# Patient Record
Sex: FEMALE | Race: WHITE | NOT HISPANIC OR LATINO | Employment: OTHER | RURAL
[De-identification: names, ages, dates, MRNs, and addresses within clinical notes are randomized per-mention and may not be internally consistent; named-entity substitution may affect disease eponyms.]

---

## 2019-10-09 LAB — HIV 1 AB: NORMAL

## 2020-04-28 ENCOUNTER — HISTORICAL (OUTPATIENT)
Dept: ADMINISTRATIVE | Facility: HOSPITAL | Age: 65
End: 2020-04-28

## 2020-05-07 ENCOUNTER — HISTORICAL (OUTPATIENT)
Dept: ADMINISTRATIVE | Facility: HOSPITAL | Age: 65
End: 2020-05-07

## 2020-10-06 ENCOUNTER — HISTORICAL (OUTPATIENT)
Dept: ADMINISTRATIVE | Facility: HOSPITAL | Age: 65
End: 2020-10-06

## 2020-10-06 LAB
ALBUMIN SERPL BCP-MCNC: 4.7 G/DL (ref 3.5–5)
ALBUMIN/GLOB SERPL: 1.4 {RATIO}
ALP SERPL-CCNC: 122 U/L (ref 50–130)
ALT SERPL W P-5'-P-CCNC: 45 U/L (ref 13–56)
ANION GAP SERPL CALCULATED.3IONS-SCNC: 14 MMOL/L (ref 7–16)
AST SERPL W P-5'-P-CCNC: 30 U/L (ref 15–37)
BASOPHILS # BLD AUTO: 0.05 X10E3/UL (ref 0–0.2)
BASOPHILS NFR BLD AUTO: 1.1 % (ref 0–1)
BILIRUB SERPL-MCNC: 1 MG/DL (ref 0–1.2)
BUN SERPL-MCNC: 15 MG/DL (ref 7–18)
BUN/CREAT SERPL: 16
CALCIUM SERPL-MCNC: 9.5 MG/DL (ref 8.5–10.1)
CHLORIDE SERPL-SCNC: 102 MMOL/L (ref 98–107)
CHOLEST SERPL-MCNC: 202 MG/DL
CHOLEST/HDLC SERPL: 5.6 {RATIO}
CO2 SERPL-SCNC: 25 MMOL/L (ref 21–32)
CREAT SERPL-MCNC: 0.94 MG/DL (ref 0.5–1.02)
EOSINOPHIL # BLD AUTO: 0.08 X10E3/UL (ref 0–0.5)
EOSINOPHIL NFR BLD AUTO: 1.8 % (ref 1–4)
ERYTHROCYTE [DISTWIDTH] IN BLOOD BY AUTOMATED COUNT: 12.7 % (ref 11.5–14.5)
GLOBULIN SER-MCNC: 3.4 G/DL (ref 2–4)
GLUCOSE SERPL-MCNC: 74 MG/DL (ref 74–106)
HCT VFR BLD AUTO: 42.2 % (ref 38–47)
HDLC SERPL-MCNC: 36 MG/DL
HGB BLD-MCNC: 13.7 G/DL (ref 12–16)
IMM GRANULOCYTES # BLD AUTO: 0.01 X10E3/UL (ref 0–0.04)
IMM GRANULOCYTES NFR BLD: 0.2 % (ref 0–0.4)
LDLC SERPL CALC-MCNC: 137 MG/DL
LYMPHOCYTES # BLD AUTO: 0.84 X10E3/UL (ref 1–4.8)
LYMPHOCYTES NFR BLD AUTO: 18.6 % (ref 27–41)
MCH RBC QN AUTO: 28.8 PG (ref 27–31)
MCHC RBC AUTO-ENTMCNC: 32.5 G/DL (ref 32–36)
MCV RBC AUTO: 88.7 FL (ref 80–96)
MONOCYTES # BLD AUTO: 0.41 X10E3/UL (ref 0–0.8)
MONOCYTES NFR BLD AUTO: 9.1 % (ref 2–6)
MPC BLD CALC-MCNC: 10.9 FL (ref 9.4–12.4)
NEUTROPHILS # BLD AUTO: 3.12 X10E3/UL (ref 1.8–7.7)
NEUTROPHILS NFR BLD AUTO: 69.2 % (ref 53–65)
NRBC # BLD AUTO: 0 X10E3/UL (ref 0–0)
NRBC, AUTO (.00): 0 /100 (ref 0–0)
PLATELET # BLD AUTO: 276 X10E3/UL (ref 150–400)
POTASSIUM SERPL-SCNC: 4.4 MMOL/L (ref 3.5–5.1)
PROT SERPL-MCNC: 8.1 G/DL (ref 6.4–8.2)
RBC # BLD AUTO: 4.76 X10E6/UL (ref 4.2–5.4)
SODIUM SERPL-SCNC: 137 MMOL/L (ref 136–145)
TRIGL SERPL-MCNC: 144 MG/DL
TSH SERPL DL<=0.005 MIU/L-ACNC: 0.22 UIU/ML (ref 0.36–3.74)
WBC # BLD AUTO: 4.51 X10E3/UL (ref 4.5–11)

## 2021-02-22 ENCOUNTER — HISTORICAL (OUTPATIENT)
Dept: ADMINISTRATIVE | Facility: HOSPITAL | Age: 66
End: 2021-02-22

## 2021-02-24 LAB
REPORT: NORMAL

## 2021-03-18 ENCOUNTER — OFFICE VISIT (OUTPATIENT)
Dept: DERMATOLOGY | Facility: CLINIC | Age: 66
End: 2021-03-18
Payer: COMMERCIAL

## 2021-03-18 VITALS — WEIGHT: 212 LBS | BODY MASS INDEX: 36.19 KG/M2 | HEIGHT: 64 IN | RESPIRATION RATE: 18 BRPM

## 2021-03-18 DIAGNOSIS — L13.0 DERMATITIS HERPETIFORMIS: Primary | ICD-10-CM

## 2021-03-18 DIAGNOSIS — L30.9 DERMATITIS, UNSPECIFIED: ICD-10-CM

## 2021-03-18 PROCEDURE — 3008F BODY MASS INDEX DOCD: CPT | Mod: CPTII,,, | Performed by: DERMATOLOGY

## 2021-03-18 PROCEDURE — 3008F PR BODY MASS INDEX (BMI) DOCUMENTED: ICD-10-PCS | Mod: CPTII,,, | Performed by: DERMATOLOGY

## 2021-03-18 PROCEDURE — 99212 OFFICE O/P EST SF 10 MIN: CPT | Mod: 25,,, | Performed by: DERMATOLOGY

## 2021-03-18 PROCEDURE — 99212 PR OFFICE/OUTPT VISIT, EST, LEVL II, 10-19 MIN: ICD-10-PCS | Mod: 25,,, | Performed by: DERMATOLOGY

## 2021-03-18 PROCEDURE — 11104 PUNCH BX SKIN SINGLE LESION: CPT | Mod: ,,, | Performed by: DERMATOLOGY

## 2021-03-18 PROCEDURE — 11104 PR PUNCH BIOPSY, SKIN, SINGLE LESION: ICD-10-PCS | Mod: ,,, | Performed by: DERMATOLOGY

## 2021-03-18 RX ORDER — IVERMECTIN 3 MG/1
TABLET ORAL
COMMUNITY
Start: 2021-02-22 | End: 2021-04-22

## 2021-03-18 RX ORDER — METOPROLOL SUCCINATE 25 MG/1
25 TABLET, EXTENDED RELEASE ORAL DAILY
COMMUNITY
Start: 2021-01-12 | End: 2021-07-22 | Stop reason: SDUPTHER

## 2021-03-18 RX ORDER — SULFAMETHOXAZOLE AND TRIMETHOPRIM 800; 160 MG/1; MG/1
TABLET ORAL
COMMUNITY
Start: 2021-03-01 | End: 2021-04-22

## 2021-03-18 RX ORDER — KETOCONAZOLE 20 MG/G
1 CREAM TOPICAL 2 TIMES DAILY
COMMUNITY
Start: 2021-02-04 | End: 2021-04-22

## 2021-03-18 RX ORDER — TRIAMCINOLONE ACETONIDE 1 MG/G
CREAM TOPICAL
COMMUNITY
Start: 2021-02-22 | End: 2021-06-08 | Stop reason: SDUPTHER

## 2021-03-18 RX ORDER — CLINDAMYCIN HYDROCHLORIDE 150 MG/1
CAPSULE ORAL
COMMUNITY
Start: 2021-01-26 | End: 2021-03-18

## 2021-03-18 RX ORDER — SULFAMETHOXAZOLE AND TRIMETHOPRIM 800; 160 MG/1; MG/1
60 TABLET ORAL DAILY
COMMUNITY
Start: 2021-03-08 | End: 2021-04-06 | Stop reason: SDUPTHER

## 2021-03-18 RX ORDER — LEVOTHYROXINE SODIUM 125 UG/1
TABLET ORAL
COMMUNITY
Start: 2021-02-04 | End: 2021-04-07

## 2021-03-30 ENCOUNTER — OFFICE VISIT (OUTPATIENT)
Dept: DERMATOLOGY | Facility: CLINIC | Age: 66
End: 2021-03-30
Payer: COMMERCIAL

## 2021-03-30 VITALS — HEIGHT: 64 IN | BODY MASS INDEX: 36.19 KG/M2 | WEIGHT: 212 LBS | RESPIRATION RATE: 18 BRPM

## 2021-03-30 DIAGNOSIS — Z48.02 ENCOUNTER FOR REMOVAL OF SUTURES: ICD-10-CM

## 2021-03-30 DIAGNOSIS — L30.9 DERMATITIS, UNSPECIFIED: Primary | ICD-10-CM

## 2021-03-30 PROCEDURE — 99213 PR OFFICE/OUTPT VISIT, EST, LEVL III, 20-29 MIN: ICD-10-PCS | Mod: ,,, | Performed by: DERMATOLOGY

## 2021-03-30 PROCEDURE — 3008F PR BODY MASS INDEX (BMI) DOCUMENTED: ICD-10-PCS | Mod: CPTII,,, | Performed by: DERMATOLOGY

## 2021-03-30 PROCEDURE — 3008F BODY MASS INDEX DOCD: CPT | Mod: CPTII,,, | Performed by: DERMATOLOGY

## 2021-03-30 PROCEDURE — 99213 OFFICE O/P EST LOW 20 MIN: CPT | Mod: ,,, | Performed by: DERMATOLOGY

## 2021-04-07 RX ORDER — SULFAMETHOXAZOLE AND TRIMETHOPRIM 800; 160 MG/1; MG/1
60 TABLET ORAL DAILY
Qty: 90 TABLET | Refills: 1 | Status: SHIPPED | OUTPATIENT
Start: 2021-04-07 | End: 2021-07-22 | Stop reason: SDUPTHER

## 2021-04-22 ENCOUNTER — OFFICE VISIT (OUTPATIENT)
Dept: DERMATOLOGY | Facility: CLINIC | Age: 66
End: 2021-04-22
Payer: COMMERCIAL

## 2021-04-22 VITALS — RESPIRATION RATE: 18 BRPM | WEIGHT: 212 LBS | HEIGHT: 64 IN | BODY MASS INDEX: 36.19 KG/M2

## 2021-04-22 DIAGNOSIS — L13.0 DERMATITIS HERPETIFORMIS: Primary | ICD-10-CM

## 2021-04-22 DIAGNOSIS — Z79.899 HIGH RISK MEDICATION USE: ICD-10-CM

## 2021-04-22 PROCEDURE — 99214 PR OFFICE/OUTPT VISIT, EST, LEVL IV, 30-39 MIN: ICD-10-PCS | Mod: ,,, | Performed by: DERMATOLOGY

## 2021-04-22 PROCEDURE — 3008F PR BODY MASS INDEX (BMI) DOCUMENTED: ICD-10-PCS | Mod: CPTII,,, | Performed by: DERMATOLOGY

## 2021-04-22 PROCEDURE — 3008F BODY MASS INDEX DOCD: CPT | Mod: CPTII,,, | Performed by: DERMATOLOGY

## 2021-04-22 PROCEDURE — 99214 OFFICE O/P EST MOD 30 MIN: CPT | Mod: ,,, | Performed by: DERMATOLOGY

## 2021-05-06 DIAGNOSIS — Z79.899 HIGH RISK MEDICATION USE: Primary | ICD-10-CM

## 2021-05-06 RX ORDER — DAPSONE 25 MG/1
50 TABLET ORAL 2 TIMES DAILY
Qty: 60 TABLET | Refills: 0 | Status: SHIPPED | OUTPATIENT
Start: 2021-05-06 | End: 2021-06-08

## 2021-05-06 RX ORDER — DAPSONE 25 MG/1
50 TABLET ORAL DAILY
Qty: 30 TABLET | Refills: 0 | OUTPATIENT
Start: 2021-05-06

## 2021-05-07 ENCOUNTER — TELEPHONE (OUTPATIENT)
Dept: DERMATOLOGY | Facility: CLINIC | Age: 66
End: 2021-05-07

## 2021-05-07 DIAGNOSIS — L13.0 DERMATITIS HERPETIFORMIS: Primary | ICD-10-CM

## 2021-05-13 ENCOUNTER — TELEPHONE (OUTPATIENT)
Dept: DERMATOLOGY | Facility: CLINIC | Age: 66
End: 2021-05-13

## 2021-05-13 DIAGNOSIS — Z79.899 HIGH RISK MEDICATION USE: ICD-10-CM

## 2021-05-13 DIAGNOSIS — L13.0 DERMATITIS HERPETIFORMIS: Primary | ICD-10-CM

## 2021-06-08 ENCOUNTER — OFFICE VISIT (OUTPATIENT)
Dept: DERMATOLOGY | Facility: CLINIC | Age: 66
End: 2021-06-08
Payer: MEDICARE

## 2021-06-08 VITALS — WEIGHT: 212 LBS | BODY MASS INDEX: 36.19 KG/M2 | HEIGHT: 64 IN | RESPIRATION RATE: 16 BRPM

## 2021-06-08 DIAGNOSIS — L13.0 DERMATITIS HERPETIFORMIS: Primary | ICD-10-CM

## 2021-06-08 DIAGNOSIS — Z79.899 HIGH RISK MEDICATION USE: ICD-10-CM

## 2021-06-08 DIAGNOSIS — L08.9 LOCAL SKIN INFECTION: ICD-10-CM

## 2021-06-08 PROCEDURE — 99214 OFFICE O/P EST MOD 30 MIN: CPT | Mod: ,,, | Performed by: DERMATOLOGY

## 2021-06-08 PROCEDURE — 87077 CULTURE AEROBIC IDENTIFY: CPT | Mod: ,,, | Performed by: CLINICAL MEDICAL LABORATORY

## 2021-06-08 PROCEDURE — 87077 CULTURE, WOUND: ICD-10-PCS | Mod: ,,, | Performed by: CLINICAL MEDICAL LABORATORY

## 2021-06-08 PROCEDURE — 87186 SC STD MICRODIL/AGAR DIL: CPT | Mod: ,,, | Performed by: CLINICAL MEDICAL LABORATORY

## 2021-06-08 PROCEDURE — 87070 CULTURE OTHR SPECIMN AEROBIC: CPT | Mod: ,,, | Performed by: CLINICAL MEDICAL LABORATORY

## 2021-06-08 PROCEDURE — 99214 PR OFFICE/OUTPT VISIT, EST, LEVL IV, 30-39 MIN: ICD-10-PCS | Mod: ,,, | Performed by: DERMATOLOGY

## 2021-06-08 PROCEDURE — 87070 CULTURE, WOUND: ICD-10-PCS | Mod: ,,, | Performed by: CLINICAL MEDICAL LABORATORY

## 2021-06-08 PROCEDURE — 87186 CULTURE, WOUND: ICD-10-PCS | Mod: ,,, | Performed by: CLINICAL MEDICAL LABORATORY

## 2021-06-08 PROCEDURE — 3008F PR BODY MASS INDEX (BMI) DOCUMENTED: ICD-10-PCS | Mod: CPTII,,, | Performed by: DERMATOLOGY

## 2021-06-08 PROCEDURE — 3008F BODY MASS INDEX DOCD: CPT | Mod: CPTII,,, | Performed by: DERMATOLOGY

## 2021-06-08 RX ORDER — DOXYCYCLINE 100 MG/1
100 CAPSULE ORAL 2 TIMES DAILY
Qty: 20 CAPSULE | Refills: 0 | OUTPATIENT
Start: 2021-06-08 | End: 2021-07-14

## 2021-06-08 RX ORDER — FLUCONAZOLE 150 MG/1
TABLET ORAL
Qty: 2 TABLET | Refills: 0 | OUTPATIENT
Start: 2021-06-08 | End: 2021-07-14

## 2021-06-08 RX ORDER — TRIAMCINOLONE ACETONIDE 1 MG/G
CREAM TOPICAL
Qty: 454 G | Refills: 1 | Status: ON HOLD | OUTPATIENT
Start: 2021-06-08 | End: 2022-02-07 | Stop reason: ALTCHOICE

## 2021-06-10 ENCOUNTER — OFFICE VISIT (OUTPATIENT)
Dept: GASTROENTEROLOGY | Facility: CLINIC | Age: 66
End: 2021-06-10
Payer: COMMERCIAL

## 2021-06-10 VITALS
SYSTOLIC BLOOD PRESSURE: 155 MMHG | HEART RATE: 64 BPM | WEIGHT: 212 LBS | BODY MASS INDEX: 36.19 KG/M2 | OXYGEN SATURATION: 100 % | DIASTOLIC BLOOD PRESSURE: 47 MMHG | RESPIRATION RATE: 16 BRPM | HEIGHT: 64 IN

## 2021-06-10 DIAGNOSIS — K90.0 CELIAC DISEASE: Primary | ICD-10-CM

## 2021-06-10 PROCEDURE — 99204 PR OFFICE/OUTPT VISIT, NEW, LEVL IV, 45-59 MIN: ICD-10-PCS | Mod: ,,, | Performed by: STUDENT IN AN ORGANIZED HEALTH CARE EDUCATION/TRAINING PROGRAM

## 2021-06-10 PROCEDURE — 99204 OFFICE O/P NEW MOD 45 MIN: CPT | Mod: ,,, | Performed by: STUDENT IN AN ORGANIZED HEALTH CARE EDUCATION/TRAINING PROGRAM

## 2021-06-10 PROCEDURE — 3008F BODY MASS INDEX DOCD: CPT | Mod: CPTII,,, | Performed by: STUDENT IN AN ORGANIZED HEALTH CARE EDUCATION/TRAINING PROGRAM

## 2021-06-10 PROCEDURE — 3008F PR BODY MASS INDEX (BMI) DOCUMENTED: ICD-10-PCS | Mod: CPTII,,, | Performed by: STUDENT IN AN ORGANIZED HEALTH CARE EDUCATION/TRAINING PROGRAM

## 2021-06-12 LAB
MICROORGANISM SPEC CULT: ABNORMAL
MICROORGANISM SPEC CULT: ABNORMAL

## 2021-06-14 RX ORDER — CIPROFLOXACIN 500 MG/1
500 TABLET ORAL EVERY 12 HOURS
Qty: 20 TABLET | Refills: 0 | OUTPATIENT
Start: 2021-06-14 | End: 2021-07-14

## 2021-07-14 ENCOUNTER — HOSPITAL ENCOUNTER (EMERGENCY)
Facility: HOSPITAL | Age: 66
Discharge: HOME | End: 2021-07-14
Payer: COMMERCIAL

## 2021-07-14 VITALS
DIASTOLIC BLOOD PRESSURE: 63 MMHG | HEIGHT: 64 IN | OXYGEN SATURATION: 96 % | BODY MASS INDEX: 35.77 KG/M2 | SYSTOLIC BLOOD PRESSURE: 168 MMHG | RESPIRATION RATE: 18 BRPM | WEIGHT: 209.5 LBS | TEMPERATURE: 99 F | HEART RATE: 54 BPM
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
## 2021-07-14 DIAGNOSIS — S61.451A DOG BITE, HAND, RIGHT, INITIAL ENCOUNTER: Primary | ICD-10-CM

## 2021-07-14 PROCEDURE — 99283 EMERGENCY DEPT VISIT LOW MDM: CPT | Mod: ,,, | Performed by: NURSE PRACTITIONER

## 2021-07-14 PROCEDURE — 99283 EMERGENCY DEPT VISIT LOW MDM: CPT | Performed by: NURSE PRACTITIONER

## 2021-07-14 PROCEDURE — 99283 PR EMERGENCY DEPT VISIT,LEVEL III: ICD-10-PCS | Mod: ,,, | Performed by: NURSE PRACTITIONER

## 2021-07-14 RX ORDER — SULFAMETHOXAZOLE AND TRIMETHOPRIM 800; 160 MG/1; MG/1
1 TABLET ORAL 2 TIMES DAILY
Qty: 20 TABLET | Refills: 0 | Status: SHIPPED | OUTPATIENT
Start: 2021-07-14 | End: 2021-07-21

## 2021-07-21 ENCOUNTER — OFFICE VISIT (OUTPATIENT)
Dept: DERMATOLOGY | Facility: CLINIC | Age: 66
End: 2021-07-21
Payer: COMMERCIAL

## 2021-07-21 VITALS — RESPIRATION RATE: 16 BRPM | HEIGHT: 64 IN | BODY MASS INDEX: 35.68 KG/M2 | WEIGHT: 209 LBS

## 2021-07-21 DIAGNOSIS — Z79.899 HIGH RISK MEDICATION USE: ICD-10-CM

## 2021-07-21 DIAGNOSIS — L13.0 DERMATITIS HERPETIFORMIS: Primary | ICD-10-CM

## 2021-07-21 DIAGNOSIS — L30.9 DERMATITIS, UNSPECIFIED: ICD-10-CM

## 2021-07-21 PROCEDURE — 99214 PR OFFICE/OUTPT VISIT, EST, LEVL IV, 30-39 MIN: ICD-10-PCS | Mod: ,,, | Performed by: DERMATOLOGY

## 2021-07-21 PROCEDURE — 99214 OFFICE O/P EST MOD 30 MIN: CPT | Mod: ,,, | Performed by: DERMATOLOGY

## 2021-07-21 PROCEDURE — 3008F PR BODY MASS INDEX (BMI) DOCUMENTED: ICD-10-PCS | Mod: CPTII,,, | Performed by: DERMATOLOGY

## 2021-07-21 PROCEDURE — 3008F BODY MASS INDEX DOCD: CPT | Mod: CPTII,,, | Performed by: DERMATOLOGY

## 2021-07-21 RX ORDER — CLOBETASOL PROPIONATE 0.5 MG/G
OINTMENT TOPICAL
Qty: 60 G | Refills: 2 | Status: ON HOLD | OUTPATIENT
Start: 2021-07-21 | End: 2022-02-07 | Stop reason: ALTCHOICE

## 2021-07-21 RX ORDER — DAPSONE 25 MG/1
50 TABLET ORAL 2 TIMES DAILY
Qty: 120 TABLET | Refills: 1 | Status: ON HOLD | OUTPATIENT
Start: 2021-07-21 | End: 2022-02-07 | Stop reason: ALTCHOICE

## 2021-07-22 ENCOUNTER — OFFICE VISIT (OUTPATIENT)
Dept: FAMILY MEDICINE | Facility: CLINIC | Age: 66
End: 2021-07-22
Payer: COMMERCIAL

## 2021-07-22 VITALS
TEMPERATURE: 98 F | DIASTOLIC BLOOD PRESSURE: 78 MMHG | SYSTOLIC BLOOD PRESSURE: 144 MMHG | WEIGHT: 209 LBS | RESPIRATION RATE: 18 BRPM | HEIGHT: 64 IN | BODY MASS INDEX: 35.68 KG/M2

## 2021-07-22 DIAGNOSIS — E03.9 HYPOTHYROIDISM, UNSPECIFIED TYPE: ICD-10-CM

## 2021-07-22 DIAGNOSIS — E66.9 OBESITY, UNSPECIFIED CLASSIFICATION, UNSPECIFIED OBESITY TYPE, UNSPECIFIED WHETHER SERIOUS COMORBIDITY PRESENT: Primary | ICD-10-CM

## 2021-07-22 DIAGNOSIS — J30.9 ALLERGIC RHINITIS, UNSPECIFIED SEASONALITY, UNSPECIFIED TRIGGER: ICD-10-CM

## 2021-07-22 DIAGNOSIS — I10 HYPERTENSION, UNSPECIFIED TYPE: ICD-10-CM

## 2021-07-22 PROCEDURE — 1125F PR PAIN SEVERITY QUANTIFIED, PAIN PRESENT: ICD-10-PCS | Mod: CPTII,,, | Performed by: FAMILY MEDICINE

## 2021-07-22 PROCEDURE — 1125F AMNT PAIN NOTED PAIN PRSNT: CPT | Mod: CPTII,,, | Performed by: FAMILY MEDICINE

## 2021-07-22 PROCEDURE — 99214 PR OFFICE/OUTPT VISIT, EST, LEVL IV, 30-39 MIN: ICD-10-PCS | Mod: ,,, | Performed by: FAMILY MEDICINE

## 2021-07-22 PROCEDURE — 80050 COMPREHENSIVE METABOLIC PANEL: ICD-10-PCS | Mod: ,,, | Performed by: CLINICAL MEDICAL LABORATORY

## 2021-07-22 PROCEDURE — 80050 GENERAL HEALTH PANEL: CPT | Mod: ,,, | Performed by: CLINICAL MEDICAL LABORATORY

## 2021-07-22 PROCEDURE — 3008F PR BODY MASS INDEX (BMI) DOCUMENTED: ICD-10-PCS | Mod: CPTII,,, | Performed by: FAMILY MEDICINE

## 2021-07-22 PROCEDURE — 84481 FREE ASSAY (FT-3): CPT | Mod: ,,, | Performed by: CLINICAL MEDICAL LABORATORY

## 2021-07-22 PROCEDURE — 84481 T3, FREE: ICD-10-PCS | Mod: ,,, | Performed by: CLINICAL MEDICAL LABORATORY

## 2021-07-22 PROCEDURE — 99214 OFFICE O/P EST MOD 30 MIN: CPT | Mod: ,,, | Performed by: FAMILY MEDICINE

## 2021-07-22 PROCEDURE — 84439 T4, FREE: ICD-10-PCS | Mod: ,,, | Performed by: CLINICAL MEDICAL LABORATORY

## 2021-07-22 PROCEDURE — 84439 ASSAY OF FREE THYROXINE: CPT | Mod: ,,, | Performed by: CLINICAL MEDICAL LABORATORY

## 2021-07-22 PROCEDURE — 3008F BODY MASS INDEX DOCD: CPT | Mod: CPTII,,, | Performed by: FAMILY MEDICINE

## 2021-07-22 PROCEDURE — 80061 LIPID PANEL: ICD-10-PCS | Mod: ,,, | Performed by: CLINICAL MEDICAL LABORATORY

## 2021-07-22 PROCEDURE — 80061 LIPID PANEL: CPT | Mod: ,,, | Performed by: CLINICAL MEDICAL LABORATORY

## 2021-07-22 RX ORDER — METOPROLOL SUCCINATE 25 MG/1
25 TABLET, EXTENDED RELEASE ORAL DAILY
Qty: 90 TABLET | Refills: 1 | Status: SHIPPED | OUTPATIENT
Start: 2021-07-22 | End: 2021-11-09 | Stop reason: SDUPTHER

## 2021-07-22 RX ORDER — SULFAMETHOXAZOLE AND TRIMETHOPRIM 800; 160 MG/1; MG/1
60 TABLET ORAL DAILY
Qty: 90 TABLET | Refills: 1 | Status: SHIPPED | OUTPATIENT
Start: 2021-07-22 | End: 2021-11-09 | Stop reason: SDUPTHER

## 2021-07-22 RX ORDER — MONTELUKAST SODIUM 10 MG/1
10 TABLET ORAL NIGHTLY
Qty: 30 TABLET | Refills: 0 | Status: SHIPPED | OUTPATIENT
Start: 2021-07-22 | End: 2021-08-21

## 2021-07-23 LAB
ALBUMIN SERPL BCP-MCNC: 4.7 G/DL (ref 3.5–5)
ALBUMIN/GLOB SERPL: 1.4 {RATIO}
ALP SERPL-CCNC: 101 U/L (ref 50–130)
ALT SERPL W P-5'-P-CCNC: 40 U/L (ref 13–56)
ANION GAP SERPL CALCULATED.3IONS-SCNC: 14 MMOL/L (ref 7–16)
AST SERPL W P-5'-P-CCNC: 26 U/L (ref 15–37)
BASOPHILS # BLD AUTO: 0.11 K/UL (ref 0–0.2)
BASOPHILS NFR BLD AUTO: 1.7 % (ref 0–1)
BILIRUB SERPL-MCNC: 0.5 MG/DL (ref 0–1.2)
BUN SERPL-MCNC: 22 MG/DL (ref 7–18)
BUN/CREAT SERPL: 16 (ref 6–20)
CALCIUM SERPL-MCNC: 9.4 MG/DL (ref 8.5–10.1)
CHLORIDE SERPL-SCNC: 100 MMOL/L (ref 98–107)
CHOLEST SERPL-MCNC: 247 MG/DL (ref 0–200)
CHOLEST/HDLC SERPL: 6 {RATIO}
CO2 SERPL-SCNC: 24 MMOL/L (ref 21–32)
CREAT SERPL-MCNC: 1.4 MG/DL (ref 0.55–1.02)
DIFFERENTIAL METHOD BLD: ABNORMAL
EOSINOPHIL # BLD AUTO: 0.32 K/UL (ref 0–0.5)
EOSINOPHIL NFR BLD AUTO: 4.9 % (ref 1–4)
ERYTHROCYTE [DISTWIDTH] IN BLOOD BY AUTOMATED COUNT: 13 % (ref 11.5–14.5)
GLOBULIN SER-MCNC: 3.4 G/DL (ref 2–4)
GLUCOSE SERPL-MCNC: 90 MG/DL (ref 74–106)
HCT VFR BLD AUTO: 38.3 % (ref 38–47)
HDLC SERPL-MCNC: 41 MG/DL (ref 40–60)
HGB BLD-MCNC: 12.5 G/DL (ref 12–16)
IMM GRANULOCYTES # BLD AUTO: 0.04 K/UL (ref 0–0.04)
IMM GRANULOCYTES NFR BLD: 0.6 % (ref 0–0.4)
LDLC SERPL CALC-MCNC: 159 MG/DL
LDLC/HDLC SERPL: 3.9 {RATIO}
LYMPHOCYTES # BLD AUTO: 1.04 K/UL (ref 1–4.8)
LYMPHOCYTES NFR BLD AUTO: 16 % (ref 27–41)
MCH RBC QN AUTO: 28.7 PG (ref 27–31)
MCHC RBC AUTO-ENTMCNC: 32.6 G/DL (ref 32–36)
MCV RBC AUTO: 88 FL (ref 80–96)
MONOCYTES # BLD AUTO: 0.51 K/UL (ref 0–0.8)
MONOCYTES NFR BLD AUTO: 7.8 % (ref 2–6)
MPC BLD CALC-MCNC: 10.8 FL (ref 9.4–12.4)
NEUTROPHILS # BLD AUTO: 4.49 K/UL (ref 1.8–7.7)
NEUTROPHILS NFR BLD AUTO: 69 % (ref 53–65)
NONHDLC SERPL-MCNC: 206 MG/DL
NRBC # BLD AUTO: 0 X10E3/UL
NRBC, AUTO (.00): 0 %
PLATELET # BLD AUTO: 294 K/UL (ref 150–400)
POTASSIUM SERPL-SCNC: 4.7 MMOL/L (ref 3.5–5.1)
PROT SERPL-MCNC: 8.1 G/DL (ref 6.4–8.2)
RBC # BLD AUTO: 4.35 M/UL (ref 4.2–5.4)
SODIUM SERPL-SCNC: 133 MMOL/L (ref 136–145)
T3FREE SERPL-MCNC: 2.83 PG/ML (ref 2.18–3.98)
T4 FREE SERPL-MCNC: 0.52 NG/DL (ref 0.76–1.46)
TRIGL SERPL-MCNC: 237 MG/DL (ref 35–150)
TSH SERPL DL<=0.005 MIU/L-ACNC: 6.13 UIU/ML (ref 0.36–3.74)
VLDLC SERPL-MCNC: 47 MG/DL
WBC # BLD AUTO: 6.51 K/UL (ref 4.5–11)

## 2021-07-26 ENCOUNTER — TELEPHONE (OUTPATIENT)
Dept: FAMILY MEDICINE | Facility: CLINIC | Age: 66
End: 2021-07-26

## 2021-07-29 ENCOUNTER — OFFICE VISIT (OUTPATIENT)
Dept: FAMILY MEDICINE | Facility: CLINIC | Age: 66
End: 2021-07-29
Payer: COMMERCIAL

## 2021-07-29 VITALS
DIASTOLIC BLOOD PRESSURE: 89 MMHG | HEIGHT: 64 IN | BODY MASS INDEX: 35.68 KG/M2 | HEART RATE: 65 BPM | RESPIRATION RATE: 18 BRPM | TEMPERATURE: 99 F | SYSTOLIC BLOOD PRESSURE: 153 MMHG | OXYGEN SATURATION: 97 % | WEIGHT: 209 LBS
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
## 2021-07-29 PROCEDURE — 99213 PR OFFICE/OUTPT VISIT, EST, LEVL III, 20-29 MIN: ICD-10-PCS | Mod: ,,, | Performed by: NURSE PRACTITIONER

## 2021-07-29 PROCEDURE — 3077F PR MOST RECENT SYSTOLIC BLOOD PRESSURE >= 140 MM HG: ICD-10-PCS | Mod: CPTII,,, | Performed by: NURSE PRACTITIONER

## 2021-07-29 PROCEDURE — 3079F DIAST BP 80-89 MM HG: CPT | Mod: CPTII,,, | Performed by: NURSE PRACTITIONER

## 2021-07-29 PROCEDURE — 3008F PR BODY MASS INDEX (BMI) DOCUMENTED: ICD-10-PCS | Mod: CPTII,,, | Performed by: NURSE PRACTITIONER

## 2021-07-29 PROCEDURE — 3077F SYST BP >= 140 MM HG: CPT | Mod: CPTII,,, | Performed by: NURSE PRACTITIONER

## 2021-07-29 PROCEDURE — 3008F BODY MASS INDEX DOCD: CPT | Mod: CPTII,,, | Performed by: NURSE PRACTITIONER

## 2021-07-29 PROCEDURE — 99213 OFFICE O/P EST LOW 20 MIN: CPT | Mod: ,,, | Performed by: NURSE PRACTITIONER

## 2021-07-29 PROCEDURE — 3079F PR MOST RECENT DIASTOLIC BLOOD PRESSURE 80-89 MM HG: ICD-10-PCS | Mod: CPTII,,, | Performed by: NURSE PRACTITIONER

## 2021-07-29 RX ORDER — DOXYCYCLINE HYCLATE 100 MG
100 TABLET ORAL EVERY 12 HOURS
Qty: 14 TABLET | Refills: 0 | Status: SHIPPED | OUTPATIENT
Start: 2021-07-29 | End: 2021-08-05

## 2021-07-30 ENCOUNTER — TELEPHONE (OUTPATIENT)
Dept: FAMILY MEDICINE | Facility: CLINIC | Age: 66
End: 2021-07-30

## 2021-08-01 ENCOUNTER — OFFICE VISIT (OUTPATIENT)
Dept: FAMILY MEDICINE | Facility: CLINIC | Age: 66
End: 2021-08-01
Payer: COMMERCIAL

## 2021-08-01 VITALS
DIASTOLIC BLOOD PRESSURE: 60 MMHG | OXYGEN SATURATION: 93 % | WEIGHT: 209 LBS | RESPIRATION RATE: 20 BRPM | TEMPERATURE: 100 F | SYSTOLIC BLOOD PRESSURE: 101 MMHG | BODY MASS INDEX: 35.68 KG/M2 | HEART RATE: 91 BPM | HEIGHT: 64 IN

## 2021-08-01 DIAGNOSIS — J02.9 SORE THROAT: ICD-10-CM

## 2021-08-01 DIAGNOSIS — R50.9 FEVER, UNSPECIFIED FEVER CAUSE: ICD-10-CM
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
## 2021-08-01 LAB
BASOPHILS # BLD AUTO: 0.07 K/UL (ref 0–0.2)
BASOPHILS NFR BLD AUTO: 1.1 % (ref 0–1)
CTP QC/QA: YES
CTP QC/QA: YES
DIFFERENTIAL METHOD BLD: ABNORMAL
EOSINOPHIL # BLD AUTO: 0.16 K/UL (ref 0–0.5)
EOSINOPHIL NFR BLD AUTO: 2.4 % (ref 1–4)
ERYTHROCYTE [DISTWIDTH] IN BLOOD BY AUTOMATED COUNT: 12.8 % (ref 11.5–14.5)
FLUAV AG NPH QL: NEGATIVE
FLUBV AG NPH QL: NEGATIVE
HCT VFR BLD AUTO: 36 % (ref 38–47)
HGB BLD-MCNC: 11.8 G/DL (ref 12–16)
IMM GRANULOCYTES # BLD AUTO: 0.02 K/UL (ref 0–0.04)
IMM GRANULOCYTES NFR BLD: 0.3 % (ref 0–0.4)
LYMPHOCYTES # BLD AUTO: 1.07 K/UL (ref 1–4.8)
LYMPHOCYTES NFR BLD AUTO: 16.4 % (ref 27–41)
MCH RBC QN AUTO: 28.7 PG (ref 27–31)
MCHC RBC AUTO-ENTMCNC: 32.8 G/DL (ref 32–36)
MCV RBC AUTO: 87.6 FL (ref 80–96)
MONOCYTES # BLD AUTO: 0.58 K/UL (ref 0–0.8)
MONOCYTES NFR BLD AUTO: 8.9 % (ref 2–6)
MPC BLD CALC-MCNC: 10.8 FL (ref 9.4–12.4)
NEUTROPHILS # BLD AUTO: 4.64 K/UL (ref 1.8–7.7)
NEUTROPHILS NFR BLD AUTO: 70.9 % (ref 53–65)
NRBC # BLD AUTO: 0 X10E3/UL
NRBC, AUTO (.00): 0 %
PLATELET # BLD AUTO: 282 K/UL (ref 150–400)
RBC # BLD AUTO: 4.11 M/UL (ref 4.2–5.4)
S PYO RRNA THROAT QL PROBE: NEGATIVE
SARS-COV-2 AG RESP QL IA.RAPID: NEGATIVE
WBC # BLD AUTO: 6.54 K/UL (ref 4.5–11)

## 2021-08-01 PROCEDURE — 87880 STREP A ASSAY W/OPTIC: CPT | Mod: QW,,, | Performed by: NURSE PRACTITIONER

## 2021-08-01 PROCEDURE — 87880 POCT RAPID STREP A: ICD-10-PCS | Mod: QW,,, | Performed by: NURSE PRACTITIONER

## 2021-08-01 PROCEDURE — 99214 PR OFFICE/OUTPT VISIT, EST, LEVL IV, 30-39 MIN: ICD-10-PCS | Mod: CG,,, | Performed by: NURSE PRACTITIONER

## 2021-08-01 PROCEDURE — 3078F PR MOST RECENT DIASTOLIC BLOOD PRESSURE < 80 MM HG: ICD-10-PCS | Mod: CPTII,,, | Performed by: NURSE PRACTITIONER

## 2021-08-01 PROCEDURE — 1125F PR PAIN SEVERITY QUANTIFIED, PAIN PRESENT: ICD-10-PCS | Mod: CPTII,,, | Performed by: NURSE PRACTITIONER

## 2021-08-01 PROCEDURE — 3074F PR MOST RECENT SYSTOLIC BLOOD PRESSURE < 130 MM HG: ICD-10-PCS | Mod: CPTII,,, | Performed by: NURSE PRACTITIONER

## 2021-08-01 PROCEDURE — 99051 MED SERV EVE/WKEND/HOLIDAY: CPT | Mod: ,,, | Performed by: NURSE PRACTITIONER

## 2021-08-01 PROCEDURE — 87428 SARSCOV & INF VIR A&B AG IA: CPT | Mod: QW,,, | Performed by: NURSE PRACTITIONER

## 2021-08-01 PROCEDURE — 3008F PR BODY MASS INDEX (BMI) DOCUMENTED: ICD-10-PCS | Mod: CPTII,,, | Performed by: NURSE PRACTITIONER

## 2021-08-01 PROCEDURE — 1160F RVW MEDS BY RX/DR IN RCRD: CPT | Mod: CPTII,,, | Performed by: NURSE PRACTITIONER

## 2021-08-01 PROCEDURE — 3008F BODY MASS INDEX DOCD: CPT | Mod: CPTII,,, | Performed by: NURSE PRACTITIONER

## 2021-08-01 PROCEDURE — 99051 PR MEDICAL SERVICES, EVE/WKEND/HOLIDAY: ICD-10-PCS | Mod: ,,, | Performed by: NURSE PRACTITIONER

## 2021-08-01 PROCEDURE — 85025 COMPLETE CBC W/AUTO DIFF WBC: CPT | Mod: ,,, | Performed by: CLINICAL MEDICAL LABORATORY

## 2021-08-01 PROCEDURE — 87428 POCT SARS-COV2 (COVID) WITH FLU ANTIGEN: ICD-10-PCS | Mod: QW,,, | Performed by: NURSE PRACTITIONER

## 2021-08-01 PROCEDURE — 85025 CBC WITH DIFFERENTIAL: ICD-10-PCS | Mod: ,,, | Performed by: CLINICAL MEDICAL LABORATORY

## 2021-08-01 PROCEDURE — 1125F AMNT PAIN NOTED PAIN PRSNT: CPT | Mod: CPTII,,, | Performed by: NURSE PRACTITIONER

## 2021-08-01 PROCEDURE — 3078F DIAST BP <80 MM HG: CPT | Mod: CPTII,,, | Performed by: NURSE PRACTITIONER

## 2021-08-01 PROCEDURE — 99214 OFFICE O/P EST MOD 30 MIN: CPT | Mod: CG,,, | Performed by: NURSE PRACTITIONER

## 2021-08-01 PROCEDURE — 1160F PR REVIEW ALL MEDS BY PRESCRIBER/CLIN PHARMACIST DOCUMENTED: ICD-10-PCS | Mod: CPTII,,, | Performed by: NURSE PRACTITIONER

## 2021-08-01 PROCEDURE — 3074F SYST BP LT 130 MM HG: CPT | Mod: CPTII,,, | Performed by: NURSE PRACTITIONER

## 2021-08-01 PROCEDURE — 1159F PR MEDICATION LIST DOCUMENTED IN MEDICAL RECORD: ICD-10-PCS | Mod: CPTII,,, | Performed by: NURSE PRACTITIONER

## 2021-08-01 PROCEDURE — 1159F MED LIST DOCD IN RCRD: CPT | Mod: CPTII,,, | Performed by: NURSE PRACTITIONER

## 2021-08-01 RX ORDER — MUPIROCIN 20 MG/G
OINTMENT TOPICAL 3 TIMES DAILY
Qty: 30 G | Refills: 0 | Status: ON HOLD | OUTPATIENT
Start: 2021-08-01 | End: 2022-02-07 | Stop reason: ALTCHOICE

## 2021-08-04 ENCOUNTER — TELEPHONE (OUTPATIENT)
Dept: FAMILY MEDICINE | Facility: CLINIC | Age: 66
End: 2021-08-04

## 2021-09-17 ENCOUNTER — TELEPHONE (OUTPATIENT)
Dept: FAMILY MEDICINE | Facility: CLINIC | Age: 66
End: 2021-09-17

## 2021-09-21 ENCOUNTER — TELEPHONE (OUTPATIENT)
Dept: DERMATOLOGY | Facility: CLINIC | Age: 66
End: 2021-09-21

## 2021-09-21 DIAGNOSIS — Z79.899 HIGH RISK MEDICATION USE: Primary | ICD-10-CM

## 2021-10-21 ENCOUNTER — TELEPHONE (OUTPATIENT)
Dept: GASTROENTEROLOGY | Facility: CLINIC | Age: 66
End: 2021-10-21

## 2021-11-09 ENCOUNTER — OFFICE VISIT (OUTPATIENT)
Dept: FAMILY MEDICINE | Facility: CLINIC | Age: 66
End: 2021-11-09
Payer: COMMERCIAL

## 2021-11-09 VITALS
RESPIRATION RATE: 18 BRPM | TEMPERATURE: 99 F | BODY MASS INDEX: 35.87 KG/M2 | DIASTOLIC BLOOD PRESSURE: 72 MMHG | HEART RATE: 64 BPM | HEIGHT: 64 IN | SYSTOLIC BLOOD PRESSURE: 100 MMHG | OXYGEN SATURATION: 98 %

## 2021-11-09 DIAGNOSIS — M25.50 ARTHRALGIA, UNSPECIFIED JOINT: Primary | ICD-10-CM

## 2021-11-09 DIAGNOSIS — I10 HYPERTENSION, UNSPECIFIED TYPE: ICD-10-CM

## 2021-11-09 DIAGNOSIS — E03.9 HYPOTHYROIDISM, UNSPECIFIED TYPE: ICD-10-CM

## 2021-11-09 PROCEDURE — 1160F RVW MEDS BY RX/DR IN RCRD: CPT | Mod: CPTII,,, | Performed by: FAMILY MEDICINE

## 2021-11-09 PROCEDURE — 1159F PR MEDICATION LIST DOCUMENTED IN MEDICAL RECORD: ICD-10-PCS | Mod: CPTII,,, | Performed by: FAMILY MEDICINE

## 2021-11-09 PROCEDURE — 3074F SYST BP LT 130 MM HG: CPT | Mod: CPTII,,, | Performed by: FAMILY MEDICINE

## 2021-11-09 PROCEDURE — 3074F PR MOST RECENT SYSTOLIC BLOOD PRESSURE < 130 MM HG: ICD-10-PCS | Mod: CPTII,,, | Performed by: FAMILY MEDICINE

## 2021-11-09 PROCEDURE — 3078F PR MOST RECENT DIASTOLIC BLOOD PRESSURE < 80 MM HG: ICD-10-PCS | Mod: CPTII,,, | Performed by: FAMILY MEDICINE

## 2021-11-09 PROCEDURE — 3008F PR BODY MASS INDEX (BMI) DOCUMENTED: ICD-10-PCS | Mod: CPTII,,, | Performed by: FAMILY MEDICINE

## 2021-11-09 PROCEDURE — 3008F BODY MASS INDEX DOCD: CPT | Mod: CPTII,,, | Performed by: FAMILY MEDICINE

## 2021-11-09 PROCEDURE — 99213 PR OFFICE/OUTPT VISIT, EST, LEVL III, 20-29 MIN: ICD-10-PCS | Mod: ,,, | Performed by: FAMILY MEDICINE

## 2021-11-09 PROCEDURE — 99213 OFFICE O/P EST LOW 20 MIN: CPT | Mod: ,,, | Performed by: FAMILY MEDICINE

## 2021-11-09 PROCEDURE — 1160F PR REVIEW ALL MEDS BY PRESCRIBER/CLIN PHARMACIST DOCUMENTED: ICD-10-PCS | Mod: CPTII,,, | Performed by: FAMILY MEDICINE

## 2021-11-09 PROCEDURE — 3078F DIAST BP <80 MM HG: CPT | Mod: CPTII,,, | Performed by: FAMILY MEDICINE

## 2021-11-09 PROCEDURE — 1159F MED LIST DOCD IN RCRD: CPT | Mod: CPTII,,, | Performed by: FAMILY MEDICINE

## 2021-11-09 RX ORDER — SULFAMETHOXAZOLE AND TRIMETHOPRIM 800; 160 MG/1; MG/1
60 TABLET ORAL DAILY
Qty: 90 TABLET | Refills: 1 | Status: ON HOLD | OUTPATIENT
Start: 2021-11-09 | End: 2022-02-13 | Stop reason: HOSPADM

## 2021-11-09 RX ORDER — METOPROLOL SUCCINATE 25 MG/1
25 TABLET, EXTENDED RELEASE ORAL DAILY
Qty: 90 TABLET | Refills: 1 | Status: SHIPPED | OUTPATIENT
Start: 2021-11-09 | End: 2022-07-28 | Stop reason: SDUPTHER

## 2021-11-17 ENCOUNTER — CLINICAL SUPPORT (OUTPATIENT)
Dept: REHABILITATION | Facility: HOSPITAL | Age: 66
End: 2021-11-17
Attending: FAMILY MEDICINE
Payer: MEDICARE

## 2021-11-17 DIAGNOSIS — M25.60 DECREASED RANGE OF MOTION: ICD-10-CM

## 2021-11-17 DIAGNOSIS — R52 PAIN: ICD-10-CM

## 2021-11-17 DIAGNOSIS — R53.1 DECREASED STRENGTH: ICD-10-CM

## 2021-11-17 DIAGNOSIS — M25.50 ARTHRALGIA, UNSPECIFIED JOINT: Primary | ICD-10-CM

## 2021-11-17 PROCEDURE — 97161 PT EVAL LOW COMPLEX 20 MIN: CPT

## 2021-11-18 ENCOUNTER — OFFICE VISIT (OUTPATIENT)
Dept: OTOLARYNGOLOGY | Facility: CLINIC | Age: 66
End: 2021-11-18
Payer: COMMERCIAL

## 2021-11-18 VITALS — WEIGHT: 209 LBS | BODY MASS INDEX: 35.68 KG/M2 | HEIGHT: 64 IN

## 2021-11-18 DIAGNOSIS — H90.3 SENSORINEURAL HEARING LOSS (SNHL) OF BOTH EARS: ICD-10-CM

## 2021-11-18 DIAGNOSIS — H93.19 TINNITUS, UNSPECIFIED LATERALITY: Primary | ICD-10-CM

## 2021-11-18 PROCEDURE — 1160F PR REVIEW ALL MEDS BY PRESCRIBER/CLIN PHARMACIST DOCUMENTED: ICD-10-PCS | Mod: CPTII,,, | Performed by: OTOLARYNGOLOGY

## 2021-11-18 PROCEDURE — 1159F MED LIST DOCD IN RCRD: CPT | Mod: CPTII,,, | Performed by: OTOLARYNGOLOGY

## 2021-11-18 PROCEDURE — 99214 PR OFFICE/OUTPT VISIT, EST, LEVL IV, 30-39 MIN: ICD-10-PCS | Mod: S$PBB,,, | Performed by: OTOLARYNGOLOGY

## 2021-11-18 PROCEDURE — 1159F PR MEDICATION LIST DOCUMENTED IN MEDICAL RECORD: ICD-10-PCS | Mod: CPTII,,, | Performed by: OTOLARYNGOLOGY

## 2021-11-18 PROCEDURE — 1160F RVW MEDS BY RX/DR IN RCRD: CPT | Mod: CPTII,,, | Performed by: OTOLARYNGOLOGY

## 2021-11-18 PROCEDURE — 99214 OFFICE O/P EST MOD 30 MIN: CPT | Mod: S$PBB,,, | Performed by: OTOLARYNGOLOGY

## 2021-11-18 PROCEDURE — 3008F PR BODY MASS INDEX (BMI) DOCUMENTED: ICD-10-PCS | Mod: CPTII,,, | Performed by: OTOLARYNGOLOGY

## 2021-11-18 PROCEDURE — 3008F BODY MASS INDEX DOCD: CPT | Mod: CPTII,,, | Performed by: OTOLARYNGOLOGY

## 2021-11-18 PROCEDURE — 99213 OFFICE O/P EST LOW 20 MIN: CPT | Mod: PBBFAC | Performed by: OTOLARYNGOLOGY

## 2021-12-08 ENCOUNTER — CLINICAL SUPPORT (OUTPATIENT)
Dept: REHABILITATION | Facility: HOSPITAL | Age: 66
End: 2021-12-08
Payer: MEDICARE

## 2021-12-08 DIAGNOSIS — M25.60 DECREASED RANGE OF MOTION: ICD-10-CM

## 2021-12-08 DIAGNOSIS — R53.1 DECREASED STRENGTH: Primary | ICD-10-CM

## 2021-12-08 DIAGNOSIS — R52 PAIN: ICD-10-CM

## 2021-12-08 PROCEDURE — 97110 THERAPEUTIC EXERCISES: CPT

## 2021-12-09 ENCOUNTER — OFFICE VISIT (OUTPATIENT)
Dept: GASTROENTEROLOGY | Facility: CLINIC | Age: 66
End: 2021-12-09
Payer: COMMERCIAL

## 2021-12-09 VITALS
DIASTOLIC BLOOD PRESSURE: 67 MMHG | OXYGEN SATURATION: 96 % | HEART RATE: 62 BPM | SYSTOLIC BLOOD PRESSURE: 105 MMHG | RESPIRATION RATE: 14 BRPM | BODY MASS INDEX: 35.68 KG/M2 | HEIGHT: 64 IN | WEIGHT: 209 LBS

## 2021-12-09 DIAGNOSIS — D50.9 IRON DEFICIENCY ANEMIA, UNSPECIFIED IRON DEFICIENCY ANEMIA TYPE: ICD-10-CM

## 2021-12-09 DIAGNOSIS — K90.0 CELIAC DISEASE: Primary | ICD-10-CM

## 2021-12-09 DIAGNOSIS — K90.41 GLUTEN INTOLERANCE: ICD-10-CM

## 2021-12-09 PROCEDURE — 99214 PR OFFICE/OUTPT VISIT, EST, LEVL IV, 30-39 MIN: ICD-10-PCS | Mod: ,,, | Performed by: NURSE PRACTITIONER

## 2021-12-09 PROCEDURE — 99214 OFFICE O/P EST MOD 30 MIN: CPT | Mod: ,,, | Performed by: NURSE PRACTITIONER

## 2021-12-14 ENCOUNTER — CLINICAL SUPPORT (OUTPATIENT)
Dept: REHABILITATION | Facility: HOSPITAL | Age: 66
End: 2021-12-14
Payer: MEDICARE

## 2021-12-14 DIAGNOSIS — R53.1 DECREASED STRENGTH: Primary | ICD-10-CM

## 2021-12-14 DIAGNOSIS — M25.60 DECREASED RANGE OF MOTION: ICD-10-CM

## 2021-12-14 DIAGNOSIS — R52 PAIN: ICD-10-CM

## 2021-12-14 PROCEDURE — 97110 THERAPEUTIC EXERCISES: CPT

## 2021-12-21 ENCOUNTER — CLINICAL SUPPORT (OUTPATIENT)
Dept: REHABILITATION | Facility: HOSPITAL | Age: 66
End: 2021-12-21
Payer: COMMERCIAL

## 2021-12-21 DIAGNOSIS — R53.1 DECREASED STRENGTH: ICD-10-CM

## 2021-12-21 DIAGNOSIS — M25.60 DECREASED RANGE OF MOTION: Primary | ICD-10-CM

## 2021-12-21 DIAGNOSIS — R52 PAIN: ICD-10-CM

## 2021-12-21 PROCEDURE — 97110 THERAPEUTIC EXERCISES: CPT | Mod: CQ

## 2021-12-27 DIAGNOSIS — Z11.59 SCREENING FOR VIRAL DISEASE: Primary | ICD-10-CM

## 2021-12-29 ENCOUNTER — HOSPITAL ENCOUNTER (OUTPATIENT)
Dept: GASTROENTEROLOGY | Facility: HOSPITAL | Age: 66
Discharge: HOME | End: 2021-12-29
Attending: NURSE PRACTITIONER
Payer: COMMERCIAL

## 2021-12-29 ENCOUNTER — ANESTHESIA (OUTPATIENT)
Dept: GASTROENTEROLOGY | Facility: HOSPITAL | Age: 66
End: 2021-12-29
Payer: COMMERCIAL

## 2021-12-29 ENCOUNTER — ANESTHESIA EVENT (OUTPATIENT)
Dept: GASTROENTEROLOGY | Facility: HOSPITAL | Age: 66
End: 2021-12-29
Payer: MEDICARE

## 2021-12-29 VITALS
TEMPERATURE: 98 F | RESPIRATION RATE: 14 BRPM | WEIGHT: 209 LBS | OXYGEN SATURATION: 100 % | BODY MASS INDEX: 35.68 KG/M2 | SYSTOLIC BLOOD PRESSURE: 146 MMHG | DIASTOLIC BLOOD PRESSURE: 74 MMHG | HEIGHT: 64 IN | HEART RATE: 63 BPM

## 2021-12-29 DIAGNOSIS — D50.9 IRON DEFICIENCY ANEMIA, UNSPECIFIED IRON DEFICIENCY ANEMIA TYPE: ICD-10-CM

## 2021-12-29 PROCEDURE — 63600175 PHARM REV CODE 636 W HCPCS: Performed by: NURSE ANESTHETIST, CERTIFIED REGISTERED

## 2021-12-29 PROCEDURE — 45380 COLONOSCOPY AND BIOPSY: CPT | Mod: ,,, | Performed by: STUDENT IN AN ORGANIZED HEALTH CARE EDUCATION/TRAINING PROGRAM

## 2021-12-29 PROCEDURE — 88305 TISSUE EXAM BY PATHOLOGIST: CPT | Mod: 26,,, | Performed by: PATHOLOGY

## 2021-12-29 PROCEDURE — 00813 ANES UPR LWR GI NDSC PX: CPT

## 2021-12-29 PROCEDURE — 00731 ANES UPR GI NDSC PX NOS: CPT

## 2021-12-29 PROCEDURE — 45380 PR COLONOSCOPY,BIOPSY: ICD-10-PCS | Mod: ,,, | Performed by: STUDENT IN AN ORGANIZED HEALTH CARE EDUCATION/TRAINING PROGRAM

## 2021-12-29 PROCEDURE — 45380 COLONOSCOPY AND BIOPSY: CPT

## 2021-12-29 PROCEDURE — 43239 EGD BIOPSY SINGLE/MULTIPLE: CPT | Mod: 51,,, | Performed by: STUDENT IN AN ORGANIZED HEALTH CARE EDUCATION/TRAINING PROGRAM

## 2021-12-29 PROCEDURE — 43239 EGD BIOPSY SINGLE/MULTIPLE: CPT

## 2021-12-29 PROCEDURE — 88305 SURGICAL PATHOLOGY: ICD-10-PCS | Mod: 26,XU,, | Performed by: PATHOLOGY

## 2021-12-29 PROCEDURE — 25000003 PHARM REV CODE 250: Performed by: NURSE ANESTHETIST, CERTIFIED REGISTERED

## 2021-12-29 PROCEDURE — 25000003 PHARM REV CODE 250: Performed by: STUDENT IN AN ORGANIZED HEALTH CARE EDUCATION/TRAINING PROGRAM

## 2021-12-29 PROCEDURE — 37000009 HC ANESTHESIA EA ADD 15 MINS

## 2021-12-29 PROCEDURE — C1889 IMPLANT/INSERT DEVICE, NOC: HCPCS

## 2021-12-29 PROCEDURE — 43239 PR EGD, FLEX, W/BIOPSY, SGL/MULTI: ICD-10-PCS | Mod: 51,,, | Performed by: STUDENT IN AN ORGANIZED HEALTH CARE EDUCATION/TRAINING PROGRAM

## 2021-12-29 PROCEDURE — D9220A PRA ANESTHESIA: Mod: ,,, | Performed by: NURSE ANESTHETIST, CERTIFIED REGISTERED

## 2021-12-29 PROCEDURE — 88305 TISSUE EXAM BY PATHOLOGIST: CPT | Mod: SUR,59 | Performed by: STUDENT IN AN ORGANIZED HEALTH CARE EDUCATION/TRAINING PROGRAM

## 2021-12-29 PROCEDURE — D9220A PRA ANESTHESIA: ICD-10-PCS | Mod: ,,, | Performed by: NURSE ANESTHETIST, CERTIFIED REGISTERED

## 2021-12-29 PROCEDURE — 27201423 OPTIME MED/SURG SUP & DEVICES STERILE SUPPLY

## 2021-12-29 PROCEDURE — 37000008 HC ANESTHESIA 1ST 15 MINUTES

## 2021-12-29 RX ORDER — GLYCOPYRROLATE 0.2 MG/ML
INJECTION INTRAMUSCULAR; INTRAVENOUS
Status: DISCONTINUED | OUTPATIENT
Start: 2021-12-29 | End: 2021-12-29

## 2021-12-29 RX ORDER — LIDOCAINE HYDROCHLORIDE 20 MG/ML
INJECTION, SOLUTION EPIDURAL; INFILTRATION; INTRACAUDAL; PERINEURAL
Status: DISCONTINUED | OUTPATIENT
Start: 2021-12-29 | End: 2021-12-29

## 2021-12-29 RX ORDER — SODIUM CHLORIDE 9 MG/ML
INJECTION, SOLUTION INTRAVENOUS CONTINUOUS
Status: DISCONTINUED | OUTPATIENT
Start: 2021-12-29 | End: 2021-12-30 | Stop reason: HOSPADM

## 2021-12-29 RX ORDER — SODIUM CHLORIDE 0.9 % (FLUSH) 0.9 %
10 SYRINGE (ML) INJECTION
Status: DISCONTINUED | OUTPATIENT
Start: 2021-12-29 | End: 2021-12-30 | Stop reason: HOSPADM

## 2021-12-29 RX ORDER — PROPOFOL 10 MG/ML
VIAL (ML) INTRAVENOUS
Status: DISCONTINUED | OUTPATIENT
Start: 2021-12-29 | End: 2021-12-29

## 2021-12-29 RX ADMIN — PROPOFOL 50 MG: 10 INJECTION, EMULSION INTRAVENOUS at 10:12

## 2021-12-29 RX ADMIN — PROPOFOL 100 MG: 10 INJECTION, EMULSION INTRAVENOUS at 10:12

## 2021-12-29 RX ADMIN — LIDOCAINE HYDROCHLORIDE 50 MG: 20 INJECTION, SOLUTION INTRAVENOUS at 10:12

## 2021-12-29 RX ADMIN — SODIUM CHLORIDE: 9 INJECTION, SOLUTION INTRAVENOUS at 10:12

## 2021-12-29 RX ADMIN — GLYCOPYRROLATE 0.2 MG: 0.2 INJECTION INTRAMUSCULAR; INTRAVENOUS at 11:12

## 2021-12-30 LAB
ESTROGEN SERPL-MCNC: NORMAL PG/ML
LAB AP GROSS DESCRIPTION: NORMAL
LAB AP LABORATORY NOTES: NORMAL
T3RU NFR SERPL: NORMAL %

## 2022-01-04 ENCOUNTER — CLINICAL SUPPORT (OUTPATIENT)
Dept: REHABILITATION | Facility: HOSPITAL | Age: 67
End: 2022-01-04
Attending: FAMILY MEDICINE
Payer: MEDICARE

## 2022-01-04 DIAGNOSIS — R53.1 DECREASED STRENGTH: Primary | ICD-10-CM

## 2022-01-04 DIAGNOSIS — R52 PAIN: ICD-10-CM

## 2022-01-04 DIAGNOSIS — M25.60 DECREASED RANGE OF MOTION: ICD-10-CM

## 2022-01-04 PROCEDURE — 97110 THERAPEUTIC EXERCISES: CPT | Mod: CQ

## 2022-01-04 NOTE — PROGRESS NOTES
Physical Therapy Treatment Note     Name: Ascencion Rodríguez  Clinic Number: [id number]    Therapy Diagnosis:   Encounter Diagnoses   Name Primary?    Decreased strength Yes    Decreased range of motion     Pain      Physician: Prem Vincent MD    Visit Date: 1/4/2022    Physician Orders: PT Eval and Treat   Medical Diagnosis from Referral: Arthralgia  Evaluation Date: 11/17/2021  Authorization Period Expiration: 11/9/22  Plan of Care Expiration: 12/19/21  Visit # / Visits authorized: 5    Time In: 1037  Time Out: 1125  Total Billable Time: 48    minutes    Precautions: Standard, MRSA and Fall      Subjective     Pt reports: still can't hold onto anything with hands, thinks left wrist was broken back in August and still hurts    She was compliant with home exercise program.    Pain: 6/10      Objective     ASCENCION received therapeutic exercises to develop strength, endurance, ROM, flexibility, posture and core stabilization   Nustep x 6 min  Calf stretch 2 min  Cybex leg press bilateral 6pl  x 30  Cybex leg unilatearl 4pl x 20  Cybex quad 2pl x 10, 1 1/2 pl x 15  Cybex HS curl 3pl x 20  Hip abduction #2 x 20 David    Home Exercises Provided and Patient Education Provided     Education provided: issued HEP for LE      Assessment   . Pt with good performance. C/o pain with hip abduction on stance leg B,  Min signs of fatigue.    ASCENCION is progressing well towards her goals.   Pt prognosis is Good.     Pt will continue to benefit from skilled outpatient physical therapy to address the deficits listed in the problem list box on initial evaluation, provide pt/family education and to maximize pt's level of independence in the home and community environment.     Pt's spiritual, cultural and educational needs considered and pt agreeable to plan of care and goals.     Anticipated barriers to physical therapy: none     Goals:    Short Term Goals: 4 weeks   1. Patient will be able to ambulate to the mailbox with no fatigue in the  legs  2. Patient be able to complete all activities of daily living with 6/10 pain    Long Term Goals: 8 weeks   1. Patient will be able to sleep ~ 4 hours without waking from leg pain  2. Patient will report no leg buckling x 1 month  3. Patient will have 4+/5 manual muscle test for the lower extremities   4. Patient will complete all activities of daily living with 4/10 pain      Plan     To continue to progress toward goals    Madie Ramirez, PTA  1/4/2022

## 2022-01-11 ENCOUNTER — CLINICAL SUPPORT (OUTPATIENT)
Dept: REHABILITATION | Facility: HOSPITAL | Age: 67
End: 2022-01-11
Attending: FAMILY MEDICINE
Payer: MEDICARE

## 2022-01-11 DIAGNOSIS — M25.60 DECREASED RANGE OF MOTION: ICD-10-CM

## 2022-01-11 DIAGNOSIS — R52 PAIN: ICD-10-CM

## 2022-01-11 DIAGNOSIS — R53.1 DECREASED STRENGTH: Primary | ICD-10-CM

## 2022-01-11 PROCEDURE — 97110 THERAPEUTIC EXERCISES: CPT | Mod: CQ

## 2022-01-11 NOTE — PROGRESS NOTES
Physical Therapy Treatment Note     Name: Ascencion Rodríguez  Clinic Number: [id number]    Therapy Diagnosis:   Encounter Diagnoses   Name Primary?    Decreased strength Yes    Decreased range of motion     Pain      Physician: Prem Vincent MD    Visit Date: 1/11/2022    Physician Orders: PT Eval and Treat   Medical Diagnosis from Referral: Arthralgia  Evaluation Date: 11/17/2021  Authorization Period Expiration: 11/9/22  Plan of Care Expiration: 12/19/21  Visit # / Visits authorized: 6    Time In: 1022  Time Out: 1120  Total Billable Time:   58  minutes    Precautions: Standard, MRSA and Fall      Subjective     Pt reports: less pain in ankles    She was compliant with home exercise program somewhat    Pain: 6/10      Objective     ASCENCION received therapeutic exercises to develop strength, endurance, ROM, flexibility, posture and core stabilization   Nustep x 6 min  Calf stretch 2 min  Foam pad calf raises x 30  Rail squats  X 20  Cybex leg press bilateral 6pl  x 30  Cybex leg unilatearl 4pl x 20 NOT THIS VISIT   Cybex quad  1 1/2 pl x 20  Cybex HS curl 4pl x 30  Hip abduction #2 x 20 David    Home Exercises Provided and Patient Education Provided     Education provided: issued HEP for LE      Assessment   . Pt with good performance. C/o pain with hip abduction on stance leg B,  Min signs of fatigue.    ASCENCION is progressing well towards her goals.   Pt prognosis is Good.     Pt will continue to benefit from skilled outpatient physical therapy to address the deficits listed in the problem list box on initial evaluation, provide pt/family education and to maximize pt's level of independence in the home and community environment.     Pt's spiritual, cultural and educational needs considered and pt agreeable to plan of care and goals.     Anticipated barriers to physical therapy: none     Goals:    Short Term Goals: 4 weeks   1. Patient will be able to ambulate to the mailbox with no fatigue in the legs  2. Patient be  able to complete all activities of daily living with 6/10 pain    Long Term Goals: 8 weeks   1. Patient will be able to sleep ~ 4 hours without waking from leg pain  2. Patient will report no leg buckling x 1 month  3. Patient will have 4+/5 manual muscle test for the lower extremities   4. Patient will complete all activities of daily living with 4/10 pain      Plan     To continue to progress toward goals    Madie Ramirez, PTA  1/11/2022

## 2022-01-24 ENCOUNTER — CLINICAL SUPPORT (OUTPATIENT)
Dept: REHABILITATION | Facility: HOSPITAL | Age: 67
End: 2022-01-24
Attending: FAMILY MEDICINE
Payer: MEDICARE

## 2022-01-24 DIAGNOSIS — R52 PAIN: ICD-10-CM

## 2022-01-24 DIAGNOSIS — M25.60 DECREASED RANGE OF MOTION: ICD-10-CM

## 2022-01-24 DIAGNOSIS — R53.1 DECREASED STRENGTH: Primary | ICD-10-CM

## 2022-01-24 PROCEDURE — 97110 THERAPEUTIC EXERCISES: CPT

## 2022-01-24 NOTE — PROGRESS NOTES
Physical Therapy Updated Plan of Care     Name: Beverly Rodríguez  Clinic Number: [id number]    Therapy Diagnosis:   Encounter Diagnoses   Name Primary?    Decreased strength Yes    Decreased range of motion     Pain      Physician: Prem Vincent MD    Visit Date: 1/24/2022    Physician Orders: PT Eval and Treat   Medical Diagnosis from Referral: Arthralgia  Evaluation Date: 11/17/2021  Authorization Period Expiration: 11/9/22  Plan of Care Expiration: 2/26/22  Visit # / Visits authorized: 7    Time In: 1025am  Time Out: 1115am     Total Billable Time:   50 minutes    Precautions: Standard, MRSA and Fall      Subjective     Pt reports:   Had very high blood pressure last week and had to miss two sessions.     She was compliant with home exercise program somewhat    Pain: 8/10      Objective     Beverly received therapeutic exercises to develop strength, endurance, ROM, flexibility, posture and core stabilization   Nustep x 6 min  Calf stretch 2 min  Cybex leg press bilateral 6pl  x 20  Rail squats  X 20  Cybex leg unilatearl 5pl x 20 left leg/ 6pl right leg x 10  Cybex quad  1 1/2 pl x 20  Cybex HS curl 4pl x 30  Hip abduction #2 x 20 David  Cybex back extension 2pl x 20    Home Exercises Provided and Patient Education Provided     Education provided: issued HEP for LE      Assessment   Patient continues to have a high pain level. Has several health concerns that seem to be limiting the progression to full capability of reaching goals set in therapy. Patient is progressing with weights and repetitions and able to tolerate the exercises but will fatigue quickly. Continues to have a high pain level and at this point the goals will have to be adjusted to fit the progression of the patient. Patient will continue to benefit from skilled physical therapy at this time.     Beverly is progressing well towards her goals.   Pt prognosis is Good.     Pt will continue to benefit from skilled outpatient physical therapy to address  the deficits listed in the problem list box on initial evaluation, provide pt/family education and to maximize pt's level of independence in the home and community environment.     Pt's spiritual, cultural and educational needs considered and pt agreeable to plan of care and goals.     Anticipated barriers to physical therapy: none     Goals:  All Goals Not Met    Short Term Goals: 4 weeks   1. Patient will be able to ambulate to the mailbox with no fatigue in the legs (new goal: Patient will be able to ambulate to mailbox with mild fatigue in the legs and no buckling of the legs)  2. Patient be able to complete all activities of daily living with 6/10 pain    Long Term Goals: 8 weeks   1. Patient will be able to sleep ~ 4 hours without waking from leg pain  2. Patient will report no leg buckling x 1 month (new goal: patient will report no buckling x 2 weeks)  3. Patient will have 4+/5 manual muscle test for the lower extremities   4. Patient will complete all activities of daily living with 4/10 pain    Reasons for Recertification of Therapy: to continue to progress toward goals    Plan     Updated Certification Period: 1/24/2022 to 2/26/22  Recommended Treatment Plan: 2 times per week for 8 weeks: Cervical/Lumbar Traction, Electrical Stimulation IFC/Premod, Iontophoresis (with Dex), Manual Therapy, Moist Heat/ Ice, Neuromuscular Re-ed, Patient Education, Therapeutic Exercise and Ultrasound  Other Recommendations: none    HOWARD DAVE, PT  1/24/2022      I CERTIFY THE NEED FOR THESE SERVICES FURNISHED UNDER THIS PLAN OF TREATMENT AND WHILE UNDER MY CARE.    Physician's comments:      Physician's Signature: ___________________________________________________

## 2022-01-26 ENCOUNTER — HOSPITAL ENCOUNTER (EMERGENCY)
Facility: HOSPITAL | Age: 67
Discharge: HOME | End: 2022-01-26
Payer: COMMERCIAL

## 2022-01-26 VITALS
BODY MASS INDEX: 40.04 KG/M2 | RESPIRATION RATE: 16 BRPM | SYSTOLIC BLOOD PRESSURE: 155 MMHG | WEIGHT: 226 LBS | TEMPERATURE: 99 F | DIASTOLIC BLOOD PRESSURE: 99 MMHG | OXYGEN SATURATION: 96 % | HEART RATE: 69 BPM | HEIGHT: 63 IN

## 2022-01-26 DIAGNOSIS — U07.1 COVID-19: Primary | ICD-10-CM

## 2022-01-26 LAB
FLUAV AG UPPER RESP QL IA.RAPID: NEGATIVE
FLUBV AG UPPER RESP QL IA.RAPID: NEGATIVE
SARS-COV+SARS-COV-2 AG RESP QL IA.RAPID: POSITIVE

## 2022-01-26 PROCEDURE — 87428 SARSCOV & INF VIR A&B AG IA: CPT | Performed by: NURSE PRACTITIONER

## 2022-01-26 PROCEDURE — 99283 EMERGENCY DEPT VISIT LOW MDM: CPT

## 2022-01-26 PROCEDURE — 99282 EMERGENCY DEPT VISIT SF MDM: CPT | Mod: ,,, | Performed by: NURSE PRACTITIONER

## 2022-01-26 PROCEDURE — 99282 PR EMERGENCY DEPT VISIT,LEVEL II: ICD-10-PCS | Mod: ,,, | Performed by: NURSE PRACTITIONER

## 2022-01-26 NOTE — DISCHARGE INSTRUCTIONS
Take vitamin C 2000mg three times per day, vitamin D 5000 units daily, zinc 220mg two times per day, thiamine 100mg two times per day, pepcid 20mg two times per day, zyrtec 10 mg daily. Quarantine for 5 days from the onset of your symptoms, and if your symptoms are resolving without fever for 24 hours, follow that by 5 days of wearing a mask when around others to minimize the risk of infecting people you encounter. Return to the emergency department for any increase in symptoms or for any other new or worrisome symptoms.

## 2022-01-26 NOTE — ED PROVIDER NOTES
Encounter Date: 1/26/2022       History     Chief Complaint   Patient presents with    Fever    Cough     66 year old female presents to the emergency department to be evaluated for chills and cough since yesterday morning.     The history is provided by the patient.   Fever  Primary symptoms of the febrile illness include fever, cough and myalgias. Primary symptoms do not include fatigue, headaches, wheezing, shortness of breath, abdominal pain, nausea, vomiting, diarrhea, dysuria, altered mental status or rash.   Cough  Associated symptoms include myalgias. Pertinent negatives include no headaches, no shortness of breath and no wheezing.     Review of patient's allergies indicates:   Allergen Reactions    Aricept [donepezil]     Aspirin     Codeine     Crestor [rosuvastatin]     Flagyl [metronidazole]     Hydrocodone     Imitrex [sumatriptan]     Influenza virus vaccines     Opioids - morphine analogues     Penicillins     Tramadol     Clindamycin Nausea Only     Past Medical History:   Diagnosis Date    Abnormal LFTs     Diabetes mellitus     Hypertension     Hypothyroidism      Past Surgical History:   Procedure Laterality Date    CHOLECYSTECTOMY      TONSILLECTOMY       Family History   Problem Relation Age of Onset    Heart disease Mother     Kidney disease Mother     Heart disease Father     Cancer Paternal Aunt      Social History     Tobacco Use    Smoking status: Never Smoker    Smokeless tobacco: Never Used   Substance Use Topics    Alcohol use: Never    Drug use: Never     Review of Systems   Constitutional: Positive for fever. Negative for fatigue.   Respiratory: Positive for cough. Negative for shortness of breath and wheezing.    Gastrointestinal: Negative for abdominal pain, diarrhea, nausea and vomiting.   Genitourinary: Negative for dysuria.   Musculoskeletal: Positive for myalgias.   Skin: Negative for rash.   Neurological: Negative for headaches.   All other systems  reviewed and are negative.      Physical Exam     Initial Vitals [01/26/22 1101]   BP Pulse Resp Temp SpO2   (!) 155/99 69 16 99.3 °F (37.4 °C) 96 %      MAP       --         Physical Exam    Vitals reviewed.  Constitutional: She appears well-developed and well-nourished.   Neck: Neck supple.   Cardiovascular: Normal rate and regular rhythm.   Pulmonary/Chest: Breath sounds normal.   Abdominal: Abdomen is soft. Bowel sounds are normal. She exhibits no distension and no mass. There is no abdominal tenderness. There is no rebound and no guarding.   Musculoskeletal:         General: Normal range of motion.      Cervical back: Neck supple.     Neurological: She is alert and oriented to person, place, and time. She has normal strength. GCS score is 15. GCS eye subscore is 4. GCS verbal subscore is 5. GCS motor subscore is 6.   Skin: Skin is warm and dry. Capillary refill takes less than 2 seconds.   Psychiatric: She has a normal mood and affect.         Medical Screening Exam   See Full Note    ED Course   Procedures  Labs Reviewed   SARS-COV2 (COVID) W/ FLU ANTIGEN - Abnormal; Notable for the following components:       Result Value    COVID-19 Ag Positive (*)     All other components within normal limits    Narrative:     Positive SARS-CoV antigen results indicate the presence of viral antigens; correlation with patient history and presence of clinical signs & symptoms consistent with COVID-19 are necessary to determine infection status.          Imaging Results    None          Medications - No data to display                    Clinical Impression:   Final diagnoses:  [U07.1] COVID-19 (Primary)          ED Disposition Condition    Discharge Stable        ED Prescriptions     None        Follow-up Information    None          Lorrie Mary, ALICIA  01/26/22 0783

## 2022-02-06 ENCOUNTER — HOSPITAL ENCOUNTER (EMERGENCY)
Facility: HOSPITAL | Age: 67
Discharge: ANOTHER HOSPITAL | End: 2022-02-06
Payer: COMMERCIAL

## 2022-02-06 ENCOUNTER — HOSPITAL ENCOUNTER (INPATIENT)
Facility: HOSPITAL | Age: 67
LOS: 8 days | Discharge: HOME HEALTH | DRG: 177 | End: 2022-02-14
Attending: EMERGENCY MEDICINE | Admitting: EMERGENCY MEDICINE
Payer: MEDICARE

## 2022-02-06 VITALS
WEIGHT: 226 LBS | SYSTOLIC BLOOD PRESSURE: 110 MMHG | DIASTOLIC BLOOD PRESSURE: 63 MMHG | HEART RATE: 64 BPM | TEMPERATURE: 98 F | OXYGEN SATURATION: 93 % | BODY MASS INDEX: 40.03 KG/M2 | RESPIRATION RATE: 18 BRPM

## 2022-02-06 DIAGNOSIS — J12.82 PNEUMONIA DUE TO COVID-19 VIRUS: Primary | ICD-10-CM

## 2022-02-06 DIAGNOSIS — N39.0 ACUTE URINARY TRACT INFECTION: ICD-10-CM

## 2022-02-06 DIAGNOSIS — U07.1 COVID: ICD-10-CM

## 2022-02-06 DIAGNOSIS — R53.1 GENERALIZED WEAKNESS: ICD-10-CM

## 2022-02-06 DIAGNOSIS — U07.1 PNEUMONIA DUE TO COVID-19 VIRUS: Primary | ICD-10-CM

## 2022-02-06 DIAGNOSIS — E87.6 HYPOKALEMIA: ICD-10-CM

## 2022-02-06 DIAGNOSIS — I10 HYPERTENSION, UNSPECIFIED TYPE: ICD-10-CM

## 2022-02-06 LAB
ALBUMIN SERPL BCP-MCNC: 3.5 G/DL (ref 3.5–5)
ALBUMIN/GLOB SERPL: 0.8 {RATIO}
ALP SERPL-CCNC: 92 U/L (ref 50–130)
ALT SERPL W P-5'-P-CCNC: 30 U/L (ref 13–56)
AMYLASE SERPL-CCNC: 48 U/L (ref 25–115)
ANION GAP SERPL CALCULATED.3IONS-SCNC: 16 MMOL/L (ref 7–16)
ANISOCYTOSIS BLD QL SMEAR: ABNORMAL
AST SERPL W P-5'-P-CCNC: 34 U/L (ref 15–37)
BACTERIA #/AREA URNS HPF: ABNORMAL /HPF
BASOPHILS # BLD AUTO: 0.01 K/UL (ref 0–0.2)
BASOPHILS NFR BLD AUTO: 0.2 % (ref 0–1)
BILIRUB SERPL-MCNC: 0.6 MG/DL (ref 0–1.2)
BILIRUB UR QL STRIP: ABNORMAL
BUN SERPL-MCNC: 28 MG/DL (ref 7–18)
BUN/CREAT SERPL: 17 (ref 6–20)
CALCIUM SERPL-MCNC: 8.9 MG/DL (ref 8.5–10.1)
CHLORIDE SERPL-SCNC: 100 MMOL/L (ref 98–107)
CLARITY UR: ABNORMAL
CO2 SERPL-SCNC: 25 MMOL/L (ref 21–32)
COLOR UR: ABNORMAL
CREAT SERPL-MCNC: 1.66 MG/DL (ref 0.55–1.02)
DIFFERENTIAL METHOD BLD: ABNORMAL
EOSINOPHIL # BLD AUTO: 0.03 K/UL (ref 0–0.5)
EOSINOPHIL NFR BLD AUTO: 0.6 % (ref 1–4)
EOSINOPHIL NFR BLD MANUAL: 1 % (ref 1–4)
ERYTHROCYTE [DISTWIDTH] IN BLOOD BY AUTOMATED COUNT: 13 % (ref 11.5–14.5)
GLOBULIN SER-MCNC: 4.4 G/DL (ref 2–4)
GLUCOSE SERPL-MCNC: 100 MG/DL (ref 74–106)
GLUCOSE UR STRIP-MCNC: NEGATIVE MG/DL
HCT VFR BLD AUTO: 33.7 % (ref 38–47)
HGB BLD-MCNC: 11.4 G/DL (ref 12–16)
IMM GRANULOCYTES # BLD AUTO: 0.01 K/UL (ref 0–0.04)
IMM GRANULOCYTES NFR BLD: 0.2 % (ref 0–0.4)
KETONES UR STRIP-SCNC: ABNORMAL MG/DL
LEUKOCYTE ESTERASE UR QL STRIP: ABNORMAL
LIPASE SERPL-CCNC: 184 U/L (ref 73–393)
LYMPHOCYTES # BLD AUTO: 0.66 K/UL (ref 1–4.8)
LYMPHOCYTES NFR BLD AUTO: 13.5 % (ref 27–41)
LYMPHOCYTES NFR BLD MANUAL: 9 % (ref 27–41)
MCH RBC QN AUTO: 28.7 PG (ref 27–31)
MCHC RBC AUTO-ENTMCNC: 33.8 G/DL (ref 32–36)
MCV RBC AUTO: 84.9 FL (ref 80–96)
MONOCYTES # BLD AUTO: 0.35 K/UL (ref 0–0.8)
MONOCYTES NFR BLD AUTO: 7.2 % (ref 2–6)
MONOCYTES NFR BLD MANUAL: 3 % (ref 2–6)
MPC BLD CALC-MCNC: 9.6 FL (ref 9.4–12.4)
NEUTROPHILS # BLD AUTO: 3.82 K/UL (ref 1.8–7.7)
NEUTROPHILS NFR BLD AUTO: 78.3 % (ref 53–65)
NEUTS SEG NFR BLD MANUAL: 87 % (ref 50–62)
NITRITE UR QL STRIP: POSITIVE
NRBC # BLD AUTO: 0 X10E3/UL
NRBC, AUTO (.00): 0 %
PH UR STRIP: 5.5 PH UNITS
PLATELET # BLD AUTO: 275 K/UL (ref 150–400)
PLATELET MORPHOLOGY: ABNORMAL
POTASSIUM SERPL-SCNC: 3 MMOL/L (ref 3.5–5.1)
PROT SERPL-MCNC: 7.9 G/DL (ref 6.4–8.2)
PROT UR QL STRIP: 100
RBC # BLD AUTO: 3.97 M/UL (ref 4.2–5.4)
RBC # UR STRIP: ABNORMAL /UL
RBC #/AREA URNS HPF: ABNORMAL /HPF
SODIUM SERPL-SCNC: 138 MMOL/L (ref 136–145)
SP GR UR STRIP: 1.02
SQUAMOUS #/AREA URNS LPF: ABNORMAL /LPF
UROBILINOGEN UR STRIP-ACNC: 0.2 MG/DL
WBC # BLD AUTO: 4.88 K/UL (ref 4.5–11)
WBC #/AREA URNS HPF: ABNORMAL /HPF

## 2022-02-06 PROCEDURE — 25000003 PHARM REV CODE 250: Performed by: NURSE PRACTITIONER

## 2022-02-06 PROCEDURE — 99284 PR EMERGENCY DEPT VISIT,LEVEL IV: ICD-10-PCS | Mod: ,,, | Performed by: NURSE PRACTITIONER

## 2022-02-06 PROCEDURE — 80053 COMPREHEN METABOLIC PANEL: CPT | Performed by: NURSE PRACTITIONER

## 2022-02-06 PROCEDURE — 36415 COLL VENOUS BLD VENIPUNCTURE: CPT | Performed by: NURSE PRACTITIONER

## 2022-02-06 PROCEDURE — 87040 BLOOD CULTURE FOR BACTERIA: CPT | Performed by: NURSE PRACTITIONER

## 2022-02-06 PROCEDURE — 82150 ASSAY OF AMYLASE: CPT | Performed by: NURSE PRACTITIONER

## 2022-02-06 PROCEDURE — 87077 CULTURE AEROBIC IDENTIFY: CPT | Performed by: NURSE PRACTITIONER

## 2022-02-06 PROCEDURE — 87186 SC STD MICRODIL/AGAR DIL: CPT | Performed by: NURSE PRACTITIONER

## 2022-02-06 PROCEDURE — 83690 ASSAY OF LIPASE: CPT | Performed by: NURSE PRACTITIONER

## 2022-02-06 PROCEDURE — 96365 THER/PROPH/DIAG IV INF INIT: CPT

## 2022-02-06 PROCEDURE — 99284 EMERGENCY DEPT VISIT MOD MDM: CPT | Mod: ,,, | Performed by: NURSE PRACTITIONER

## 2022-02-06 PROCEDURE — 96361 HYDRATE IV INFUSION ADD-ON: CPT

## 2022-02-06 PROCEDURE — 99285 EMERGENCY DEPT VISIT HI MDM: CPT | Mod: 25

## 2022-02-06 PROCEDURE — 85025 COMPLETE CBC W/AUTO DIFF WBC: CPT | Performed by: NURSE PRACTITIONER

## 2022-02-06 PROCEDURE — 96375 TX/PRO/DX INJ NEW DRUG ADDON: CPT

## 2022-02-06 PROCEDURE — 81001 URINALYSIS AUTO W/SCOPE: CPT | Performed by: NURSE PRACTITIONER

## 2022-02-06 PROCEDURE — 25000242 PHARM REV CODE 250 ALT 637 W/ HCPCS: Performed by: NURSE PRACTITIONER

## 2022-02-06 PROCEDURE — 96368 THER/DIAG CONCURRENT INF: CPT

## 2022-02-06 PROCEDURE — 11000001 HC ACUTE MED/SURG PRIVATE ROOM

## 2022-02-06 PROCEDURE — 63600175 PHARM REV CODE 636 W HCPCS: Performed by: NURSE PRACTITIONER

## 2022-02-06 RX ORDER — ALBUTEROL SULFATE 90 UG/1
2 AEROSOL, METERED RESPIRATORY (INHALATION) ONCE
Status: COMPLETED | OUTPATIENT
Start: 2022-02-06 | End: 2022-02-06

## 2022-02-06 RX ORDER — POTASSIUM CHLORIDE 750 MG/1
30 TABLET, EXTENDED RELEASE ORAL ONCE
Status: DISCONTINUED | OUTPATIENT
Start: 2022-02-06 | End: 2022-02-07 | Stop reason: HOSPADM

## 2022-02-06 RX ORDER — CIPROFLOXACIN 2 MG/ML
400 INJECTION, SOLUTION INTRAVENOUS
Status: DISCONTINUED | OUTPATIENT
Start: 2022-02-06 | End: 2022-02-06

## 2022-02-06 RX ORDER — POTASSIUM CHLORIDE 7.45 MG/ML
10 INJECTION INTRAVENOUS
Status: COMPLETED | OUTPATIENT
Start: 2022-02-06 | End: 2022-02-06

## 2022-02-06 RX ORDER — DEXAMETHASONE SODIUM PHOSPHATE 4 MG/ML
4 INJECTION, SOLUTION INTRA-ARTICULAR; INTRALESIONAL; INTRAMUSCULAR; INTRAVENOUS; SOFT TISSUE
Status: COMPLETED | OUTPATIENT
Start: 2022-02-06 | End: 2022-02-06

## 2022-02-06 RX ORDER — LEVOFLOXACIN 5 MG/ML
500 INJECTION, SOLUTION INTRAVENOUS
Status: DISCONTINUED | OUTPATIENT
Start: 2022-02-06 | End: 2022-02-07 | Stop reason: HOSPADM

## 2022-02-06 RX ORDER — ONDANSETRON 2 MG/ML
4 INJECTION INTRAMUSCULAR; INTRAVENOUS
Status: COMPLETED | OUTPATIENT
Start: 2022-02-06 | End: 2022-02-06

## 2022-02-06 RX ADMIN — LEVOFLOXACIN 500 MG: 5 INJECTION, SOLUTION INTRAVENOUS at 09:02

## 2022-02-06 RX ADMIN — ALBUTEROL SULFATE 2 PUFF: 90 AEROSOL, METERED RESPIRATORY (INHALATION) at 09:02

## 2022-02-06 RX ADMIN — SODIUM CHLORIDE 1000 ML: 9 INJECTION, SOLUTION INTRAVENOUS at 06:02

## 2022-02-06 RX ADMIN — ONDANSETRON 4 MG: 2 INJECTION INTRAMUSCULAR; INTRAVENOUS at 07:02

## 2022-02-06 RX ADMIN — POTASSIUM CHLORIDE 10 MEQ: 7.46 INJECTION, SOLUTION INTRAVENOUS at 09:02

## 2022-02-06 RX ADMIN — DEXAMETHASONE SODIUM PHOSPHATE 4 MG: 4 INJECTION, SOLUTION INTRAMUSCULAR; INTRAVENOUS at 09:02

## 2022-02-07 PROBLEM — M25.60 DECREASED RANGE OF MOTION: Status: RESOLVED | Noted: 2021-11-17 | Resolved: 2022-02-07

## 2022-02-07 PROBLEM — R53.1 DECREASED STRENGTH: Status: RESOLVED | Noted: 2021-11-17 | Resolved: 2022-02-07

## 2022-02-07 PROBLEM — R53.1 GENERALIZED WEAKNESS: Status: ACTIVE | Noted: 2022-02-07

## 2022-02-07 PROBLEM — R52 PAIN: Status: RESOLVED | Noted: 2021-11-17 | Resolved: 2022-02-07
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
## 2022-02-07 PROBLEM — E03.9 HYPOTHYROIDISM: Status: ACTIVE | Noted: 2022-02-07

## 2022-02-07 PROBLEM — I10 HYPERTENSION: Status: ACTIVE | Noted: 2022-02-07

## 2022-02-07 PROBLEM — R11.2 NAUSEA VOMITING AND DIARRHEA: Status: ACTIVE | Noted: 2022-02-07

## 2022-02-07 PROBLEM — E87.6 HYPOKALEMIA: Status: ACTIVE | Noted: 2022-02-07

## 2022-02-07 PROBLEM — J12.82 PNEUMONIA DUE TO COVID-19 VIRUS: Status: ACTIVE | Noted: 2022-02-07

## 2022-02-07 PROBLEM — E86.0 DEHYDRATION: Status: ACTIVE | Noted: 2022-02-07

## 2022-02-07 PROBLEM — N39.0 ACUTE URINARY TRACT INFECTION: Status: ACTIVE | Noted: 2022-02-07

## 2022-02-07 PROBLEM — U07.1 PNEUMONIA DUE TO COVID-19 VIRUS: Status: ACTIVE | Noted: 2022-02-07

## 2022-02-07 PROBLEM — R19.7 NAUSEA VOMITING AND DIARRHEA: Status: ACTIVE | Noted: 2022-02-07

## 2022-02-07 LAB
ALBUMIN SERPL BCP-MCNC: 3.3 G/DL (ref 3.5–5)
ALBUMIN/GLOB SERPL: 0.8 {RATIO}
ALP SERPL-CCNC: 89 U/L (ref 50–130)
ALT SERPL W P-5'-P-CCNC: 36 U/L (ref 13–56)
ANION GAP SERPL CALCULATED.3IONS-SCNC: 14 MMOL/L (ref 7–16)
AST SERPL W P-5'-P-CCNC: 35 U/L (ref 15–37)
BASOPHILS # BLD AUTO: 0.01 K/UL (ref 0–0.2)
BASOPHILS NFR BLD AUTO: 0.3 % (ref 0–1)
BILIRUB SERPL-MCNC: 0.5 MG/DL (ref 0–1.2)
BUN SERPL-MCNC: 24 MG/DL (ref 7–18)
BUN/CREAT SERPL: 16 (ref 6–20)
CALCIUM SERPL-MCNC: 8.4 MG/DL (ref 8.5–10.1)
CHLORIDE SERPL-SCNC: 104 MMOL/L (ref 98–107)
CO2 SERPL-SCNC: 27 MMOL/L (ref 21–32)
CREAT SERPL-MCNC: 1.52 MG/DL (ref 0.55–1.02)
D DIMER PPP FEU-MCNC: 0.54 ΜG/ML (ref 0–0.47)
DIFFERENTIAL METHOD BLD: ABNORMAL
EOSINOPHIL # BLD AUTO: 0 K/UL (ref 0–0.5)
EOSINOPHIL NFR BLD AUTO: 0 % (ref 1–4)
ERYTHROCYTE [DISTWIDTH] IN BLOOD BY AUTOMATED COUNT: 13 % (ref 11.5–14.5)
GLOBULIN SER-MCNC: 4.1 G/DL (ref 2–4)
GLUCOSE SERPL-MCNC: 146 MG/DL (ref 74–106)
HCT VFR BLD AUTO: 31.5 % (ref 38–47)
HGB BLD-MCNC: 10.4 G/DL (ref 12–16)
LYMPHOCYTES # BLD AUTO: 0.38 K/UL (ref 1–4.8)
LYMPHOCYTES NFR BLD AUTO: 12.1 % (ref 27–41)
LYMPHOCYTES NFR BLD MANUAL: 10 % (ref 27–41)
MAGNESIUM SERPL-MCNC: 2.1 MG/DL (ref 1.7–2.3)
MCH RBC QN AUTO: 28.6 PG (ref 27–31)
MCHC RBC AUTO-ENTMCNC: 33 G/DL (ref 32–36)
MCV RBC AUTO: 86.5 FL (ref 80–96)
MONOCYTES # BLD AUTO: 0.2 K/UL (ref 0–0.8)
MONOCYTES NFR BLD AUTO: 6.4 % (ref 2–6)
MONOCYTES NFR BLD MANUAL: 2 % (ref 2–6)
MPC BLD CALC-MCNC: 9.9 FL (ref 9.4–12.4)
NEUTROPHILS # BLD AUTO: 2.54 K/UL (ref 1.8–7.7)
NEUTROPHILS NFR BLD AUTO: 81.2 % (ref 53–65)
NEUTS BAND NFR BLD MANUAL: 5 % (ref 1–5)
NEUTS SEG NFR BLD MANUAL: 83 % (ref 50–62)
NRBC BLD MANUAL-RTO: ABNORMAL %
PLATELET # BLD AUTO: 243 K/UL (ref 150–400)
PLATELET MORPHOLOGY: NORMAL
POTASSIUM SERPL-SCNC: 3.2 MMOL/L (ref 3.5–5.1)
PROT SERPL-MCNC: 7.4 G/DL (ref 6.4–8.2)
RBC # BLD AUTO: 3.64 M/UL (ref 4.2–5.4)
RBC MORPH BLD: NORMAL
SODIUM SERPL-SCNC: 142 MMOL/L (ref 136–145)
WBC # BLD AUTO: 3.13 K/UL (ref 4.5–11)

## 2022-02-07 PROCEDURE — 94640 AIRWAY INHALATION TREATMENT: CPT

## 2022-02-07 PROCEDURE — 63600175 PHARM REV CODE 636 W HCPCS: Performed by: NURSE PRACTITIONER

## 2022-02-07 PROCEDURE — 84443 ASSAY THYROID STIM HORMONE: CPT | Performed by: NURSE PRACTITIONER

## 2022-02-07 PROCEDURE — 25000242 PHARM REV CODE 250 ALT 637 W/ HCPCS: Performed by: NURSE PRACTITIONER

## 2022-02-07 PROCEDURE — 27000221 HC OXYGEN, UP TO 24 HOURS

## 2022-02-07 PROCEDURE — 80053 COMPREHEN METABOLIC PANEL: CPT | Performed by: NURSE PRACTITIONER

## 2022-02-07 PROCEDURE — 99223 PR INITIAL HOSPITAL CARE,LEVL III: ICD-10-PCS | Mod: ,,, | Performed by: EMERGENCY MEDICINE

## 2022-02-07 PROCEDURE — 25000003 PHARM REV CODE 250: Performed by: NURSE PRACTITIONER

## 2022-02-07 PROCEDURE — 83735 ASSAY OF MAGNESIUM: CPT | Performed by: NURSE PRACTITIONER

## 2022-02-07 PROCEDURE — 85378 FIBRIN DEGRADE SEMIQUANT: CPT | Performed by: NURSE PRACTITIONER

## 2022-02-07 PROCEDURE — 85025 COMPLETE CBC W/AUTO DIFF WBC: CPT | Performed by: NURSE PRACTITIONER

## 2022-02-07 PROCEDURE — 99223 1ST HOSP IP/OBS HIGH 75: CPT | Mod: ,,, | Performed by: EMERGENCY MEDICINE

## 2022-02-07 PROCEDURE — 11000001 HC ACUTE MED/SURG PRIVATE ROOM

## 2022-02-07 PROCEDURE — 99900035 HC TECH TIME PER 15 MIN (STAT)

## 2022-02-07 PROCEDURE — 36415 COLL VENOUS BLD VENIPUNCTURE: CPT | Performed by: NURSE PRACTITIONER

## 2022-02-07 PROCEDURE — 25000003 PHARM REV CODE 250: Performed by: EMERGENCY MEDICINE

## 2022-02-07 PROCEDURE — 27100098 HC SPACER

## 2022-02-07 PROCEDURE — 94761 N-INVAS EAR/PLS OXIMETRY MLT: CPT

## 2022-02-07 RX ORDER — METOPROLOL SUCCINATE 25 MG/1
25 TABLET, EXTENDED RELEASE ORAL DAILY
Status: DISCONTINUED | OUTPATIENT
Start: 2022-02-07 | End: 2022-02-14 | Stop reason: HOSPADM

## 2022-02-07 RX ORDER — ONDANSETRON 2 MG/ML
4 INJECTION INTRAMUSCULAR; INTRAVENOUS EVERY 8 HOURS PRN
Status: DISCONTINUED | OUTPATIENT
Start: 2022-02-07 | End: 2022-02-14 | Stop reason: HOSPADM

## 2022-02-07 RX ORDER — ENOXAPARIN SODIUM 100 MG/ML
40 INJECTION SUBCUTANEOUS EVERY 24 HOURS
Status: DISCONTINUED | OUTPATIENT
Start: 2022-02-07 | End: 2022-02-14 | Stop reason: HOSPADM

## 2022-02-07 RX ORDER — THYROID 15 MG/1
15 TABLET ORAL
Status: ON HOLD | COMMUNITY
End: 2022-02-13 | Stop reason: HOSPADM

## 2022-02-07 RX ORDER — POLYETHYLENE GLYCOL 3350 17 G/17G
17 POWDER, FOR SOLUTION ORAL 2 TIMES DAILY PRN
Status: DISCONTINUED | OUTPATIENT
Start: 2022-02-07 | End: 2022-02-14 | Stop reason: HOSPADM

## 2022-02-07 RX ORDER — THYROID 15 MG/1
60 TABLET ORAL DAILY
Status: DISCONTINUED | OUTPATIENT
Start: 2022-02-07 | End: 2022-02-09 | Stop reason: SDUPTHER

## 2022-02-07 RX ORDER — THYROID 15 MG/1
15 TABLET ORAL
Status: DISCONTINUED | OUTPATIENT
Start: 2022-02-07 | End: 2022-02-09

## 2022-02-07 RX ORDER — CHOLECALCIFEROL (VITAMIN D3) 25 MCG
1000 TABLET ORAL DAILY
Status: DISCONTINUED | OUTPATIENT
Start: 2022-02-07 | End: 2022-02-14 | Stop reason: HOSPADM

## 2022-02-07 RX ORDER — DEXAMETHASONE SODIUM PHOSPHATE 4 MG/ML
6 INJECTION, SOLUTION INTRA-ARTICULAR; INTRALESIONAL; INTRAMUSCULAR; INTRAVENOUS; SOFT TISSUE EVERY 24 HOURS
Status: DISCONTINUED | OUTPATIENT
Start: 2022-02-07 | End: 2022-02-11

## 2022-02-07 RX ORDER — AMOXICILLIN 250 MG
1 CAPSULE ORAL 2 TIMES DAILY
Status: DISCONTINUED | OUTPATIENT
Start: 2022-02-07 | End: 2022-02-09

## 2022-02-07 RX ORDER — ASCORBIC ACID 500 MG
1000 TABLET ORAL DAILY
Status: DISCONTINUED | OUTPATIENT
Start: 2022-02-07 | End: 2022-02-14 | Stop reason: HOSPADM

## 2022-02-07 RX ORDER — ACETAMINOPHEN 325 MG/1
650 TABLET ORAL EVERY 6 HOURS PRN
Status: DISCONTINUED | OUTPATIENT
Start: 2022-02-07 | End: 2022-02-14 | Stop reason: HOSPADM

## 2022-02-07 RX ORDER — SODIUM CHLORIDE 0.9 % (FLUSH) 0.9 %
10 SYRINGE (ML) INJECTION
Status: DISCONTINUED | OUTPATIENT
Start: 2022-02-07 | End: 2022-02-14 | Stop reason: HOSPADM

## 2022-02-07 RX ORDER — LEVOFLOXACIN 5 MG/ML
500 INJECTION, SOLUTION INTRAVENOUS
Status: DISCONTINUED | OUTPATIENT
Start: 2022-02-07 | End: 2022-02-07

## 2022-02-07 RX ORDER — ALBUTEROL SULFATE 90 UG/1
2 AEROSOL, METERED RESPIRATORY (INHALATION) EVERY 6 HOURS
Status: DISCONTINUED | OUTPATIENT
Start: 2022-02-07 | End: 2022-02-14 | Stop reason: HOSPADM

## 2022-02-07 RX ORDER — POTASSIUM CHLORIDE 20 MEQ/1
40 TABLET, EXTENDED RELEASE ORAL ONCE
Status: DISCONTINUED | OUTPATIENT
Start: 2022-02-07 | End: 2022-02-07

## 2022-02-07 RX ORDER — TALC
6 POWDER (GRAM) TOPICAL NIGHTLY PRN
Status: DISCONTINUED | OUTPATIENT
Start: 2022-02-07 | End: 2022-02-14 | Stop reason: HOSPADM

## 2022-02-07 RX ORDER — FAMOTIDINE 20 MG/1
20 TABLET, FILM COATED ORAL DAILY
Status: DISCONTINUED | OUTPATIENT
Start: 2022-02-07 | End: 2022-02-14 | Stop reason: HOSPADM

## 2022-02-07 RX ADMIN — FAMOTIDINE 20 MG: 20 TABLET ORAL at 09:02

## 2022-02-07 RX ADMIN — OXYCODONE HYDROCHLORIDE AND ACETAMINOPHEN 1000 MG: 500 TABLET ORAL at 09:02

## 2022-02-07 RX ADMIN — Medication 1000 UNITS: at 09:02

## 2022-02-07 RX ADMIN — AZITHROMYCIN MONOHYDRATE 500 MG: 500 INJECTION, POWDER, LYOPHILIZED, FOR SOLUTION INTRAVENOUS at 12:02

## 2022-02-07 RX ADMIN — POLYETHYLENE GLYCOL 3350 17 G: 17 POWDER, FOR SOLUTION ORAL at 01:02

## 2022-02-07 RX ADMIN — SENNOSIDES AND DOCUSATE SODIUM 1 TABLET: 50; 8.6 TABLET ORAL at 08:02

## 2022-02-07 RX ADMIN — SENNOSIDES AND DOCUSATE SODIUM 1 TABLET: 50; 8.6 TABLET ORAL at 09:02

## 2022-02-07 RX ADMIN — ALBUTEROL SULFATE 2 PUFF: 90 AEROSOL, METERED RESPIRATORY (INHALATION) at 12:02

## 2022-02-07 RX ADMIN — POTASSIUM BICARBONATE 40 MEQ: 391 TABLET, EFFERVESCENT ORAL at 08:02

## 2022-02-07 RX ADMIN — DEXAMETHASONE SODIUM PHOSPHATE 6 MG: 4 INJECTION, SOLUTION INTRAMUSCULAR; INTRAVENOUS at 09:02

## 2022-02-07 RX ADMIN — ALBUTEROL SULFATE 2 PUFF: 90 AEROSOL, METERED RESPIRATORY (INHALATION) at 07:02

## 2022-02-07 RX ADMIN — CEFTRIAXONE 1 G: 1 INJECTION, POWDER, FOR SOLUTION INTRAMUSCULAR; INTRAVENOUS at 10:02

## 2022-02-07 RX ADMIN — ALBUTEROL SULFATE 2 PUFF: 90 AEROSOL, METERED RESPIRATORY (INHALATION) at 05:02

## 2022-02-07 RX ADMIN — METOPROLOL SUCCINATE 25 MG: 25 TABLET, EXTENDED RELEASE ORAL at 09:02

## 2022-02-07 RX ADMIN — ENOXAPARIN SODIUM 40 MG: 40 INJECTION SUBCUTANEOUS at 04:02

## 2022-02-07 NOTE — ASSESSMENT & PLAN NOTE
Ns 1 Liter bolus in ED  NS at 100 Ml/h  BUN 28/creatinine 1.66 in ED  Bun 24/ creatinine 1.52 this am

## 2022-02-07 NOTE — HPI
Ms. Rodríguez is a 66 year old  female who is admitted to [facility] acute care services due to COVID 19 pneumonia and acute urinary tract infection. She tested positive for COVID 19 infection on 1/26/22 after presenting to the ED with c/o fever and chills. She was discharged home at that time. Yesterday, she returned to the ED at [facility] per EMS EMS with complaint of nausea, vomiting, diarrhea, generalized weakness, non-productive cough, and shortness of breath cough.  Diagnostic evaluation in the ED at [facility] revealed a significant viral pneumonia on chest x-ray, a urinary tract infection and dehydration on labs. Her WBC was 4880. Her Bun was 28 with creatinine 1.66. Her potassium was 3.0. She was given 1 L of NS, Levaquine, decadron, and Kcl. Her O2 sats while in the ED ranged 93-95% on RA. She was started on O2 at 2L/min per NC. Dr. Madrigal accepted her here for admission. Admit orders and home medication reconciliation were completed under his direction.    Upon arrival here, Ms. Rodríguez is alert and oriented x3. She has no obvious dyspnea. She denies chest pain. She states that she has gotten so weak and tired with nausea, vomiting, and diarrhea that started shortly after diagnosis with COVID 19 but has continued. She states that she has been unable to eat anything solid but can tolerate liquids po. She denies significant shortness of breath. She notes that she is on home O2 at night due to pulmonary disease but not sure of her diagnosis. She is receiving O2 at 2L/min per NC. A d-dimer was 0.54 here. She was started on Lovenox 40 mg daily. Levaquin and decadron are continued along with NS at 100 ml/h.    Full Code  Not UTD with COVID 19 of flu vaccines due to allergic to flu vaccine  High risk VTE-TEDs, lovenox, early amb

## 2022-02-07 NOTE — NURSING
Patient stated that she cannot swallow the potassium and lactobacillus pills. I offered to crush the pills and put them in applesauce or jello but she refused. She said that Dr Doll stretched her esophegus years ago and she has not had it done in awhile.Lori Bartlett NP is aware.

## 2022-02-07 NOTE — ASSESSMENT & PLAN NOTE
Levaquin  Decadron  Albuterol MDI  Incentive spirometry  Vit C, Vit D3 (Zinc not started due to nausea)  Famotidine  O2 at 2L/min per NC to titrate  BC x2 pending

## 2022-02-07 NOTE — ED PROVIDER NOTES
Encounter Date: 2/6/2022       History     Chief Complaint   Patient presents with    Vomiting    Nausea     Patient presents to ER via EMS with complaint of nausea and vomiting.  She states she was diagnosed with COVID on 01/26/21.  She states she has not felt well since that time.  She complains of non-productive cough and body aches.  She states she gets short of breath with her coughing.  She denies fever but complains of body aches and chills.  She also complains of headache and weakness.  She contributes the weakness to not being able to the nausea and vomiting and not being able to eat or drink.  She denies chest pain.  She also complains of diarrhea.      The history is provided by the patient and the EMS personnel. No  was used.     Review of patient's allergies indicates:   Allergen Reactions    Aricept [donepezil]     Aspirin     Codeine     Crestor [rosuvastatin]     Flagyl [metronidazole]     Hydrocodone     Imitrex [sumatriptan]     Influenza virus vaccines     Opioids - morphine analogues     Penicillins     Tramadol     Clindamycin Nausea Only     Past Medical History:   Diagnosis Date    Abnormal LFTs     Diabetes mellitus     Hypertension     Hypothyroidism      Past Surgical History:   Procedure Laterality Date    CHOLECYSTECTOMY      TONSILLECTOMY       Family History   Problem Relation Age of Onset    Heart disease Mother     Kidney disease Mother     Heart disease Father     Cancer Paternal Aunt      Social History     Tobacco Use    Smoking status: Never Smoker    Smokeless tobacco: Never Used   Substance Use Topics    Alcohol use: Never    Drug use: Never     Review of Systems   Constitutional: Positive for appetite change, chills and fatigue.   HENT: Positive for sore throat.    Respiratory: Positive for cough and shortness of breath.    Gastrointestinal: Positive for diarrhea, nausea and vomiting.   Musculoskeletal: Positive for myalgias.    Neurological: Positive for weakness and headaches.   All other systems reviewed and are negative.      Physical Exam     Initial Vitals [02/06/22 1823]   BP Pulse Resp Temp SpO2   104/72 70 18 97.6 °F (36.4 °C) 95 %      MAP       --         Physical Exam    Nursing note and vitals reviewed.  Constitutional: She appears well-developed and well-nourished. She appears distressed.   HENT:   Head: Normocephalic.   Right Ear: External ear normal.   Left Ear: External ear normal.   Nose: Nose normal.   Mouth/Throat: Oropharyngeal exudate present.   Eyes: Conjunctivae and EOM are normal. Pupils are equal, round, and reactive to light.   Neck: Neck supple.   Normal range of motion.  Cardiovascular: Normal rate, regular rhythm, normal heart sounds and intact distal pulses.   Pulmonary/Chest: She has rhonchi.   Abdominal: Abdomen is soft. Bowel sounds are normal. There is abdominal tenderness (epigastric).   Musculoskeletal:         General: Normal range of motion.      Cervical back: Normal range of motion and neck supple.     Neurological: She is alert and oriented to person, place, and time. She has normal strength. GCS score is 15. GCS eye subscore is 4. GCS verbal subscore is 5. GCS motor subscore is 6.   Skin: Skin is warm and dry. Capillary refill takes less than 2 seconds.   Psychiatric: She has a normal mood and affect. Her behavior is normal. Judgment and thought content normal.         Medical Screening Exam   See Full Note    ED Course   Procedures  Labs Reviewed   COMPREHENSIVE METABOLIC PANEL - Abnormal; Notable for the following components:       Result Value    Potassium 3.0 (*)     BUN 28 (*)     Creatinine 1.66 (*)     Globulin 4.4 (*)     eGFR 33 (*)     All other components within normal limits   URINALYSIS, REFLEX TO URINE CULTURE - Abnormal; Notable for the following components:    Clarity, UA Cloudy (*)     Leukocytes, UA Moderate (*)     Nitrites, UA Positive (*)     Protein,   (*)      Bilirubin, UA Small (*)     Blood, UA Large (*)     All other components within normal limits   CBC WITH DIFFERENTIAL - Abnormal; Notable for the following components:    RBC 3.97 (*)     Hemoglobin 11.4 (*)     Hematocrit 33.7 (*)     Neutrophils % 78.3 (*)     Lymphocytes % 13.5 (*)     Monocytes % 7.2 (*)     Eosinophils % 0.6 (*)     Lymphocytes, Absolute 0.66 (*)     All other components within normal limits   MANUAL DIFFERENTIAL - Abnormal; Notable for the following components:    Segmented Neutrophils, Man % 87 (*)     Lymphocytes, Man % 9 (*)     Platelet Morphology Few Large Platelets (*)     All other components within normal limits   URINALYSIS, MICROSCOPIC - Abnormal; Notable for the following components:    WBC, UA 15-25 (*)     RBC, UA 10-15 (*)     Bacteria, UA Many (*)     Squamous Epithelial Cells, UA Few (*)     All other components within normal limits   AMYLASE - Normal   LIPASE - Normal   CULTURE, BLOOD   CULTURE, BLOOD   CULTURE, URINE   CBC W/ AUTO DIFFERENTIAL    Narrative:     The following orders were created for panel order CBC auto differential.  Procedure                               Abnormality         Status                     ---------                               -----------         ------                     CBC with Differential[[id number]]        Abnormal            Final result               Manual Differential[[id number]]          Abnormal            Final result                 Please view results for these tests on the individual orders.          Imaging Results          X-Ray Chest AP Portable (Final result)  Result time 02/06/22 19:10:54    Final result by Jake Martinez MD (02/06/22 19:10:54)                 Impression:      There is evidence of bilateral pneumonia.  Consider viral etiologies    No acute process otherwise      Electronically signed by: Jake Martinez  Date:    02/06/2022  Time:    19:10             Narrative:    EXAMINATION:  XR CHEST AP  PORTABLE    CLINICAL HISTORY:  Covid positive, cough;.    TECHNIQUE:  AP portable erect chest    COMPARISON:  February 27, 2020    FINDINGS:  There is borderline cardiomegaly.  There is no mediastinal mass.    There is patchy and hazy and ground-glass opacity throughout both lungs compatible with pneumonia.  Shallow breath.  There is no gross pleural effusion.    Osseous structures are similar                                 Medications   potassium chloride 10 mEq in 100 mL IVPB (10 mEq Intravenous New Bag 2/6/22 2124)   levoFLOXacin 500 mg/100 mL IVPB 500 mg (500 mg Intravenous New Bag 2/6/22 2119)   potassium chloride CR tablet 30 mEq (has no administration in time range)   sodium chloride 0.9% bolus 1,000 mL (1,000 mLs Intravenous New Bag 2/6/22 1854)   ondansetron injection 4 mg (4 mg Intravenous Given 2/6/22 1925)   dexamethasone injection 4 mg (4 mg Intravenous Given 2/6/22 2113)   albuterol inhaler 2 puff (2 puffs Inhalation Given 2/6/22 2116)     Medical Decision Making:   Clinical Tests:   Lab Tests: Ordered  Radiological Study: Ordered  Other:   I have discussed this case with another health care provider.       <> Summary of the Discussion: Discussed history and results with Dr Madrigal through [facility] .  Dr Madrigal will evaluate patient in ER.     2130 Dr Madrigal accepted patient for transfer.  Patient will transfer to [facility].                    Clinical Impression:   Final diagnoses:  [U07.1, J12.82] Pneumonia due to COVID-19 virus (Primary)  [N39.0] Acute urinary tract infection          ED Disposition Condition    Admit               ALICIA Sampson  02/06/22 2131

## 2022-02-07 NOTE — H&P
[facility] - Medical Surgical Unit  [facility]  History & Physical    Patient Name: Beverly Rodríguez  MRN: [id number]  Patient Class: IP- Inpatient  Admission Date: 2/6/2022  Attending Physician: Sharron Andujar MD   Primary Care Provider: Prem Vincent MD         Patient information was obtained from patient and ER records.     Subjective:     Principal Problem:Pneumonia due to COVID-19 virus    Chief Complaint:   Chief Complaint   Patient presents with    Fatigue        HPI: Ms. Rodríguez is a 66 year old  female who is admitted to [facility] acute care services due to COVID 19 pneumonia and acute urinary tract infection. She tested positive for COVID 19 infection on 1/26/22 after presenting to the ED with c/o fever and chills. She was discharged home at that time. Yesterday, she returned to the ED at [facility] per EMS EMS with complaint of nausea, vomiting, diarrhea, generalized weakness, non-productive cough, and shortness of breath cough.  Diagnostic evaluation in the ED at [facility] revealed a significant viral pneumonia on chest x-ray, a urinary tract infection and dehydration on labs. Her WBC was 4880. Her Bun was 28 with creatinine 1.66. Her potassium was 3.0. She was given 1 L of NS, Levaquine, decadron, and Kcl. Her O2 sats while in the ED ranged 93-95% on RA. She was started on O2 at 2L/min per NC. Dr. Madrigal accepted her here for admission. Admit orders and home medication reconciliation were completed under his direction.    Upon arrival here, Ms. Rodríguez is alert and oriented x3. She has no obvious dyspnea. She denies chest pain. She states that she has gotten so weak and tired with nausea, vomiting, and diarrhea that started shortly after diagnosis with COVID 19 but has continued. She states that she has been unable to eat anything solid but can tolerate liquids po. She denies significant shortness of breath. She notes that she is on home O2 at night due to pulmonary disease but not sure of her  diagnosis. She is receiving O2 at 2L/min per NC. A d-dimer was 0.54 here. She was started on Lovenox 40 mg daily. Levaquin and decadron are continued along with NS at 100 ml/h.      Past Medical History:   Diagnosis Date    Abnormal LFTs     Diabetes mellitus     Hypertension     Hypothyroidism        Past Surgical History:   Procedure Laterality Date    APPENDECTOMY      CHOLECYSTECTOMY      HYSTERECTOMY      TONSILLECTOMY         Review of patient's allergies indicates:   Allergen Reactions    Aricept [donepezil]     Aspirin     Codeine     Crestor [rosuvastatin]     Flagyl [metronidazole]     Hydrocodone     Imitrex [sumatriptan]     Influenza virus vaccines     Opioids - morphine analogues     Penicillins     Tramadol     Clindamycin Nausea Only       Current Facility-Administered Medications on File Prior to Encounter   Medication    [COMPLETED] albuterol inhaler 2 puff    [COMPLETED] dexamethasone injection 4 mg    [COMPLETED] ondansetron injection 4 mg    [COMPLETED] potassium chloride 10 mEq in 100 mL IVPB    [COMPLETED] sodium chloride 0.9% bolus 1,000 mL    [DISCONTINUED] ciprofloxacin (CIPRO)400mg/200ml D5W IVPB 400 mg    [DISCONTINUED] levoFLOXacin 500 mg/100 mL IVPB 500 mg    [DISCONTINUED] potassium chloride CR tablet 30 mEq     Current Outpatient Medications on File Prior to Encounter   Medication Sig    ARMOUR THYROID 60 mg Tab Take 1 tablet (60 mg total) by mouth once daily.    metoprolol succinate (TOPROL-XL) 25 MG 24 hr tablet Take 1 tablet (25 mg total) by mouth once daily.    thyroid, pork, (ARMOUR THYROID) 15 mg Tab Take 15 mg by mouth before breakfast.    [DISCONTINUED] clobetasol 0.05% (TEMOVATE) 0.05 % Oint Apply to rash on hands BID tapering with improvement    [DISCONTINUED] dapsone 25 MG Tab Take 2 tablets (50 mg total) by mouth 2 (two) times daily.    [DISCONTINUED] mupirocin (BACTROBAN) 2 % ointment Apply topically 3 (three) times daily.     [DISCONTINUED] triamcinolone acetonide 0.1% (KENALOG) 0.1 % cream APPLY TO RASH on body TWICE A DAY AND taper with improvement     Family History     Problem Relation (Age of Onset)    Cancer Paternal Aunt    Heart disease Mother, Father    Kidney disease Mother        Tobacco Use    Smoking status: Never Smoker    Smokeless tobacco: Never Used   Substance and Sexual Activity    Alcohol use: Never    Drug use: Never    Sexual activity: Not Currently     Review of Systems   Constitutional: Positive for activity change, appetite change, chills and fatigue. Negative for fever.   HENT: Negative.    Respiratory: Positive for cough and shortness of breath (mild  , more so with exertion).    Cardiovascular: Negative for chest pain, palpitations and leg swelling.   Gastrointestinal: Positive for diarrhea, nausea and vomiting. Negative for abdominal pain.   Genitourinary: Positive for decreased urine volume. Negative for difficulty urinating and dysuria.   Musculoskeletal: Positive for myalgias.   Skin: Negative.    Neurological: Positive for weakness (generalized).   Psychiatric/Behavioral: Negative.      Objective:     Vital Signs (Most Recent):  Temp: 98.3 °F (36.8 °C) (02/07/22 0400)  Pulse: 63 (02/07/22 0546)  Resp: 18 (02/07/22 0546)  BP: 123/76 (02/07/22 0400)  SpO2: 95 % (02/07/22 0546) Vital Signs (24h Range):  Temp:  [97.6 °F (36.4 °C)-98.3 °F (36.8 °C)] 98.3 °F (36.8 °C)  Pulse:  [60-70] 63  Resp:  [18-20] 18  SpO2:  [93 %-97 %] 95 %  BP: (104-123)/(63-76) 123/76     Weight: 95.9 kg (211 lb 6.4 oz)  Body mass index is 36.29 kg/m².    Physical Exam  Vitals and nursing note reviewed.   Constitutional:       General: She is not in acute distress.     Appearance: Normal appearance. She is obese. She is ill-appearing. She is not toxic-appearing.   HENT:      Head: Normocephalic.      Right Ear: Tympanic membrane normal.      Left Ear: Tympanic membrane normal.      Nose: Nose normal.      Mouth/Throat:      Mouth:  Mucous membranes are dry.      Pharynx: Oropharynx is clear.   Eyes:      Extraocular Movements: Extraocular movements intact.      Conjunctiva/sclera: Conjunctivae normal.      Pupils: Pupils are equal, round, and reactive to light.   Cardiovascular:      Rate and Rhythm: Normal rate and regular rhythm.      Pulses: Normal pulses.      Heart sounds: Normal heart sounds.   Pulmonary:      Effort: Pulmonary effort is normal.      Breath sounds: Normal breath sounds.   Abdominal:      General: Bowel sounds are normal. There is no distension.      Palpations: Abdomen is soft.      Tenderness: There is no abdominal tenderness. There is no guarding.   Musculoskeletal:         General: Normal range of motion.      Cervical back: Normal range of motion and neck supple.   Skin:     General: Skin is warm and dry.      Capillary Refill: Capillary refill takes less than 2 seconds.   Neurological:      General: No focal deficit present.      Mental Status: She is alert and oriented to person, place, and time.      Cranial Nerves: No cranial nerve deficit.   Psychiatric:         Mood and Affect: Mood normal.         Behavior: Behavior normal.         Thought Content: Thought content normal.         Judgment: Judgment normal.           CRANIAL NERVES     CN III, IV, VI   Pupils are equal, round, and reactive to light.       Significant Labs:   All pertinent labs within the past 24 hours have been reviewed.  CBC:   Recent Labs   Lab 02/06/22 1850 02/07/22  0420   WBC 4.88 3.13*   HGB 11.4* 10.4*   HCT 33.7* 31.5*    243     CMP:   Recent Labs   Lab 02/06/22 1850 02/07/22  0420    142   K 3.0* 3.2*    104   CO2 25 27    146*   BUN 28* 24*   CREATININE 1.66* 1.52*   CALCIUM 8.9 8.4*   PROT 7.9 7.4   ALBUMIN 3.5 3.3*   BILITOT 0.6 0.5   ALKPHOS 92 89   AST 34 35   ALT 30 36   ANIONGAP 16 14   EGFRNONAA 33* 37*     Urine Studies:   Recent Labs   Lab 02/06/22 1948   COLORU Straw   APPEARANCEUA Cloudy*    PHUR 5.5   SPECGRAV 1.025   PROTEINUA 100 *   GLUCUA Negative   KETONESU Trace   BILIRUBINUA Small*   OCCULTUA Large*   NITRITE Positive*   UROBILINOGEN 0.2   LEUKOCYTESUR Moderate*   RBCUA 10-15*   WBCUA 15-25*   BACTERIA Many*       Significant Imaging: I have reviewed all pertinent imaging results/findings within the past 24 hours.    Assessment/Plan:     * Pneumonia due to COVID-19 virus  Levaquin  Decadron  Albuterol MDI  Incentive spirometry  Vit C, Vit D3 (Zinc not started due to nausea)  Famotidine  O2 at 2L/min per NC to titrate  BC x2 pending        Dehydration  Ns 1 Liter bolus in ED  NS at 100 Ml/h  BUN 28/creatinine 1.66 in ED  Bun 24/ creatinine 1.52 this am    Acute urinary tract infection  Urine culture and blood culture pending  Levaquin  NS at 100 ml/h      Hypokalemia  Check magnesium  Potassium 3.2 this am  Monitor daily      Hypertension  Continue Metoprolol  /66      Hypothyroidism  TSH pending  Continue home med ([place] thyroid 75 mg daily)      Generalized weakness  Routine treatment for her illnesses  PT/OT if tolerated      Nausea vomiting and diarrhea  Ns at 100 ml/h  Ondansetron prn          VTE Risk Mitigation (From admission, onward)         Ordered     enoxaparin injection 40 mg  Daily         02/07/22 0111     IP VTE HIGH RISK PATIENT  Once         02/07/22 0111     Place sequential compression device  Until discontinued         02/07/22 0111                   Latesha Paulino NP  Department of Hospital Medicine   [facility] - Medical Surgical Unit

## 2022-02-07 NOTE — PROGRESS NOTES
Patient is a 66-year-old female with a history of type 2 diabetes, CKD III, hypertension, and hypothyroidism who presented emergency room with nausea vomiting and shortness of breath with most dry cough the past few days.  Patient stated that she tested positive for COVID on 1/26/22 and has not felt well since primarily due to fatigue. On today's evaluation, patient was found to have bilateral pulmonary infiltrates suggestive of a viral pneumonia, and urinary tract infection.  Patient's oxygen requirement is only 2 L per nasal cannula to maintain SpO2 of greater than 94% and patient's respiratory status is stable.  Feel that patient could be treated at an outlying critical care access hospital.

## 2022-02-07 NOTE — PLAN OF CARE
PLAN OF CARE REVIEWED WITH PATIENT  Problem: Adult Inpatient Plan of Care  Goal: Plan of Care Review  Outcome: Ongoing, Progressing  Goal: Patient-Specific Goal (Individualized)  Outcome: Ongoing, Progressing  Goal: Absence of Hospital-Acquired Illness or Injury  Outcome: Ongoing, Progressing  Intervention: Identify and Manage Fall Risk  Flowsheets (Taken 2/7/2022 0431)  Safety Promotion/Fall Prevention:   lighting adjusted   nonskid shoes/socks when out of bed   side rails raised x 2   Supervised toileting - stay within arms reach   instructed to call staff for mobility   toileting scheduled   supervised activity  Intervention: Prevent Skin Injury  Flowsheets (Taken 2/7/2022 0431)  Body Position:   position changed independently   position maintained  Skin Protection: tubing/devices free from skin contact  Intervention: Prevent and Manage VTE (Venous Thromboembolism) Risk  Flowsheets (Taken 2/7/2022 0431)  VTE Prevention/Management:   fluids promoted   dorsiflexion/plantar flexion performed   ROM (active) performed  Intervention: Prevent Infection  Flowsheets (Taken 2/7/2022 0431)  Infection Prevention:   equipment surfaces disinfected   hand hygiene promoted   personal protective equipment utilized   rest/sleep promoted   single patient room provided  Goal: Optimal Comfort and Wellbeing  Outcome: Ongoing, Progressing  Intervention: Monitor Pain and Promote Comfort  Flowsheets (Taken 2/7/2022 0431)  Pain Management Interventions:   position adjusted   pillow support provided   quiet environment facilitated   pain management plan reviewed with patient/caregiver  Intervention: Provide Person-Centered Care  Flowsheets (Taken 2/7/2022 0431)  Trust Relationship/Rapport:   care explained   choices provided   emotional support provided   empathic listening provided   questions answered   thoughts/feelings acknowledged   reassurance provided   questions encouraged     Problem: Bariatric Environmental Safety  Goal: Safety  Maintained with Care  Outcome: Ongoing, Progressing

## 2022-02-07 NOTE — HOSPITAL COURSE
2/7/22: Day 2    2/8/22: Hospital day 3. COVID 19 positive day 13. Ms. Rodríguez has been afebrile since admission. Droplet and airborne isolation are discontinued to day. Her O2 sats are ranging 93-96% on O2 at 2L/min per NC. She does wear O2 at night at home. Labs show improved BUN and creatinine to 18 and 1.09. Her glucose is 112 this am. She is tolerating po intake without vomiting. She says that her diarrhea has improved as well. Her diet will be increased to full liquid today. She is receiving decadron (day 4), azithromycin, (day 2) and rocephin (day 2). Since she is tolerating po intake, her azithromycin is changed to po today. She is also receiving Lovenox 40 mg SC daily, Vit C, Vit D, and she is using albuterol MDI and incentive spirometer under the direction of RT. She has not been receiving her thyroid med due to availability for 2 days. Her TSH is 0.61. She will restart her routine home dose tonight.    02/09/2022 HOSPITAL DAY 4  COVID POSITIVE DAY 14  PT'S VTE PPX IS LOVENOX/TEDS/EARLY AMBULATION    PT CONTINUES COVID TREATMENT WITH ALBUTEROL,O2 TITRATION, ROCEPHIN/Z MAX, DECADRON AND PEPCID AS WELL AS VIT C,D, AND ZINC.PT NOTED TO HAVE INCREASED COUGHING TODAY AND WILL CONTINUE ISOLATION FOR 20 DAYS . PT O2 SAT IS 94-96% ON 2 L NC. PT HAS JIANG WITH INCREASED FATIGUE WITH EXERCISE. PT/OT EVALUATED PT TODAY WITH PT/OT RECOMMENDED FOR 1-2 WEEKS; PT AMBULATED 40 FEET WITH O2 WITH MILD DYSPNEA WITH SUPERVISION. PT'S LABS REVEAL K 3.4, H/H 10/32 STABLE WITH UR CULTURE E COLI >100K SENSITIVE TO ROCEPHIN AND ENTEROCOCCUS >15K .     02/10/2022 HOSPITAL DAY 5  COVID POSITIVE DAY 15  PT CONTINUES COVID 19 TREATMENT WITH ALBUTEROL, O2 TITRATION, ROCEPHIN/Z MAX, DECADRON AND PEPCID; VIT C,D AND ZN. PT REMAINS IN COVID ISOLATION. PT'S O2 SAT IS 93-98% ON 2L NC O2; PT HAS INCREASED DYSPNEA ON EXERTION. PT PARTICIPATED WITH PT/OT TODAY WITH BED MOBILITY AND TRANSFER EXERCISES AND AMBULATED 80 FEET WITH ONE EPISODE OF LOSS  OF BALANCE. PT'S LABS ARE STABLE EXCEPT K 2.9 WITH POTASSIUM SUPPLEMENTATION ORDERED. PT WILL CONTINUE DAILY LABS.    02/11/2022 HOSPITAL DAY 6  COVID POSITIVE DAY 16  PT CONTINUES COVID TREATMENT WITH ALBUTEROL, O2 TITRATION, ROCEPHIN/Z MAX, PEPCID, VIT C,D AND ZINC. PT HAS COMPLETED DECADRON. PT CONTINUES ISOLATION. PT HAS O2 SAT 95-98% ON 2 LNC O2.LABS ARE STABLE.PT PARTICIPATED WITH PT/OT TODAY WITH BED MOBILITY AND TRANSFER EXERCISES. PT HAS HYPOKALEMIA WITH IMPROVEMENT K 3.4 TODAY.PT STATES DYSPNEA AND COUGH HAVE IMPROVED.    2/12/22:  Hospital day 7  COVID positive day 17  COVID treatment continues.  O2 sat 98% on 2 L. labs and vital signs stable.  Potassium is 3.2 today.  Being repleted by mouth.  Participated with PT/OT throughout the week.  Patient is scheduled to receive her last dose of Rocephin in the morning and will be discharged after.  She has requested a repeat COVID test.  We discussed the possibility that it was still returned a positive result and is not of much value.  However, a repeat COVID test was ordered for tomorrow morning per patient request.  The patient continues to have an intermittent cough, though improved.  She is without complaints and ready to return home.  Discussed with Dr. Gomes.  No change in plan.        2/13/22. Hospital day 8. COVID 19 positive day 18. Ms. Rodríguez was planned for discharge home today. However, she has no transportation home. Medicaid transport was scheduled to arrive at 1300, but when transport did not arrive, nursing staff contacted the dispatch and was told that no  is available now. Discharge was cancelled. She has completed treatment with Rocephin, azithromycin, and Decadron. She continues to receive Lovenox, albuterol MDI, Vitamin C, and Vitamin D3. She is afebrile. Her BP is 147/87. He O2 sats range 95-98% on O2 at 2L/min per NC. She has home O2 that she wears at night. A repeat COVID 19 test today is negative. Will continue current  treatment.    02/14/2022 HOSPITAL DAY 9  COVID POS DAY 19  PT HAS COMPLETED COVID TREATMENT WITH ROCEPHIN/ZITHROMAX AND DECADRON WITH MARKED IMPROVEMENT AND PT IS STABLE FOR DISCHARGE HOME WITH HOME HEALTH NURSING CARE. PT HAD SAT 93-98% ON 2 L NC.  PT DID NOT QUALIFY FOR REMDESIVIR DUE PRESENTATION GREATER THAN 10 DAYS.  PT HAD E COLI UTI >100,000 SENSITIVE TO ROCEPHIN WHICH PT COMPLETED FOR 7 DAYS AND >15,000 ENTEROCOCCUS. PT TESTED NEG FOR COVID ON REPEAT 2/13/2022. PT PARTICIPATED WITH PT/OT AMBULATING 40 FEET WITH O2 AND MILD JIANG  WITH SUPERVISION. PT WILL CONTINUE VIT C,D AND ZN AS WELL AS PEPCID. PT WILL CONTINUE TEDS AND IS ALLERGIC TO ASA. PT ENCOURAGED TO INCREASED HOME AMBULATION.

## 2022-02-07 NOTE — SUBJECTIVE & OBJECTIVE
Past Medical History:   Diagnosis Date    Abnormal LFTs     Diabetes mellitus     Hypertension     Hypothyroidism        Past Surgical History:   Procedure Laterality Date    APPENDECTOMY      CHOLECYSTECTOMY      HYSTERECTOMY      TONSILLECTOMY         Review of patient's allergies indicates:   Allergen Reactions    Aricept [donepezil]     Aspirin     Codeine     Crestor [rosuvastatin]     Flagyl [metronidazole]     Hydrocodone     Imitrex [sumatriptan]     Influenza virus vaccines     Opioids - morphine analogues     Penicillins     Tramadol     Clindamycin Nausea Only       Current Facility-Administered Medications on File Prior to Encounter   Medication    [COMPLETED] albuterol inhaler 2 puff    [COMPLETED] dexamethasone injection 4 mg    [COMPLETED] ondansetron injection 4 mg    [COMPLETED] potassium chloride 10 mEq in 100 mL IVPB    [COMPLETED] sodium chloride 0.9% bolus 1,000 mL    [DISCONTINUED] ciprofloxacin (CIPRO)400mg/200ml D5W IVPB 400 mg    [DISCONTINUED] levoFLOXacin 500 mg/100 mL IVPB 500 mg    [DISCONTINUED] potassium chloride CR tablet 30 mEq     Current Outpatient Medications on File Prior to Encounter   Medication Sig    ARMOUR THYROID 60 mg Tab Take 1 tablet (60 mg total) by mouth once daily.    metoprolol succinate (TOPROL-XL) 25 MG 24 hr tablet Take 1 tablet (25 mg total) by mouth once daily.    thyroid, pork, (ARMOUR THYROID) 15 mg Tab Take 15 mg by mouth before breakfast.    [DISCONTINUED] clobetasol 0.05% (TEMOVATE) 0.05 % Oint Apply to rash on hands BID tapering with improvement    [DISCONTINUED] dapsone 25 MG Tab Take 2 tablets (50 mg total) by mouth 2 (two) times daily.    [DISCONTINUED] mupirocin (BACTROBAN) 2 % ointment Apply topically 3 (three) times daily.    [DISCONTINUED] triamcinolone acetonide 0.1% (KENALOG) 0.1 % cream APPLY TO RASH on body TWICE A DAY AND taper with improvement     Family History     Problem Relation (Age of Onset)    Cancer  Paternal Aunt    Heart disease Mother, Father    Kidney disease Mother        Tobacco Use    Smoking status: Never Smoker    Smokeless tobacco: Never Used   Substance and Sexual Activity    Alcohol use: Never    Drug use: Never    Sexual activity: Not Currently     Review of Systems   Constitutional: Positive for activity change, appetite change, chills and fatigue. Negative for fever.   HENT: Negative.    Respiratory: Positive for cough and shortness of breath (mild  , more so with exertion).    Cardiovascular: Negative for chest pain, palpitations and leg swelling.   Gastrointestinal: Positive for diarrhea, nausea and vomiting. Negative for abdominal pain.   Genitourinary: Positive for decreased urine volume. Negative for difficulty urinating and dysuria.   Musculoskeletal: Positive for myalgias.   Skin: Negative.    Neurological: Positive for weakness (generalized).   Psychiatric/Behavioral: Negative.      Objective:     Vital Signs (Most Recent):  Temp: 98.3 °F (36.8 °C) (02/07/22 0400)  Pulse: 63 (02/07/22 0546)  Resp: 18 (02/07/22 0546)  BP: 123/76 (02/07/22 0400)  SpO2: 95 % (02/07/22 0546) Vital Signs (24h Range):  Temp:  [97.6 °F (36.4 °C)-98.3 °F (36.8 °C)] 98.3 °F (36.8 °C)  Pulse:  [60-70] 63  Resp:  [18-20] 18  SpO2:  [93 %-97 %] 95 %  BP: (104-123)/(63-76) 123/76     Weight: 95.9 kg (211 lb 6.4 oz)  Body mass index is 36.29 kg/m².    Physical Exam  Vitals and nursing note reviewed.   Constitutional:       General: She is not in acute distress.     Appearance: Normal appearance. She is obese. She is ill-appearing. She is not toxic-appearing.   HENT:      Head: Normocephalic.      Right Ear: Tympanic membrane normal.      Left Ear: Tympanic membrane normal.      Nose: Nose normal.      Mouth/Throat:      Mouth: Mucous membranes are dry.      Pharynx: Oropharynx is clear.   Eyes:      Extraocular Movements: Extraocular movements intact.      Conjunctiva/sclera: Conjunctivae normal.      Pupils:  Pupils are equal, round, and reactive to light.   Cardiovascular:      Rate and Rhythm: Normal rate and regular rhythm.      Pulses: Normal pulses.      Heart sounds: Normal heart sounds.   Pulmonary:      Effort: Pulmonary effort is normal.      Breath sounds: Normal breath sounds.   Abdominal:      General: Bowel sounds are normal. There is no distension.      Palpations: Abdomen is soft.      Tenderness: There is no abdominal tenderness. There is no guarding.   Musculoskeletal:         General: Normal range of motion.      Cervical back: Normal range of motion and neck supple.   Skin:     General: Skin is warm and dry.      Capillary Refill: Capillary refill takes less than 2 seconds.   Neurological:      General: No focal deficit present.      Mental Status: She is alert and oriented to person, place, and time.      Cranial Nerves: No cranial nerve deficit.   Psychiatric:         Mood and Affect: Mood normal.         Behavior: Behavior normal.         Thought Content: Thought content normal.         Judgment: Judgment normal.           CRANIAL NERVES     CN III, IV, VI   Pupils are equal, round, and reactive to light.       Significant Labs:   All pertinent labs within the past 24 hours have been reviewed.  CBC:   Recent Labs   Lab 02/06/22 1850 02/07/22  0420   WBC 4.88 3.13*   HGB 11.4* 10.4*   HCT 33.7* 31.5*    243     CMP:   Recent Labs   Lab 02/06/22 1850 02/07/22  0420    142   K 3.0* 3.2*    104   CO2 25 27    146*   BUN 28* 24*   CREATININE 1.66* 1.52*   CALCIUM 8.9 8.4*   PROT 7.9 7.4   ALBUMIN 3.5 3.3*   BILITOT 0.6 0.5   ALKPHOS 92 89   AST 34 35   ALT 30 36   ANIONGAP 16 14   EGFRNONAA 33* 37*     Urine Studies:   Recent Labs   Lab 02/06/22 1948   COLORU Straw   APPEARANCEUA Cloudy*   PHUR 5.5   SPECGRAV 1.025   PROTEINUA 100 *   GLUCUA Negative   KETONESU Trace   BILIRUBINUA Small*   OCCULTUA Large*   NITRITE Positive*   UROBILINOGEN 0.2   LEUKOCYTESUR Moderate*   RBCUA  10-15*   WBCUA 15-25*   BACTERIA Many*       Significant Imaging: I have reviewed all pertinent imaging results/findings within the past 24 hours.

## 2022-02-08 LAB
ALBUMIN SERPL BCP-MCNC: 3.2 G/DL (ref 3.5–5)
ALBUMIN/GLOB SERPL: 0.8 {RATIO}
ALP SERPL-CCNC: 79 U/L (ref 50–130)
ALT SERPL W P-5'-P-CCNC: 33 U/L (ref 13–56)
ANION GAP SERPL CALCULATED.3IONS-SCNC: 13 MMOL/L (ref 7–16)
AST SERPL W P-5'-P-CCNC: 29 U/L (ref 15–37)
BASOPHILS # BLD AUTO: 0.01 K/UL (ref 0–0.2)
BASOPHILS NFR BLD AUTO: 0.2 % (ref 0–1)
BILIRUB SERPL-MCNC: 0.5 MG/DL (ref 0–1.2)
BUN SERPL-MCNC: 18 MG/DL (ref 7–18)
BUN/CREAT SERPL: 17 (ref 6–20)
CALCIUM SERPL-MCNC: 8.6 MG/DL (ref 8.5–10.1)
CHLORIDE SERPL-SCNC: 106 MMOL/L (ref 98–107)
CO2 SERPL-SCNC: 29 MMOL/L (ref 21–32)
CREAT SERPL-MCNC: 1.09 MG/DL (ref 0.55–1.02)
DIFFERENTIAL METHOD BLD: ABNORMAL
EOSINOPHIL # BLD AUTO: 0.01 K/UL (ref 0–0.5)
EOSINOPHIL NFR BLD AUTO: 0.2 % (ref 1–4)
ERYTHROCYTE [DISTWIDTH] IN BLOOD BY AUTOMATED COUNT: 12.9 % (ref 11.5–14.5)
GLOBULIN SER-MCNC: 3.8 G/DL (ref 2–4)
GLUCOSE SERPL-MCNC: 112 MG/DL (ref 74–106)
HCT VFR BLD AUTO: 30.9 % (ref 38–47)
HGB BLD-MCNC: 10 G/DL (ref 12–16)
LYMPHOCYTES # BLD AUTO: 0.7 K/UL (ref 1–4.8)
LYMPHOCYTES NFR BLD AUTO: 13.6 % (ref 27–41)
LYMPHOCYTES NFR BLD MANUAL: 10 % (ref 27–41)
MCH RBC QN AUTO: 28.2 PG (ref 27–31)
MCHC RBC AUTO-ENTMCNC: 32.4 G/DL (ref 32–36)
MCV RBC AUTO: 87.3 FL (ref 80–96)
MONOCYTES # BLD AUTO: 0.56 K/UL (ref 0–0.8)
MONOCYTES NFR BLD AUTO: 10.9 % (ref 2–6)
MONOCYTES NFR BLD MANUAL: 7 % (ref 2–6)
MPC BLD CALC-MCNC: 10.1 FL (ref 9.4–12.4)
NEUTROPHILS # BLD AUTO: 3.85 K/UL (ref 1.8–7.7)
NEUTROPHILS NFR BLD AUTO: 75.1 % (ref 53–65)
NEUTS BAND NFR BLD MANUAL: 3 % (ref 1–5)
NEUTS SEG NFR BLD MANUAL: 80 % (ref 50–62)
NRBC BLD MANUAL-RTO: ABNORMAL %
PLATELET # BLD AUTO: 258 K/UL (ref 150–400)
PLATELET MORPHOLOGY: NORMAL
POTASSIUM SERPL-SCNC: 3.3 MMOL/L (ref 3.5–5.1)
PROT SERPL-MCNC: 7 G/DL (ref 6.4–8.2)
RBC # BLD AUTO: 3.54 M/UL (ref 4.2–5.4)
RBC MORPH BLD: NORMAL
SODIUM SERPL-SCNC: 145 MMOL/L (ref 136–145)
TSH SERPL DL<=0.005 MIU/L-ACNC: 0.6 UIU/ML (ref 0.36–3.74)
WBC # BLD AUTO: 5.13 K/UL (ref 4.5–11)

## 2022-02-08 PROCEDURE — 94640 AIRWAY INHALATION TREATMENT: CPT

## 2022-02-08 PROCEDURE — 99233 SBSQ HOSP IP/OBS HIGH 50: CPT | Mod: ,,, | Performed by: EMERGENCY MEDICINE

## 2022-02-08 PROCEDURE — 25000003 PHARM REV CODE 250: Performed by: EMERGENCY MEDICINE

## 2022-02-08 PROCEDURE — 63600175 PHARM REV CODE 636 W HCPCS: Performed by: NURSE PRACTITIONER

## 2022-02-08 PROCEDURE — 99233 PR SUBSEQUENT HOSPITAL CARE,LEVL III: ICD-10-PCS | Mod: ,,, | Performed by: EMERGENCY MEDICINE

## 2022-02-08 PROCEDURE — 25000003 PHARM REV CODE 250: Performed by: NURSE PRACTITIONER

## 2022-02-08 PROCEDURE — 80053 COMPREHEN METABOLIC PANEL: CPT | Performed by: NURSE PRACTITIONER

## 2022-02-08 PROCEDURE — 36415 COLL VENOUS BLD VENIPUNCTURE: CPT | Performed by: NURSE PRACTITIONER

## 2022-02-08 PROCEDURE — 85025 COMPLETE CBC W/AUTO DIFF WBC: CPT | Performed by: NURSE PRACTITIONER

## 2022-02-08 PROCEDURE — 27000221 HC OXYGEN, UP TO 24 HOURS

## 2022-02-08 PROCEDURE — 11000001 HC ACUTE MED/SURG PRIVATE ROOM

## 2022-02-08 PROCEDURE — 27000944

## 2022-02-08 PROCEDURE — 94761 N-INVAS EAR/PLS OXIMETRY MLT: CPT

## 2022-02-08 RX ORDER — AZITHROMYCIN 250 MG/1
500 TABLET, FILM COATED ORAL DAILY
Status: COMPLETED | OUTPATIENT
Start: 2022-02-09 | End: 2022-02-13

## 2022-02-08 RX ADMIN — FAMOTIDINE 20 MG: 20 TABLET ORAL at 08:02

## 2022-02-08 RX ADMIN — ENOXAPARIN SODIUM 40 MG: 40 INJECTION SUBCUTANEOUS at 05:02

## 2022-02-08 RX ADMIN — ALBUTEROL SULFATE 2 PUFF: 90 AEROSOL, METERED RESPIRATORY (INHALATION) at 05:02

## 2022-02-08 RX ADMIN — ALBUTEROL SULFATE 2 PUFF: 90 AEROSOL, METERED RESPIRATORY (INHALATION) at 12:02

## 2022-02-08 RX ADMIN — LACTOBACILLUS TAB 4 TABLET: TAB at 08:02

## 2022-02-08 RX ADMIN — DEXAMETHASONE SODIUM PHOSPHATE 6 MG: 4 INJECTION, SOLUTION INTRAMUSCULAR; INTRAVENOUS at 08:02

## 2022-02-08 RX ADMIN — SENNOSIDES AND DOCUSATE SODIUM 1 TABLET: 50; 8.6 TABLET ORAL at 08:02

## 2022-02-08 RX ADMIN — CEFTRIAXONE 1 G: 1 INJECTION, POWDER, FOR SOLUTION INTRAMUSCULAR; INTRAVENOUS at 10:02

## 2022-02-08 RX ADMIN — AZITHROMYCIN MONOHYDRATE 500 MG: 500 INJECTION, POWDER, LYOPHILIZED, FOR SOLUTION INTRAVENOUS at 10:02

## 2022-02-08 RX ADMIN — LACTOBACILLUS TAB 4 TABLET: TAB at 05:02

## 2022-02-08 RX ADMIN — Medication 1000 UNITS: at 08:02

## 2022-02-08 RX ADMIN — LACTOBACILLUS TAB 4 TABLET: TAB at 12:02

## 2022-02-08 RX ADMIN — OXYCODONE HYDROCHLORIDE AND ACETAMINOPHEN 1000 MG: 500 TABLET ORAL at 08:02

## 2022-02-08 NOTE — ASSESSMENT & PLAN NOTE
Ns 1 Liter bolus in ED  NS at 100 Ml/h  BUN 28/creatinine 1.66 in ED  Bun 24/ creatinine 1.52 this am  2/8/22 1.09 and 18

## 2022-02-08 NOTE — ASSESSMENT & PLAN NOTE
Urine culture preliminary show E. Coli > 100,000 and Enterococcus species 15,000  Levaquin d/c'd  Azithromycin/Rocephin started 2/7 for 7 days  NS at 100 ml/h-completed

## 2022-02-08 NOTE — SUBJECTIVE & OBJECTIVE
Interval History: Upon evaluation today, Ms. Rodríguez is alert and oriented. Her color is good, and she has no obvious dyspnea. She is pleasant and very talkative. She states that she is feeling better. She denies n/v and is tolerating po intake. Her case was discussed with Dr. Vazquez, and he is participating in all aspects of her care.    Review of Systems   Constitutional: Positive for activity change, appetite change (improving) and fatigue. Negative for chills and fever.   HENT: Negative.    Respiratory: Positive for cough and shortness of breath (mild  , more so with exertion).    Cardiovascular: Negative for chest pain, palpitations and leg swelling.   Gastrointestinal: Positive for diarrhea (mild). Negative for abdominal pain, nausea and vomiting.   Genitourinary: Negative for decreased urine volume, difficulty urinating and dysuria.   Musculoskeletal: Negative for myalgias.   Skin: Negative.    Neurological: Positive for weakness (generalized).   Psychiatric/Behavioral: Negative.      Objective:     Vital Signs (Most Recent):  Temp: 98 °F (36.7 °C) (02/08/22 1100)  Pulse: (!) 59 (02/08/22 1214)  Resp: 18 (02/08/22 1214)  BP: 125/60 (02/08/22 1100)  SpO2: 95 % (02/08/22 1100) Vital Signs (24h Range):  Temp:  [97.8 °F (36.6 °C)-98.9 °F (37.2 °C)] 98 °F (36.7 °C)  Pulse:  [47-60] 59  Resp:  [18-20] 18  SpO2:  [93 %-96 %] 95 %  BP: (115-145)/(57-73) 125/60     Weight: 95.9 kg (211 lb 6.4 oz)  Body mass index is 36.29 kg/m².    Intake/Output Summary (Last 24 hours) at 2/8/2022 1531  Last data filed at 2/8/2022 1400  Gross per 24 hour   Intake 720 ml   Output --   Net 720 ml      Physical Exam  Vitals and nursing note reviewed.   Constitutional:       General: She is not in acute distress.     Appearance: Normal appearance. She is obese. She is ill-appearing (improving). She is not toxic-appearing.   HENT:      Head: Normocephalic.      Right Ear: Tympanic membrane normal.      Left Ear: Tympanic membrane normal.       Nose: Nose normal.      Mouth/Throat:      Mouth: Mucous membranes are dry.      Pharynx: Oropharynx is clear.   Eyes:      Extraocular Movements: Extraocular movements intact.      Conjunctiva/sclera: Conjunctivae normal.      Pupils: Pupils are equal, round, and reactive to light.   Cardiovascular:      Rate and Rhythm: Normal rate and regular rhythm.      Pulses: Normal pulses.      Heart sounds: Normal heart sounds.   Pulmonary:      Effort: Pulmonary effort is normal.      Breath sounds: Normal breath sounds.      Comments: Coarse breath sounds bilat.  Abdominal:      General: Bowel sounds are normal. There is no distension.      Palpations: Abdomen is soft.      Tenderness: There is no abdominal tenderness. There is no guarding.   Musculoskeletal:         General: Normal range of motion.      Cervical back: Normal range of motion and neck supple.   Skin:     General: Skin is warm and dry.      Capillary Refill: Capillary refill takes less than 2 seconds.   Neurological:      General: No focal deficit present.      Mental Status: She is alert and oriented to person, place, and time.      Cranial Nerves: No cranial nerve deficit.   Psychiatric:         Mood and Affect: Mood normal.         Behavior: Behavior normal.         Thought Content: Thought content normal.         Judgment: Judgment normal.         Significant Labs:   All pertinent labs within the past 24 hours have been reviewed.  CBC:   Recent Labs   Lab 02/06/22  1850 02/07/22  0420 02/08/22  0435   WBC 4.88 3.13* 5.13   HGB 11.4* 10.4* 10.0*   HCT 33.7* 31.5* 30.9*    243 258     CMP:   Recent Labs   Lab 02/06/22  1850 02/07/22  0420 02/08/22  0435    142 145   K 3.0* 3.2* 3.3*    104 106   CO2 25 27 29    146* 112*   BUN 28* 24* 18   CREATININE 1.66* 1.52* 1.09*   CALCIUM 8.9 8.4* 8.6   PROT 7.9 7.4 7.0   ALBUMIN 3.5 3.3* 3.2*   BILITOT 0.6 0.5 0.5   ALKPHOS 92 89 79   AST 34 35 29   ALT 30 36 33   ANIONGAP 16 14 13    EGFRNONAA 33* 37* 54*       Significant Imaging: I have reviewed all pertinent imaging results/findings within the past 24 hours.

## 2022-02-08 NOTE — PROGRESS NOTES
[facility] Medical Surgical Unit  [facility] Medicine  Progress Note    Patient Name: Beverly Rodríguez  MRN: [id number]  Patient Class: IP- Inpatient   Admission Date: 2/6/2022  Length of Stay: 2 days  Attending Physician: Sharron Andujar MD  Primary Care Provider: Prem Vincent MD        Subjective:     Principal Problem:Pneumonia due to COVID-19 virus        HPI:  Ms. Rodríguez is a 66 year old  female who is admitted to [facility] acute care services due to COVID 19 pneumonia and acute urinary tract infection. She tested positive for COVID 19 infection on 1/26/22 after presenting to the ED with c/o fever and chills. She was discharged home at that time. Yesterday, she returned to the ED at [facility] per EMS EMS with complaint of nausea, vomiting, diarrhea, generalized weakness, non-productive cough, and shortness of breath cough.  Diagnostic evaluation in the ED at [facility] revealed a significant viral pneumonia on chest x-ray, a urinary tract infection and dehydration on labs. Her WBC was 4880. Her Bun was 28 with creatinine 1.66. Her potassium was 3.0. She was given 1 L of NS, Levaquine, decadron, and Kcl. Her O2 sats while in the ED ranged 93-95% on RA. She was started on O2 at 2L/min per NC. Dr. Madrigal accepted her here for admission. Admit orders and home medication reconciliation were completed under his direction.    Upon arrival here, Ms. Rodríguez is alert and oriented x3. She has no obvious dyspnea. She denies chest pain. She states that she has gotten so weak and tired with nausea, vomiting, and diarrhea that started shortly after diagnosis with COVID 19 but has continued. She states that she has been unable to eat anything solid but can tolerate liquids po. She denies significant shortness of breath. She notes that she is on home O2 at night due to pulmonary disease but not sure of her diagnosis. She is receiving O2 at 2L/min per NC. A d-dimer was 0.54 here. She was started on Lovenox 40 mg daily. Levaquin  and decadron are continued along with NS at 100 ml/h.    Full Code  Not UTD with COVID 19 of flu vaccines due to allergic to flu vaccine  High risk VTE-TEDs, lovenox, early amb      Overview/Hospital Course:  2/7/22: Day 2    2/8/22: Hospital day 3. COVID 19 positive day 13. Ms. Rodríguez has been afebrile since admission. Droplet and airborne isolation are discontinued to day. Her O2 sats are ranging 93-96% on O2 at 2L/min per NC. She does wear O2 at night at home. Labs show improved BUN and creatinine to 18 and 1.09. Her glucose is 112 this am. She is tolerating po intake without vomiting. She says that her diarrhea has improved as well. Her diet will be increased to full liquid today. She is receiving decadron (day 4), azithromycin, (day 2) and rocephin (day 2). Since she is tolerating po intake, her azithromycin is changed to po today. She is also receiving Lovenox 40 mg SC daily, Vit C, Vit D, and she is using albuterol MDI and incentive spirometer under the direction of RT. She has not been receiving her thyroid med due to availability for 2 days. Her TSH is 0.61. She will restart her routine home dose tonight.      Interval History: Upon evaluation today, Ms. Rodríguez is alert and oriented. Her color is good, and she has no obvious dyspnea. She is pleasant and very talkative. She states that she is feeling better. She denies n/v and is tolerating po intake. Her case was discussed with Dr. Vazquez, and he is participating in all aspects of her care.    Review of Systems   Constitutional: Positive for activity change, appetite change (improving) and fatigue. Negative for chills and fever.   HENT: Negative.    Respiratory: Positive for cough and shortness of breath (mild  , more so with exertion).    Cardiovascular: Negative for chest pain, palpitations and leg swelling.   Gastrointestinal: Positive for diarrhea (mild). Negative for abdominal pain, nausea and vomiting.   Genitourinary: Negative for decreased urine  volume, difficulty urinating and dysuria.   Musculoskeletal: Negative for myalgias.   Skin: Negative.    Neurological: Positive for weakness (generalized).   Psychiatric/Behavioral: Negative.      Objective:     Vital Signs (Most Recent):  Temp: 98 °F (36.7 °C) (02/08/22 1100)  Pulse: (!) 59 (02/08/22 1214)  Resp: 18 (02/08/22 1214)  BP: 125/60 (02/08/22 1100)  SpO2: 95 % (02/08/22 1100) Vital Signs (24h Range):  Temp:  [97.8 °F (36.6 °C)-98.9 °F (37.2 °C)] 98 °F (36.7 °C)  Pulse:  [47-60] 59  Resp:  [18-20] 18  SpO2:  [93 %-96 %] 95 %  BP: (115-145)/(57-73) 125/60     Weight: 95.9 kg (211 lb 6.4 oz)  Body mass index is 36.29 kg/m².    Intake/Output Summary (Last 24 hours) at 2/8/2022 1531  Last data filed at 2/8/2022 1400  Gross per 24 hour   Intake 720 ml   Output --   Net 720 ml      Physical Exam  Vitals and nursing note reviewed.   Constitutional:       General: She is not in acute distress.     Appearance: Normal appearance. She is obese. She is ill-appearing (improving). She is not toxic-appearing.   HENT:      Head: Normocephalic.      Right Ear: Tympanic membrane normal.      Left Ear: Tympanic membrane normal.      Nose: Nose normal.      Mouth/Throat:      Mouth: Mucous membranes are dry.      Pharynx: Oropharynx is clear.   Eyes:      Extraocular Movements: Extraocular movements intact.      Conjunctiva/sclera: Conjunctivae normal.      Pupils: Pupils are equal, round, and reactive to light.   Cardiovascular:      Rate and Rhythm: Normal rate and regular rhythm.      Pulses: Normal pulses.      Heart sounds: Normal heart sounds.   Pulmonary:      Effort: Pulmonary effort is normal.      Breath sounds: Normal breath sounds.      Comments: Coarse breath sounds bilat.  Abdominal:      General: Bowel sounds are normal. There is no distension.      Palpations: Abdomen is soft.      Tenderness: There is no abdominal tenderness. There is no guarding.   Musculoskeletal:         General: Normal range of motion.       Cervical back: Normal range of motion and neck supple.   Skin:     General: Skin is warm and dry.      Capillary Refill: Capillary refill takes less than 2 seconds.   Neurological:      General: No focal deficit present.      Mental Status: She is alert and oriented to person, place, and time.      Cranial Nerves: No cranial nerve deficit.   Psychiatric:         Mood and Affect: Mood normal.         Behavior: Behavior normal.         Thought Content: Thought content normal.         Judgment: Judgment normal.         Significant Labs:   All pertinent labs within the past 24 hours have been reviewed.  CBC:   Recent Labs   Lab 02/06/22 1850 02/07/22  0420 02/08/22  0435   WBC 4.88 3.13* 5.13   HGB 11.4* 10.4* 10.0*   HCT 33.7* 31.5* 30.9*    243 258     CMP:   Recent Labs   Lab 02/06/22 1850 02/07/22 0420 02/08/22  0435    142 145   K 3.0* 3.2* 3.3*    104 106   CO2 25 27 29    146* 112*   BUN 28* 24* 18   CREATININE 1.66* 1.52* 1.09*   CALCIUM 8.9 8.4* 8.6   PROT 7.9 7.4 7.0   ALBUMIN 3.5 3.3* 3.2*   BILITOT 0.6 0.5 0.5   ALKPHOS 92 89 79   AST 34 35 29   ALT 30 36 33   ANIONGAP 16 14 13   EGFRNONAA 33* 37* 54*       Significant Imaging: I have reviewed all pertinent imaging results/findings within the past 24 hours.      Assessment/Plan:      * Pneumonia due to COVID-19 virus  Levaquin d/c'd   Azithromycin/Rocephin started on 2/7 (dose 2 of both today)  Decadron (dose 4 today)  Albuterol MDI  Incentive spirometry  Vit C, Vit D3 (Zinc not started due to nausea)  Famotidine  O2 at 2L/min per NC to titrate  BC x2 show no growth after 24 hours        Dehydration  Ns 1 Liter bolus in ED  NS at 100 Ml/h  BUN 28/creatinine 1.66 in ED  Bun 24/ creatinine 1.52 this am  2/8/22 1.09 and 18    Acute urinary tract infection  Urine culture preliminary show E. Coli > 100,000 and Enterococcus species 15,000  Levaquin d/c'd  Azithromycin/Rocephin started 2/7 for 7 days  NS at 100  ml/h-completed      Hypokalemia  Magnesium 2.1  Potassium 3.3 this am  (3.0 to 3.2 to 3.3)  Monitor daily      Hypertension  Continue Metoprolol  /57      Hypothyroidism  TSH 0.6  Continue home med ([place] thyroid 75 mg daily)      Generalized weakness  Routine treatment for her illnesses  PT/OT if tolerated      Nausea vomiting and diarrhea  Ns at 100 ml/h-completed  Ondansetron prn  Vishal clear liq diet. Increased to full liquid diet today (2/8)        VTE Risk Mitigation (From admission, onward)         Ordered     enoxaparin injection 40 mg  Daily         02/07/22 0111     IP VTE HIGH RISK PATIENT  Once         02/07/22 0111     Place sequential compression device  Until discontinued         02/07/22 0111                Discharge Planning   SANDY:      Code Status: Full Code   Is the patient medically ready for discharge?:     Reason for patient still in hospital (select all that apply): Treatment                     Latesha Paulino NP  Department of Hospital Medicine   [facility] [place] - Medical Surgical Unit

## 2022-02-08 NOTE — ASSESSMENT & PLAN NOTE
Levaquin d/c'd   Azithromycin/Rocephin started on 2/7 (dose 2 of both today)  Decadron (dose 4 today)  Albuterol MDI  Incentive spirometry  Vit C, Vit D3 (Zinc not started due to nausea)  Famotidine  O2 at 2L/min per NC to titrate  BC x2 show no growth after 24 hours

## 2022-02-08 NOTE — ASSESSMENT & PLAN NOTE
Ns at 100 ml/h-completed  Ondansetron prn  Vishal clear liq diet. Increased to full liquid diet today (2/8)

## 2022-02-08 NOTE — PLAN OF CARE
Problem: Adult Inpatient Plan of Care  Goal: Plan of Care Review  Outcome: Ongoing, Progressing  Goal: Patient-Specific Goal (Individualized)  Outcome: Ongoing, Progressing  Goal: Absence of Hospital-Acquired Illness or Injury  Outcome: Ongoing, Progressing  Intervention: Identify and Manage Fall Risk  Flowsheets (Taken 2/8/2022 0638)  Safety Promotion/Fall Prevention:   bed alarm set   assistive device/personal item within reach   Fall Risk reviewed with patient/family   Fall Risk signage in place   nonskid shoes/socks when out of bed   side rails raised x 2  Intervention: Prevent Skin Injury  Flowsheets (Taken 2/8/2022 0638)  Body Position: position changed independently  Skin Protection: tubing/devices free from skin contact  Intervention: Prevent and Manage VTE (Venous Thromboembolism) Risk  Flowsheets (Taken 2/8/2022 0638)  Activity Management: Rolling - L1  VTE Prevention/Management: remove, assess skin, and reapply compression stockings  Range of Motion: active ROM (range of motion) encouraged  Intervention: Prevent Infection  Flowsheets (Taken 2/8/2022 0638)  Infection Prevention: hand hygiene promoted  Goal: Optimal Comfort and Wellbeing  Outcome: Ongoing, Progressing  Goal: Readiness for Transition of Care  Outcome: Ongoing, Progressing     Problem: Bariatric Environmental Safety  Goal: Safety Maintained with Care  Outcome: Ongoing, Progressing

## 2022-02-09 PROBLEM — Z79.899 DVT PROPHYLAXIS: Status: ACTIVE | Noted: 2022-02-09

## 2022-02-09 LAB
ALBUMIN SERPL BCP-MCNC: 3.3 G/DL (ref 3.5–5)
ALBUMIN/GLOB SERPL: 0.9 {RATIO}
ALP SERPL-CCNC: 81 U/L (ref 50–130)
ALT SERPL W P-5'-P-CCNC: 38 U/L (ref 13–56)
ANION GAP SERPL CALCULATED.3IONS-SCNC: 9 MMOL/L (ref 7–16)
AST SERPL W P-5'-P-CCNC: 21 U/L (ref 15–37)
BASOPHILS # BLD AUTO: 0.01 K/UL (ref 0–0.2)
BASOPHILS NFR BLD AUTO: 0.1 % (ref 0–1)
BILIRUB SERPL-MCNC: 0.5 MG/DL (ref 0–1.2)
BUN SERPL-MCNC: 15 MG/DL (ref 7–18)
BUN/CREAT SERPL: 14 (ref 6–20)
CALCIUM SERPL-MCNC: 9.1 MG/DL (ref 8.5–10.1)
CHLORIDE SERPL-SCNC: 106 MMOL/L (ref 98–107)
CO2 SERPL-SCNC: 32 MMOL/L (ref 21–32)
CREAT SERPL-MCNC: 1.11 MG/DL (ref 0.55–1.02)
DIFFERENTIAL METHOD BLD: ABNORMAL
EOSINOPHIL # BLD AUTO: 0.07 K/UL (ref 0–0.5)
EOSINOPHIL NFR BLD AUTO: 1 % (ref 1–4)
EOSINOPHIL NFR BLD MANUAL: 1 % (ref 1–4)
ERYTHROCYTE [DISTWIDTH] IN BLOOD BY AUTOMATED COUNT: 12.8 % (ref 11.5–14.5)
GLOBULIN SER-MCNC: 3.7 G/DL (ref 2–4)
GLUCOSE SERPL-MCNC: 99 MG/DL (ref 74–106)
HCT VFR BLD AUTO: 32.2 % (ref 38–47)
HGB BLD-MCNC: 10.3 G/DL (ref 12–16)
LYMPHOCYTES # BLD AUTO: 0.9 K/UL (ref 1–4.8)
LYMPHOCYTES NFR BLD AUTO: 13.3 % (ref 27–41)
LYMPHOCYTES NFR BLD MANUAL: 14 % (ref 27–41)
MCH RBC QN AUTO: 28.3 PG (ref 27–31)
MCHC RBC AUTO-ENTMCNC: 32 G/DL (ref 32–36)
MCV RBC AUTO: 88.5 FL (ref 80–96)
MONOCYTES # BLD AUTO: 0.8 K/UL (ref 0–0.8)
MONOCYTES NFR BLD AUTO: 11.8 % (ref 2–6)
MONOCYTES NFR BLD MANUAL: 9 % (ref 2–6)
MPC BLD CALC-MCNC: 9.9 FL (ref 9.4–12.4)
NEUTROPHILS # BLD AUTO: 4.98 K/UL (ref 1.8–7.7)
NEUTROPHILS NFR BLD AUTO: 73.8 % (ref 53–65)
NEUTS BAND NFR BLD MANUAL: 4 % (ref 1–5)
NEUTS SEG NFR BLD MANUAL: 72 % (ref 50–62)
NRBC BLD MANUAL-RTO: ABNORMAL %
PLATELET # BLD AUTO: 310 K/UL (ref 150–400)
PLATELET MORPHOLOGY: ABNORMAL
POTASSIUM SERPL-SCNC: 3.4 MMOL/L (ref 3.5–5.1)
PROT SERPL-MCNC: 7 G/DL (ref 6.4–8.2)
RBC # BLD AUTO: 3.64 M/UL (ref 4.2–5.4)
RBC MORPH BLD: NORMAL
SODIUM SERPL-SCNC: 144 MMOL/L (ref 136–145)
UA COMPLETE W REFLEX CULTURE PNL UR: ABNORMAL
UA COMPLETE W REFLEX CULTURE PNL UR: ABNORMAL
WBC # BLD AUTO: 6.76 K/UL (ref 4.5–11)

## 2022-02-09 PROCEDURE — 36415 COLL VENOUS BLD VENIPUNCTURE: CPT | Performed by: NURSE PRACTITIONER

## 2022-02-09 PROCEDURE — 63600175 PHARM REV CODE 636 W HCPCS: Performed by: NURSE PRACTITIONER

## 2022-02-09 PROCEDURE — 25000003 PHARM REV CODE 250: Performed by: EMERGENCY MEDICINE

## 2022-02-09 PROCEDURE — 11000001 HC ACUTE MED/SURG PRIVATE ROOM

## 2022-02-09 PROCEDURE — 94640 AIRWAY INHALATION TREATMENT: CPT

## 2022-02-09 PROCEDURE — 99900035 HC TECH TIME PER 15 MIN (STAT)

## 2022-02-09 PROCEDURE — 99233 PR SUBSEQUENT HOSPITAL CARE,LEVL III: ICD-10-PCS | Mod: ,,, | Performed by: EMERGENCY MEDICINE

## 2022-02-09 PROCEDURE — 97161 PT EVAL LOW COMPLEX 20 MIN: CPT

## 2022-02-09 PROCEDURE — 97165 OT EVAL LOW COMPLEX 30 MIN: CPT

## 2022-02-09 PROCEDURE — 25000003 PHARM REV CODE 250: Performed by: NURSE PRACTITIONER

## 2022-02-09 PROCEDURE — 99233 SBSQ HOSP IP/OBS HIGH 50: CPT | Mod: ,,, | Performed by: EMERGENCY MEDICINE

## 2022-02-09 PROCEDURE — 80053 COMPREHEN METABOLIC PANEL: CPT | Performed by: NURSE PRACTITIONER

## 2022-02-09 PROCEDURE — 94761 N-INVAS EAR/PLS OXIMETRY MLT: CPT

## 2022-02-09 PROCEDURE — 63700000 PHARM REV CODE 250 ALT 637 W/O HCPCS: Performed by: EMERGENCY MEDICINE

## 2022-02-09 PROCEDURE — 27000221 HC OXYGEN, UP TO 24 HOURS

## 2022-02-09 PROCEDURE — 85025 COMPLETE CBC W/AUTO DIFF WBC: CPT | Performed by: NURSE PRACTITIONER

## 2022-02-09 PROCEDURE — 27000944

## 2022-02-09 RX ORDER — THYROID 15 MG/1
75 TABLET ORAL
Status: DISCONTINUED | OUTPATIENT
Start: 2022-02-09 | End: 2022-02-14 | Stop reason: HOSPADM

## 2022-02-09 RX ADMIN — ALBUTEROL SULFATE 2 PUFF: 90 AEROSOL, METERED RESPIRATORY (INHALATION) at 01:02

## 2022-02-09 RX ADMIN — METOPROLOL SUCCINATE 25 MG: 25 TABLET, EXTENDED RELEASE ORAL at 09:02

## 2022-02-09 RX ADMIN — ENOXAPARIN SODIUM 40 MG: 40 INJECTION SUBCUTANEOUS at 04:02

## 2022-02-09 RX ADMIN — SENNOSIDES AND DOCUSATE SODIUM 1 TABLET: 50; 8.6 TABLET ORAL at 09:02

## 2022-02-09 RX ADMIN — Medication 1000 UNITS: at 09:02

## 2022-02-09 RX ADMIN — ACETAMINOPHEN 650 MG: 325 TABLET ORAL at 01:02

## 2022-02-09 RX ADMIN — OXYCODONE HYDROCHLORIDE AND ACETAMINOPHEN 1000 MG: 500 TABLET ORAL at 09:02

## 2022-02-09 RX ADMIN — DEXAMETHASONE SODIUM PHOSPHATE 6 MG: 4 INJECTION, SOLUTION INTRAMUSCULAR; INTRAVENOUS at 09:02

## 2022-02-09 RX ADMIN — THYROID, PORCINE 75 MG: 15 TABLET ORAL at 09:02

## 2022-02-09 RX ADMIN — CEFTRIAXONE 1 G: 1 INJECTION, POWDER, FOR SOLUTION INTRAMUSCULAR; INTRAVENOUS at 09:02

## 2022-02-09 RX ADMIN — ALBUTEROL SULFATE 2 PUFF: 90 AEROSOL, METERED RESPIRATORY (INHALATION) at 07:02

## 2022-02-09 RX ADMIN — LACTOBACILLUS TAB 4 TABLET: TAB at 04:02

## 2022-02-09 RX ADMIN — FAMOTIDINE 20 MG: 20 TABLET ORAL at 09:02

## 2022-02-09 RX ADMIN — LACTOBACILLUS TAB 4 TABLET: TAB at 11:02

## 2022-02-09 RX ADMIN — AZITHROMYCIN MONOHYDRATE 500 MG: 250 TABLET ORAL at 09:02

## 2022-02-09 RX ADMIN — LACTOBACILLUS TAB 4 TABLET: TAB at 09:02

## 2022-02-09 NOTE — PLAN OF CARE
Per physician, patient has worsened. She will require IV abx until 2/15. PT/OT to eval patient. Patient still having issues related to covid. On full liquid diet and receiving O2 and albuterol. Continue to follow for d/c needs prn.

## 2022-02-09 NOTE — PLAN OF CARE
Problem: Physical Therapy Goal  Goal: Physical Therapy Goal  Description: STG:  Patient will perform sit to stand and SPT with independence  Patient will ambulate 250 feet with no assistive device with no LOB and supervision assistance without LOB.     LTG  Patient will ambuate 350 feet with no assistive device with no assistance without LOB.   Patient will perform 10 min of LE exercise without rest break to improve endurance with no SOB     Outcome: Ongoing, Progressing

## 2022-02-09 NOTE — PT/OT/SLP EVAL
Physical Therapy Evaluation    Patient Name:  Beverly Rodríguez   MRN:  [id number]    Recommendations:     Discharge Recommendations:  home health PT,home health OT   Discharge Equipment Recommendations: 3-in-1 commode   Barriers to discharge: Decreased caregiver support    Assessment:     Beverly Rodríguez is a 66 y.o. female admitted with a medical diagnosis of Pneumonia due to COVID-19 virus.  She presents with the following impairments/functional limitations:  impaired endurance .  Patient with very mild SOB with room distance ambulation.  I recommend 1-2 weeks of PT for generalized strength and endurance.    Rehab Prognosis: Good; patient would benefit from acute skilled PT services to address these deficits and reach maximum level of function.    Recent Surgery: * No surgery found *      Plan:     During this hospitalization, patient to be seen 5 x/week to address the identified rehab impairments via gait training,therapeutic exercises and progress toward the following goals:    · Plan of Care Expires:  02/23/22    Subjective     Chief Complaint: SOB with gait  Patient/Family Comments/goals: return home   Pain/Comfort:  · Pain Rating 1: 0/10  · Pain Rating Post-Intervention 1: 0/10    Patients cultural, spiritual, Latter day conflicts given the current situation: no    Living Environment:  Patient lives alone in an apartment.   Prior to admission, patients level of function was independent.  Equipment used at home: cane, straight,rollator.  DME owned (not currently used): none.  Upon discharge, patient will have assistance from neighbor.    Objective:     Communicated with nurse prior to session.  Patient found sitting edge of bed with oxygen,telemetry,peripheral IV  upon PT entry to room.    General Precautions: Standard, fall,droplet,contact,airborne   Orthopedic Precautions:    Braces: N/A  Respiratory Status: Nasal cannula, flow 2 L/min    Exams:  · RLE ROM: WFL  · RLE Strength: WFL  · LLE ROM: WFL  · LLE Strength:  WFL    Functional Mobility:  · Bed Mobility:     · Sit to Supine: independence  · Transfers:     · Sit to Stand:  supervision with no AD  · Gait: patient ambulated 40 feet with O2 with mild SOB with supervision    Therapeutic Activities and Exercises:   eval only    AM-PAC 6 CLICK MOBILITY  Total Score:20     Patient left supine with call button in reach.    GOALS:   Multidisciplinary Problems     Physical Therapy Goals        Problem: Physical Therapy Goal    Goal Priority Disciplines Outcome Goal Variances Interventions   Physical Therapy Goal     PT, PT/OT Ongoing, Progressing     Description: STG:  Patient will perform sit to stand and SPT with independence  Patient will ambulate 250 feet with no assistive device with no LOB and supervision assistance without LOB.     LTG  Patient will ambuate 350 feet with no assistive device with no assistance without LOB.   Patient will perform 10 min of LE exercise without rest break to improve endurance with no SOB                      History:     Past Medical History:   Diagnosis Date    Abnormal LFTs     Asthma     ? diagnosis    Diabetes mellitus     Hypertension     Hypothyroidism     from birth       Past Surgical History:   Procedure Laterality Date    APPENDECTOMY      CHOLECYSTECTOMY      HYSTERECTOMY      TONSILLECTOMY         Time Tracking:     PT Received On: 02/09/22  PT Start Time: 1540     PT Stop Time: 1600  PT Total Time (min): 20 min     Billable Minutes: Evaluation 15      02/09/2022

## 2022-02-09 NOTE — PLAN OF CARE
Plan of care reviewed, status is ongoing.   Problem: Adult Inpatient Plan of Care  Goal: Plan of Care Review  Outcome: Ongoing, Progressing  Goal: Patient-Specific Goal (Individualized)  Outcome: Ongoing, Progressing  Flowsheets (Taken 2/9/2022 8256)  Anxieties, Fears or Concerns: passing COVID to her father  Individualized Care Needs: stop coughing and feeling nauseated  Patient-Specific Goals (Include Timeframe): to go home stronger and uninfected  Goal: Absence of Hospital-Acquired Illness or Injury  Outcome: Ongoing, Progressing  Goal: Optimal Comfort and Wellbeing  Outcome: Ongoing, Progressing

## 2022-02-09 NOTE — PLAN OF CARE
Problem: Adult Inpatient Plan of Care  Goal: Plan of Care Review  Outcome: Ongoing, Progressing  Goal: Patient-Specific Goal (Individualized)  Outcome: Ongoing, Progressing  Goal: Absence of Hospital-Acquired Illness or Injury  Outcome: Ongoing, Progressing  Intervention: Identify and Manage Fall Risk  Flowsheets (Taken 2/9/2022 0140)  Safety Promotion/Fall Prevention:   assistive device/personal item within reach   commode/urinal/bedpan at bedside   Fall Risk reviewed with patient/family   Fall Risk signage in place   nonskid shoes/socks when out of bed   side rails raised x 2  Intervention: Prevent Skin Injury  Flowsheets (Taken 2/9/2022 0140)  Body Position: position changed independently  Skin Protection: tubing/devices free from skin contact  Intervention: Prevent and Manage VTE (Venous Thromboembolism) Risk  Flowsheets (Taken 2/9/2022 0140)  Activity Management: Sitting at edge of bed - L2  VTE Prevention/Management: remove, assess skin, and reapply compression stockings  Range of Motion: active ROM (range of motion) encouraged  Intervention: Prevent Infection  Flowsheets (Taken 2/9/2022 0140)  Infection Prevention:   hand hygiene promoted   rest/sleep promoted  Goal: Optimal Comfort and Wellbeing  Outcome: Ongoing, Progressing  Goal: Readiness for Transition of Care  Outcome: Ongoing, Progressing     Problem: Skin Injury Risk Increased  Goal: Skin Health and Integrity  Outcome: Ongoing, Progressing  Intervention: Optimize Skin Protection  Flowsheets (Taken 2/9/2022 0140)  Pressure Reduction Techniques:   frequent weight shift encouraged   heels elevated off bed  Skin Protection: tubing/devices free from skin contact  Head of Bed (HOB) Positioning: HOB at 30-45 degrees  Intervention: Promote and Optimize Oral Intake  Flowsheets (Taken 2/9/2022 0140)  Oral Nutrition Promotion: rest periods promoted

## 2022-02-09 NOTE — PROGRESS NOTES
"[facility]  Adult Nutrition  Consult Note    SUMMARY     Recommendations    Recommendation/Intervention: 1. Gluten free Regular diet  Goals: Meet EEN> 75%  Nutrition Goal Status: new  Communication of RD Recs: reviewed with physician    Assessment and Plan    No new Assessment & Plan notes have been filed under this hospital service since the last note was generated.  Service: Nutrition       Malnutrition Assessment  Malnutrition Type:  (This patient is not malnourished.  Alb 3.3-suspect inaccurate.)                                    Reason for Assessment    Reason For Assessment: consult (gluten free diet)  Diagnosis: pulmonary disease (recent COVID-19 with GI symptoms)  Relevant Medical History: Stated celiac disease,DM ,HTN, obesity  General Information Comments: RDN visitedpt today and she stated that she was tested for a gluten allergy (rash) and that she was positive for celiac disease,  Her GI distress is much improved, but she did not an accident this a.m. she felt like was related to stool softener. Noted hx of esophageal diltation but she did not c/o diff swallowing.  Pt denied history of DM.  BG: has been <150mg/dL.  She is on steroids noted.  Also recent UTI.  Nutrition Risk Screen    Nutrition Risk Screen: no indicators present    Nutrition/Diet History    Patient Reported Diet/Restrictions/Preferences: other (see comments) (Gluten free, Low Na)  Typical Food/Fluid Intake: 50% since admit  Spiritual, Cultural Beliefs, Ep[employer] Practices, Values that Affect Care: no  Food Allergies:  (stated Gluten)  Factors Affecting Nutritional Intake: decreased appetite (Previous N, V, D)    Anthropometrics    Temp: 98 °F (36.7 °C)  Height Method: Stated  Height: 5' 4" (162.6 cm)  Height (inches): 64 in  Weight Method: Bed Scale  Weight: 96 kg (211 lb 10.3 oz)  Weight (lb): 211.64 lb  Ideal Body Weight (IBW), Female: 120 lb  % Ideal Body Weight, Female (lb): 176.37 %  BMI (Calculated): " 36.3  BMI Grade: 35 - 39.9 - obesity - grade II       Lab/Procedures/Meds    Pertinent Labs Reviewed: reviewed  Pertinent Labs Comments: Hgb 10.3, Hct 32.2, Alb 3.3, crt 1.11  Pertinent Medications Reviewed: reviewed  Pertinent Medications Comments: Vit C, Azithromycin, Rocephin, Dexamethasone, Pepcid, Lactobacillus, Vit D    Physical Findings/Assessment         Estimated/Assessed Needs    Weight Used For Calorie Calculations: 54.4 kg (120 lb)  Energy Calorie Requirements (kcal): 3628-5813  Energy Need Method: Kcal/kg  Protein Requirements: 55-60  Weight Used For Protein Calculations: 54.4 kg (120 lb)     Estimated Fluid Requirement Method:  (1728ml)  RDA Method (mL): 1360         Nutrition Prescription Ordered    Current Diet Order: FL    Evaluation of Received Nutrient/Fluid Intake    Energy Calories Required: not meeting needs  Protein Required: not meeting needs  Fluid Required: not meeting needs  Tolerance: tolerating  % Intake of Estimated Energy Needs: 25 - 50 %  % Meal Intake: 25 - 50 %    Nutrition Risk    Level of Risk/Frequency of Follow-up: moderate       Monitor and Evaluation    Food and Nutrient Intake: energy intake  Biochemical Data, Medical Tests and Procedures: electrolyte and renal panel,glucose/endocrine profile  Nutrition-Focused Physical Findings: overall appearance       Nutrition Follow-Up    RD Follow-up?: Yes

## 2022-02-09 NOTE — PT/OT/SLP EVAL
Occupational Therapy   Evaluation    Name: Beverly Rodríguez  MRN: [id number]  Admitting Diagnosis:  Pneumonia due to COVID-19 virus  Recent Surgery: * No surgery found *      Recommendations:     Discharge Recommendations: home,home with home health  Discharge Equipment Recommendations:  3-in-1 commode  Barriers to discharge:  None    Assessment:     Beverly Rodríguez is a 66 y.o. female with a medical diagnosis of Pneumonia due to COVID-19 virus.  She presents with minimal SOB. Will issue HEP for patient to continue with strengthening. Performance deficits affecting function: impaired endurance.      Rehab Prognosis: Good; patient would benefit from acute skilled OT services to address these deficits and reach maximum level of function.       Plan:     Patient to be seen 5 x/week to address the above listed problems via therapeutic exercises  · Plan of Care Expires: 02/10/22  · Plan of Care Reviewed with: patient    Subjective     Chief Complaint: SOB with increased activities  Patient/Family Comments/goals: to return to prior level of function    Occupational Profile:  Living Environment: Patient lives at home with father  Previous level of function: Independent with ADls  Roles and Routines: Homemaker  Equipment Used at Home:  cane, straight  Assistance upon Discharge: Recommend D/C to home with HH as needed    Pain/Comfort:  · Pain Rating 1: 0/10  · Pain Rating Post-Intervention 1: 0/10    Patients cultural, spiritual, Quaker conflicts given the current situation:      Objective:     Communicated with: Nurse prior to session.  Patient found supine with peripheral IV,oxygen upon OT entry to room.    General Precautions: Standard, fall   Orthopedic Precautions:N/A   Braces: N/A  Respiratory Status: Nasal cannula, flow 2 L/min    Occupational Performance:    Bed Mobility:    · Patient completed Rolling/Turning to Left with  independence  · Patient completed Rolling/Turning to Right with independence  · Patient completed  Scooting/Bridging with independence  · Patient completed Supine to Sit with independence  · Patient completed Sit to Supine with independence    Functional Mobility/Transfers:  · Patient completed Sit <> Stand Transfer with independence  with  no assistive device   · Patient completed Toilet Transfer Step Transfer technique with independence with  no AD  · Functional Mobility: Patient ambulated to/from bathroom with no difficulty    Activities of Daily Living:  · Feeding:  independence to perform self feeding  · Grooming: independence to perform hygiene  · Bathing: patient reportts independence with bathing with setup   · Upper Body Dressing: independence tod onn top  · Lower Body Dressing: modified independence to oniel B socks  · Toileting: modified independence to perform toilet hygiene    Cognitive/Visual Perceptual:  Cognitive/Psychosocial Skills:     -       Oriented to: Person, Place and Situation   -       Follows Commands/attention:Follows multistep  commands  -       Communication: clear/fluent  -       Memory: No Deficits noted  -       Safety awareness/insight to disability: intact   Visual/Perceptual:      -Intact WFLs    Physical Exam:  Balance:    -       good  Motor Planning:    -       WFLs  Dominant hand:    -       right  Upper Extremity Range of Motion:     -       Right Upper Extremity: WNL  -       Left Upper Extremity: WNL  Upper Extremity Strength:    -       Right Upper Extremity: 5/5 grossly  -       Left Upper Extremity: 5/5 grossly   Strength:    -       Right Upper Extremity: WNL  -       Left Upper Extremity: WNL  Fine Motor Coordination:    -       Intact  Gross motor coordination:   WFL    AMPAC 6 Click ADL:  AMPAC Total Score: 24    Treatment & Education:  · Pt educated on OT role/POC.   · Importance of OOB activity with staff assistance.  · Importance of sitting up in the chair throughout the day as tolerated, especially for meals   · Safety during functional t/f and  mobility  · Importance of assisting with self-care activities     Education:    Patient left sitting edge of bed with all lines intact and call button in reach    GOALS:   Multidisciplinary Problems     Occupational Therapy Goals        Problem: Occupational Therapy Goal    Goal Priority Disciplines Outcome Interventions   Occupational Therapy Goal     OT, PT/OT Ongoing, Progressing    Description: Patient will perform therapeutic exercises per HEP for B UE strengthening/endurance exercises.                      History:     Past Medical History:   Diagnosis Date    Abnormal LFTs     Asthma     ? diagnosis    Diabetes mellitus     Hypertension     Hypothyroidism     from birth       Past Surgical History:   Procedure Laterality Date    APPENDECTOMY      CHOLECYSTECTOMY      HYSTERECTOMY      TONSILLECTOMY         Time Tracking:     OT Date of Treatment:    OT Start Time: 1441  OT Stop Time: 1506  OT Total Time (min): 25 min    Billable Minutes:Evaluation Low complexity    2/9/2022

## 2022-02-09 NOTE — PLAN OF CARE
Problem: Occupational Therapy Goal  Goal: Occupational Therapy Goal  Description: Patient will perform therapeutic exercises per HEP for B UE strengthening/endurance exercises.     Outcome: Ongoing, Progressing

## 2022-02-09 NOTE — NURSING
"Called to Mr. Smallwood to clarify thyroid dose, pharmacist verifies dosage as 75 mg.  States,"She gets a 15 mg tablet along with a 60 mg tablet."  "

## 2022-02-10 LAB
ALBUMIN SERPL BCP-MCNC: 3.2 G/DL (ref 3.5–5)
ALBUMIN/GLOB SERPL: 0.9 {RATIO}
ALP SERPL-CCNC: 82 U/L (ref 50–130)
ALT SERPL W P-5'-P-CCNC: 47 U/L (ref 13–56)
ANION GAP SERPL CALCULATED.3IONS-SCNC: 14 MMOL/L (ref 7–16)
AST SERPL W P-5'-P-CCNC: 31 U/L (ref 15–37)
BASOPHILS # BLD AUTO: 0.01 K/UL (ref 0–0.2)
BASOPHILS NFR BLD AUTO: 0.2 % (ref 0–1)
BILIRUB SERPL-MCNC: 0.5 MG/DL (ref 0–1.2)
BUN SERPL-MCNC: 18 MG/DL (ref 7–18)
BUN/CREAT SERPL: 20 (ref 6–20)
CALCIUM SERPL-MCNC: 8.9 MG/DL (ref 8.5–10.1)
CHLORIDE SERPL-SCNC: 102 MMOL/L (ref 98–107)
CO2 SERPL-SCNC: 29 MMOL/L (ref 21–32)
CREAT SERPL-MCNC: 0.91 MG/DL (ref 0.55–1.02)
DIFFERENTIAL METHOD BLD: ABNORMAL
EOSINOPHIL # BLD AUTO: 0.08 K/UL (ref 0–0.5)
EOSINOPHIL NFR BLD AUTO: 1.8 % (ref 1–4)
EOSINOPHIL NFR BLD MANUAL: 1 % (ref 1–4)
ERYTHROCYTE [DISTWIDTH] IN BLOOD BY AUTOMATED COUNT: 12.7 % (ref 11.5–14.5)
GLOBULIN SER-MCNC: 3.7 G/DL (ref 2–4)
GLUCOSE SERPL-MCNC: 87 MG/DL (ref 74–106)
HCT VFR BLD AUTO: 31.2 % (ref 38–47)
HGB BLD-MCNC: 10.2 G/DL (ref 12–16)
LYMPHOCYTES # BLD AUTO: 0.89 K/UL (ref 1–4.8)
LYMPHOCYTES NFR BLD AUTO: 20.3 % (ref 27–41)
LYMPHOCYTES NFR BLD MANUAL: 13 % (ref 27–41)
MCH RBC QN AUTO: 28.7 PG (ref 27–31)
MCHC RBC AUTO-ENTMCNC: 32.7 G/DL (ref 32–36)
MCV RBC AUTO: 87.9 FL (ref 80–96)
MONOCYTES # BLD AUTO: 0.58 K/UL (ref 0–0.8)
MONOCYTES NFR BLD AUTO: 13.2 % (ref 2–6)
MONOCYTES NFR BLD MANUAL: 9 % (ref 2–6)
MPC BLD CALC-MCNC: 9.8 FL (ref 9.4–12.4)
NEUTROPHILS # BLD AUTO: 2.83 K/UL (ref 1.8–7.7)
NEUTROPHILS NFR BLD AUTO: 64.5 % (ref 53–65)
NEUTS BAND NFR BLD MANUAL: 2 % (ref 1–5)
NEUTS SEG NFR BLD MANUAL: 75 % (ref 50–62)
NRBC BLD MANUAL-RTO: ABNORMAL %
PLATELET # BLD AUTO: 312 K/UL (ref 150–400)
PLATELET MORPHOLOGY: NORMAL
POTASSIUM SERPL-SCNC: 2.9 MMOL/L (ref 3.5–5.1)
PROT SERPL-MCNC: 6.9 G/DL (ref 6.4–8.2)
RBC # BLD AUTO: 3.55 M/UL (ref 4.2–5.4)
RBC MORPH BLD: NORMAL
SODIUM SERPL-SCNC: 142 MMOL/L (ref 136–145)
WBC # BLD AUTO: 4.39 K/UL (ref 4.5–11)

## 2022-02-10 PROCEDURE — 25000003 PHARM REV CODE 250: Performed by: NURSE PRACTITIONER

## 2022-02-10 PROCEDURE — 63600175 PHARM REV CODE 636 W HCPCS: Performed by: NURSE PRACTITIONER

## 2022-02-10 PROCEDURE — 99900035 HC TECH TIME PER 15 MIN (STAT)

## 2022-02-10 PROCEDURE — 85025 COMPLETE CBC W/AUTO DIFF WBC: CPT | Performed by: NURSE PRACTITIONER

## 2022-02-10 PROCEDURE — 80053 COMPREHEN METABOLIC PANEL: CPT | Performed by: NURSE PRACTITIONER

## 2022-02-10 PROCEDURE — 94640 AIRWAY INHALATION TREATMENT: CPT

## 2022-02-10 PROCEDURE — 97116 GAIT TRAINING THERAPY: CPT | Mod: CQ

## 2022-02-10 PROCEDURE — 94761 N-INVAS EAR/PLS OXIMETRY MLT: CPT

## 2022-02-10 PROCEDURE — 25000003 PHARM REV CODE 250: Performed by: EMERGENCY MEDICINE

## 2022-02-10 PROCEDURE — 27000221 HC OXYGEN, UP TO 24 HOURS

## 2022-02-10 PROCEDURE — 99233 SBSQ HOSP IP/OBS HIGH 50: CPT | Mod: ,,, | Performed by: EMERGENCY MEDICINE

## 2022-02-10 PROCEDURE — 11000001 HC ACUTE MED/SURG PRIVATE ROOM

## 2022-02-10 PROCEDURE — 36415 COLL VENOUS BLD VENIPUNCTURE: CPT | Performed by: NURSE PRACTITIONER

## 2022-02-10 PROCEDURE — 99233 PR SUBSEQUENT HOSPITAL CARE,LEVL III: ICD-10-PCS | Mod: ,,, | Performed by: EMERGENCY MEDICINE

## 2022-02-10 PROCEDURE — 97110 THERAPEUTIC EXERCISES: CPT

## 2022-02-10 PROCEDURE — 63700000 PHARM REV CODE 250 ALT 637 W/O HCPCS: Performed by: EMERGENCY MEDICINE

## 2022-02-10 RX ORDER — POTASSIUM CHLORIDE 20 MEQ/1
40 TABLET, EXTENDED RELEASE ORAL ONCE
Status: COMPLETED | OUTPATIENT
Start: 2022-02-10 | End: 2022-02-10

## 2022-02-10 RX ADMIN — METOPROLOL SUCCINATE 25 MG: 25 TABLET, EXTENDED RELEASE ORAL at 08:02

## 2022-02-10 RX ADMIN — ALBUTEROL SULFATE 2 PUFF: 90 AEROSOL, METERED RESPIRATORY (INHALATION) at 07:02

## 2022-02-10 RX ADMIN — DEXAMETHASONE SODIUM PHOSPHATE 6 MG: 4 INJECTION, SOLUTION INTRAMUSCULAR; INTRAVENOUS at 10:02

## 2022-02-10 RX ADMIN — OXYCODONE HYDROCHLORIDE AND ACETAMINOPHEN 1000 MG: 500 TABLET ORAL at 08:02

## 2022-02-10 RX ADMIN — POTASSIUM CHLORIDE 40 MEQ: 1500 TABLET, EXTENDED RELEASE ORAL at 11:02

## 2022-02-10 RX ADMIN — LACTOBACILLUS TAB 4 TABLET: TAB at 05:02

## 2022-02-10 RX ADMIN — LACTOBACILLUS TAB 4 TABLET: TAB at 11:02

## 2022-02-10 RX ADMIN — ENOXAPARIN SODIUM 40 MG: 40 INJECTION SUBCUTANEOUS at 05:02

## 2022-02-10 RX ADMIN — FAMOTIDINE 20 MG: 20 TABLET ORAL at 08:02

## 2022-02-10 RX ADMIN — ALBUTEROL SULFATE 2 PUFF: 90 AEROSOL, METERED RESPIRATORY (INHALATION) at 12:02

## 2022-02-10 RX ADMIN — ALBUTEROL SULFATE 2 PUFF: 90 AEROSOL, METERED RESPIRATORY (INHALATION) at 01:02

## 2022-02-10 RX ADMIN — ALBUTEROL SULFATE 2 PUFF: 90 AEROSOL, METERED RESPIRATORY (INHALATION) at 06:02

## 2022-02-10 RX ADMIN — THYROID, PORCINE 75 MG: 15 TABLET ORAL at 05:02

## 2022-02-10 RX ADMIN — CEFTRIAXONE 1 G: 1 INJECTION, POWDER, FOR SOLUTION INTRAMUSCULAR; INTRAVENOUS at 10:02

## 2022-02-10 RX ADMIN — Medication 1000 UNITS: at 08:02

## 2022-02-10 RX ADMIN — LACTOBACILLUS TAB 4 TABLET: TAB at 08:02

## 2022-02-10 RX ADMIN — AZITHROMYCIN MONOHYDRATE 500 MG: 250 TABLET ORAL at 08:02

## 2022-02-10 NOTE — SUBJECTIVE & OBJECTIVE
Interval History: PT CONTINUES COVID 19 TREATMENT AND DUE TO INCREASED COUGH WILL EXTEND ISOLATION TO 20 DAYS. PT IS IMPROVED WITH INCREASED APPETITE;  PT IS TOLERATING 50% DIET WITH RDN CONSULT WITH CONTINUATION OF GLUTEN FREE DIET.PT/OT HAS EVALUATED PT AND REC REHABILITATION.     Review of Systems   Constitutional: Positive for activity change, appetite change and fatigue.   HENT: Negative.    Eyes: Negative.    Respiratory: Positive for cough and shortness of breath.    Cardiovascular: Negative.    Gastrointestinal: Negative for abdominal pain, diarrhea, nausea and vomiting.   Endocrine: Negative.    Genitourinary: Negative.    Musculoskeletal: Positive for arthralgias.   Skin: Negative.    Allergic/Immunologic: Negative.    Neurological: Positive for weakness.   Hematological: Negative.    Psychiatric/Behavioral: The patient is nervous/anxious.      Objective:     Vital Signs (Most Recent):  Temp: 98.3 °F (36.8 °C) (02/09/22 2020)  Pulse: (!) 55 (02/09/22 2020)  Resp: 18 (02/09/22 2020)  BP: (!) 143/79 (02/09/22 2020)  SpO2: 96 % (02/09/22 2020) Vital Signs (24h Range):  Temp:  [98 °F (36.7 °C)-98.4 °F (36.9 °C)] 98.3 °F (36.8 °C)  Pulse:  [48-60] 55  Resp:  [16-22] 18  SpO2:  [94 %-98 %] 96 %  BP: (122-145)/(61-79) 143/79     Weight: 96 kg (211 lb 10.3 oz)  Body mass index is 36.33 kg/m².    Intake/Output Summary (Last 24 hours) at 2/9/2022 2331  Last data filed at 2/9/2022 1852  Gross per 24 hour   Intake 1010 ml   Output --   Net 1010 ml      Physical Exam  Vitals and nursing note reviewed. Exam conducted with a chaperone present.   Constitutional:       Appearance: She is obese. She is ill-appearing.   HENT:      Head: Normocephalic and atraumatic.      Right Ear: Tympanic membrane normal.      Left Ear: Tympanic membrane normal.      Nose: Nose normal.      Mouth/Throat:      Mouth: Mucous membranes are moist.   Eyes:      Extraocular Movements: Extraocular movements intact.      Pupils: Pupils are equal,  round, and reactive to light.   Cardiovascular:      Rate and Rhythm: Normal rate and regular rhythm.      Pulses: Normal pulses.      Heart sounds: Normal heart sounds.   Pulmonary:      Effort: Pulmonary effort is normal.      Breath sounds: Normal breath sounds.   Abdominal:      General: Bowel sounds are normal. There is no distension.      Palpations: Abdomen is soft.      Tenderness: There is no abdominal tenderness.   Musculoskeletal:         General: No swelling or tenderness. Normal range of motion.      Cervical back: Normal range of motion.   Skin:     General: Skin is warm and dry.      Capillary Refill: Capillary refill takes less than 2 seconds.   Neurological:      General: No focal deficit present.      Mental Status: She is alert and oriented to person, place, and time.      Motor: Weakness present.   Psychiatric:         Behavior: Behavior normal.         Thought Content: Thought content normal.         Judgment: Judgment normal.      Comments: PT IS NERVOUS         Significant Labs:   All pertinent labs within the past 24 hours have been reviewed.  BMP:   Recent Labs   Lab 02/09/22  0410   GLU 99      K 3.4*      CO2 32   BUN 15   CREATININE 1.11*   CALCIUM 9.1     CBC:   Recent Labs   Lab 02/08/22  0435 02/09/22  0410   WBC 5.13 6.76   HGB 10.0* 10.3*   HCT 30.9* 32.2*    310       Significant Imaging: I have reviewed all pertinent imaging results/findings within the past 24 hours.  CXR: I have reviewed all pertinent results/findings within the past 24 hours and my personal findings are:  B PNA

## 2022-02-10 NOTE — PT/OT/SLP PROGRESS
Occupational Therapy   Treatment    Name: Beverly Rodríguez  MRN: [id number]  Admitting Diagnosis:  Pneumonia due to COVID-19 virus       Recommendations:     Discharge Recommendations: rehabilitation facility,nursing facility, skilled  Discharge Equipment Recommendations:  3-in-1 commode  Barriers to discharge:  None    Assessment:     Beverly Rodríguez is a 66 y.o. female with a medical diagnosis of Pneumonia due to COVID-19 virus.  She presents with decreased endurance. Performance deficits affecting function are weakness,impaired endurance,impaired self care skills,impaired functional mobilty,impaired balance,decreased upper extremity function,decreased lower extremity function,decreased safety awareness.     Rehab Prognosis:  Good; patient would benefit from acute skilled OT services to address these deficits and reach maximum level of function.       Plan:     Patient to be seen 5 x/week to address the above listed problems via self-care/home management,therapeutic activities,therapeutic exercises  · Plan of Care Expires: 02/10/22  · Plan of Care Reviewed with: patient    Subjective     Pain/Comfort:  · Pain Rating 1: 0/10  · Pain Rating Post-Intervention 1: 0/10    Objective:     Communicated with: Nurse prior to session.  Patient found sitting edge of bed with peripheral IV upon OT entry to room.    General Precautions: Standard, fall   Orthopedic Precautions:N/A   Braces: N/A  Respiratory Status: Room air     Occupational Performance:     Bed Mobility:    · Not tested    Functional Mobility/Transfers:  · Patient completed Sit <> Stand Transfer with independence  with  no assistive device   · Functional Mobility: n/a    Activities of Daily Living:  · Not tested      [facility] 6 Click ADL: 24    Treatment & Education:  Patient educated on HEP for B UE strengthening exercises for shoulder flexion, pectoral stretches, elbow flexion, and elbow extension, 2x10 reps. Patient issued green theraband for home use and patient  demonstrated independence with HEP. No further skilled OT indicated at this time secondary to patient is at baseline with ADLs. Patient to continue strengthening independently.     Patient left sitting edge of bed with all lines intact and call button in reachEducation:      GOALS:   Multidisciplinary Problems     Occupational Therapy Goals        Problem: Occupational Therapy Goal    Goal Priority Disciplines Outcome Interventions   Occupational Therapy Goal     OT, PT/OT Ongoing, Progressing    Description: Patient will perform therapeutic exercises per HEP for B UE strengthening/endurance exercises.                      Time Tracking:     OT Date of Treatment: 02/10/22  OT Start Time: 1440  OT Stop Time: 1500  OT Total Time (min): 20 min    Billable Minutes:Therapeutic Exercise 15 min    OT/CLIF: OT          2/10/2022

## 2022-02-10 NOTE — PT/OT/SLP PROGRESS
Physical Therapy Treatment    Patient Name:  Beverly Rodríguez   MRN:  [id number]    Recommendations:     Discharge Recommendations:  home health PT,home health OT   Discharge Equipment Recommendations: 3-in-1 commode   Barriers to discharge: Decreased caregiver support    Assessment:     Beverly Rodríguez is a 66 y.o. female admitted with a medical diagnosis of Pneumonia due to COVID-19 virus.  She presents with the following impairments/functional limitations:    impaired endurance. Patient able to perform transfers and ambulation with very mild SOB. Patient able to talk the entire session without signs of fatigue and only very mild SOB.    Rehab Prognosis: Good; patient would benefit from acute skilled PT services to address these deficits and reach maximum level of function.    Recent Surgery: * No surgery found *      Plan:     During this hospitalization, patient to be seen 5 x/week to address the identified rehab impairments via gait training,therapeutic activities,therapeutic exercises and progress toward the following goals:    · Plan of Care Expires:  02/23/22    Subjective     Chief Complaint: SOB with gait  Patient/Family Comments/goals: return home  Pain/Comfort:  · Pain Rating 1: 0/10  · Pain Rating Post-Intervention 1: 0/10      Objective:     Communicated with PT prior to session.  Patient found sitting edge of bed with   upon PT entry to room.     General Precautions: Standard, fall,droplet,contact,airborne   Orthopedic Precautions:    Braces: N/A  Respiratory Status: Nasal cannula, flow 2 L/min     Functional Mobility:  · Transfers:     · Sit to Stand:  supervision with no AD  · Gait: Patient ambulated 80 ft in room with O2 and no AD with SBA due to one moment of LOB (pt able to self correct).      AM-PAC 6 CLICK MOBILITY  Turning over in bed (including adjusting bedclothes, sheets and blankets)?: 4  Sitting down on and standing up from a chair with arms (e.g., wheelchair, bedside commode, etc.): 4  Moving  from lying on back to sitting on the side of the bed?: 4  Moving to and from a bed to a chair (including a wheelchair)?: 4  Need to walk in hospital room?: 3  Climbing 3-5 steps with a railing?: 2  Basic Mobility Total Score: 21       Therapeutic Activities and Exercises:   Patient performed sit to stands x 10 at supervision    Patient left sitting edge of bed with call button in reach..    GOALS:   Multidisciplinary Problems     Physical Therapy Goals        Problem: Physical Therapy Goal    Goal Priority Disciplines Outcome Goal Variances Interventions   Physical Therapy Goal     PT, PT/OT Ongoing, Progressing     Description: STG:  Patient will perform sit to stand and SPT with independence  Patient will ambulate 250 feet with no assistive device with no LOB and supervision assistance without LOB.     LTG  Patient will ambuate 350 feet with no assistive device with no assistance without LOB.   Patient will perform 10 min of LE exercise without rest break to improve endurance with no SOB                      Time Tracking:     PT Received On: 02/10/22  PT Start Time: 1607     PT Stop Time: 1621  PT Total Time (min): 14 min     Billable Minutes: Gait Training 9 and Therapeutic Activity 5    Treatment Type: Treatment  PT/PTA: PTA     PTA Visit Number: 1     02/10/2022

## 2022-02-10 NOTE — ASSESSMENT & PLAN NOTE
Urine culture preliminary show E. Coli > 100,000 and Enterococcus species 15,000  Levaquin d/c'd  Azithromycin/Rocephin started 2/7 for 7 days  NS at 100 ml/h-completed    02/09/2022  PT HAS ECOLI >100K SENS TO ROCEPHIN, > 15K ENTEROCOCCUS

## 2022-02-10 NOTE — ASSESSMENT & PLAN NOTE
Ns 1 Liter bolus in ED  NS at 100 Ml/h  BUN 28/creatinine 1.66 in ED  Bun 24/ creatinine 1.52 this am  2/8/22 1.09 and 18 02/09/2022 BUN/CREAT 15/1.11 IMPROVED  PT EATING AND DRINKING

## 2022-02-10 NOTE — PLAN OF CARE
Problem: Adult Inpatient Plan of Care  Goal: Optimal Comfort and Wellbeing  Outcome: Ongoing, Progressing  Intervention: Provide Person-Centered Care  Flowsheets (Taken 2/10/2022 1206)  Trust Relationship/Rapport:   emotional support provided   empathic listening provided   thoughts/feelings acknowledged

## 2022-02-10 NOTE — ASSESSMENT & PLAN NOTE
Levaquin d/c'd   Azithromycin/Rocephin started on 2/7 (dose 2 of both today)  Decadron (dose 4 today)  Albuterol MDI  Incentive spirometry  Vit C, Vit D3 (Zinc not started due to nausea)  Famotidine  O2 at 2L/min per NC to titrate  BC x2 show no growth after 24 hours  02/09/2022  PT WILL CONTINUE TREATMENT , CONTINUED DEEP COUGH WILL CONTINUE ISOLATION PROTOCOL  O2 SAT S94-96% 2L NC O2  PT NOTES JIANG, WEAKNESS; APPETITE IS IMPROVED

## 2022-02-10 NOTE — ASSESSMENT & PLAN NOTE
Magnesium 2.1  Potassium 3.3 this am  (3.0 to 3.2 to 3.3)  Monitor daily  02/09/2022  K IS 33.4 CONTINUE SUPPLEMENTATION

## 2022-02-10 NOTE — ASSESSMENT & PLAN NOTE
Ns at 100 ml/h-completed  Ondansetron prn  Vishal clear liq diet. Increased to full liquid diet today (2/8)  02/09/202  PT NOW TOLERATING 50% OF DIET WITH 1 EPISODE DIARRHEA TODAY   RDN CONSULT- RECOMMENDATIONS NOTED  CONTINUE GLUTEN FREE DIET

## 2022-02-10 NOTE — PROGRESS NOTES
[facility]  [facility]  Progress Note    Patient Name: Beverly Rodríguez  MRN: [id number]  Patient Class: IP- Inpatient   Admission Date: 2/6/2022  Length of Stay: 3 days  Attending Physician: Sharron Andujar MD  Primary Care Provider: Prem Vincent MD        Subjective:     Principal Problem:Pneumonia due to COVID-19 virus        HPI:  Ms. Rodríguez is a 66 year old  female who is admitted to [facility] acute care services due to COVID 19 pneumonia and acute urinary tract infection. She tested positive for COVID 19 infection on 1/26/22 after presenting to the ED with c/o fever and chills. She was discharged home at that time. Yesterday, she returned to the ED at [facility] per EMS EMS with complaint of nausea, vomiting, diarrhea, generalized weakness, non-productive cough, and shortness of breath cough.  Diagnostic evaluation in the ED at [facility] revealed a significant viral pneumonia on chest x-ray, a urinary tract infection and dehydration on labs. Her WBC was 4880. Her Bun was 28 with creatinine 1.66. Her potassium was 3.0. She was given 1 L of NS, Levaquine, decadron, and Kcl. Her O2 sats while in the ED ranged 93-95% on RA. She was started on O2 at 2L/min per NC. Dr. Madrigal accepted her here for admission. Admit orders and home medication reconciliation were completed under his direction.    Upon arrival here, Ms. Rodríguez is alert and oriented x3. She has no obvious dyspnea. She denies chest pain. She states that she has gotten so weak and tired with nausea, vomiting, and diarrhea that started shortly after diagnosis with COVID 19 but has continued. She states that she has been unable to eat anything solid but can tolerate liquids po. She denies significant shortness of breath. She notes that she is on home O2 at night due to pulmonary disease but not sure of her diagnosis. She is receiving O2 at 2L/min per NC. A d-dimer was 0.54 here. She was started on Lovenox 40 mg daily. Levaquin  and decadron are continued along with NS at 100 ml/h.    Full Code  Not UTD with COVID 19 of flu vaccines due to allergic to flu vaccine  High risk VTE-TEDs, lovenox, early amb      Overview/Hospital Course:  2/7/22: Day 2    2/8/22: Hospital day 3. COVID 19 positive day 13. Ms. Rodríguez has been afebrile since admission. Droplet and airborne isolation are discontinued to day. Her O2 sats are ranging 93-96% on O2 at 2L/min per NC. She does wear O2 at night at home. Labs show improved BUN and creatinine to 18 and 1.09. Her glucose is 112 this am. She is tolerating po intake without vomiting. She says that her diarrhea has improved as well. Her diet will be increased to full liquid today. She is receiving decadron (day 4), azithromycin, (day 2) and rocephin (day 2). Since she is tolerating po intake, her azithromycin is changed to po today. She is also receiving Lovenox 40 mg SC daily, Vit C, Vit D, and she is using albuterol MDI and incentive spirometer under the direction of RT. She has not been receiving her thyroid med due to availability for 2 days. Her TSH is 0.61. She will restart her routine home dose tonight.    02/09/2022 HOSPITAL DAY 4  COVID POSITIVE DAY 14  PT'S VTE PPX IS LOVENOX/TEDS/EARLY AMBULATION    PT CONTINUES COVID TREATMENT WITH ALBUTEROL,O2 TITRATION, ROCEPHIN/Z MAX, DECADRON AND PEPCID AS WELL AS VIT C,D, AND ZINC.PT NOTED TO HAVE INCREASED COUGHING TODAY AND WILL CONTINUE ISOLATION FOR 20 DAYS . PT O2 SAT IS 94-96% ON 2 L NC. PT HAS JIANG WITH INCREASED FATIGUE WITH EXERCISE. PT/OT EVALUATED PT TODAY WITH PT/OT RECOMMENDED FOR 1-2 WEEKS; PT AMBULATED 40 FEET WITH O2 WITH MILD DYSPNEA WITH SUPERVISION. PT'S LABS REVEAL K 3.4, H/H 10/32 STABLE WITH UR CULTURE E COLI >100K SENSITIVE TO ROCEPHIN AND ENTEROCOCCUS >15K .       Interval History: PT CONTINUES COVID 19 TREATMENT AND DUE TO INCREASED COUGH WILL EXTEND ISOLATION TO 20 DAYS. PT IS IMPROVED WITH INCREASED APPETITE;  PT IS TOLERATING 50% DIET  WITH RDN CONSULT WITH CONTINUATION OF GLUTEN FREE DIET.PT/OT HAS EVALUATED PT AND REC REHABILITATION.     Review of Systems   Constitutional: Positive for activity change, appetite change and fatigue.   HENT: Negative.    Eyes: Negative.    Respiratory: Positive for cough and shortness of breath.    Cardiovascular: Negative.    Gastrointestinal: Negative for abdominal pain, diarrhea, nausea and vomiting.   Endocrine: Negative.    Genitourinary: Negative.    Musculoskeletal: Positive for arthralgias.   Skin: Negative.    Allergic/Immunologic: Negative.    Neurological: Positive for weakness.   Hematological: Negative.    Psychiatric/Behavioral: The patient is nervous/anxious.      Objective:     Vital Signs (Most Recent):  Temp: 98.3 °F (36.8 °C) (02/09/22 2020)  Pulse: (!) 55 (02/09/22 2020)  Resp: 18 (02/09/22 2020)  BP: (!) 143/79 (02/09/22 2020)  SpO2: 96 % (02/09/22 2020) Vital Signs (24h Range):  Temp:  [98 °F (36.7 °C)-98.4 °F (36.9 °C)] 98.3 °F (36.8 °C)  Pulse:  [48-60] 55  Resp:  [16-22] 18  SpO2:  [94 %-98 %] 96 %  BP: (122-145)/(61-79) 143/79     Weight: 96 kg (211 lb 10.3 oz)  Body mass index is 36.33 kg/m².    Intake/Output Summary (Last 24 hours) at 2/9/2022 2331  Last data filed at 2/9/2022 1852  Gross per 24 hour   Intake 1010 ml   Output --   Net 1010 ml      Physical Exam  Vitals and nursing note reviewed. Exam conducted with a chaperone present.   Constitutional:       Appearance: She is obese. She is ill-appearing.   HENT:      Head: Normocephalic and atraumatic.      Right Ear: Tympanic membrane normal.      Left Ear: Tympanic membrane normal.      Nose: Nose normal.      Mouth/Throat:      Mouth: Mucous membranes are moist.   Eyes:      Extraocular Movements: Extraocular movements intact.      Pupils: Pupils are equal, round, and reactive to light.   Cardiovascular:      Rate and Rhythm: Normal rate and regular rhythm.      Pulses: Normal pulses.      Heart sounds: Normal heart sounds.    Pulmonary:      Effort: Pulmonary effort is normal.      Breath sounds: Normal breath sounds.   Abdominal:      General: Bowel sounds are normal. There is no distension.      Palpations: Abdomen is soft.      Tenderness: There is no abdominal tenderness.   Musculoskeletal:         General: No swelling or tenderness. Normal range of motion.      Cervical back: Normal range of motion.   Skin:     General: Skin is warm and dry.      Capillary Refill: Capillary refill takes less than 2 seconds.   Neurological:      General: No focal deficit present.      Mental Status: She is alert and oriented to person, place, and time.      Motor: Weakness present.   Psychiatric:         Behavior: Behavior normal.         Thought Content: Thought content normal.         Judgment: Judgment normal.      Comments: PT IS NERVOUS         Significant Labs:   All pertinent labs within the past 24 hours have been reviewed.  BMP:   Recent Labs   Lab 02/09/22  0410   GLU 99      K 3.4*      CO2 32   BUN 15   CREATININE 1.11*   CALCIUM 9.1     CBC:   Recent Labs   Lab 02/08/22  0435 02/09/22  0410   WBC 5.13 6.76   HGB 10.0* 10.3*   HCT 30.9* 32.2*    310       Significant Imaging: I have reviewed all pertinent imaging results/findings within the past 24 hours.  CXR: I have reviewed all pertinent results/findings within the past 24 hours and my personal findings are:  B PNA      Assessment/Plan:      * Pneumonia due to COVID-19 virus  Levaquin d/c'd   Azithromycin/Rocephin started on 2/7 (dose 2 of both today)  Decadron (dose 4 today)  Albuterol MDI  Incentive spirometry  Vit C, Vit D3 (Zinc not started due to nausea)  Famotidine  O2 at 2L/min per NC to titrate  BC x2 show no growth after 24 hours  02/09/2022  PT WILL CONTINUE TREATMENT , CONTINUED DEEP COUGH WILL CONTINUE ISOLATION PROTOCOL  O2 SAT S94-96% 2L NC O2  PT NOTES JIANG, WEAKNESS; APPETITE IS IMPROVED        DVT prophylaxis  PT IS AT RISK FOR VTE  VTE PPX IS  LOVENOX/TEDS/EARLY AMBULATION    Dehydration  Ns 1 Liter bolus in ED  NS at 100 Ml/h  BUN 28/creatinine 1.66 in ED  Bun 24/ creatinine 1.52 this am  2/8/22 1.09 and 18  02/09/2022 BUN/CREAT 15/1.11 IMPROVED  PT EATING AND DRINKING     Acute urinary tract infection  Urine culture preliminary show E. Coli > 100,000 and Enterococcus species 15,000  Levaquin d/c'd  Azithromycin/Rocephin started 2/7 for 7 days  NS at 100 ml/h-completed    02/09/2022  PT HAS ECOLI >100K SENS TO ROCEPHIN, > 15K ENTEROCOCCUS      Hypokalemia  Magnesium 2.1  Potassium 3.3 this am  (3.0 to 3.2 to 3.3)  Monitor daily  02/09/2022  K IS 33.4 CONTINUE SUPPLEMENTATION    Hypertension  Continue Metoprolol  /57  02/09/2022  /79      Hypothyroidism  TSH 0.6  Continue home med ([place] thyroid 75 mg daily)  02/09/2022 CONTINUE THY MED      Generalized weakness  Routine treatment for her illnesses  PT/OT if tolerated  02/09/2022  PT/OT CONSULT WITH REHAB RECOMMENDED  PT AMBULATED 40 FEET WITH SUPERVISION AND MILD DYSPNEA ON 2 L NC O2  SWING BED RECOMMENDED      Nausea vomiting and diarrhea  Ns at 100 ml/h-completed  Ondansetron prn  Vishal clear liq diet. Increased to full liquid diet today (2/8)  02/09/202  PT NOW TOLERATING 50% OF DIET WITH 1 EPISODE DIARRHEA TODAY   RDN CONSULT- RECOMMENDATIONS NOTED  CONTINUE GLUTEN FREE DIET        VTE Risk Mitigation (From admission, onward)         Ordered     enoxaparin injection 40 mg  Daily         02/07/22 0111     IP VTE HIGH RISK PATIENT  Once         02/07/22 0111     Place sequential compression device  Until discontinued         02/07/22 0111                Discharge Planning   SANDY:      Code Status: Full Code   Is the patient medically ready for discharge?:     Reason for patient still in hospital (select all that apply): Patient trending condition, Laboratory test, Treatment, Imaging, Consult recommendations and PT / OT recommendations                     Sharron Andujar MD  Department of  [facility] Medicine   [facility]

## 2022-02-10 NOTE — ASSESSMENT & PLAN NOTE
Routine treatment for her illnesses  PT/OT if tolerated  02/09/2022  PT/OT CONSULT WITH REHAB RECOMMENDED  PT AMBULATED 40 FEET WITH SUPERVISION AND MILD DYSPNEA ON 2 L NC O2  SWING BED RECOMMENDED

## 2022-02-11 PROBLEM — R19.7 NAUSEA VOMITING AND DIARRHEA: Status: RESOLVED | Noted: 2022-02-07 | Resolved: 2022-02-11

## 2022-02-11 PROBLEM — R11.2 NAUSEA VOMITING AND DIARRHEA: Status: RESOLVED | Noted: 2022-02-07 | Resolved: 2022-02-11

## 2022-02-11 LAB
ALBUMIN SERPL BCP-MCNC: 3.5 G/DL (ref 3.5–5)
ALBUMIN/GLOB SERPL: 0.9 {RATIO}
ALP SERPL-CCNC: 86 U/L (ref 50–130)
ALT SERPL W P-5'-P-CCNC: 55 U/L (ref 13–56)
ANION GAP SERPL CALCULATED.3IONS-SCNC: 12 MMOL/L (ref 7–16)
AST SERPL W P-5'-P-CCNC: 29 U/L (ref 15–37)
BASOPHILS # BLD AUTO: 0.01 K/UL (ref 0–0.2)
BASOPHILS NFR BLD AUTO: 0.2 % (ref 0–1)
BILIRUB SERPL-MCNC: 0.5 MG/DL (ref 0–1.2)
BUN SERPL-MCNC: 19 MG/DL (ref 7–18)
BUN/CREAT SERPL: 19 (ref 6–20)
CALCIUM SERPL-MCNC: 9.3 MG/DL (ref 8.5–10.1)
CHLORIDE SERPL-SCNC: 101 MMOL/L (ref 98–107)
CO2 SERPL-SCNC: 31 MMOL/L (ref 21–32)
CREAT SERPL-MCNC: 1 MG/DL (ref 0.55–1.02)
DIFFERENTIAL METHOD BLD: ABNORMAL
EOSINOPHIL # BLD AUTO: 0.08 K/UL (ref 0–0.5)
EOSINOPHIL NFR BLD AUTO: 1.5 % (ref 1–4)
EOSINOPHIL NFR BLD MANUAL: 2 % (ref 1–4)
ERYTHROCYTE [DISTWIDTH] IN BLOOD BY AUTOMATED COUNT: 12.6 % (ref 11.5–14.5)
GLOBULIN SER-MCNC: 4 G/DL (ref 2–4)
GLUCOSE SERPL-MCNC: 84 MG/DL (ref 74–106)
HCT VFR BLD AUTO: 34.2 % (ref 38–47)
HGB BLD-MCNC: 11.2 G/DL (ref 12–16)
LYMPHOCYTES # BLD AUTO: 1.14 K/UL (ref 1–4.8)
LYMPHOCYTES NFR BLD AUTO: 21.9 % (ref 27–41)
LYMPHOCYTES NFR BLD MANUAL: 22 % (ref 27–41)
MCH RBC QN AUTO: 28.6 PG (ref 27–31)
MCHC RBC AUTO-ENTMCNC: 32.7 G/DL (ref 32–36)
MCV RBC AUTO: 87.5 FL (ref 80–96)
MONOCYTES # BLD AUTO: 0.55 K/UL (ref 0–0.8)
MONOCYTES NFR BLD AUTO: 10.6 % (ref 2–6)
MONOCYTES NFR BLD MANUAL: 15 % (ref 2–6)
MPC BLD CALC-MCNC: 9.5 FL (ref 9.4–12.4)
NEUTROPHILS # BLD AUTO: 3.42 K/UL (ref 1.8–7.7)
NEUTROPHILS NFR BLD AUTO: 65.8 % (ref 53–65)
NEUTS BAND NFR BLD MANUAL: 1 % (ref 1–5)
NEUTS SEG NFR BLD MANUAL: 60 % (ref 50–62)
NRBC BLD MANUAL-RTO: ABNORMAL %
PLATELET # BLD AUTO: 357 K/UL (ref 150–400)
PLATELET MORPHOLOGY: NORMAL
POTASSIUM SERPL-SCNC: 3.4 MMOL/L (ref 3.5–5.1)
PROT SERPL-MCNC: 7.5 G/DL (ref 6.4–8.2)
RBC # BLD AUTO: 3.91 M/UL (ref 4.2–5.4)
RBC MORPH BLD: NORMAL
SODIUM SERPL-SCNC: 141 MMOL/L (ref 136–145)
WBC # BLD AUTO: 5.2 K/UL (ref 4.5–11)

## 2022-02-11 PROCEDURE — 94761 N-INVAS EAR/PLS OXIMETRY MLT: CPT

## 2022-02-11 PROCEDURE — 63600175 PHARM REV CODE 636 W HCPCS: Performed by: NURSE PRACTITIONER

## 2022-02-11 PROCEDURE — 36415 COLL VENOUS BLD VENIPUNCTURE: CPT | Performed by: NURSE PRACTITIONER

## 2022-02-11 PROCEDURE — 25000003 PHARM REV CODE 250: Performed by: EMERGENCY MEDICINE

## 2022-02-11 PROCEDURE — 97110 THERAPEUTIC EXERCISES: CPT | Mod: CQ

## 2022-02-11 PROCEDURE — 11000001 HC ACUTE MED/SURG PRIVATE ROOM

## 2022-02-11 PROCEDURE — 27000221 HC OXYGEN, UP TO 24 HOURS

## 2022-02-11 PROCEDURE — 25000003 PHARM REV CODE 250: Performed by: NURSE PRACTITIONER

## 2022-02-11 PROCEDURE — 63700000 PHARM REV CODE 250 ALT 637 W/O HCPCS: Performed by: EMERGENCY MEDICINE

## 2022-02-11 PROCEDURE — 85025 COMPLETE CBC W/AUTO DIFF WBC: CPT | Performed by: NURSE PRACTITIONER

## 2022-02-11 PROCEDURE — 94640 AIRWAY INHALATION TREATMENT: CPT

## 2022-02-11 PROCEDURE — 99900035 HC TECH TIME PER 15 MIN (STAT)

## 2022-02-11 PROCEDURE — 99233 SBSQ HOSP IP/OBS HIGH 50: CPT | Mod: ,,, | Performed by: EMERGENCY MEDICINE

## 2022-02-11 PROCEDURE — 99233 PR SUBSEQUENT HOSPITAL CARE,LEVL III: ICD-10-PCS | Mod: ,,, | Performed by: EMERGENCY MEDICINE

## 2022-02-11 PROCEDURE — 80053 COMPREHEN METABOLIC PANEL: CPT | Performed by: NURSE PRACTITIONER

## 2022-02-11 RX ORDER — POTASSIUM CHLORIDE 20 MEQ/1
20 TABLET, EXTENDED RELEASE ORAL 2 TIMES DAILY
Status: DISCONTINUED | OUTPATIENT
Start: 2022-02-12 | End: 2022-02-14 | Stop reason: HOSPADM

## 2022-02-11 RX ADMIN — ALBUTEROL SULFATE 2 PUFF: 90 AEROSOL, METERED RESPIRATORY (INHALATION) at 05:02

## 2022-02-11 RX ADMIN — LACTOBACILLUS TAB 4 TABLET: TAB at 08:02

## 2022-02-11 RX ADMIN — LACTOBACILLUS TAB 4 TABLET: TAB at 11:02

## 2022-02-11 RX ADMIN — ENOXAPARIN SODIUM 40 MG: 40 INJECTION SUBCUTANEOUS at 05:02

## 2022-02-11 RX ADMIN — ALBUTEROL SULFATE 2 PUFF: 90 AEROSOL, METERED RESPIRATORY (INHALATION) at 01:02

## 2022-02-11 RX ADMIN — CEFTRIAXONE 1 G: 1 INJECTION, POWDER, FOR SOLUTION INTRAMUSCULAR; INTRAVENOUS at 09:02

## 2022-02-11 RX ADMIN — DEXAMETHASONE SODIUM PHOSPHATE 6 MG: 4 INJECTION, SOLUTION INTRAMUSCULAR; INTRAVENOUS at 10:02

## 2022-02-11 RX ADMIN — ALBUTEROL SULFATE 2 PUFF: 90 AEROSOL, METERED RESPIRATORY (INHALATION) at 12:02

## 2022-02-11 RX ADMIN — LACTOBACILLUS TAB 4 TABLET: TAB at 05:02

## 2022-02-11 RX ADMIN — THYROID, PORCINE 75 MG: 15 TABLET ORAL at 06:02

## 2022-02-11 RX ADMIN — OXYCODONE HYDROCHLORIDE AND ACETAMINOPHEN 1000 MG: 500 TABLET ORAL at 09:02

## 2022-02-11 RX ADMIN — Medication 1000 UNITS: at 10:02

## 2022-02-11 RX ADMIN — FAMOTIDINE 20 MG: 20 TABLET ORAL at 09:02

## 2022-02-11 RX ADMIN — AZITHROMYCIN MONOHYDRATE 500 MG: 250 TABLET ORAL at 09:02

## 2022-02-11 NOTE — PLAN OF CARE
[facility]  Discharge Assessment       Primary Care Provider: Prem Vincent MD    Admission Diagnosis: Pneumonia due to COVID-19 virus [U07.1, J12.82]    Admission Date: 2/6/2022  Expected Discharge Date: 2/13/2022    Discharge Barriers Identified: (P) None    Payor: HUMANA / Plan: HUMANA  PPO / Product Type: PPO /     Extended Emergency Contact Information  Primary Emergency Contact: Hang Orozco  Home Phone: [phone]  Mobile Phone: [phone]  Relation: Son  Preferred language: English   needed? No    Discharge Plan A: (P) Home Health,Home ([facility])  Discharge Plan B: (P) Home       [facility] - [place], [place] - [address] [place] [id number] [facility] [address]  Phone: [phone] Fax: [phone]     [facility] - [place], [place] - [address] [place] [address]  Phone: [phone] Fax: [phone]      Initial Assessment (most recent)       Adult Discharge Assessment - 02/11/22 1353          Discharge Assessment    Assessment Type Discharge Planning Assessment     Confirmed/corrected address, phone number and insurance Yes     Confirmed Demographics Correct on Facesheet     Source of Information patient     Communicated SANDY with patient/caregiver Yes     Reason For Admission Pneumonia     Lives With alone     Do you expect to return to your current living situation? Yes     Prior to hospitilization cognitive status: Alert/Oriented     Current cognitive status: Alert/Oriented     Walking or Climbing Stairs Difficulty none     Dressing/Bathing Difficulty none (P)      Equipment Currently Used at Home cane, straight;rollator (P)    None used at present    How do you get to doctors appointments? agency (P)      Are you on dialysis? No (P)      Do you take coumadin? No (P)      Discharge Plan A Home Health;Home (P)    [facility]    Discharge Plan B Home (P)      DME Needed Upon Discharge  none (P)       Discharge Plan discussed with: Patient (P)      Discharge Barriers Identified None (P)

## 2022-02-11 NOTE — PLAN OF CARE
Received call from Radha with [facility] . Patient denied due to them not accepting patient's insurance. Faxed referral to [facility].

## 2022-02-11 NOTE — PT/OT/SLP PROGRESS
Physical Therapy Treatment    Patient Name:  Beverly Rodríguez   MRN:  [id number]    Recommendations:     Discharge Recommendations:  home health OT,home health PT   Discharge Equipment Recommendations: 3-in-1 commode   Barriers to discharge: Decreased caregiver support    Assessment:     Beverly Rodríguez is a 66 y.o. female admitted with a medical diagnosis of Pneumonia due to COVID-19 virus.  She presents with the following impairments/functional limitations:      impaired endurance. Patient able to talk the entire session without signs of fatigue and only very mild SOB. Patient did not want to perform ambulation at the time of treatment due to being on peripheral IV.    Rehab Prognosis: Good; patient would benefit from acute skilled PT services to address these deficits and reach maximum level of function.    Recent Surgery: * No surgery found *      Plan:     During this hospitalization, patient to be seen 5 x/week to address the identified rehab impairments via therapeutic exercises,therapeutic activities,gait training and progress toward the following goals:    · Plan of Care Expires:  02/23/22    Subjective     Chief Complaint: SOB with gait  Patient/Family Comments/goals: return home  Pain/Comfort:  · Pain Rating 1: 0/10  · Pain Rating Post-Intervention 1: 0/10      Objective:     Communicated with nurse prior to session.  Patient found HOB elevated with peripheral IV upon PT entry to room.     General Precautions: Standard, fall,droplet,contact,airborne   Orthopedic Precautions:    Braces: N/A  Respiratory Status: Nasal cannula, flow 2 L/min     Functional Mobility:  · Bed Mobility:     · Supine to Sit: independence  · Transfers:     · Sit to Stand:  supervision with no AD      AM-PAC 6 CLICK MOBILITY  Turning over in bed (including adjusting bedclothes, sheets and blankets)?: 4  Sitting down on and standing up from a chair with arms (e.g., wheelchair, bedside commode, etc.): 4  Moving from lying on back to sitting on  the side of the bed?: 4  Moving to and from a bed to a chair (including a wheelchair)?: 4  Need to walk in hospital room?: 3  Climbing 3-5 steps with a railing?: 2  Basic Mobility Total Score: 21       Therapeutic Activities and Exercises:   LAQ, AP, Hip flexion, hip abduction, and hip adduction x 20 each at EOB  Sit to stands x 10 at supervision  Mini squats x 10    Patient left sitting edge of bed with call button in reach..    GOALS:   Multidisciplinary Problems     Physical Therapy Goals        Problem: Physical Therapy Goal    Goal Priority Disciplines Outcome Goal Variances Interventions   Physical Therapy Goal     PT, PT/OT Ongoing, Progressing     Description: STG:  Patient will perform sit to stand and SPT with independence  Patient will ambulate 250 feet with no assistive device with no LOB and supervision assistance without LOB.     LTG  Patient will ambuate 350 feet with no assistive device with no assistance without LOB.   Patient will perform 10 min of LE exercise without rest break to improve endurance with no SOB                      Time Tracking:     PT Received On: 02/11/22  PT Start Time: 1030     PT Stop Time: 1045  PT Total Time (min): 15 min     Billable Minutes: Therapeutic Exercise 15    Treatment Type: Treatment  PT/PTA: PTA     PTA Visit Number: 2     02/11/2022

## 2022-02-11 NOTE — ASSESSMENT & PLAN NOTE
Ns at 100 ml/h-completed  Ondansetron prn  Vishal clear liq diet. Increased to full liquid diet today (2/8)  02/09/202  PT NOW TOLERATING 50% OF DIET WITH 1 EPISODE DIARRHEA TODAY   RDN CONSULT- RECOMMENDATIONS NOTED  CONTINUE GLUTEN FREE DIET  02/10/2022  CONTINUES TO IMPROVE

## 2022-02-11 NOTE — PLAN OF CARE
Problem: Occupational Therapy Goal  Goal: Occupational Therapy Goal  Description: Patient will perform therapeutic exercises per HEP for B UE strengthening/endurance exercises.     Outcome: Met

## 2022-02-11 NOTE — ASSESSMENT & PLAN NOTE
Continue Metoprolol  /57  02/09/2022  /79  02/10/2022  PT HAS ADEQUATE BP CONTROL ON MEDICATION.  -68

## 2022-02-11 NOTE — PT/OT/SLP DISCHARGE
Occupational Therapy Discharge Summary    Beverly Rodríguez  MRN: [id number]   Principal Problem: Pneumonia due to COVID-19 virus      Patient Discharged from acute Occupational Therapy on 2/11/2022.  Please refer to prior OT note dated 2/10/2022 for functional status.    Assessment:      Patient has met all goals and is not appropriate for therapy.    Objective:     GOALS:   Multidisciplinary Problems     Occupational Therapy Goals     Not on file          Multidisciplinary Problems (Resolved)        Problem: Occupational Therapy Goal    Goal Priority Disciplines Outcome Interventions   Occupational Therapy Goal   (Resolved)     OT, PT/OT Met    Description: Patient will perform therapeutic exercises per HEP for B UE strengthening/endurance exercises.                      Reasons for Discontinuation of Therapy Services  Satisfactory goal achievement.      Plan:     Patient Discharged to: [facility] for LOS    2/11/2022

## 2022-02-11 NOTE — ASSESSMENT & PLAN NOTE
Magnesium 2.1  Potassium 3.3 this am  (3.0 to 3.2 to 3.3)  Monitor daily  02/09/2022  K IS 3.4 CONTINUE SUPPLEMENTATION  02/10/2022  K 2.9 WILL ADMINISTER 40 MEQ  AND CONTINUE BID

## 2022-02-11 NOTE — SUBJECTIVE & OBJECTIVE
Interval History: PT CONTINUES COVID TREATMENT WITH SLOW IMPROVEMENT. PT HAS STABLE LABS EXCEPT FOR K 2.9 WITH SUPPLEMENTATION ORDERED. PT IS PARTICIPATING WITH PT/OT AMBULATING 80 FEET WITH 1 EPISODE OF LOB.     Review of Systems   Constitutional: Positive for activity change, appetite change and fatigue.   HENT: Negative.    Eyes: Negative.    Respiratory: Positive for cough and shortness of breath.    Cardiovascular: Negative.    Gastrointestinal: Positive for abdominal pain and anal bleeding.   Endocrine: Negative.    Genitourinary: Negative.    Musculoskeletal: Positive for arthralgias.   Allergic/Immunologic: Negative.    Neurological: Positive for weakness (GENERALIZED).   Hematological: Negative.    Psychiatric/Behavioral: The patient is nervous/anxious.      Objective:     Vital Signs (Most Recent):  Temp: 98.7 °F (37.1 °C) (02/10/22 1955)  Pulse: (!) 53 (02/10/22 1955)  Resp: 20 (02/10/22 1955)  BP: 136/68 (02/10/22 1955)  SpO2: (!) 93 % (02/10/22 1955) Vital Signs (24h Range):  Temp:  [97.6 °F (36.4 °C)-98.7 °F (37.1 °C)] 98.7 °F (37.1 °C)  Pulse:  [45-66] 53  Resp:  [12-20] 20  SpO2:  [93 %-98 %] 93 %  BP: (125-162)/(53-81) 136/68     Weight: 96 kg (211 lb 10.3 oz)  Body mass index is 36.33 kg/m².    Intake/Output Summary (Last 24 hours) at 2/10/2022 2210  Last data filed at 2/10/2022 1730  Gross per 24 hour   Intake 720 ml   Output --   Net 720 ml      Physical Exam  Vitals and nursing note reviewed. Exam conducted with a chaperone present.   Constitutional:       Appearance: She is ill-appearing.   HENT:      Head: Normocephalic and atraumatic.      Right Ear: Tympanic membrane normal.      Left Ear: Tympanic membrane normal.      Nose: Nose normal.      Mouth/Throat:      Mouth: Mucous membranes are moist.   Eyes:      Extraocular Movements: Extraocular movements intact.      Pupils: Pupils are equal, round, and reactive to light.   Cardiovascular:      Rate and Rhythm: Normal rate and regular rhythm.       Pulses: Normal pulses.      Heart sounds: Normal heart sounds.   Pulmonary:      Effort: Pulmonary effort is normal.      Breath sounds: Normal breath sounds.   Abdominal:      General: Bowel sounds are normal. There is no distension.      Palpations: Abdomen is soft.      Tenderness: There is no abdominal tenderness.   Musculoskeletal:         General: No swelling or tenderness. Normal range of motion.      Cervical back: Normal range of motion and neck supple.   Skin:     General: Skin is warm.      Capillary Refill: Capillary refill takes less than 2 seconds.   Neurological:      General: No focal deficit present.      Mental Status: She is alert and oriented to person, place, and time.      Motor: Weakness (GENERALIZED) present.   Psychiatric:         Behavior: Behavior normal.         Thought Content: Thought content normal.         Judgment: Judgment normal.         Significant Labs:   All pertinent labs within the past 24 hours have been reviewed.  CBC:   Recent Labs   Lab 02/09/22 0410 02/10/22  0630   WBC 6.76 4.39*   HGB 10.3* 10.2*   HCT 32.2* 31.2*    312     CMP:   Recent Labs   Lab 02/09/22 0410 02/10/22  0630    142   K 3.4* 2.9*    102   CO2 32 29   GLU 99 87   BUN 15 18   CREATININE 1.11* 0.91   CALCIUM 9.1 8.9   PROT 7.0 6.9   ALBUMIN 3.3* 3.2*   BILITOT 0.5 0.5   ALKPHOS 81 82   AST 21 31   ALT 38 47   ANIONGAP 9 14   EGFRNONAA 53* 66       Significant Imaging: I have reviewed all pertinent imaging results/findings within the past 24 hours.  NONE

## 2022-02-11 NOTE — ASSESSMENT & PLAN NOTE
Routine treatment for her illnesses  PT/OT if tolerated  02/09/2022  PT/OT CONSULT WITH REHAB RECOMMENDED  PT AMBULATED 40 FEET WITH SUPERVISION AND MILD DYSPNEA ON 2 L NC O2  SWING BED RECOMMENDED  02/10/2022  PT AMBULATED 80 FEET WITH ONE EPISODE LOB  SWING BED RECOMMENDED

## 2022-02-11 NOTE — PLAN OF CARE
Spoke with patient about plan to DC on 2/13/22. Provider choice form provided, patient signed with choice. Reviewed with patient the Important Message from Medicare regarding discharge, form signed. Faxed referral to [facility]. Called [employer] and set up transportation for patient's discharge, for pick-up time to be 1pm.

## 2022-02-11 NOTE — ASSESSMENT & PLAN NOTE
Ns 1 Liter bolus in ED  NS at 100 Ml/h  BUN 28/creatinine 1.66 in ED  Bun 24/ creatinine 1.52 this am  2/8/22 1.09 and 18  02/09/2022 BUN/CREAT 15/1.11 IMPROVED  PT EATING AND DRINKING   02/10/2022  PT IS IMPROVED  BUN/CREAT 18/0.9 (24/1.52)

## 2022-02-11 NOTE — ASSESSMENT & PLAN NOTE
Levaquin d/c'd   Azithromycin/Rocephin started on 2/7 (dose 2 of both today)  Decadron (dose 4 today)  Albuterol MDI  Incentive spirometry  Vit C, Vit D3 (Zinc not started due to nausea)  Famotidine  O2 at 2L/min per NC to titrate  BC x2 show no growth after 24 hours  02/09/2022  PT WILL CONTINUE TREATMENT , CONTINUED DEEP COUGH WILL CONTINUE ISOLATION PROTOCOL  O2 SAT S94-96% 2L NC O2  PT NOTES JIANG, WEAKNESS; APPETITE IS IMPROVED  02/10/2022  CONTINUE COVID TREATMENT  PT HAS SAT 93-98% ON 2 L NC O2  PT HAS MUSCLE WEAKNESS AND PT/OT  HAS RECOMMENDED REHABILITATION

## 2022-02-11 NOTE — PLAN OF CARE
Problem: Bariatric Environmental Safety  Goal: Safety Maintained with Care  Outcome: Ongoing, Progressing     Problem: Skin Injury Risk Increased  Goal: Skin Health and Integrity  Outcome: Ongoing, Progressing  Intervention: Promote and Optimize Oral Intake  Flowsheets (Taken 2/11/2022 1116)  Oral Nutrition Promotion: rest periods promoted

## 2022-02-11 NOTE — ASSESSMENT & PLAN NOTE
Urine culture preliminary show E. Coli > 100,000 and Enterococcus species 15,000  Levaquin d/c'd  Azithromycin/Rocephin started 2/7 for 7 days  NS at 100 ml/h-completed    02/09/2022  PT HAS ECOLI >100K SENS TO ROCEPHIN, > 15K ENTEROCOCCUS  02'10'2022  PT CONTINUES TREATMENT FOR UTI

## 2022-02-11 NOTE — PLAN OF CARE
Problem: Adult Inpatient Plan of Care  Goal: Plan of Care Review  Outcome: Ongoing, Progressing  Goal: Patient-Specific Goal (Individualized)  Outcome: Ongoing, Progressing  Goal: Absence of Hospital-Acquired Illness or Injury  Outcome: Ongoing, Progressing  Goal: Optimal Comfort and Wellbeing  Outcome: Ongoing, Progressing  Goal: Readiness for Transition of Care  Outcome: Ongoing, Progressing     Problem: Bariatric Environmental Safety  Goal: Safety Maintained with Care  Outcome: Ongoing, Progressing     Problem: Skin Injury Risk Increased  Goal: Skin Health and Integrity  Outcome: Ongoing, Progressing     Problem: Gas Exchange Impaired  Goal: Optimal Gas Exchange  Outcome: Ongoing, Progressing

## 2022-02-11 NOTE — PLAN OF CARE
Problem: Adult Inpatient Plan of Care  Goal: Plan of Care Review  2/10/2022 2007 by Thad Caldwell RN  Outcome: Ongoing, Progressing  2/10/2022 2006 by Thad Caldwell RN  Outcome: Ongoing, Progressing  Goal: Patient-Specific Goal (Individualized)  2/10/2022 2007 by Thad Caldwell RN  Outcome: Ongoing, Progressing  2/10/2022 2006 by Thad Caldwell RN  Outcome: Ongoing, Progressing  Goal: Absence of Hospital-Acquired Illness or Injury  2/10/2022 2007 by Thad Caldwell RN  Outcome: Ongoing, Progressing  2/10/2022 2006 by Thad Caldwell RN  Outcome: Ongoing, Progressing  Goal: Optimal Comfort and Wellbeing  2/10/2022 2007 by Thad Caldwell RN  Outcome: Ongoing, Progressing  2/10/2022 2006 by Thad Caldwell RN  Outcome: Ongoing, Progressing  Goal: Readiness for Transition of Care  2/10/2022 2007 by Thad Caldwell RN  Outcome: Ongoing, Progressing  2/10/2022 2006 by Thad Caldwell RN  Outcome: Ongoing, Progressing     Problem: Gas Exchange Impaired  Goal: Optimal Gas Exchange  Outcome: Ongoing, Progressing

## 2022-02-11 NOTE — ASSESSMENT & PLAN NOTE
TSH 0.6  Continue home med ([place] thyroid 75 mg daily)  02/09/2022 CONTINUE THY MED  02/10/2022 CONTINUE THY MED

## 2022-02-11 NOTE — PROGRESS NOTES
[facility]  [facility] Medicine  Progress Note    Patient Name: Beverly Rodríguez  MRN: [id number]  Patient Class: IP- Inpatient   Admission Date: 2/6/2022  Length of Stay: 4 days  Attending Physician: Sharron Andujar MD  Primary Care Provider: Prem Vincent MD        Subjective:     Principal Problem:Pneumonia due to COVID-19 virus        HPI:  Ms. Rodríguez is a 66 year old  female who is admitted to [facility] acute care services due to COVID 19 pneumonia and acute urinary tract infection. She tested positive for COVID 19 infection on 1/26/22 after presenting to the ED with c/o fever and chills. She was discharged home at that time. Yesterday, she returned to the ED at [facility] per EMS EMS with complaint of nausea, vomiting, diarrhea, generalized weakness, non-productive cough, and shortness of breath cough.  Diagnostic evaluation in the ED at [facility] revealed a significant viral pneumonia on chest x-ray, a urinary tract infection and dehydration on labs. Her WBC was 4880. Her Bun was 28 with creatinine 1.66. Her potassium was 3.0. She was given 1 L of NS, Levaquine, decadron, and Kcl. Her O2 sats while in the ED ranged 93-95% on RA. She was started on O2 at 2L/min per NC. Dr. Madrigal accepted her here for admission. Admit orders and home medication reconciliation were completed under his direction.    Upon arrival here, Ms. Rodríguez is alert and oriented x3. She has no obvious dyspnea. She denies chest pain. She states that she has gotten so weak and tired with nausea, vomiting, and diarrhea that started shortly after diagnosis with COVID 19 but has continued. She states that she has been unable to eat anything solid but can tolerate liquids po. She denies significant shortness of breath. She notes that she is on home O2 at night due to pulmonary disease but not sure of her diagnosis. She is receiving O2 at 2L/min per NC. A d-dimer was 0.54 here. She was started on Lovenox 40 mg daily. Levaquin  and decadron are continued along with NS at 100 ml/h.    Full Code  Not UTD with COVID 19 of flu vaccines due to allergic to flu vaccine  High risk VTE-TEDs, lovenox, early amb      Overview/Hospital Course:  2/7/22: Day 2    2/8/22: Hospital day 3. COVID 19 positive day 13. Ms. Rodríguez has been afebrile since admission. Droplet and airborne isolation are discontinued to day. Her O2 sats are ranging 93-96% on O2 at 2L/min per NC. She does wear O2 at night at home. Labs show improved BUN and creatinine to 18 and 1.09. Her glucose is 112 this am. She is tolerating po intake without vomiting. She says that her diarrhea has improved as well. Her diet will be increased to full liquid today. She is receiving decadron (day 4), azithromycin, (day 2) and rocephin (day 2). Since she is tolerating po intake, her azithromycin is changed to po today. She is also receiving Lovenox 40 mg SC daily, Vit C, Vit D, and she is using albuterol MDI and incentive spirometer under the direction of RT. She has not been receiving her thyroid med due to availability for 2 days. Her TSH is 0.61. She will restart her routine home dose tonight.    02/09/2022 HOSPITAL DAY 4  COVID POSITIVE DAY 14  PT'S VTE PPX IS LOVENOX/TEDS/EARLY AMBULATION    PT CONTINUES COVID TREATMENT WITH ALBUTEROL,O2 TITRATION, ROCEPHIN/Z MAX, DECADRON AND PEPCID AS WELL AS VIT C,D, AND ZINC.PT NOTED TO HAVE INCREASED COUGHING TODAY AND WILL CONTINUE ISOLATION FOR 20 DAYS . PT O2 SAT IS 94-96% ON 2 L NC. PT HAS JIANG WITH INCREASED FATIGUE WITH EXERCISE. PT/OT EVALUATED PT TODAY WITH PT/OT RECOMMENDED FOR 1-2 WEEKS; PT AMBULATED 40 FEET WITH O2 WITH MILD DYSPNEA WITH SUPERVISION. PT'S LABS REVEAL K 3.4, H/H 10/32 STABLE WITH UR CULTURE E COLI >100K SENSITIVE TO ROCEPHIN AND ENTEROCOCCUS >15K .     02/10/2022 HOSPITAL DAY 5  COVID POSITIVE DAY 15  PT CONTINUES COVID 19 TREATMENT WITH ALBUTEROL, O2 TITRATION, ROCEPHIN/Z MAX, DECADRON AND PEPCID; VIT C,D AND ZNPT REMAINS IN  COVID ISOLATION. PT'S O2 SAT IS 93-98% ON 2L NC O2; PT HAS INCREASED DYSPNEA ON EXERTION. PT PARTICIPATED WITH PT/OT TODAY WITH BED MOBILITY AND TRANSFER EXERCISES AND AMBULATED 80 FEET WITH ONE EPISODE OF LOSS OF BALANCE. PT'S LABS ARE STABLE EXCEPT K 2.9 WITH POTASSIUM SUPPLEMENTATION ORDERED. PT WILL CONTINUE DAILY LABS.        Interval History: PT CONTINUES COVID TREATMENT WITH SLOW IMPROVEMENT. PT HAS STABLE LABS EXCEPT FOR K 2.9 WITH SUPPLEMENTATION ORDERED. PT IS PARTICIPATING WITH PT/OT AMBULATING 80 FEET WITH 1 EPISODE OF LOB.     Review of Systems   Constitutional: Positive for activity change, appetite change and fatigue.   HENT: Negative.    Eyes: Negative.    Respiratory: Positive for cough and shortness of breath.    Cardiovascular: Negative.    Gastrointestinal: Positive for abdominal pain and anal bleeding.   Endocrine: Negative.    Genitourinary: Negative.    Musculoskeletal: Positive for arthralgias.   Allergic/Immunologic: Negative.    Neurological: Positive for weakness (GENERALIZED).   Hematological: Negative.    Psychiatric/Behavioral: The patient is nervous/anxious.      Objective:     Vital Signs (Most Recent):  Temp: 98.7 °F (37.1 °C) (02/10/22 1955)  Pulse: (!) 53 (02/10/22 1955)  Resp: 20 (02/10/22 1955)  BP: 136/68 (02/10/22 1955)  SpO2: (!) 93 % (02/10/22 1955) Vital Signs (24h Range):  Temp:  [97.6 °F (36.4 °C)-98.7 °F (37.1 °C)] 98.7 °F (37.1 °C)  Pulse:  [45-66] 53  Resp:  [12-20] 20  SpO2:  [93 %-98 %] 93 %  BP: (125-162)/(53-81) 136/68     Weight: 96 kg (211 lb 10.3 oz)  Body mass index is 36.33 kg/m².    Intake/Output Summary (Last 24 hours) at 2/10/2022 2210  Last data filed at 2/10/2022 1730  Gross per 24 hour   Intake 720 ml   Output --   Net 720 ml      Physical Exam  Vitals and nursing note reviewed. Exam conducted with a chaperone present.   Constitutional:       Appearance: She is ill-appearing.   HENT:      Head: Normocephalic and atraumatic.      Right Ear: Tympanic  membrane normal.      Left Ear: Tympanic membrane normal.      Nose: Nose normal.      Mouth/Throat:      Mouth: Mucous membranes are moist.   Eyes:      Extraocular Movements: Extraocular movements intact.      Pupils: Pupils are equal, round, and reactive to light.   Cardiovascular:      Rate and Rhythm: Normal rate and regular rhythm.      Pulses: Normal pulses.      Heart sounds: Normal heart sounds.   Pulmonary:      Effort: Pulmonary effort is normal.      Breath sounds: Normal breath sounds.   Abdominal:      General: Bowel sounds are normal. There is no distension.      Palpations: Abdomen is soft.      Tenderness: There is no abdominal tenderness.   Musculoskeletal:         General: No swelling or tenderness. Normal range of motion.      Cervical back: Normal range of motion and neck supple.   Skin:     General: Skin is warm.      Capillary Refill: Capillary refill takes less than 2 seconds.   Neurological:      General: No focal deficit present.      Mental Status: She is alert and oriented to person, place, and time.      Motor: Weakness (GENERALIZED) present.   Psychiatric:         Behavior: Behavior normal.         Thought Content: Thought content normal.         Judgment: Judgment normal.         Significant Labs:   All pertinent labs within the past 24 hours have been reviewed.  CBC:   Recent Labs   Lab 02/09/22  0410 02/10/22  0630   WBC 6.76 4.39*   HGB 10.3* 10.2*   HCT 32.2* 31.2*    312     CMP:   Recent Labs   Lab 02/09/22  0410 02/10/22  0630    142   K 3.4* 2.9*    102   CO2 32 29   GLU 99 87   BUN 15 18   CREATININE 1.11* 0.91   CALCIUM 9.1 8.9   PROT 7.0 6.9   ALBUMIN 3.3* 3.2*   BILITOT 0.5 0.5   ALKPHOS 81 82   AST 21 31   ALT 38 47   ANIONGAP 9 14   EGFRNONAA 53* 66       Significant Imaging: I have reviewed all pertinent imaging results/findings within the past 24 hours.  NONE      Assessment/Plan:      * Pneumonia due to COVID-19 virus  Levaquin d/c'd    Azithromycin/Rocephin started on 2/7 (dose 2 of both today)  Decadron (dose 4 today)  Albuterol MDI  Incentive spirometry  Vit C, Vit D3 (Zinc not started due to nausea)  Famotidine  O2 at 2L/min per NC to titrate  BC x2 show no growth after 24 hours  02/09/2022  PT WILL CONTINUE TREATMENT , CONTINUED DEEP COUGH WILL CONTINUE ISOLATION PROTOCOL  O2 SAT S94-96% 2L NC O2  PT NOTES JIANG, WEAKNESS; APPETITE IS IMPROVED  02/10/2022  CONTINUE COVID TREATMENT  PT HAS SAT 93-98% ON 2 L NC O2  PT HAS MUSCLE WEAKNESS AND PT/OT  HAS RECOMMENDED REHABILITATION        DVT prophylaxis  PT IS AT RISK FOR VTE  VTE PPX IS LOVENOX/TEDS/EARLY AMBULATION  02/10/2022  CONTINUE VTE PPX    Dehydration  Ns 1 Liter bolus in ED  NS at 100 Ml/h  BUN 28/creatinine 1.66 in ED  Bun 24/ creatinine 1.52 this am  2/8/22 1.09 and 18  02/09/2022 BUN/CREAT 15/1.11 IMPROVED  PT EATING AND DRINKING   02/10/2022  PT IS IMPROVED  BUN/CREAT 18/0.9 (24/1.52)    Acute urinary tract infection  Urine culture preliminary show E. Coli > 100,000 and Enterococcus species 15,000  Levaquin d/c'd  Azithromycin/Rocephin started 2/7 for 7 days  NS at 100 ml/h-completed    02/09/2022  PT HAS ECOLI >100K SENS TO ROCEPHIN, > 15K ENTEROCOCCUS  02'10'2022  PT CONTINUES TREATMENT FOR UTI      Hypokalemia  Magnesium 2.1  Potassium 3.3 this am  (3.0 to 3.2 to 3.3)  Monitor daily  02/09/2022  K IS 3.4 CONTINUE SUPPLEMENTATION  02/10/2022  K 2.9 WILL ADMINISTER 40 MEQ  AND CONTINUE BID    Hypertension  Continue Metoprolol  /57  02/09/2022  /79  02/10/2022  PT HAS ADEQUATE BP CONTROL ON MEDICATION.  -68      Hypothyroidism  TSH 0.6  Continue home med ([place] thyroid 75 mg daily)  02/09/2022 CONTINUE THY MED  02/10/2022 CONTINUE THY MED      Generalized weakness  Routine treatment for her illnesses  PT/OT if tolerated  02/09/2022  PT/OT CONSULT WITH REHAB RECOMMENDED  PT AMBULATED 40 FEET WITH SUPERVISION AND MILD DYSPNEA ON 2 L NC O2  SWING BED  RECOMMENDED  02/10/2022  PT AMBULATED 80 FEET WITH ONE EPISODE LOB  SWING BED RECOMMENDED    Nausea vomiting and diarrhea  Ns at 100 ml/h-completed  Ondansetron prn  Vishal clear liq diet. Increased to full liquid diet today (2/8)  02/09/202  PT NOW TOLERATING 50% OF DIET WITH 1 EPISODE DIARRHEA TODAY   RDN CONSULT- RECOMMENDATIONS NOTED  CONTINUE GLUTEN FREE DIET  02/10/2022  CONTINUES TO IMPROVE        VTE Risk Mitigation (From admission, onward)         Ordered     enoxaparin injection 40 mg  Daily         02/07/22 0111     IP VTE HIGH RISK PATIENT  Once         02/07/22 0111     Place sequential compression device  Until discontinued         02/07/22 0111                Discharge Planning   SANDY:      Code Status: Full Code   Is the patient medically ready for discharge?:     Reason for patient still in hospital (select all that apply): Patient trending condition, Laboratory test, Treatment, Imaging, Consult recommendations and PT / OT recommendations                     Sharron Andujar MD  Department of Hospital Medicine   [facility] [place] - [facility] Surgical Unit

## 2022-02-12 LAB
ALBUMIN SERPL BCP-MCNC: 3.6 G/DL (ref 3.5–5)
ALBUMIN/GLOB SERPL: 1 {RATIO}
ALP SERPL-CCNC: 87 U/L (ref 50–130)
ALT SERPL W P-5'-P-CCNC: 69 U/L (ref 13–56)
ANION GAP SERPL CALCULATED.3IONS-SCNC: 12 MMOL/L (ref 7–16)
AST SERPL W P-5'-P-CCNC: 32 U/L (ref 15–37)
BACTERIA BLD CULT: NORMAL
BACTERIA BLD CULT: NORMAL
BASOPHILS # BLD AUTO: 0.01 K/UL (ref 0–0.2)
BASOPHILS NFR BLD AUTO: 0.2 % (ref 0–1)
BILIRUB SERPL-MCNC: 0.5 MG/DL (ref 0–1.2)
BUN SERPL-MCNC: 19 MG/DL (ref 7–18)
BUN/CREAT SERPL: 20 (ref 6–20)
CALCIUM SERPL-MCNC: 9.3 MG/DL (ref 8.5–10.1)
CHLORIDE SERPL-SCNC: 101 MMOL/L (ref 98–107)
CO2 SERPL-SCNC: 31 MMOL/L (ref 21–32)
CREAT SERPL-MCNC: 0.94 MG/DL (ref 0.55–1.02)
DIFFERENTIAL METHOD BLD: ABNORMAL
EOSINOPHIL # BLD AUTO: 0.06 K/UL (ref 0–0.5)
EOSINOPHIL NFR BLD AUTO: 1.1 % (ref 1–4)
EOSINOPHIL NFR BLD MANUAL: 1 % (ref 1–4)
ERYTHROCYTE [DISTWIDTH] IN BLOOD BY AUTOMATED COUNT: 12.6 % (ref 11.5–14.5)
GLOBULIN SER-MCNC: 3.6 G/DL (ref 2–4)
GLUCOSE SERPL-MCNC: 90 MG/DL (ref 74–106)
HCT VFR BLD AUTO: 33 % (ref 38–47)
HGB BLD-MCNC: 10.6 G/DL (ref 12–16)
LYMPHOCYTES # BLD AUTO: 1.13 K/UL (ref 1–4.8)
LYMPHOCYTES NFR BLD AUTO: 21.4 % (ref 27–41)
LYMPHOCYTES NFR BLD MANUAL: 17 % (ref 27–41)
MCH RBC QN AUTO: 28.2 PG (ref 27–31)
MCHC RBC AUTO-ENTMCNC: 32.1 G/DL (ref 32–36)
MCV RBC AUTO: 87.8 FL (ref 80–96)
MONOCYTES # BLD AUTO: 0.58 K/UL (ref 0–0.8)
MONOCYTES NFR BLD AUTO: 11 % (ref 2–6)
MONOCYTES NFR BLD MANUAL: 11 % (ref 2–6)
MPC BLD CALC-MCNC: 9.9 FL (ref 9.4–12.4)
NEUTROPHILS # BLD AUTO: 3.5 K/UL (ref 1.8–7.7)
NEUTROPHILS NFR BLD AUTO: 66.3 % (ref 53–65)
NEUTS SEG NFR BLD MANUAL: 71 % (ref 50–62)
NRBC BLD MANUAL-RTO: ABNORMAL %
PLATELET # BLD AUTO: 341 K/UL (ref 150–400)
PLATELET MORPHOLOGY: NORMAL
POTASSIUM SERPL-SCNC: 3.2 MMOL/L (ref 3.5–5.1)
PROT SERPL-MCNC: 7.2 G/DL (ref 6.4–8.2)
RBC # BLD AUTO: 3.76 M/UL (ref 4.2–5.4)
RBC MORPH BLD: NORMAL
SODIUM SERPL-SCNC: 141 MMOL/L (ref 136–145)
WBC # BLD AUTO: 5.28 K/UL (ref 4.5–11)

## 2022-02-12 PROCEDURE — 94761 N-INVAS EAR/PLS OXIMETRY MLT: CPT

## 2022-02-12 PROCEDURE — 36415 COLL VENOUS BLD VENIPUNCTURE: CPT | Performed by: NURSE PRACTITIONER

## 2022-02-12 PROCEDURE — 85025 COMPLETE CBC W/AUTO DIFF WBC: CPT | Performed by: NURSE PRACTITIONER

## 2022-02-12 PROCEDURE — 63600175 PHARM REV CODE 636 W HCPCS: Performed by: NURSE PRACTITIONER

## 2022-02-12 PROCEDURE — 63700000 PHARM REV CODE 250 ALT 637 W/O HCPCS: Performed by: EMERGENCY MEDICINE

## 2022-02-12 PROCEDURE — 25000003 PHARM REV CODE 250: Performed by: NURSE PRACTITIONER

## 2022-02-12 PROCEDURE — 25000003 PHARM REV CODE 250: Performed by: EMERGENCY MEDICINE

## 2022-02-12 PROCEDURE — 11000001 HC ACUTE MED/SURG PRIVATE ROOM

## 2022-02-12 PROCEDURE — 27000221 HC OXYGEN, UP TO 24 HOURS

## 2022-02-12 PROCEDURE — 99900035 HC TECH TIME PER 15 MIN (STAT)

## 2022-02-12 PROCEDURE — 80053 COMPREHEN METABOLIC PANEL: CPT | Performed by: NURSE PRACTITIONER

## 2022-02-12 PROCEDURE — 94640 AIRWAY INHALATION TREATMENT: CPT

## 2022-02-12 RX ADMIN — AZITHROMYCIN MONOHYDRATE 500 MG: 250 TABLET ORAL at 09:02

## 2022-02-12 RX ADMIN — ALBUTEROL SULFATE 2 PUFF: 90 AEROSOL, METERED RESPIRATORY (INHALATION) at 12:02

## 2022-02-12 RX ADMIN — Medication 1000 UNITS: at 09:02

## 2022-02-12 RX ADMIN — CEFTRIAXONE 1 G: 1 INJECTION, POWDER, FOR SOLUTION INTRAMUSCULAR; INTRAVENOUS at 11:02

## 2022-02-12 RX ADMIN — METOPROLOL SUCCINATE 25 MG: 25 TABLET, EXTENDED RELEASE ORAL at 09:02

## 2022-02-12 RX ADMIN — FAMOTIDINE 20 MG: 20 TABLET ORAL at 09:02

## 2022-02-12 RX ADMIN — ALBUTEROL SULFATE 2 PUFF: 90 AEROSOL, METERED RESPIRATORY (INHALATION) at 05:02

## 2022-02-12 RX ADMIN — LACTOBACILLUS TAB 4 TABLET: TAB at 11:02

## 2022-02-12 RX ADMIN — POTASSIUM CHLORIDE 20 MEQ: 1500 TABLET, EXTENDED RELEASE ORAL at 08:02

## 2022-02-12 RX ADMIN — ENOXAPARIN SODIUM 40 MG: 40 INJECTION SUBCUTANEOUS at 04:02

## 2022-02-12 RX ADMIN — OXYCODONE HYDROCHLORIDE AND ACETAMINOPHEN 1000 MG: 500 TABLET ORAL at 09:02

## 2022-02-12 RX ADMIN — LACTOBACILLUS TAB 4 TABLET: TAB at 08:02

## 2022-02-12 RX ADMIN — THYROID, PORCINE 75 MG: 15 TABLET ORAL at 06:02

## 2022-02-12 RX ADMIN — POTASSIUM CHLORIDE 20 MEQ: 1500 TABLET, EXTENDED RELEASE ORAL at 09:02

## 2022-02-12 RX ADMIN — LACTOBACILLUS TAB 4 TABLET: TAB at 04:02

## 2022-02-12 NOTE — ASSESSMENT & PLAN NOTE
TSH 0.6  Continue home med ([place] thyroid 75 mg daily)  02/09/2022 CONTINUE THY MED  02/10/2022 CONTINUE THY MED  02/11/2022 CONTINUE THY MED

## 2022-02-12 NOTE — SUBJECTIVE & OBJECTIVE
Interval History:     Review of Systems   Constitutional: Negative for fever.   HENT: Negative for trouble swallowing.    Eyes: Negative for visual disturbance.   Respiratory: Positive for cough. Negative for shortness of breath.    Cardiovascular: Negative for chest pain, palpitations and leg swelling.   Gastrointestinal: Negative for abdominal pain, diarrhea, nausea and vomiting.   Genitourinary: Negative for decreased urine volume, dysuria, frequency and hematuria.   Skin: Negative for color change.   Neurological: Negative for dizziness, numbness and headaches.     Objective:     Vital Signs (Most Recent):  Temp: 98.1 °F (36.7 °C) (02/12/22 0735)  Pulse:  (.) (02/12/22 1206)  Resp:  (.) (02/12/22 1206)  BP: (!) 152/78 (02/12/22 0735)  SpO2: 98 % (02/12/22 0735) Vital Signs (24h Range):  Temp:  [97.8 °F (36.6 °C)-98.2 °F (36.8 °C)] 98.1 °F (36.7 °C)  Pulse:  [45-63] 62  Resp:  [16-20] 20  SpO2:  [95 %-98 %] 98 %  BP: (116-152)/(60-78) 152/78     Weight: 96 kg (211 lb 10.3 oz)  Body mass index is 36.33 kg/m².    Intake/Output Summary (Last 24 hours) at 2/12/2022 1210  Last data filed at 2/12/2022 0800  Gross per 24 hour   Intake 1320 ml   Output 1 ml   Net 1319 ml      Physical Exam  Vitals and nursing note reviewed.   Constitutional:       General: She is not in acute distress.     Appearance: Normal appearance. She is obese.   HENT:      Head: Normocephalic and atraumatic.      Mouth/Throat:      Mouth: Mucous membranes are moist.   Eyes:      Extraocular Movements: Extraocular movements intact.      Pupils: Pupils are equal, round, and reactive to light.   Cardiovascular:      Rate and Rhythm: Normal rate and regular rhythm.      Heart sounds: Normal heart sounds.   Pulmonary:      Effort: Pulmonary effort is normal. No respiratory distress.      Breath sounds: Normal breath sounds.   Abdominal:      General: Bowel sounds are normal. There is no distension.      Palpations: Abdomen is soft.      Tenderness:  There is no abdominal tenderness.   Musculoskeletal:         General: Normal range of motion.      Cervical back: Neck supple.   Skin:     General: Skin is warm and dry.      Capillary Refill: Capillary refill takes less than 2 seconds.   Neurological:      Mental Status: She is alert and oriented to person, place, and time. Mental status is at baseline.      Cranial Nerves: No cranial nerve deficit.         Significant Labs: All pertinent labs within the past 24 hours have been reviewed.    Significant Imaging: I have reviewed all pertinent imaging results/findings within the past 24 hours.

## 2022-02-12 NOTE — ASSESSMENT & PLAN NOTE
Ns 1 Liter bolus in ED  NS at 100 Ml/h  BUN 28/creatinine 1.66 in ED  Bun 24/ creatinine 1.52 this am  2/8/22 1.09 and 18  02/09/2022 BUN/CREAT 15/1.11 IMPROVED  PT EATING AND DRINKING   02/10/2022  PT IS IMPROVED  BUN/CREAT 18/0.9 (24/1.52)  02/11/2022  PT DENIES N/V/D AND APPETITE HAS IMPROVED; BUN/CREAT 19/1.00  PT HAS BEEN EVALUATED PER RDN WITH RECOMMENDATIONS MADE AND PT WILL CONTINUE GLUTEN FREE DIET

## 2022-02-12 NOTE — PLAN OF CARE
Problem: Adult Inpatient Plan of Care  Goal: Plan of Care Review  Outcome: Ongoing, Progressing  Goal: Patient-Specific Goal (Individualized)  Outcome: Ongoing, Progressing  Goal: Absence of Hospital-Acquired Illness or Injury  Outcome: Ongoing, Progressing  Intervention: Identify and Manage Fall Risk  Flowsheets (Taken 2/11/2022 2140)  Safety Promotion/Fall Prevention:   assistive device/personal item within reach   Fall Risk reviewed with patient/family   Fall Risk signage in place   lighting adjusted   medications reviewed   nonskid shoes/socks when out of bed   side rails raised x 2  Intervention: Prevent Skin Injury  Flowsheets (Taken 2/11/2022 2140)  Body Position:   position maintained   position changed independently  Skin Protection: tubing/devices free from skin contact  Intervention: Prevent and Manage VTE (Venous Thromboembolism) Risk  Flowsheets (Taken 2/11/2022 2140)  Activity Management: Sitting at edge of bed - L2  VTE Prevention/Management: remove, assess skin, and reapply compression stockings  Range of Motion: active ROM (range of motion) encouraged  Intervention: Prevent Infection  Flowsheets (Taken 2/11/2022 2140)  Infection Prevention:   hand hygiene promoted   rest/sleep promoted  Goal: Optimal Comfort and Wellbeing  Outcome: Ongoing, Progressing  Intervention: Monitor Pain and Promote Comfort  Flowsheets (Taken 2/11/2022 2140)  Pain Management Interventions: care clustered  Intervention: Provide Person-Centered Care  Flowsheets (Taken 2/11/2022 2140)  Trust Relationship/Rapport: care explained  Goal: Readiness for Transition of Care  Outcome: Ongoing, Progressing     Problem: Skin Injury Risk Increased  Goal: Skin Health and Integrity  Outcome: Ongoing, Progressing  Intervention: Optimize Skin Protection  Flowsheets (Taken 2/11/2022 2140)  Pressure Reduction Techniques:   frequent weight shift encouraged   heels elevated off bed  Skin Protection: tubing/devices free from skin contact  Head  of Bed (HOB) Positioning: HOB at 30-45 degrees  Intervention: Promote and Optimize Oral Intake  Flowsheets (Taken 2/11/2022 2140)  Oral Nutrition Promotion: rest periods promoted     Problem: Gas Exchange Impaired  Goal: Optimal Gas Exchange  Outcome: Ongoing, Progressing  Intervention: Optimize Oxygenation and Ventilation  Flowsheets (Taken 2/11/2022 2140)  Airway/Ventilation Management: humidification applied  Head of Bed (HOB) Positioning: HOB at 30-45 degrees

## 2022-02-12 NOTE — ASSESSMENT & PLAN NOTE
Levaquin d/c'd   Azithromycin/Rocephin started on 2/7 (dose 2 of both today)  Decadron (dose 4 today)  Albuterol MDI  Incentive spirometry  Vit C, Vit D3 (Zinc not started due to nausea)  Famotidine  O2 at 2L/min per NC to titrate  BC x2 show no growth after 24 hours  02/09/2022  PT WILL CONTINUE TREATMENT , CONTINUED DEEP COUGH WILL CONTINUE ISOLATION PROTOCOL  O2 SAT S94-96% 2L NC O2  PT NOTES JIANG, WEAKNESS; APPETITE IS IMPROVED  02/10/2022  CONTINUE COVID TREATMENT  PT HAS SAT 93-98% ON 2 L NC O2  PT HAS MUSCLE WEAKNESS AND PT/OT HAS RECOMMENDED REHABILITATION    02/11/2022  CONTINUE COVID TREATMENT WITH ALBUTEROL, O2 TITRATED, INCENTIVE SPIROMETRY  ROCEPHIN, ZMAX; PT HAS COMPLETED DECADRON  02 SAT 95-98% ON 2 L NC O2

## 2022-02-12 NOTE — ASSESSMENT & PLAN NOTE
Routine treatment for her illnesses  PT/OT if tolerated  02/09/2022  PT/OT CONSULT WITH REHAB RECOMMENDED  PT AMBULATED 40 FEET WITH SUPERVISION AND MILD DYSPNEA ON 2 L NC O2  SWING BED RECOMMENDED  02/10/2022  PT AMBULATED 80 FEET WITH ONE EPISODE LOB  SWING BED RECOMMENDED  02/11/2022   PT AMBULATED 80 FEET YESTERDAY  PT PARTICIPATED WITH PT/OT WITH BED MOBILITY AND TRANSFERS

## 2022-02-12 NOTE — ASSESSMENT & PLAN NOTE
Ns at 100 ml/h-completed  Ondansetron prn  Vishal clear liq diet. Increased to full liquid diet today (2/8)  02/09/202  PT NOW TOLERATING 50% OF DIET WITH 1 EPISODE DIARRHEA TODAY   RDN CONSULT- RECOMMENDATIONS NOTED  CONTINUE GLUTEN FREE DIET  02/10/2022  CONTINUES TO IMPROVE  02/11/2022 RESOLVED

## 2022-02-12 NOTE — ASSESSMENT & PLAN NOTE
Continue Metoprolol  /57  02/09/2022  /79  02/10/2022  PT HAS ADEQUATE BP CONTROL ON MEDICATION.  -68  02/11/2022  /64

## 2022-02-12 NOTE — SUBJECTIVE & OBJECTIVE
Interval History: PT CONTINUED TO IMPROVE WITH COVID 19 TREATMENT.PT'S O2 SAT IS 95-98% ON 2L NC O2 WITH IMPROVEMENT IN JIANG AND COUGH. PT CONTINUES TO PARTICIPATE WITH PT/OT PT'S LABS ARE STABLE.  Review of Systems   Constitutional: Positive for activity change and fatigue. Negative for appetite change.   HENT: Negative.    Eyes: Negative.    Respiratory: Negative.    Cardiovascular: Negative.    Gastrointestinal: Negative.    Endocrine: Negative.    Genitourinary: Negative.    Musculoskeletal: Positive for arthralgias.   Skin: Negative.    Allergic/Immunologic: Negative.    Neurological: Positive for weakness.   Hematological: Negative.    Psychiatric/Behavioral: The patient is nervous/anxious.      Objective:     Vital Signs (Most Recent):  Temp: 98.2 °F (36.8 °C) (02/11/22 1915)  Pulse: 63 (02/11/22 1915)  Resp: 18 (02/11/22 1915)  BP: 116/64 (02/11/22 1915)  SpO2: 95 % (02/11/22 1915) Vital Signs (24h Range):  Temp:  [97.9 °F (36.6 °C)-98.9 °F (37.2 °C)] 98.2 °F (36.8 °C)  Pulse:  [41-63] 63  Resp:  [16-20] 18  SpO2:  [94 %-98 %] 95 %  BP: (116-146)/(60-68) 116/64     Weight: 96 kg (211 lb 10.3 oz)  Body mass index is 36.33 kg/m².    Intake/Output Summary (Last 24 hours) at 2/11/2022 2233  Last data filed at 2/11/2022 2221  Gross per 24 hour   Intake 1130 ml   Output 7 ml   Net 1123 ml      Physical Exam  Vitals reviewed.   Constitutional:       Appearance: She is obese. She is ill-appearing.   HENT:      Head: Normocephalic and atraumatic.      Right Ear: Tympanic membrane normal.      Left Ear: Tympanic membrane normal.      Nose: Nose normal.      Mouth/Throat:      Mouth: Mucous membranes are dry.   Eyes:      Extraocular Movements: Extraocular movements intact.      Pupils: Pupils are equal, round, and reactive to light.   Cardiovascular:      Rate and Rhythm: Normal rate and regular rhythm.      Pulses: Normal pulses.      Heart sounds: Normal heart sounds.   Pulmonary:      Effort: Pulmonary effort is  normal.      Breath sounds: Normal breath sounds.   Abdominal:      General: Bowel sounds are normal. There is no distension.      Palpations: Abdomen is soft.      Tenderness: There is no abdominal tenderness.   Musculoskeletal:         General: No swelling or tenderness. Normal range of motion.      Cervical back: Normal range of motion and neck supple.   Skin:     General: Skin is warm.      Capillary Refill: Capillary refill takes less than 2 seconds.   Neurological:      General: No focal deficit present.      Mental Status: She is alert and oriented to person, place, and time.      Motor: Weakness present.   Psychiatric:         Mood and Affect: Mood normal.         Behavior: Behavior normal.         Thought Content: Thought content normal.         Judgment: Judgment normal.         Significant Labs:   All pertinent labs within the past 24 hours have been reviewed.  CBC:   Recent Labs   Lab 02/10/22  0630 02/11/22  0615   WBC 4.39* 5.20   HGB 10.2* 11.2*   HCT 31.2* 34.2*    357     CMP:   Recent Labs   Lab 02/10/22  0630 02/11/22  0615    141   K 2.9* 3.4*    101   CO2 29 31   GLU 87 84   BUN 18 19*   CREATININE 0.91 1.00   CALCIUM 8.9 9.3   PROT 6.9 7.5   ALBUMIN 3.2* 3.5   BILITOT 0.5 0.5   ALKPHOS 82 86   AST 31 29   ALT 47 55   ANIONGAP 14 12   EGFRNONAA 66 59*       Significant Imaging: I have reviewed all pertinent imaging results/findings within the past 24 hours.  NONE

## 2022-02-12 NOTE — PROGRESS NOTES
[facility]  [facility] Medicine  Progress Note    Patient Name: Beverly Rodríguez  MRN: [id number]  Patient Class: IP- Inpatient   Admission Date: 2/6/2022  Length of Stay: 5 days  Attending Physician: Sharron Andujar MD  Primary Care Provider: Prem Vincent MD        Subjective:     Principal Problem:Pneumonia due to COVID-19 virus        HPI:  Ms. Rodríguez is a 66 year old  female who is admitted to [facility] acute care services due to COVID 19 pneumonia and acute urinary tract infection. She tested positive for COVID 19 infection on 1/26/22 after presenting to the ED with c/o fever and chills. She was discharged home at that time. Yesterday, she returned to the ED at [facility] per [facility] with complaint of nausea, vomiting, diarrhea, generalized weakness, non-productive cough, and shortness of breath cough.  Diagnostic evaluation in the ED at [facility] revealed a significant viral pneumonia on chest x-ray, a urinary tract infection and dehydration on labs. Her WBC was 4880. Her Bun was 28 with creatinine 1.66. Her potassium was 3.0. She was given 1 L of NS, Levaquine, decadron, and Kcl. Her O2 sats while in the ED ranged 93-95% on RA. She was started on O2 at 2L/min per NC. Dr. Madrigal accepted her here for admission. Admit orders and home medication reconciliation were completed under his direction.    Upon arrival here, Ms. Rodríguez is alert and oriented x3. She has no obvious dyspnea. She denies chest pain. She states that she has gotten so weak and tired with nausea, vomiting, and diarrhea that started shortly after diagnosis with COVID 19 but has continued. She states that she has been unable to eat anything solid but can tolerate liquids po. She denies significant shortness of breath. She notes that she is on home O2 at night due to pulmonary disease but not sure of her diagnosis. She is receiving O2 at 2L/min per NC. A d-dimer was 0.54 here. She was started on Lovenox 40 mg daily. Levaquin  and decadron are continued along with NS at 100 ml/h.    Full Code  Not UTD with COVID 19 of flu vaccines due to allergic to flu vaccine  High risk VTE-TEDs, lovenox, early amb      Overview/Hospital Course:  2/7/22: Day 2    2/8/22: Hospital day 3. COVID 19 positive day 13. Ms. Rodríguez has been afebrile since admission. Droplet and airborne isolation are discontinued to day. Her O2 sats are ranging 93-96% on O2 at 2L/min per NC. She does wear O2 at night at home. Labs show improved BUN and creatinine to 18 and 1.09. Her glucose is 112 this am. She is tolerating po intake without vomiting. She says that her diarrhea has improved as well. Her diet will be increased to full liquid today. She is receiving decadron (day 4), azithromycin, (day 2) and rocephin (day 2). Since she is tolerating po intake, her azithromycin is changed to po today. She is also receiving Lovenox 40 mg SC daily, Vit C, Vit D, and she is using albuterol MDI and incentive spirometer under the direction of RT. She has not been receiving her thyroid med due to availability for 2 days. Her TSH is 0.61. She will restart her routine home dose tonight.    02/09/2022 HOSPITAL DAY 4  COVID POSITIVE DAY 14  PT'S VTE PPX IS LOVENOX/TEDS/EARLY AMBULATION    PT CONTINUES COVID TREATMENT WITH ALBUTEROL,O2 TITRATION, ROCEPHIN/Z MAX, DECADRON AND PEPCID AS WELL AS VIT C,D, AND ZINC.PT NOTED TO HAVE INCREASED COUGHING TODAY AND WILL CONTINUE ISOLATION FOR 20 DAYS . PT O2 SAT IS 94-96% ON 2 L NC. PT HAS JIANG WITH INCREASED FATIGUE WITH EXERCISE. PT/OT EVALUATED PT TODAY WITH PT/OT RECOMMENDED FOR 1-2 WEEKS; PT AMBULATED 40 FEET WITH O2 WITH MILD DYSPNEA WITH SUPERVISION. PT'S LABS REVEAL K 3.4, H/H 10/32 STABLE WITH UR CULTURE E COLI >100K SENSITIVE TO ROCEPHIN AND ENTEROCOCCUS >15K .     02/10/2022 HOSPITAL DAY 5  COVID POSITIVE DAY 15  PT CONTINUES COVID 19 TREATMENT WITH ALBUTEROL, O2 TITRATION, ROCEPHIN/Z MAX, DECADRON AND PEPCID; VIT C,D AND ZN. PT REMAINS IN  COVID ISOLATION. PT'S O2 SAT IS 93-98% ON 2L NC O2; PT HAS INCREASED DYSPNEA ON EXERTION. PT PARTICIPATED WITH PT/OT TODAY WITH BED MOBILITY AND TRANSFER EXERCISES AND AMBULATED 80 FEET WITH ONE EPISODE OF LOSS OF BALANCE. PT'S LABS ARE STABLE EXCEPT K 2.9 WITH POTASSIUM SUPPLEMENTATION ORDERED. PT WILL CONTINUE DAILY LABS.    02/11/2022 HOSPITAL DAY 6  COVID POSITIVE DAY 16  PT CONTINUES COVID TREATMENT WITH ALBUTEROL, O2 TITRATION, ROCEPHIN/Z MAX, PEPCID, VIT C,D AND ZINC. PT HAS COMPLETED DECADRON. PT CONTINUES ISOLATION. PT HAS O2 SAT 95-98% ON 2 LNC O2.LABS ARE STABLE.PT PARTICIPATED WITH PT/OT TODAY WITH BED MOBILITY AND TRANSFER EXERCISES. PT HAS HYPOKALEMIA WITH IMPROVEMENT K 3.4 TODAY.PT STATES DYSPNEA AND COUGH HAVE IMPROVED.        Interval History: PT CONTINUED TO IMPROVE WITH COVID 19 TREATMENT.PT'S O2 SAT IS 95-98% ON 2L NC O2 WITH IMPROVEMENT IN JIANG AND COUGH. PT CONTINUES TO PARTICIPATE WITH PT/OT PT'S LABS ARE STABLE.  Review of Systems   Constitutional: Positive for activity change and fatigue. Negative for appetite change.   HENT: Negative.    Eyes: Negative.    Respiratory: Negative.    Cardiovascular: Negative.    Gastrointestinal: Negative.    Endocrine: Negative.    Genitourinary: Negative.    Musculoskeletal: Positive for arthralgias.   Skin: Negative.    Allergic/Immunologic: Negative.    Neurological: Positive for weakness.   Hematological: Negative.    Psychiatric/Behavioral: The patient is nervous/anxious.      Objective:     Vital Signs (Most Recent):  Temp: 98.2 °F (36.8 °C) (02/11/22 1915)  Pulse: 63 (02/11/22 1915)  Resp: 18 (02/11/22 1915)  BP: 116/64 (02/11/22 1915)  SpO2: 95 % (02/11/22 1915) Vital Signs (24h Range):  Temp:  [97.9 °F (36.6 °C)-98.9 °F (37.2 °C)] 98.2 °F (36.8 °C)  Pulse:  [41-63] 63  Resp:  [16-20] 18  SpO2:  [94 %-98 %] 95 %  BP: (116-146)/(60-68) 116/64     Weight: 96 kg (211 lb 10.3 oz)  Body mass index is 36.33 kg/m².    Intake/Output Summary (Last 24 hours) at  2/11/2022 2233  Last data filed at 2/11/2022 2221  Gross per 24 hour   Intake 1130 ml   Output 7 ml   Net 1123 ml      Physical Exam  Vitals reviewed.   Constitutional:       Appearance: She is obese. She is ill-appearing.   HENT:      Head: Normocephalic and atraumatic.      Right Ear: Tympanic membrane normal.      Left Ear: Tympanic membrane normal.      Nose: Nose normal.      Mouth/Throat:      Mouth: Mucous membranes are dry.   Eyes:      Extraocular Movements: Extraocular movements intact.      Pupils: Pupils are equal, round, and reactive to light.   Cardiovascular:      Rate and Rhythm: Normal rate and regular rhythm.      Pulses: Normal pulses.      Heart sounds: Normal heart sounds.   Pulmonary:      Effort: Pulmonary effort is normal.      Breath sounds: Normal breath sounds.   Abdominal:      General: Bowel sounds are normal. There is no distension.      Palpations: Abdomen is soft.      Tenderness: There is no abdominal tenderness.   Musculoskeletal:         General: No swelling or tenderness. Normal range of motion.      Cervical back: Normal range of motion and neck supple.   Skin:     General: Skin is warm.      Capillary Refill: Capillary refill takes less than 2 seconds.   Neurological:      General: No focal deficit present.      Mental Status: She is alert and oriented to person, place, and time.      Motor: Weakness present.   Psychiatric:         Mood and Affect: Mood normal.         Behavior: Behavior normal.         Thought Content: Thought content normal.         Judgment: Judgment normal.         Significant Labs:   All pertinent labs within the past 24 hours have been reviewed.  CBC:   Recent Labs   Lab 02/10/22  0630 02/11/22  0615   WBC 4.39* 5.20   HGB 10.2* 11.2*   HCT 31.2* 34.2*    357     CMP:   Recent Labs   Lab 02/10/22  0630 02/11/22  0615    141   K 2.9* 3.4*    101   CO2 29 31   GLU 87 84   BUN 18 19*   CREATININE 0.91 1.00   CALCIUM 8.9 9.3   PROT 6.9 7.5    ALBUMIN 3.2* 3.5   BILITOT 0.5 0.5   ALKPHOS 82 86   AST 31 29   ALT 47 55   ANIONGAP 14 12   EGFRNONAA 66 59*       Significant Imaging: I have reviewed all pertinent imaging results/findings within the past 24 hours.  NONE      Assessment/Plan:      * Pneumonia due to COVID-19 virus  Levaquin d/c'd   Azithromycin/Rocephin started on 2/7 (dose 2 of both today)  Decadron (dose 4 today)  Albuterol MDI  Incentive spirometry  Vit C, Vit D3 (Zinc not started due to nausea)  Famotidine  O2 at 2L/min per NC to titrate  BC x2 show no growth after 24 hours  02/09/2022  PT WILL CONTINUE TREATMENT , CONTINUED DEEP COUGH WILL CONTINUE ISOLATION PROTOCOL  O2 SAT S94-96% 2L NC O2  PT NOTES JIANG, WEAKNESS; APPETITE IS IMPROVED  02/10/2022  CONTINUE COVID TREATMENT  PT HAS SAT 93-98% ON 2 L NC O2  PT HAS MUSCLE WEAKNESS AND PT/OT HAS RECOMMENDED REHABILITATION    02/11/2022  CONTINUE COVID TREATMENT WITH ALBUTEROL, O2 TITRATED, INCENTIVE SPIROMETRY  ROCEPHIN, ZMAX; PT HAS COMPLETED DECADRON  02 SAT 95-98% ON 2 L NC O2      DVT prophylaxis  PT IS AT RISK FOR VTE  VTE PPX IS LOVENOX/TEDS/EARLY AMBULATION  02/10/2022  CONTINUE VTE PPX  02/11/2022  CONTINUE VTE PPX, CONTINUE TEDS AT HOME, PT ALLERGIC TO ASA    Dehydration  Ns 1 Liter bolus in ED  NS at 100 Ml/h  BUN 28/creatinine 1.66 in ED  Bun 24/ creatinine 1.52 this am  2/8/22 1.09 and 18  02/09/2022 BUN/CREAT 15/1.11 IMPROVED  PT EATING AND DRINKING   02/10/2022  PT IS IMPROVED  BUN/CREAT 18/0.9 (24/1.52)  02/11/2022  PT DENIES N/V/D AND APPETITE HAS IMPROVED; BUN/CREAT 19/1.00  PT HAS BEEN EVALUATED PER RDN WITH RECOMMENDATIONS MADE AND PT WILL CONTINUE GLUTEN FREE DIET    Acute urinary tract infection  Urine culture preliminary show E. Coli > 100,000 and Enterococcus species 15,000  Levaquin d/c'd  Azithromycin/Rocephin started 2/7 for 7 days  NS at 100 ml/h-completed    02/09/2022  PT HAS ECOLI >100K SENS TO ROCEPHIN, > 15K ENTEROCOCCUS  02/10/2022  PT CONTINUES TREATMENT FOR  UTI  02/11/2022  PT CONTINUES IV ABX FOR UTI        Hypokalemia  Magnesium 2.1  Potassium 3.3 this am  (3.0 to 3.2 to 3.3)  Monitor daily  02/09/2022  K IS 3.4 CONTINUE SUPPLEMENTATION  02/10/2022  K 2.9 WILL ADMINISTER 40 MEQ  AND CONTINUE BID  02/11/2022  K 3.4 (2.9) POTASSIUM BID    Hypertension  Continue Metoprolol  /57  02/09/2022  /79  02/10/2022  PT HAS ADEQUATE BP CONTROL ON MEDICATION.  -68  02/11/2022  /64      Hypothyroidism  TSH 0.6  Continue home med ([place] thyroid 75 mg daily)  02/09/2022 CONTINUE THY MED  02/10/2022 CONTINUE THY MED  02/11/2022 CONTINUE THY MED    Generalized weakness  Routine treatment for her illnesses  PT/OT if tolerated  02/09/2022  PT/OT CONSULT WITH REHAB RECOMMENDED  PT AMBULATED 40 FEET WITH SUPERVISION AND MILD DYSPNEA ON 2 L NC O2  SWING BED RECOMMENDED  02/10/2022  PT AMBULATED 80 FEET WITH ONE EPISODE LOB  SWING BED RECOMMENDED  02/11/2022   PT AMBULATED 80 FEET YESTERDAY  PT PARTICIPATED WITH PT/OT WITH BED MOBILITY AND TRANSFERS      VTE Risk Mitigation (From admission, onward)         Ordered     enoxaparin injection 40 mg  Daily         02/07/22 0111     IP VTE HIGH RISK PATIENT  Once         02/07/22 0111     Place sequential compression device  Until discontinued         02/07/22 0111                Discharge Planning   SANDY: 2/13/2022     Code Status: Full Code   Is the patient medically ready for discharge?:     Reason for patient still in hospital (select all that apply): Patient trending condition, Laboratory test, Treatment, Imaging, Consult recommendations and PT / OT recommendations  Discharge Plan A: Home Health,Home ([facility])                  Sharron Andujar MD  Department of Hospital Medicine   [facility] - Medical Surgical Unit

## 2022-02-12 NOTE — ASSESSMENT & PLAN NOTE
Urine culture preliminary show E. Coli > 100,000 and Enterococcus species 15,000  Levaquin d/c'd  Azithromycin/Rocephin started 2/7 for 7 days  NS at 100 ml/h-completed    02/09/2022  PT HAS ECOLI >100K SENS TO ROCEPHIN, > 15K ENTEROCOCCUS  02/10/2022  PT CONTINUES TREATMENT FOR UTI  02/11/2022  PT CONTINUES IV ABX FOR UTI

## 2022-02-12 NOTE — ASSESSMENT & PLAN NOTE
PT IS AT RISK FOR VTE  VTE PPX IS LOVENOX/TEDS/EARLY AMBULATION  02/10/2022  CONTINUE VTE PPX  02/11/2022  CONTINUE VTE PPX, CONTINUE TEDS AT HOME, PT ALLERGIC TO ASA

## 2022-02-12 NOTE — PLAN OF CARE
Problem: Adult Inpatient Plan of Care  Goal: Plan of Care Review  Outcome: Ongoing, Progressing  Goal: Patient-Specific Goal (Individualized)  Outcome: Ongoing, Progressing  Flowsheets (Taken 2/12/2022 1722)  Anxieties, Fears or Concerns: hoping to be able to go home tomorrow  Individualized Care Needs: assist as needed  Goal: Absence of Hospital-Acquired Illness or Injury  Outcome: Ongoing, Progressing  Goal: Optimal Comfort and Wellbeing  Outcome: Ongoing, Progressing  Goal: Readiness for Transition of Care  Outcome: Ongoing, Progressing     Problem: Bariatric Environmental Safety  Goal: Safety Maintained with Care  Outcome: Ongoing, Progressing     Problem: Skin Injury Risk Increased  Goal: Skin Health and Integrity  Outcome: Ongoing, Progressing     Problem: Gas Exchange Impaired  Goal: Optimal Gas Exchange  Outcome: Ongoing, Progressing   Reviewed with pt.

## 2022-02-12 NOTE — PROGRESS NOTES
[facility] Medical Surgical Unit  [facility] Medicine  Progress Note    Patient Name: Beverly Rodríguez  MRN: [id number]  Patient Class: IP- Inpatient   Admission Date: 2/6/2022  Length of Stay: 6 days  Attending Physician: Sharron Andujar MD  Primary Care Provider: Prem Vincent MD        Subjective:     Principal Problem:Pneumonia due to COVID-19 virus        HPI:  Ms. Rodríguez is a 66 year old  female who is admitted to [facility] acute care services due to COVID 19 pneumonia and acute urinary tract infection. She tested positive for COVID 19 infection on 1/26/22 after presenting to the ED with c/o fever and chills. She was discharged home at that time. Yesterday, she returned to the ED at [facility] per EMS EMS with complaint of nausea, vomiting, diarrhea, generalized weakness, non-productive cough, and shortness of breath cough.  Diagnostic evaluation in the ED at [facility] revealed a significant viral pneumonia on chest x-ray, a urinary tract infection and dehydration on labs. Her WBC was 4880. Her Bun was 28 with creatinine 1.66. Her potassium was 3.0. She was given 1 L of NS, Levaquine, decadron, and Kcl. Her O2 sats while in the ED ranged 93-95% on RA. She was started on O2 at 2L/min per NC. Dr. Madrigal accepted her here for admission. Admit orders and home medication reconciliation were completed under his direction.    Upon arrival here, Ms. Rodríguez is alert and oriented x3. She has no obvious dyspnea. She denies chest pain. She states that she has gotten so weak and tired with nausea, vomiting, and diarrhea that started shortly after diagnosis with COVID 19 but has continued. She states that she has been unable to eat anything solid but can tolerate liquids po. She denies significant shortness of breath. She notes that she is on home O2 at night due to pulmonary disease but not sure of her diagnosis. She is receiving O2 at 2L/min per NC. A d-dimer was 0.54 here. She was started on Lovenox 40 mg daily. Levaquin  and decadron are continued along with NS at 100 ml/h.    Full Code  Not UTD with COVID 19 of flu vaccines due to allergic to flu vaccine  High risk VTE-TEDs, lovenox, early amb      Overview/Hospital Course:  2/7/22: Day 2    2/8/22: Hospital day 3. COVID 19 positive day 13. Ms. Rodríguez has been afebrile since admission. Droplet and airborne isolation are discontinued to day. Her O2 sats are ranging 93-96% on O2 at 2L/min per NC. She does wear O2 at night at home. Labs show improved BUN and creatinine to 18 and 1.09. Her glucose is 112 this am. She is tolerating po intake without vomiting. She says that her diarrhea has improved as well. Her diet will be increased to full liquid today. She is receiving decadron (day 4), azithromycin, (day 2) and rocephin (day 2). Since she is tolerating po intake, her azithromycin is changed to po today. She is also receiving Lovenox 40 mg SC daily, Vit C, Vit D, and she is using albuterol MDI and incentive spirometer under the direction of RT. She has not been receiving her thyroid med due to availability for 2 days. Her TSH is 0.61. She will restart her routine home dose tonight.    02/09/2022 HOSPITAL DAY 4  COVID POSITIVE DAY 14  PT'S VTE PPX IS LOVENOX/TEDS/EARLY AMBULATION    PT CONTINUES COVID TREATMENT WITH ALBUTEROL,O2 TITRATION, ROCEPHIN/Z MAX, DECADRON AND PEPCID AS WELL AS VIT C,D, AND ZINC.PT NOTED TO HAVE INCREASED COUGHING TODAY AND WILL CONTINUE ISOLATION FOR 20 DAYS . PT O2 SAT IS 94-96% ON 2 L NC. PT HAS JIANG WITH INCREASED FATIGUE WITH EXERCISE. PT/OT EVALUATED PT TODAY WITH PT/OT RECOMMENDED FOR 1-2 WEEKS; PT AMBULATED 40 FEET WITH O2 WITH MILD DYSPNEA WITH SUPERVISION. PT'S LABS REVEAL K 3.4, H/H 10/32 STABLE WITH UR CULTURE E COLI >100K SENSITIVE TO ROCEPHIN AND ENTEROCOCCUS >15K .     02/10/2022 HOSPITAL DAY 5  COVID POSITIVE DAY 15  PT CONTINUES COVID 19 TREATMENT WITH ALBUTEROL, O2 TITRATION, ROCEPHIN/Z MAX, DECADRON AND PEPCID; VIT C,D AND ZN. PT REMAINS IN  COVID ISOLATION. PT'S O2 SAT IS 93-98% ON 2L NC O2; PT HAS INCREASED DYSPNEA ON EXERTION. PT PARTICIPATED WITH PT/OT TODAY WITH BED MOBILITY AND TRANSFER EXERCISES AND AMBULATED 80 FEET WITH ONE EPISODE OF LOSS OF BALANCE. PT'S LABS ARE STABLE EXCEPT K 2.9 WITH POTASSIUM SUPPLEMENTATION ORDERED. PT WILL CONTINUE DAILY LABS.    02/11/2022 HOSPITAL DAY 6  COVID POSITIVE DAY 16  PT CONTINUES COVID TREATMENT WITH ALBUTEROL, O2 TITRATION, ROCEPHIN/Z MAX, PEPCID, VIT C,D AND ZINC. PT HAS COMPLETED DECADRON. PT CONTINUES ISOLATION. PT HAS O2 SAT 95-98% ON 2 LNC O2.LABS ARE STABLE.PT PARTICIPATED WITH PT/OT TODAY WITH BED MOBILITY AND TRANSFER EXERCISES. PT HAS HYPOKALEMIA WITH IMPROVEMENT K 3.4 TODAY.PT STATES DYSPNEA AND COUGH HAVE IMPROVED.    2/12/22:  Hospital day 7  COVID positive day 17  COVID treatment continues.  O2 sat 98% on 2 L. labs and vital signs stable.  Potassium is 3.2 today.  Being repleted by mouth.  Participated with PT/OT throughout the week.  Patient is scheduled to receive her last dose of Rocephin in the morning and will be discharged after.  She has requested a repeat COVID test.  We discussed the possibility that it was still returned a positive result and is not of much value.  However, a repeat COVID test was ordered for tomorrow morning per patient request.  The patient continues to have an intermittent cough, though improved.  She is without complaints and ready to return home.  Discussed with Dr. Gomes.  No change in plan.              Interval History:     Review of Systems   Constitutional: Negative for fever.   HENT: Negative for trouble swallowing.    Eyes: Negative for visual disturbance.   Respiratory: Positive for cough. Negative for shortness of breath.    Cardiovascular: Negative for chest pain, palpitations and leg swelling.   Gastrointestinal: Negative for abdominal pain, diarrhea, nausea and vomiting.   Genitourinary: Negative for decreased urine volume, dysuria, frequency and  hematuria.   Skin: Negative for color change.   Neurological: Negative for dizziness, numbness and headaches.     Objective:     Vital Signs (Most Recent):  Temp: 98.1 °F (36.7 °C) (02/12/22 0735)  Pulse:  (.) (02/12/22 1206)  Resp:  (.) (02/12/22 1206)  BP: (!) 152/78 (02/12/22 0735)  SpO2: 98 % (02/12/22 0735) Vital Signs (24h Range):  Temp:  [97.8 °F (36.6 °C)-98.2 °F (36.8 °C)] 98.1 °F (36.7 °C)  Pulse:  [45-63] 62  Resp:  [16-20] 20  SpO2:  [95 %-98 %] 98 %  BP: (116-152)/(60-78) 152/78     Weight: 96 kg (211 lb 10.3 oz)  Body mass index is 36.33 kg/m².    Intake/Output Summary (Last 24 hours) at 2/12/2022 1210  Last data filed at 2/12/2022 0800  Gross per 24 hour   Intake 1320 ml   Output 1 ml   Net 1319 ml      Physical Exam  Vitals and nursing note reviewed.   Constitutional:       General: She is not in acute distress.     Appearance: Normal appearance. She is obese.   HENT:      Head: Normocephalic and atraumatic.      Mouth/Throat:      Mouth: Mucous membranes are moist.   Eyes:      Extraocular Movements: Extraocular movements intact.      Pupils: Pupils are equal, round, and reactive to light.   Cardiovascular:      Rate and Rhythm: Normal rate and regular rhythm.      Heart sounds: Normal heart sounds.   Pulmonary:      Effort: Pulmonary effort is normal. No respiratory distress.      Breath sounds: Normal breath sounds.   Abdominal:      General: Bowel sounds are normal. There is no distension.      Palpations: Abdomen is soft.      Tenderness: There is no abdominal tenderness.   Musculoskeletal:         General: Normal range of motion.      Cervical back: Neck supple.   Skin:     General: Skin is warm and dry.      Capillary Refill: Capillary refill takes less than 2 seconds.   Neurological:      Mental Status: She is alert and oriented to person, place, and time. Mental status is at baseline.      Cranial Nerves: No cranial nerve deficit.         Significant Labs: All pertinent labs within the past  24 hours have been reviewed.    Significant Imaging: I have reviewed all pertinent imaging results/findings within the past 24 hours.      Assessment/Plan:      * Pneumonia due to COVID-19 virus  Levaquin d/c'd   Azithromycin/Rocephin started on 2/7 (dose 2 of both today)  Decadron (dose 4 today)  Albuterol MDI  Incentive spirometry  Vit C, Vit D3 (Zinc not started due to nausea)  Famotidine  O2 at 2L/min per NC to titrate  BC x2 show no growth after 24 hours  02/09/2022  PT WILL CONTINUE TREATMENT , CONTINUED DEEP COUGH WILL CONTINUE ISOLATION PROTOCOL  O2 SAT S94-96% 2L NC O2  PT NOTES JIANG, WEAKNESS; APPETITE IS IMPROVED  02/10/2022  CONTINUE COVID TREATMENT  PT HAS SAT 93-98% ON 2 L NC O2  PT HAS MUSCLE WEAKNESS AND PT/OT HAS RECOMMENDED REHABILITATION    02/11/2022  CONTINUE COVID TREATMENT WITH ALBUTEROL, O2 TITRATED, INCENTIVE SPIROMETRY  ROCEPHIN, ZMAX; PT HAS COMPLETED DECADRON  02 SAT 95-98% ON 2 L NC O2      DVT prophylaxis  PT IS AT RISK FOR VTE  VTE PPX IS LOVENOX/TEDS/EARLY AMBULATION  02/10/2022  CONTINUE VTE PPX  02/11/2022  CONTINUE VTE PPX, CONTINUE TEDS AT HOME, PT ALLERGIC TO ASA    Dehydration  Ns 1 Liter bolus in ED  NS at 100 Ml/h  BUN 28/creatinine 1.66 in ED  Bun 24/ creatinine 1.52 this am  2/8/22 1.09 and 18  02/09/2022 BUN/CREAT 15/1.11 IMPROVED  PT EATING AND DRINKING   02/10/2022  PT IS IMPROVED  BUN/CREAT 18/0.9 (24/1.52)  02/11/2022  PT DENIES N/V/D AND APPETITE HAS IMPROVED; BUN/CREAT 19/1.00  PT HAS BEEN EVALUATED PER RDN WITH RECOMMENDATIONS MADE AND PT WILL CONTINUE GLUTEN FREE DIET    Acute urinary tract infection  Urine culture preliminary show E. Coli > 100,000 and Enterococcus species 15,000  Levaquin d/c'd  Azithromycin/Rocephin started 2/7 for 7 days  NS at 100 ml/h-completed    02/09/2022  PT HAS ECOLI >100K SENS TO ROCEPHIN, > 15K ENTEROCOCCUS  02/10/2022  PT CONTINUES TREATMENT FOR UTI  02/11/2022  PT CONTINUES IV ABX FOR UTI        Hypokalemia  Magnesium 2.1  Potassium  3.3 this am  (3.0 to 3.2 to 3.3)  Monitor daily  02/09/2022  K IS 3.4 CONTINUE SUPPLEMENTATION  02/10/2022  K 2.9 WILL ADMINISTER 40 MEQ  AND CONTINUE BID  02/11/2022  K 3.4 (2.9) POTASSIUM BID    Hypertension  Continue Metoprolol  /57  02/09/2022  /79  02/10/2022  PT HAS ADEQUATE BP CONTROL ON MEDICATION.  -68  02/11/2022  /64      Hypothyroidism  TSH 0.6  Continue home med ([place] thyroid 75 mg daily)  02/09/2022 CONTINUE THY MED  02/10/2022 CONTINUE THY MED  02/11/2022 CONTINUE THY MED    Generalized weakness  Routine treatment for her illnesses  PT/OT if tolerated  02/09/2022  PT/OT CONSULT WITH REHAB RECOMMENDED  PT AMBULATED 40 FEET WITH SUPERVISION AND MILD DYSPNEA ON 2 L NC O2  SWING BED RECOMMENDED  02/10/2022  PT AMBULATED 80 FEET WITH ONE EPISODE LOB  SWING BED RECOMMENDED  02/11/2022   PT AMBULATED 80 FEET YESTERDAY  PT PARTICIPATED WITH PT/OT WITH BED MOBILITY AND TRANSFERS    VTE Risk Mitigation (From admission, onward)         Ordered     enoxaparin injection 40 mg  Daily         02/07/22 0111     IP VTE HIGH RISK PATIENT  Once         02/07/22 0111     Place sequential compression device  Until discontinued         02/07/22 0111                Discharge Planning   SANDY: 2/13/2022     Code Status: Full Code   Is the patient medically ready for discharge?:     Reason for patient still in hospital (select all that apply): Treatment  Discharge Plan A: Home Health,Home ([facility] )                  ALICIA eGorge  Department of Hospital Medicine   [facility] - Medical Surgical Unit

## 2022-02-12 NOTE — ASSESSMENT & PLAN NOTE
Magnesium 2.1  Potassium 3.3 this am  (3.0 to 3.2 to 3.3)  Monitor daily  02/09/2022  K IS 3.4 CONTINUE SUPPLEMENTATION  02/10/2022  K 2.9 WILL ADMINISTER 40 MEQ  AND CONTINUE BID  02/11/2022  K 3.4 (2.9) POTASSIUM BID

## 2022-02-13 LAB
ALBUMIN SERPL BCP-MCNC: 3.6 G/DL (ref 3.5–5)
ALBUMIN/GLOB SERPL: 1 {RATIO}
ALP SERPL-CCNC: 92 U/L (ref 50–130)
ALT SERPL W P-5'-P-CCNC: 76 U/L (ref 13–56)
ANION GAP SERPL CALCULATED.3IONS-SCNC: 12 MMOL/L (ref 7–16)
AST SERPL W P-5'-P-CCNC: 39 U/L (ref 15–37)
BASOPHILS # BLD AUTO: 0.03 K/UL (ref 0–0.2)
BASOPHILS NFR BLD AUTO: 0.5 % (ref 0–1)
BILIRUB SERPL-MCNC: 0.6 MG/DL (ref 0–1.2)
BUN SERPL-MCNC: 17 MG/DL (ref 7–18)
BUN/CREAT SERPL: 18 (ref 6–20)
CALCIUM SERPL-MCNC: 9.1 MG/DL (ref 8.5–10.1)
CHLORIDE SERPL-SCNC: 102 MMOL/L (ref 98–107)
CO2 SERPL-SCNC: 31 MMOL/L (ref 21–32)
CREAT SERPL-MCNC: 0.94 MG/DL (ref 0.55–1.02)
DIFFERENTIAL METHOD BLD: ABNORMAL
EOSINOPHIL # BLD AUTO: 0.1 K/UL (ref 0–0.5)
EOSINOPHIL NFR BLD AUTO: 1.7 % (ref 1–4)
ERYTHROCYTE [DISTWIDTH] IN BLOOD BY AUTOMATED COUNT: 12.9 % (ref 11.5–14.5)
GLOBULIN SER-MCNC: 3.5 G/DL (ref 2–4)
GLUCOSE SERPL-MCNC: 86 MG/DL (ref 74–106)
HCT VFR BLD AUTO: 35.4 % (ref 38–47)
HGB BLD-MCNC: 11.5 G/DL (ref 12–16)
INTERNAL CONTROL: NORMAL
LYMPHOCYTES # BLD AUTO: 1.21 K/UL (ref 1–4.8)
LYMPHOCYTES NFR BLD AUTO: 20.4 % (ref 27–41)
LYMPHOCYTES NFR BLD MANUAL: 28 % (ref 27–41)
MCH RBC QN AUTO: 28.7 PG (ref 27–31)
MCHC RBC AUTO-ENTMCNC: 32.5 G/DL (ref 32–36)
MCV RBC AUTO: 88.3 FL (ref 80–96)
MONOCYTES # BLD AUTO: 0.6 K/UL (ref 0–0.8)
MONOCYTES NFR BLD AUTO: 10.1 % (ref 2–6)
MONOCYTES NFR BLD MANUAL: 8 % (ref 2–6)
MPC BLD CALC-MCNC: 9.2 FL (ref 9.4–12.4)
NEUTROPHILS # BLD AUTO: 4 K/UL (ref 1.8–7.7)
NEUTROPHILS NFR BLD AUTO: 67.3 % (ref 53–65)
NEUTS BAND NFR BLD MANUAL: 3 % (ref 1–5)
NEUTS SEG NFR BLD MANUAL: 61 % (ref 50–62)
NRBC BLD MANUAL-RTO: ABNORMAL %
PLATELET # BLD AUTO: 347 K/UL (ref 150–400)
PLATELET MORPHOLOGY: NORMAL
POTASSIUM SERPL-SCNC: 3.8 MMOL/L (ref 3.5–5.1)
PROT SERPL-MCNC: 7.1 G/DL (ref 6.4–8.2)
RBC # BLD AUTO: 4.01 M/UL (ref 4.2–5.4)
RBC MORPH BLD: NORMAL
SARS-COV+SARS-COV-2 AG RESP QL IA.RAPID: NEGATIVE
SODIUM SERPL-SCNC: 141 MMOL/L (ref 136–145)
WBC # BLD AUTO: 5.94 K/UL (ref 4.5–11)

## 2022-02-13 PROCEDURE — 36415 COLL VENOUS BLD VENIPUNCTURE: CPT | Performed by: NURSE PRACTITIONER

## 2022-02-13 PROCEDURE — 25000003 PHARM REV CODE 250: Performed by: EMERGENCY MEDICINE

## 2022-02-13 PROCEDURE — 25000003 PHARM REV CODE 250: Performed by: NURSE PRACTITIONER

## 2022-02-13 PROCEDURE — 27000944

## 2022-02-13 PROCEDURE — 94761 N-INVAS EAR/PLS OXIMETRY MLT: CPT

## 2022-02-13 PROCEDURE — 87426 SARSCOV CORONAVIRUS AG IA: CPT | Performed by: NURSE PRACTITIONER

## 2022-02-13 PROCEDURE — 99900035 HC TECH TIME PER 15 MIN (STAT)

## 2022-02-13 PROCEDURE — 63600175 PHARM REV CODE 636 W HCPCS: Performed by: NURSE PRACTITIONER

## 2022-02-13 PROCEDURE — 11000001 HC ACUTE MED/SURG PRIVATE ROOM

## 2022-02-13 PROCEDURE — 94640 AIRWAY INHALATION TREATMENT: CPT

## 2022-02-13 PROCEDURE — 85025 COMPLETE CBC W/AUTO DIFF WBC: CPT | Performed by: NURSE PRACTITIONER

## 2022-02-13 PROCEDURE — 80053 COMPREHEN METABOLIC PANEL: CPT | Performed by: NURSE PRACTITIONER

## 2022-02-13 PROCEDURE — 63700000 PHARM REV CODE 250 ALT 637 W/O HCPCS: Performed by: EMERGENCY MEDICINE

## 2022-02-13 PROCEDURE — 27000221 HC OXYGEN, UP TO 24 HOURS

## 2022-02-13 RX ORDER — IBUPROFEN 100 MG/5ML
1000 SUSPENSION, ORAL (FINAL DOSE FORM) ORAL DAILY
Qty: 30 TABLET | Refills: 0
Start: 2022-02-14 | End: 2022-02-14 | Stop reason: HOSPADM

## 2022-02-13 RX ORDER — ACETAMINOPHEN 325 MG/1
650 TABLET ORAL EVERY 6 HOURS PRN
Refills: 0
Start: 2022-02-13 | End: 2022-02-14 | Stop reason: HOSPADM

## 2022-02-13 RX ORDER — THYROID 15 MG/1
75 TABLET ORAL
Qty: 150 TABLET | Refills: 11
Start: 2022-02-14 | End: 2022-07-28

## 2022-02-13 RX ORDER — CHOLECALCIFEROL (VITAMIN D3) 25 MCG
1000 TABLET ORAL DAILY
Qty: 30 TABLET | Refills: 0
Start: 2022-02-14 | End: 2022-02-14 | Stop reason: HOSPADM

## 2022-02-13 RX ORDER — ALBUTEROL SULFATE 90 UG/1
2 AEROSOL, METERED RESPIRATORY (INHALATION) EVERY 6 HOURS
Qty: 18 G | Refills: 0
Start: 2022-02-13 | End: 2022-02-21 | Stop reason: SDUPTHER

## 2022-02-13 RX ORDER — FAMOTIDINE 20 MG/1
20 TABLET, FILM COATED ORAL DAILY
Qty: 30 TABLET | Refills: 11
Start: 2022-02-14 | End: 2023-06-12

## 2022-02-13 RX ADMIN — ALBUTEROL SULFATE 2 PUFF: 90 AEROSOL, METERED RESPIRATORY (INHALATION) at 05:02

## 2022-02-13 RX ADMIN — METOPROLOL SUCCINATE 25 MG: 25 TABLET, EXTENDED RELEASE ORAL at 08:02

## 2022-02-13 RX ADMIN — CEFTRIAXONE 1 G: 1 INJECTION, POWDER, FOR SOLUTION INTRAMUSCULAR; INTRAVENOUS at 10:02

## 2022-02-13 RX ADMIN — POTASSIUM CHLORIDE 20 MEQ: 1500 TABLET, EXTENDED RELEASE ORAL at 08:02

## 2022-02-13 RX ADMIN — LACTOBACILLUS TAB 4 TABLET: TAB at 08:02

## 2022-02-13 RX ADMIN — LACTOBACILLUS TAB 4 TABLET: TAB at 05:02

## 2022-02-13 RX ADMIN — ALBUTEROL SULFATE 2 PUFF: 90 AEROSOL, METERED RESPIRATORY (INHALATION) at 12:02

## 2022-02-13 RX ADMIN — OXYCODONE HYDROCHLORIDE AND ACETAMINOPHEN 1000 MG: 500 TABLET ORAL at 08:02

## 2022-02-13 RX ADMIN — ALBUTEROL SULFATE 2 PUFF: 90 AEROSOL, METERED RESPIRATORY (INHALATION) at 06:02

## 2022-02-13 RX ADMIN — THYROID, PORCINE 75 MG: 15 TABLET ORAL at 05:02

## 2022-02-13 RX ADMIN — ENOXAPARIN SODIUM 40 MG: 40 INJECTION SUBCUTANEOUS at 05:02

## 2022-02-13 RX ADMIN — FAMOTIDINE 20 MG: 20 TABLET ORAL at 08:02

## 2022-02-13 RX ADMIN — Medication 1000 UNITS: at 08:02

## 2022-02-13 RX ADMIN — AZITHROMYCIN MONOHYDRATE 500 MG: 250 TABLET ORAL at 08:02

## 2022-02-13 NOTE — NURSING
DC papers given and discussed with patient. Verbalized understanding. IV removed jelco intact from left FA area. Dressing applied. Waiting on medicaid transport to arrive for tranport home.

## 2022-02-13 NOTE — DISCHARGE INSTRUCTIONS
INCREASE REST AND FLUIDS   WEAR JEREMY HOSE FOR 1 MONTH 11 HOURS ON 1 HOUR OFF  OVER THE COUNTER VITAMIN C AND D  AND ZINC IF NAUSEA IS BETTER  HOME OXYGEN  WALK AS MUCH AS POSSIBLE IN THE HOME  [facility] WILL FOLLOW UP

## 2022-02-13 NOTE — PLAN OF CARE
Problem: Adult Inpatient Plan of Care  Goal: Plan of Care Review  Outcome: Ongoing, Progressing  Goal: Patient-Specific Goal (Individualized)  Outcome: Ongoing, Progressing  Goal: Absence of Hospital-Acquired Illness or Injury  Outcome: Ongoing, Progressing  Intervention: Identify and Manage Fall Risk  Flowsheets (Taken 2/12/2022 2224)  Safety Promotion/Fall Prevention:   assistive device/personal item within reach   Fall Risk reviewed with patient/family   side rails raised x 2   lighting adjusted   medications reviewed   nonskid shoes/socks when out of bed  Intervention: Prevent Skin Injury  Flowsheets (Taken 2/12/2022 2224)  Body Position:   position maintained   position changed independently  Skin Protection: tubing/devices free from skin contact  Intervention: Prevent and Manage VTE (Venous Thromboembolism) Risk  Flowsheets (Taken 2/12/2022 2224)  Activity Management: Sitting at edge of bed - L2  VTE Prevention/Management: remove, assess skin, and reapply compression stockings  Range of Motion: active ROM (range of motion) encouraged  Intervention: Prevent Infection  Flowsheets (Taken 2/12/2022 2224)  Infection Prevention: hand hygiene promoted  Goal: Optimal Comfort and Wellbeing  Outcome: Ongoing, Progressing  Goal: Readiness for Transition of Care  Outcome: Ongoing, Progressing     Problem: Skin Injury Risk Increased  Goal: Skin Health and Integrity  Outcome: Ongoing, Progressing  Intervention: Optimize Skin Protection  Flowsheets (Taken 2/12/2022 2224)  Pressure Reduction Techniques:   frequent weight shift encouraged   heels elevated off bed  Skin Protection: tubing/devices free from skin contact  Head of Bed (HOB) Positioning: HOB at 30-45 degrees  Intervention: Promote and Optimize Oral Intake  Flowsheets (Taken 2/12/2022 2224)  Oral Nutrition Promotion: rest periods promoted     Problem: Gas Exchange Impaired  Goal: Optimal Gas Exchange  Outcome: Ongoing, Progressing  Intervention: Optimize  Oxygenation and Ventilation  Flowsheets (Taken 2/12/2022 2224)  Head of Bed (HOB) Positioning: HOB at 30-45 degrees

## 2022-02-14 VITALS
HEART RATE: 57 BPM | TEMPERATURE: 98 F | RESPIRATION RATE: 18 BRPM | WEIGHT: 211.63 LBS | HEIGHT: 64 IN | DIASTOLIC BLOOD PRESSURE: 66 MMHG | OXYGEN SATURATION: 93 % | BODY MASS INDEX: 36.13 KG/M2 | SYSTOLIC BLOOD PRESSURE: 133 MMHG

## 2022-02-14 PROBLEM — U07.1 PNEUMONIA DUE TO COVID-19 VIRUS: Status: RESOLVED | Noted: 2022-02-07 | Resolved: 2022-02-14

## 2022-02-14 PROBLEM — J12.82 PNEUMONIA DUE TO COVID-19 VIRUS: Status: RESOLVED | Noted: 2022-02-07 | Resolved: 2022-02-14

## 2022-02-14 PROBLEM — R53.1 GENERALIZED WEAKNESS: Status: RESOLVED | Noted: 2022-02-07 | Resolved: 2022-02-14

## 2022-02-14 PROBLEM — N39.0 ACUTE URINARY TRACT INFECTION: Status: RESOLVED | Noted: 2022-02-07 | Resolved: 2022-02-14

## 2022-02-14 PROBLEM — E87.6 HYPOKALEMIA: Status: RESOLVED | Noted: 2022-02-07 | Resolved: 2022-02-14

## 2022-02-14 LAB
ALBUMIN SERPL BCP-MCNC: 3.6 G/DL (ref 3.5–5)
ALBUMIN/GLOB SERPL: 1 {RATIO}
ALP SERPL-CCNC: 97 U/L (ref 50–130)
ALT SERPL W P-5'-P-CCNC: 77 U/L (ref 13–56)
ANION GAP SERPL CALCULATED.3IONS-SCNC: 10 MMOL/L (ref 7–16)
AST SERPL W P-5'-P-CCNC: 32 U/L (ref 15–37)
BASOPHILS # BLD AUTO: 0.02 K/UL (ref 0–0.2)
BASOPHILS NFR BLD AUTO: 0.4 % (ref 0–1)
BILIRUB SERPL-MCNC: 0.6 MG/DL (ref 0–1.2)
BUN SERPL-MCNC: 19 MG/DL (ref 7–18)
BUN/CREAT SERPL: 20 (ref 6–20)
CALCIUM SERPL-MCNC: 9.3 MG/DL (ref 8.5–10.1)
CHLORIDE SERPL-SCNC: 102 MMOL/L (ref 98–107)
CO2 SERPL-SCNC: 31 MMOL/L (ref 21–32)
CREAT SERPL-MCNC: 0.95 MG/DL (ref 0.55–1.02)
DIFFERENTIAL METHOD BLD: ABNORMAL
EOSINOPHIL # BLD AUTO: 0.14 K/UL (ref 0–0.5)
EOSINOPHIL NFR BLD AUTO: 2.5 % (ref 1–4)
ERYTHROCYTE [DISTWIDTH] IN BLOOD BY AUTOMATED COUNT: 13 % (ref 11.5–14.5)
GLOBULIN SER-MCNC: 3.7 G/DL (ref 2–4)
GLUCOSE SERPL-MCNC: 86 MG/DL (ref 74–106)
HCT VFR BLD AUTO: 36.5 % (ref 38–47)
HGB BLD-MCNC: 11.7 G/DL (ref 12–16)
LYMPHOCYTES # BLD AUTO: 1.22 K/UL (ref 1–4.8)
LYMPHOCYTES NFR BLD AUTO: 22.1 % (ref 27–41)
MCH RBC QN AUTO: 28.5 PG (ref 27–31)
MCHC RBC AUTO-ENTMCNC: 32.1 G/DL (ref 32–36)
MCV RBC AUTO: 88.8 FL (ref 80–96)
MONOCYTES # BLD AUTO: 0.64 K/UL (ref 0–0.8)
MONOCYTES NFR BLD AUTO: 11.6 % (ref 2–6)
MPC BLD CALC-MCNC: 10 FL (ref 9.4–12.4)
NEUTROPHILS # BLD AUTO: 3.5 K/UL (ref 1.8–7.7)
NEUTROPHILS NFR BLD AUTO: 63.4 % (ref 53–65)
PLATELET # BLD AUTO: 337 K/UL (ref 150–400)
POTASSIUM SERPL-SCNC: 4.1 MMOL/L (ref 3.5–5.1)
PROT SERPL-MCNC: 7.3 G/DL (ref 6.4–8.2)
RBC # BLD AUTO: 4.11 M/UL (ref 4.2–5.4)
SODIUM SERPL-SCNC: 139 MMOL/L (ref 136–145)
WBC # BLD AUTO: 5.52 K/UL (ref 4.5–11)

## 2022-02-14 PROCEDURE — 25000003 PHARM REV CODE 250: Performed by: NURSE PRACTITIONER

## 2022-02-14 PROCEDURE — 80053 COMPREHEN METABOLIC PANEL: CPT | Performed by: NURSE PRACTITIONER

## 2022-02-14 PROCEDURE — 94761 N-INVAS EAR/PLS OXIMETRY MLT: CPT

## 2022-02-14 PROCEDURE — 85025 COMPLETE CBC W/AUTO DIFF WBC: CPT | Performed by: NURSE PRACTITIONER

## 2022-02-14 PROCEDURE — 94640 AIRWAY INHALATION TREATMENT: CPT

## 2022-02-14 PROCEDURE — 27000221 HC OXYGEN, UP TO 24 HOURS

## 2022-02-14 PROCEDURE — 36415 COLL VENOUS BLD VENIPUNCTURE: CPT | Performed by: NURSE PRACTITIONER

## 2022-02-14 PROCEDURE — 99239 PR HOSPITAL DISCHARGE DAY,>30 MIN: ICD-10-PCS | Mod: ,,, | Performed by: EMERGENCY MEDICINE

## 2022-02-14 PROCEDURE — 99239 HOSP IP/OBS DSCHRG MGMT >30: CPT | Mod: ,,, | Performed by: EMERGENCY MEDICINE

## 2022-02-14 PROCEDURE — 25000003 PHARM REV CODE 250: Performed by: EMERGENCY MEDICINE

## 2022-02-14 RX ADMIN — LACTOBACILLUS TAB 4 TABLET: TAB at 08:02

## 2022-02-14 RX ADMIN — Medication 1000 UNITS: at 09:02

## 2022-02-14 RX ADMIN — OXYCODONE HYDROCHLORIDE AND ACETAMINOPHEN 1000 MG: 500 TABLET ORAL at 09:02

## 2022-02-14 RX ADMIN — THYROID, PORCINE 75 MG: 15 TABLET ORAL at 05:02

## 2022-02-14 RX ADMIN — POTASSIUM CHLORIDE 20 MEQ: 1500 TABLET, EXTENDED RELEASE ORAL at 09:02

## 2022-02-14 RX ADMIN — LACTOBACILLUS TAB 4 TABLET: TAB at 11:02

## 2022-02-14 RX ADMIN — ALBUTEROL SULFATE 2 PUFF: 90 AEROSOL, METERED RESPIRATORY (INHALATION) at 05:02

## 2022-02-14 RX ADMIN — METOPROLOL SUCCINATE 25 MG: 25 TABLET, EXTENDED RELEASE ORAL at 09:02

## 2022-02-14 RX ADMIN — ALBUTEROL SULFATE 2 PUFF: 90 AEROSOL, METERED RESPIRATORY (INHALATION) at 12:02

## 2022-02-14 RX ADMIN — FAMOTIDINE 20 MG: 20 TABLET ORAL at 09:02

## 2022-02-14 NOTE — ASSESSMENT & PLAN NOTE
Magnesium 2.1  Potassium 3.3 this am  (3.0 to 3.2 to 3.3)  Monitor daily  02/09/2022  K IS 3.4 CONTINUE SUPPLEMENTATION  02/10/2022  K 2.9 WILL ADMINISTER 40 MEQ  AND CONTINUE BID  02/11/2022  K 3.4 (2.9) POTASSIUM BID  2/13/22 Potassium 3.8. KCl 20 meq po BID

## 2022-02-14 NOTE — ASSESSMENT & PLAN NOTE
Continue Metoprolol  /57  02/09/2022  /79  02/10/2022  PT HAS ADEQUATE BP CONTROL ON MEDICATION.  -68  02/11/2022  /64  2/13/22 /87

## 2022-02-14 NOTE — ASSESSMENT & PLAN NOTE
PT IS AT RISK FOR VTE  VTE PPX IS LOVENOX/TEDS/EARLY AMBULATION  02/10/2022  CONTINUE VTE PPX  02/11/2022  CONTINUE VTE PPX, CONTINUE TEDS AT HOME, PT ALLERGIC TO ASA  2/13/22 TEDs. Lovenox

## 2022-02-14 NOTE — PLAN OF CARE
Called [place] [employer] Transportation at [phone]. Informed them that patient was set to be DC home from hospital yesterday 2/13/22 and Medicaid transport had been set up to  patient 2/13/22 1pm. Informed them that patient was not picked up as planned by Medicaid transport. Set up Medicaid transport for  time today 2/14/22 at 1pm. Notified MD of above.

## 2022-02-14 NOTE — ASSESSMENT & PLAN NOTE
Ns 1 Liter bolus in ED  NS at 100 Ml/h  BUN 28/creatinine 1.66 in ED  Bun 24/ creatinine 1.52 this am  2/8/22 1.09 and 18  02/09/2022 BUN/CREAT 15/1.11 IMPROVED  PT EATING AND DRINKING   02/10/2022  PT IS IMPROVED  BUN/CREAT 18/0.9 (24/1.52)  02/11/2022  PT DENIES N/V/D AND APPETITE HAS IMPROVED; BUN/CREAT 19/1.00  PT HAS BEEN EVALUATED PER RDN WITH RECOMMENDATIONS MADE AND PT WILL CONTINUE GLUTEN FREE DIET  2/13/22 Vishal po intake well. BUN 17, creatinine 0.94

## 2022-02-14 NOTE — PROGRESS NOTES
[facility] Surgical Unit  [facility] Medicine  Progress Note    Patient Name: Beverly Rodríguez  MRN: [id number]  Patient Class: IP- Inpatient   Admission Date: 2/6/2022  Length of Stay: 7 days  Attending Physician: Sharron Andujar MD  Primary Care Provider: Prem Vincent MD        Subjective:     Principal Problem:Pneumonia due to COVID-19 virus        HPI:  Ms. Rodríguez is a 66 year old  female who is admitted to [facility] acute care services due to COVID 19 pneumonia and acute urinary tract infection. She tested positive for COVID 19 infection on 1/26/22 after presenting to the ED with c/o fever and chills. She was discharged home at that time. Yesterday, she returned to the ED at [facility] per EMS EMS with complaint of nausea, vomiting, diarrhea, generalized weakness, non-productive cough, and shortness of breath cough.  Diagnostic evaluation in the ED at [facility] revealed a significant viral pneumonia on chest x-ray, a urinary tract infection and dehydration on labs. Her WBC was 4880. Her Bun was 28 with creatinine 1.66. Her potassium was 3.0. She was given 1 L of NS, Levaquine, decadron, and Kcl. Her O2 sats while in the ED ranged 93-95% on RA. She was started on O2 at 2L/min per NC. Dr. Madrigal accepted her here for admission. Admit orders and home medication reconciliation were completed under his direction.    Upon arrival here, Ms. Rodríguez is alert and oriented x3. She has no obvious dyspnea. She denies chest pain. She states that she has gotten so weak and tired with nausea, vomiting, and diarrhea that started shortly after diagnosis with COVID 19 but has continued. She states that she has been unable to eat anything solid but can tolerate liquids po. She denies significant shortness of breath. She notes that she is on home O2 at night due to pulmonary disease but not sure of her diagnosis. She is receiving O2 at 2L/min per NC. A d-dimer was 0.54 here. She was started on Lovenox 40 mg daily. Levaquin  and decadron are continued along with NS at 100 ml/h.    Full Code  Not UTD with COVID 19 of flu vaccines due to allergic to flu vaccine  High risk VTE-TEDs, lovenox, early amb      Overview/Hospital Course:  2/7/22: Day 2    2/8/22: Hospital day 3. COVID 19 positive day 13. Ms. Rodríguez has been afebrile since admission. Droplet and airborne isolation are discontinued to day. Her O2 sats are ranging 93-96% on O2 at 2L/min per NC. She does wear O2 at night at home. Labs show improved BUN and creatinine to 18 and 1.09. Her glucose is 112 this am. She is tolerating po intake without vomiting. She says that her diarrhea has improved as well. Her diet will be increased to full liquid today. She is receiving decadron (day 4), azithromycin, (day 2) and rocephin (day 2). Since she is tolerating po intake, her azithromycin is changed to po today. She is also receiving Lovenox 40 mg SC daily, Vit C, Vit D, and she is using albuterol MDI and incentive spirometer under the direction of RT. She has not been receiving her thyroid med due to availability for 2 days. Her TSH is 0.61. She will restart her routine home dose tonight.    02/09/2022 HOSPITAL DAY 4  COVID POSITIVE DAY 14  PT'S VTE PPX IS LOVENOX/TEDS/EARLY AMBULATION    PT CONTINUES COVID TREATMENT WITH ALBUTEROL,O2 TITRATION, ROCEPHIN/Z MAX, DECADRON AND PEPCID AS WELL AS VIT C,D, AND ZINC.PT NOTED TO HAVE INCREASED COUGHING TODAY AND WILL CONTINUE ISOLATION FOR 20 DAYS . PT O2 SAT IS 94-96% ON 2 L NC. PT HAS JIANG WITH INCREASED FATIGUE WITH EXERCISE. PT/OT EVALUATED PT TODAY WITH PT/OT RECOMMENDED FOR 1-2 WEEKS; PT AMBULATED 40 FEET WITH O2 WITH MILD DYSPNEA WITH SUPERVISION. PT'S LABS REVEAL K 3.4, H/H 10/32 STABLE WITH UR CULTURE E COLI >100K SENSITIVE TO ROCEPHIN AND ENTEROCOCCUS >15K .     02/10/2022 HOSPITAL DAY 5  COVID POSITIVE DAY 15  PT CONTINUES COVID 19 TREATMENT WITH ALBUTEROL, O2 TITRATION, ROCEPHIN/Z MAX, DECADRON AND PEPCID; VIT C,D AND ZN. PT REMAINS IN  COVID ISOLATION. PT'S O2 SAT IS 93-98% ON 2L NC O2; PT HAS INCREASED DYSPNEA ON EXERTION. PT PARTICIPATED WITH PT/OT TODAY WITH BED MOBILITY AND TRANSFER EXERCISES AND AMBULATED 80 FEET WITH ONE EPISODE OF LOSS OF BALANCE. PT'S LABS ARE STABLE EXCEPT K 2.9 WITH POTASSIUM SUPPLEMENTATION ORDERED. PT WILL CONTINUE DAILY LABS.    02/11/2022 HOSPITAL DAY 6  COVID POSITIVE DAY 16  PT CONTINUES COVID TREATMENT WITH ALBUTEROL, O2 TITRATION, ROCEPHIN/Z MAX, PEPCID, VIT C,D AND ZINC. PT HAS COMPLETED DECADRON. PT CONTINUES ISOLATION. PT HAS O2 SAT 95-98% ON 2 LNC O2.LABS ARE STABLE.PT PARTICIPATED WITH PT/OT TODAY WITH BED MOBILITY AND TRANSFER EXERCISES. PT HAS HYPOKALEMIA WITH IMPROVEMENT K 3.4 TODAY.PT STATES DYSPNEA AND COUGH HAVE IMPROVED.    2/12/22:  Hospital day 7  COVID positive day 17  COVID treatment continues.  O2 sat 98% on 2 L. labs and vital signs stable.  Potassium is 3.2 today.  Being repleted by mouth.  Participated with PT/OT throughout the week.  Patient is scheduled to receive her last dose of Rocephin in the morning and will be discharged after.  She has requested a repeat COVID test.  We discussed the possibility that it was still returned a positive result and is not of much value.  However, a repeat COVID test was ordered for tomorrow morning per patient request.  The patient continues to have an intermittent cough, though improved.  She is without complaints and ready to return home.  Discussed with Dr. Gomes.  No change in plan.        2/13/22. Hospital day 8. COVID 19 positive day 18. Ms. Rodríguez was planned for discharge home today. However, she has no transportation home. Medicaid transport was scheduled to arrive at 1300, but when transport did not arrive, nursing staff contacted the dispatch and was told that no  is available now. Discharge was cancelled. She has completed treatment with Rocephin, azithromycin, and Decadron. She continues to receive Lovenox, albuterol MDI, Vitamin C, and  Vitamin D3. She is afebrile. Her BP is 147/87. He O2 sats range 95-98% on O2 at 2L/min per NC. She has home O2 that she wears at night. A repeat COVID 19 test today is negative. Will continue current treatment.      Interval History: Upon evaluation this morning, Ms. Rodríguez is alert and oriented. She is up in her room and dressed to go home. She says that she is ready to go today. She denies dyspnea, chest pain, abd pain or n/v/d. Her case was discussed with Dr. Gomes, and he is participating in all aspects of her care.    Review of Systems   Constitutional: Negative for appetite change, chills and fever.   HENT: Negative for congestion and postnasal drip.    Respiratory: Positive for cough. Negative for shortness of breath.    Cardiovascular: Negative for chest pain, palpitations and leg swelling.   Gastrointestinal: Negative for abdominal pain, diarrhea, nausea and vomiting.   Genitourinary: Negative for decreased urine volume, dysuria, frequency and hematuria.   Skin: Negative for color change.   Neurological: Negative for weakness and headaches.   Psychiatric/Behavioral: Negative.      Objective:     Vital Signs (Most Recent):  Temp: 98.1 °F (36.7 °C) (02/13/22 0740)  Pulse: 68 (02/13/22 1741)  Resp: 18 (02/13/22 1741)  BP: (!) 147/87 (02/13/22 0740)  SpO2: 98 % (02/13/22 1741) Vital Signs (24h Range):  Temp:  [98 °F (36.7 °C)-98.5 °F (36.9 °C)] 98.1 °F (36.7 °C)  Pulse:  [50-68] 68  Resp:  [16-20] 18  SpO2:  [95 %-98 %] 98 %  BP: (100-147)/(60-87) 147/87     Weight: 96 kg (211 lb 10.3 oz)  Body mass index is 36.33 kg/m².    Intake/Output Summary (Last 24 hours) at 2/13/2022 1805  Last data filed at 2/13/2022 1300  Gross per 24 hour   Intake 480 ml   Output --   Net 480 ml      Physical Exam  Vitals and nursing note reviewed.   Constitutional:       General: She is not in acute distress.     Appearance: Normal appearance. She is obese. She is not ill-appearing.      Comments: Smiling, pleasant, talkative    HENT:      Head: Normocephalic and atraumatic.      Right Ear: External ear normal.      Left Ear: External ear normal.      Mouth/Throat:      Mouth: Mucous membranes are moist.      Pharynx: Oropharynx is clear.   Eyes:      Extraocular Movements: Extraocular movements intact.      Conjunctiva/sclera: Conjunctivae normal.      Pupils: Pupils are equal, round, and reactive to light.   Cardiovascular:      Rate and Rhythm: Normal rate and regular rhythm.      Pulses: Normal pulses.      Heart sounds: Normal heart sounds.   Pulmonary:      Effort: Pulmonary effort is normal. No respiratory distress.      Breath sounds: Normal breath sounds.   Abdominal:      General: Bowel sounds are normal. There is no distension.      Palpations: Abdomen is soft.      Tenderness: There is no abdominal tenderness.   Musculoskeletal:         General: Normal range of motion.      Cervical back: Normal range of motion and neck supple.   Skin:     General: Skin is warm and dry.      Capillary Refill: Capillary refill takes less than 2 seconds.   Neurological:      Mental Status: She is alert and oriented to person, place, and time. Mental status is at baseline.      Cranial Nerves: No cranial nerve deficit.      Motor: No weakness.   Psychiatric:         Mood and Affect: Mood normal.         Behavior: Behavior normal.         Thought Content: Thought content normal.         Judgment: Judgment normal.         Significant Labs: All pertinent labs within the past 24 hours have been reviewed.    Significant Imaging: I have reviewed all pertinent imaging results/findings within the past 24 hours.      Assessment/Plan:      * Pneumonia due to COVID-19 virus  Levaquin d/c'd   Azithromycin/Rocephin started on 2/7 (dose 2 of both today)  Decadron (dose 4 today)  Albuterol MDI  Incentive spirometry  Vit C, Vit D3 (Zinc not started due to nausea)  Famotidine  O2 at 2L/min per NC to titrate  BC x2 show no growth after 24 hours  02/09/2022  PT  WILL CONTINUE TREATMENT , CONTINUED DEEP COUGH WILL CONTINUE ISOLATION PROTOCOL  O2 SAT S94-96% 2L NC O2  PT NOTES JIANG, WEAKNESS; APPETITE IS IMPROVED  02/10/2022  CONTINUE COVID TREATMENT  PT HAS SAT 93-98% ON 2 L NC O2  PT HAS MUSCLE WEAKNESS AND PT/OT HAS RECOMMENDED REHABILITATION    02/11/2022  CONTINUE COVID TREATMENT WITH ALBUTEROL, O2 TITRATED, INCENTIVE SPIROMETRY  ROCEPHIN, ZMAX; PT HAS COMPLETED DECADRON  02 SAT 95-98% ON 2 L NC O2    2/13 o2 sats 95-98%. Afebrile.  Repeat COVID 19 test negative today.    DVT prophylaxis  PT IS AT RISK FOR VTE  VTE PPX IS LOVENOX/TEDS/EARLY AMBULATION  02/10/2022  CONTINUE VTE PPX  02/11/2022  CONTINUE VTE PPX, CONTINUE TEDS AT HOME, PT ALLERGIC TO ASA  2/13/22 TEDs. Lovenox    Dehydration  Ns 1 Liter bolus in ED  NS at 100 Ml/h  BUN 28/creatinine 1.66 in ED  Bun 24/ creatinine 1.52 this am  2/8/22 1.09 and 18  02/09/2022 BUN/CREAT 15/1.11 IMPROVED  PT EATING AND DRINKING   02/10/2022  PT IS IMPROVED  BUN/CREAT 18/0.9 (24/1.52)  02/11/2022  PT DENIES N/V/D AND APPETITE HAS IMPROVED; BUN/CREAT 19/1.00  PT HAS BEEN EVALUATED PER RDN WITH RECOMMENDATIONS MADE AND PT WILL CONTINUE GLUTEN FREE DIET  2/13/22 Vishal po intake well. BUN 17, creatinine 0.94    Acute urinary tract infection  Urine culture preliminary show E. Coli > 100,000 and Enterococcus species 15,000  Levaquin d/c'd  Azithromycin/Rocephin started 2/7 for 7 days  NS at 100 ml/h-completed    02/09/2022  PT HAS ECOLI >100K SENS TO ROCEPHIN, > 15K ENTEROCOCCUS  02/10/2022  PT CONTINUES TREATMENT FOR UTI  02/11/2022  PT CONTINUES IV ABX FOR UTI  2/13/22 Has complete antibiotic. Denies symptoms of UTI. Afebrile.        Hypokalemia  Magnesium 2.1  Potassium 3.3 this am  (3.0 to 3.2 to 3.3)  Monitor daily  02/09/2022  K IS 3.4 CONTINUE SUPPLEMENTATION  02/10/2022  K 2.9 WILL ADMINISTER 40 MEQ  AND CONTINUE BID  02/11/2022  K 3.4 (2.9) POTASSIUM BID  2/13/22 Potassium 3.8. KCl 20 meq po BID    Hypertension  Continue  Metoprolol  /57  02/09/2022  /79  02/10/2022  PT HAS ADEQUATE BP CONTROL ON MEDICATION.  -68  02/11/2022  /64  2/13/22 /87      Hypothyroidism  TSH 0.6  Continue home med ([place] thyroid 75 mg daily)  02/09/2022 CONTINUE THY MED  02/10/2022 CONTINUE THY MED  02/11/2022 CONTINUE THY MED    Generalized weakness  Routine treatment for her illnesses  PT/OT if tolerated  02/09/2022  PT/OT CONSULT WITH REHAB RECOMMENDED  PT AMBULATED 40 FEET WITH SUPERVISION AND MILD DYSPNEA ON 2 L NC O2  SWING BED RECOMMENDED  02/10/2022  PT AMBULATED 80 FEET WITH ONE EPISODE LOB  SWING BED RECOMMENDED  02/11/2022   PT AMBULATED 80 FEET YESTERDAY  PT PARTICIPATED WITH PT/OT WITH BED MOBILITY AND TRANSFERS    2/13/22 Up in room today.      VTE Risk Mitigation (From admission, onward)         Ordered     enoxaparin injection 40 mg  Daily         02/07/22 0111     IP VTE HIGH RISK PATIENT  Once         02/07/22 0111     Place sequential compression device  Until discontinued         02/07/22 0111                Discharge Planning   SANDY: 2/13/2022     Code Status: Full Code   Is the patient medically ready for discharge?:     Reason for patient still in hospital (select all that apply): Pending disposition  Discharge Plan A: Home Health,Home ([facility] )   Discharge Delays: None known at this time              Latesha Paulino NP  Department of Hospital Medicine   [facility] - Medical Surgical Unit

## 2022-02-14 NOTE — SUBJECTIVE & OBJECTIVE
Interval History: Upon evaluation this morning, Ms. Rodríguez is alert and oriented. She is up in her room and dressed to go home. She says that she is ready to go today. She denies dyspnea, chest pain, abd pain or n/v/d. Her case was discussed with Dr. Gomes, and he is participating in all aspects of her care.    Review of Systems   Constitutional: Negative for appetite change, chills and fever.   HENT: Negative for congestion and postnasal drip.    Respiratory: Positive for cough. Negative for shortness of breath.    Cardiovascular: Negative for chest pain, palpitations and leg swelling.   Gastrointestinal: Negative for abdominal pain, diarrhea, nausea and vomiting.   Genitourinary: Negative for decreased urine volume, dysuria, frequency and hematuria.   Skin: Negative for color change.   Neurological: Negative for weakness and headaches.   Psychiatric/Behavioral: Negative.      Objective:     Vital Signs (Most Recent):  Temp: 98.1 °F (36.7 °C) (02/13/22 0740)  Pulse: 68 (02/13/22 1741)  Resp: 18 (02/13/22 1741)  BP: (!) 147/87 (02/13/22 0740)  SpO2: 98 % (02/13/22 1741) Vital Signs (24h Range):  Temp:  [98 °F (36.7 °C)-98.5 °F (36.9 °C)] 98.1 °F (36.7 °C)  Pulse:  [50-68] 68  Resp:  [16-20] 18  SpO2:  [95 %-98 %] 98 %  BP: (100-147)/(60-87) 147/87     Weight: 96 kg (211 lb 10.3 oz)  Body mass index is 36.33 kg/m².    Intake/Output Summary (Last 24 hours) at 2/13/2022 1805  Last data filed at 2/13/2022 1300  Gross per 24 hour   Intake 480 ml   Output --   Net 480 ml      Physical Exam  Vitals and nursing note reviewed.   Constitutional:       General: She is not in acute distress.     Appearance: Normal appearance. She is obese. She is not ill-appearing.      Comments: Smiling, pleasant, talkative   HENT:      Head: Normocephalic and atraumatic.      Right Ear: External ear normal.      Left Ear: External ear normal.      Mouth/Throat:      Mouth: Mucous membranes are moist.      Pharynx: Oropharynx is clear.    Eyes:      Extraocular Movements: Extraocular movements intact.      Conjunctiva/sclera: Conjunctivae normal.      Pupils: Pupils are equal, round, and reactive to light.   Cardiovascular:      Rate and Rhythm: Normal rate and regular rhythm.      Pulses: Normal pulses.      Heart sounds: Normal heart sounds.   Pulmonary:      Effort: Pulmonary effort is normal. No respiratory distress.      Breath sounds: Normal breath sounds.   Abdominal:      General: Bowel sounds are normal. There is no distension.      Palpations: Abdomen is soft.      Tenderness: There is no abdominal tenderness.   Musculoskeletal:         General: Normal range of motion.      Cervical back: Normal range of motion and neck supple.   Skin:     General: Skin is warm and dry.      Capillary Refill: Capillary refill takes less than 2 seconds.   Neurological:      Mental Status: She is alert and oriented to person, place, and time. Mental status is at baseline.      Cranial Nerves: No cranial nerve deficit.      Motor: No weakness.   Psychiatric:         Mood and Affect: Mood normal.         Behavior: Behavior normal.         Thought Content: Thought content normal.         Judgment: Judgment normal.         Significant Labs: All pertinent labs within the past 24 hours have been reviewed.    Significant Imaging: I have reviewed all pertinent imaging results/findings within the past 24 hours.

## 2022-02-14 NOTE — PROGRESS NOTES
Pt given discharge instructions, reviewed with pt, verbalized understanding. Belongings are packed ready for discharge. Medicaid transport is planning on picking up pt at 1300.

## 2022-02-14 NOTE — ASSESSMENT & PLAN NOTE
TSH 0.6  Continue home med ([place] thyroid 75 mg daily)  02/09/2022 CONTINUE THY MED  02/10/2022 CONTINUE THY MED  02/11/2022 CONTINUE THY MED

## 2022-02-14 NOTE — ASSESSMENT & PLAN NOTE
Levaquin d/c'd   Azithromycin/Rocephin started on 2/7 (dose 2 of both today)  Decadron (dose 4 today)  Albuterol MDI  Incentive spirometry  Vit C, Vit D3 (Zinc not started due to nausea)  Famotidine  O2 at 2L/min per NC to titrate  BC x2 show no growth after 24 hours  02/09/2022  PT WILL CONTINUE TREATMENT , CONTINUED DEEP COUGH WILL CONTINUE ISOLATION PROTOCOL  O2 SAT S94-96% 2L NC O2  PT NOTES JAING, WEAKNESS; APPETITE IS IMPROVED  02/10/2022  CONTINUE COVID TREATMENT  PT HAS SAT 93-98% ON 2 L NC O2  PT HAS MUSCLE WEAKNESS AND PT/OT HAS RECOMMENDED REHABILITATION    02/11/2022  CONTINUE COVID TREATMENT WITH ALBUTEROL, O2 TITRATED, INCENTIVE SPIROMETRY  ROCEPHIN, ZMAX; PT HAS COMPLETED DECADRON  02 SAT 95-98% ON 2 L NC O2    2/13 o2 sats 95-98%. Afebrile.  Repeat COVID 19 test negative today.

## 2022-02-14 NOTE — ASSESSMENT & PLAN NOTE
Routine treatment for her illnesses  PT/OT if tolerated  02/09/2022  PT/OT CONSULT WITH REHAB RECOMMENDED  PT AMBULATED 40 FEET WITH SUPERVISION AND MILD DYSPNEA ON 2 L NC O2  SWING BED RECOMMENDED  02/10/2022  PT AMBULATED 80 FEET WITH ONE EPISODE LOB  SWING BED RECOMMENDED  02/11/2022   PT AMBULATED 80 FEET YESTERDAY  PT PARTICIPATED WITH PT/OT WITH BED MOBILITY AND TRANSFERS    2/13/22 Up in room today.

## 2022-02-14 NOTE — ASSESSMENT & PLAN NOTE
Urine culture preliminary show E. Coli > 100,000 and Enterococcus species 15,000  Levaquin d/c'd  Azithromycin/Rocephin started 2/7 for 7 days  NS at 100 ml/h-completed    02/09/2022  PT HAS ECOLI >100K SENS TO ROCEPHIN, > 15K ENTEROCOCCUS  02/10/2022  PT CONTINUES TREATMENT FOR UTI  02/11/2022  PT CONTINUES IV ABX FOR UTI  2/13/22 Has complete antibiotic. Denies symptoms of UTI. Afebrile.

## 2022-02-15 ENCOUNTER — TELEPHONE (OUTPATIENT)
Dept: FAMILY MEDICINE | Facility: CLINIC | Age: 67
End: 2022-02-15
Payer: MEDICARE

## 2022-02-15 RX ORDER — ALBUTEROL SULFATE 0.83 MG/ML
2.5 SOLUTION RESPIRATORY (INHALATION) EVERY 6 HOURS PRN
COMMUNITY
End: 2022-02-15 | Stop reason: SDUPTHER

## 2022-02-15 RX ORDER — ALBUTEROL SULFATE 0.83 MG/ML
2.5 SOLUTION RESPIRATORY (INHALATION) EVERY 6 HOURS PRN
Qty: 100 ML | Refills: 3 | Status: SHIPPED | OUTPATIENT
Start: 2022-02-15

## 2022-02-15 NOTE — TELEPHONE ENCOUNTER
Pt contacted, reports she does not need a neb machine at this time just the albuterol solution. Dr Vincent consulted. Will send rx to pharm.

## 2022-02-15 NOTE — TELEPHONE ENCOUNTER
----- Message from Apurva Ch MA sent at 2/15/2022 11:18 AM CST -----  Pt called back saying she only needs the albuterol for the nebulizer.

## 2022-02-16 ENCOUNTER — TELEPHONE (OUTPATIENT)
Dept: FAMILY MEDICINE | Facility: CLINIC | Age: 67
End: 2022-02-16
Payer: MEDICARE

## 2022-02-18 RX ORDER — CHOLECALCIFEROL (VITAMIN D3) 125 MCG
5000 CAPSULE ORAL DAILY
COMMUNITY
End: 2022-12-09

## 2022-02-18 RX ORDER — IBUPROFEN 100 MG/5ML
1000 SUSPENSION, ORAL (FINAL DOSE FORM) ORAL DAILY
COMMUNITY

## 2022-02-18 NOTE — ASSESSMENT & PLAN NOTE
TSH 0.6  Continue home med ([place] thyroid 75 mg daily)  02/09/2022 CONTINUE THY MED  02/10/2022 CONTINUE THY MED  02/11/2022 CONTINUE THY MED  02/14/2022 CONTINUE THYROID MEDICATION AND FOLLOW UP PER PCP

## 2022-02-18 NOTE — ASSESSMENT & PLAN NOTE
Continue Metoprolol  /57  02/09/2022  /79  02/10/2022  PT HAS ADEQUATE BP CONTROL ON MEDICATION.  -68  02/11/2022  /64  2/13/22 /87  02/14/2022 /66

## 2022-02-18 NOTE — TELEPHONE ENCOUNTER
2/18/22-spoke with patient this afternoon. States she is blessed and feeling good today. Denies any shortness of breath or chest pain. Reviewed discharge medications and reconciled with current meds. She states she is taking all meds as directed. Informed of follow up appt.Instructed to bring all meds to follow up visit and to call office for questions/concerns. Also to seek medical attention for any new or worsening sx. Therese Camarena    3/15/22-pt was seen for her hosp followup on 2/21/22-no hosp readmissons noted.Hyacinth

## 2022-02-18 NOTE — ASSESSMENT & PLAN NOTE
Ns 1 Liter bolus in ED  NS at 100 Ml/h  BUN 28/creatinine 1.66 in ED  Bun 24/ creatinine 1.52 this am  2/8/22 1.09 and 18  02/09/2022 BUN/CREAT 15/1.11 IMPROVED  PT EATING AND DRINKING   02/10/2022  PT IS IMPROVED  BUN/CREAT 18/0.9 (24/1.52)  02/11/2022  PT DENIES N/V/D AND APPETITE HAS IMPROVED; BUN/CREAT 19/1.00  PT HAS BEEN EVALUATED PER RDN WITH RECOMMENDATIONS MADE AND PT WILL CONTINUE GLUTEN FREE DIET  2/13/22 Vishal po intake well. BUN 17, creatinine 0.94  02/14/2022 RESOLVED

## 2022-02-18 NOTE — DISCHARGE SUMMARY
[facility] - Medical Surgical Unit  Hospital Medicine  Discharge Summary      Patient Name: Beverly Rodríguez  MRN: [id number]  Patient Class: IP- Inpatient  Admission Date: 2/6/2022  Hospital Length of Stay: 8 days  Discharge Date and Time: 2/14/2022  1:00 PM  Attending Physician: No att. providers found   Discharging Provider: Sharron Andujar MD  Primary Care Provider: Prem Vincent MD      HPI:   Ms. Rodríguez is a 66 year old  female who is admitted to [facility] acute care services due to COVID 19 pneumonia and acute urinary tract infection. She tested positive for COVID 19 infection on 1/26/22 after presenting to the ED with c/o fever and chills. She was discharged home at that time. Yesterday, she returned to the ED at [facility] per EMS EMS with complaint of nausea, vomiting, diarrhea, generalized weakness, non-productive cough, and shortness of breath cough.  Diagnostic evaluation in the ED at [facility] revealed a significant viral pneumonia on chest x-ray, a urinary tract infection and dehydration on labs. Her WBC was 4880. Her Bun was 28 with creatinine 1.66. Her potassium was 3.0. She was given 1 L of NS, Levaquine, decadron, and Kcl. Her O2 sats while in the ED ranged 93-95% on RA. She was started on O2 at 2L/min per NC. Dr. Madrigal accepted her here for admission. Admit orders and home medication reconciliation were completed under his direction.    Upon arrival here, Ms. Rodríguez is alert and oriented x3. She has no obvious dyspnea. She denies chest pain. She states that she has gotten so weak and tired with nausea, vomiting, and diarrhea that started shortly after diagnosis with COVID 19 but has continued. She states that she has been unable to eat anything solid but can tolerate liquids po. She denies significant shortness of breath. She notes that she is on home O2 at night due to pulmonary disease but not sure of her diagnosis. She is receiving O2 at 2L/min per NC. A d-dimer was 0.54 here. She was started  on Lovenox 40 mg daily. Levaquin and decadron are continued along with NS at 100 ml/h.    Full Code  Not UTD with COVID 19 of flu vaccines due to allergic to flu vaccine  High risk VTE-TEDs, lovenox, early amb      * No surgery found *      Hospital Course:   2/7/22: Day 2    2/8/22: Hospital day 3. COVID 19 positive day 13. Ms. Rodríguez has been afebrile since admission. Droplet and airborne isolation are discontinued to day. Her O2 sats are ranging 93-96% on O2 at 2L/min per NC. She does wear O2 at night at home. Labs show improved BUN and creatinine to 18 and 1.09. Her glucose is 112 this am. She is tolerating po intake without vomiting. She says that her diarrhea has improved as well. Her diet will be increased to full liquid today. She is receiving decadron (day 4), azithromycin, (day 2) and rocephin (day 2). Since she is tolerating po intake, her azithromycin is changed to po today. She is also receiving Lovenox 40 mg SC daily, Vit C, Vit D, and she is using albuterol MDI and incentive spirometer under the direction of RT. She has not been receiving her thyroid med due to availability for 2 days. Her TSH is 0.61. She will restart her routine home dose tonight.    02/09/2022 HOSPITAL DAY 4  COVID POSITIVE DAY 14  PT'S VTE PPX IS LOVENOX/TEDS/EARLY AMBULATION    PT CONTINUES COVID TREATMENT WITH ALBUTEROL,O2 TITRATION, ROCEPHIN/Z MAX, DECADRON AND PEPCID AS WELL AS VIT C,D, AND ZINC.PT NOTED TO HAVE INCREASED COUGHING TODAY AND WILL CONTINUE ISOLATION FOR 20 DAYS . PT O2 SAT IS 94-96% ON 2 L NC. PT HAS JIANG WITH INCREASED FATIGUE WITH EXERCISE. PT/OT EVALUATED PT TODAY WITH PT/OT RECOMMENDED FOR 1-2 WEEKS; PT AMBULATED 40 FEET WITH O2 WITH MILD DYSPNEA WITH SUPERVISION. PT'S LABS REVEAL K 3.4, H/H 10/32 STABLE WITH UR CULTURE E COLI >100K SENSITIVE TO ROCEPHIN AND ENTEROCOCCUS >15K .     02/10/2022 HOSPITAL DAY 5  COVID POSITIVE DAY 15  PT CONTINUES COVID 19 TREATMENT WITH ALBUTEROL, O2 TITRATION, ROCEPHIN/Z MAX,  DECADRON AND PEPCID; VIT C,D AND ZN. PT REMAINS IN COVID ISOLATION. PT'S O2 SAT IS 93-98% ON 2L NC O2; PT HAS INCREASED DYSPNEA ON EXERTION. PT PARTICIPATED WITH PT/OT TODAY WITH BED MOBILITY AND TRANSFER EXERCISES AND AMBULATED 80 FEET WITH ONE EPISODE OF LOSS OF BALANCE. PT'S LABS ARE STABLE EXCEPT K 2.9 WITH POTASSIUM SUPPLEMENTATION ORDERED. PT WILL CONTINUE DAILY LABS.    02/11/2022 HOSPITAL DAY 6  COVID POSITIVE DAY 16  PT CONTINUES COVID TREATMENT WITH ALBUTEROL, O2 TITRATION, ROCEPHIN/Z MAX, PEPCID, VIT C,D AND ZINC. PT HAS COMPLETED DECADRON. PT CONTINUES ISOLATION. PT HAS O2 SAT 95-98% ON 2 LNC O2.LABS ARE STABLE.PT PARTICIPATED WITH PT/OT TODAY WITH BED MOBILITY AND TRANSFER EXERCISES. PT HAS HYPOKALEMIA WITH IMPROVEMENT K 3.4 TODAY.PT STATES DYSPNEA AND COUGH HAVE IMPROVED.    2/12/22:  Hospital day 7  COVID positive day 17  COVID treatment continues.  O2 sat 98% on 2 L. labs and vital signs stable.  Potassium is 3.2 today.  Being repleted by mouth.  Participated with PT/OT throughout the week.  Patient is scheduled to receive her last dose of Rocephin in the morning and will be discharged after.  She has requested a repeat COVID test.  We discussed the possibility that it was still returned a positive result and is not of much value.  However, a repeat COVID test was ordered for tomorrow morning per patient request.  The patient continues to have an intermittent cough, though improved.  She is without complaints and ready to return home.  Discussed with Dr. Gomes.  No change in plan.        2/13/22. Hospital day 8. COVID 19 positive day 18. Ms. Rodríguez was planned for discharge home today. However, she has no transportation home. Medicaid transport was scheduled to arrive at 1300, but when transport did not arrive, nursing staff contacted the dispatch and was told that no  is available now. Discharge was cancelled. She has completed treatment with Rocephin, azithromycin, and Decadron. She  continues to receive Lovenox, albuterol MDI, Vitamin C, and Vitamin D3. She is afebrile. Her BP is 147/87. He O2 sats range 95-98% on O2 at 2L/min per NC. She has home O2 that she wears at night. A repeat COVID 19 test today is negative. Will continue current treatment.    02/14/2022 HOSPITAL DAY 9  COVID POS DAY 19  PT HAS COMPLETED COVID TREATMENT WITH ROCEPHIN/ZITHROMAX AND DECADRON WITH MARKED IMPROVEMENT AND PT IS STABLE FOR DISCHARGE HOME WITH HOME HEALTH NURSING CARE. PT HAD SAT 93-98% ON 2 L NC.  PT DID NOT QUALIFY FOR REMDESIVIR DUE PRESENTATION GREATER THAN 10 DAYS.  PT HAD E COLI UTI >100,000 SENSITIVE TO ROCEPHIN WHICH PT COMPLETED FOR 7 DAYS AND >15,000 ENTEROCOCCUS. PT TESTED NEG FOR COVID ON REPEAT 2/13/2022. PT PARTICIPATED WITH PT/OT AMBULATING 40 FEET WITH O2 AND MILD JIANG  WITH SUPERVISION. PT WILL CONTINUE VIT C,D AND ZN AS WELL AS PEPCID. PT WILL CONTINUE TEDS AND IS ALLERGIC TO ASA. PT ENCOURAGED TO INCREASED HOME AMBULATION.       Goals of Care Treatment Preferences:  Code Status: Full Code      Consults:     * COVID  Levaquin d/c'd   Azithromycin/Rocephin started on 2/7 (dose 2 of both today)  Decadron (dose 4 today)  Albuterol MDI  Incentive spirometry  Vit C, Vit D3 (Zinc not started due to nausea)  Famotidine  O2 at 2L/min per NC to titrate  BC x2 show no growth after 24 hours  02/09/2022  PT WILL CONTINUE TREATMENT , CONTINUED DEEP COUGH WILL CONTINUE ISOLATION PROTOCOL  O2 SAT S94-96% 2L NC O2  PT NOTES JIANG, WEAKNESS; APPETITE IS IMPROVED  02/10/2022  CONTINUE COVID TREATMENT  PT HAS SAT 93-98% ON 2 L NC O2  PT HAS MUSCLE WEAKNESS AND PT/OT HAS RECOMMENDED REHABILITATION    02/11/2022  CONTINUE COVID TREATMENT WITH ALBUTEROL, O2 TITRATED, INCENTIVE SPIROMETRY  ROCEPHIN, ZMAX; PT HAS COMPLETED DECADRON  02 SAT 95-98% ON 2 L NC O2    2/13 o2 sats 95-98%. Afebrile.  Repeat COVID 19 test negative today.    02/14/2022 PT IS STABLE FOR DISCHARGE WITH O2 SAT 93-98% ON 2 L NC  PT STATES JIANG HAS  IMPROVED AND SHE NOTES MINIMAL COUGH  PT'S NAUSEA AND DIARRHEA HAS RESOLVED  PT WILL CONTINUE VIT C,D AND ZINC AS WELL AS PEPCID    DVT prophylaxis  PT IS AT RISK FOR VTE  VTE PPX IS LOVENOX/TEDS/EARLY AMBULATION  02/10/2022  CONTINUE VTE PPX  02/11/2022  CONTINUE VTE PPX, CONTINUE TEDS AT HOME, PT ALLERGIC TO ASA  2/13/22 TEDs. Lovenox  02/14/2022  TEDS ALLERGIC TO ASA/PT TO CONTINUE AMBULATION    Dehydration  Ns 1 Liter bolus in ED  NS at 100 Ml/h  BUN 28/creatinine 1.66 in ED  Bun 24/ creatinine 1.52 this am  2/8/22 1.09 and 18  02/09/2022 BUN/CREAT 15/1.11 IMPROVED  PT EATING AND DRINKING   02/10/2022  PT IS IMPROVED  BUN/CREAT 18/0.9 (24/1.52)  02/11/2022  PT DENIES N/V/D AND APPETITE HAS IMPROVED; BUN/CREAT 19/1.00  PT HAS BEEN EVALUATED PER RDN WITH RECOMMENDATIONS MADE AND PT WILL CONTINUE GLUTEN FREE DIET  2/13/22 Vishal po intake well. BUN 17, creatinine 0.94  02/14/2022 RESOLVED    Hypertension  Continue Metoprolol  /57  02/09/2022  /79  02/10/2022  PT HAS ADEQUATE BP CONTROL ON MEDICATION.  -68  02/11/2022  /64  2/13/22 /87  02/14/2022 /66      Hypothyroidism  TSH 0.6  Continue home med ([place] thyroid 75 mg daily)  02/09/2022 CONTINUE THY MED  02/10/2022 CONTINUE THY MED  02/11/2022 CONTINUE THY MED  02/14/2022 CONTINUE THYROID MEDICATION AND FOLLOW UP PER PCP    Final Active Diagnoses:    Diagnosis Date Noted POA    PRINCIPAL PROBLEM:  COVID [U07.1] 02/07/2022 Yes    DVT prophylaxis [Z29.9] 02/09/2022 Not Applicable    Hypothyroidism [E03.9] 02/07/2022 Yes    Hypertension [I10] 02/07/2022 Yes    Dehydration [E86.0] 02/07/2022 Yes      Problems Resolved During this Admission:    Diagnosis Date Noted Date Resolved POA    Nausea vomiting and diarrhea [R11.2, R19.7] 02/07/2022 02/11/2022 Yes    Generalized weakness [R53.1] 02/07/2022 02/14/2022 Yes    Hypokalemia [E87.6] 02/07/2022 02/14/2022 Yes    Acute urinary tract infection [N39.0] 02/07/2022 02/14/2022 Yes        Discharged Condition: stable    Disposition: Home-Health Care [facility] [place]    Follow Up:   Contact information for follow-up providers     Prem Vincent MD On 2/21/2022.    Specialty: Family Medicine  Why: @10:15  Contact information:  [address]  [facility] [address]  [phone]                   Contact information for after-discharge care     Home Medical Care     [facility] .    Service: Home Health Services  Contact information:  [address] [facility] [address]  [phone]                           Patient Instructions:      Ambulatory referral/consult to Home Health   Standing Status: Future   Referral Priority: Routine Referral Type: Home Health Care   Referral Reason: Specialty Services Required   Referred to Provider: [facility] [place] Requested Specialty: Home Health Services   Number of Visits Requested: 1     Diet Cardiac   Order Comments: Gluten free     Diet Adult Regular     Order Specific Question Answer Comments   Additional restrictions: Gluten Free      Notify your health care provider if you experience any of the following:  temperature >100.4     Notify your health care provider if you experience any of the following:  persistent nausea and vomiting or diarrhea     Notify your health care provider if you experience any of the following:  severe uncontrolled pain     Notify your health care provider if you experience any of the following:  redness, tenderness, or signs of infection (pain, swelling, redness, odor or green/yellow discharge around incision site)     HOME HEALTH ORDERS   Order Comments: Subsequent Home Health Orders    Current Medications:  Current Facility-Administered Medications:  acetaminophen tablet 650 mg, 650 mg, Oral, Q6H PRN, Latesha Paulino, NP, 650 mg at 02/09/22 1342  albuterol inhaler 2 puff, 2 puff, Inhalation, Q6H, Latesha Paulino NP, 2 puff at 02/13/22 0601  ascorbic acid  (vitamin C) tablet 1,000 mg, 1,000 mg, Oral, Daily, Latesha Paulino, NP, 1,000 mg at 02/13/22 0840  cefTRIAXone (ROCEPHIN) 1 g in dextrose 5 % in water (D5W) 5 % 50 mL IVPB (MB+), 1 g, Intravenous, Q24H, Lori Bartlett, FNP, Stopped at 02/12/22 1204  enoxaparin injection 40 mg, 40 mg, Subcutaneous, Daily, Latesha Paulino, NP, 40 mg at 02/12/22 1643  famotidine tablet 20 mg, 20 mg, Oral, Daily, Latesha Paulino, NP, 20 mg at 02/13/22 0841  Lactobacillus acidoph-L.bulgar 1 million cell tablet 4 tablet, 4 tablet, Oral, TID WM, Sharron Andujar MD, 4 tablet at 02/13/22 0824  melatonin tablet 6 mg, 6 mg, Oral, Nightly PRN, Latesha Paulino NP  metoprolol succinate (TOPROL-XL) 24 hr tablet 25 mg, 25 mg, Oral, Daily, Latesha Paulino NP, 25 mg at 02/13/22 0841  ondansetron injection 4 mg, 4 mg, Intravenous, Q8H PRN, Latesha Paulino NP  polyethylene glycol packet 17 g, 17 g, Oral, BID PRN, Latesha Paulino NP, 17 g at 02/07/22 0148  potassium chloride SA CR tablet 20 mEq, 20 mEq, Oral, BID, Sharron Andujar MD, 20 mEq at 02/13/22 0841  sodium chloride 0.9% flush 10 mL, 10 mL, Intravenous, PRN, Latesha Paulino NP  thyroid (pork) 15 mg tablet 75 mg, 75 mg, Oral, Before breakfast, Kimberli Clark, FNP, 75 mg at 02/13/22 0547  vitamin D 1000 units tablet 1,000 Units, 1,000 Units, Oral, Daily, Latesha Paulino NP, 1,000 Units at 02/13/22 0841        Nursing:   Home Oxygen:  No change    Diet:   cardiac diet    Activities:   activity as tolerated    Labs:  NA    Referrals/ ConsultsNA      Home Health Aide:  Home Health Aide based on needs as per HH assessment     Order Specific Question Answer Comments   What Home Health Agency is the patient currently using? Other/External      Notify your health care provider if you experience any of the following:  temperature >100.4     Notify your health care provider if you experience any of the following:  difficulty breathing or increased cough     Notify your health care provider if you experience any of the  following:  increased confusion or weakness     Activity as tolerated       Significant Diagnostic Studies: Labs: BMP: No results for input(s): GLU, NA, K, CL, CO2, BUN, CREATININE, CALCIUM, MG in the last 48 hours. and CBC No results for input(s): WBC, HGB, HCT, PLT in the last 48 hours.  Microbiology:   Blood Culture No results found for: LABBLOO and Urine Culture    Lab Results   Component Value Date    LABURIN >100,000 Escherichia coli (A) 02/06/2022    LABURIN 15,000 Enterococcus faecalis (A) 02/06/2022     Radiology: X-Ray: CXR: X-Ray Chest 1 View (CXR): No results found for this visit on 02/06/22.    Pending Diagnostic Studies:     None         Medications:  Reconciled Home Medications:      Medication List      START taking these medications    albuterol 90 mcg/actuation inhaler  Commonly known as: PROVENTIL/VENTOLIN HFA  Inhale 2 puffs into the lungs every 6 (six) hours. Rescue     famotidine 20 MG tablet  Commonly known as: PEPCID  Take 1 tablet (20 mg total) by mouth once daily.        CHANGE how you take these medications    thyroid (pork) 15 mg Tab  Commonly known as: ARMOUR THYROID  Take 5 tablets (75 mg total) by mouth before breakfast.  What changed:   · how much to take  · Another medication with the same name was removed. Continue taking this medication, and follow the directions you see here.        CONTINUE taking these medications    metoprolol succinate 25 MG 24 hr tablet  Commonly known as: TOPROL-XL  Take 1 tablet (25 mg total) by mouth once daily.            Indwelling Lines/Drains at time of discharge:   Lines/Drains/Airways     None                 Time spent on the discharge of patient: 45 minutes         Sharron Andujar MD  Department of Hospital Medicine  [facility] - Medical Surgical Unit

## 2022-02-18 NOTE — ASSESSMENT & PLAN NOTE
Levaquin d/c'd   Azithromycin/Rocephin started on 2/7 (dose 2 of both today)  Decadron (dose 4 today)  Albuterol MDI  Incentive spirometry  Vit C, Vit D3 (Zinc not started due to nausea)  Famotidine  O2 at 2L/min per NC to titrate  BC x2 show no growth after 24 hours  02/09/2022  PT WILL CONTINUE TREATMENT , CONTINUED DEEP COUGH WILL CONTINUE ISOLATION PROTOCOL  O2 SAT S94-96% 2L NC O2  PT NOTES JIANG, WEAKNESS; APPETITE IS IMPROVED  02/10/2022  CONTINUE COVID TREATMENT  PT HAS SAT 93-98% ON 2 L NC O2  PT HAS MUSCLE WEAKNESS AND PT/OT HAS RECOMMENDED REHABILITATION    02/11/2022  CONTINUE COVID TREATMENT WITH ALBUTEROL, O2 TITRATED, INCENTIVE SPIROMETRY  ROCEPHIN, ZMAX; PT HAS COMPLETED DECADRON  02 SAT 95-98% ON 2 L NC O2    2/13 o2 sats 95-98%. Afebrile.  Repeat COVID 19 test negative today.    02/14/2022 PT IS STABLE FOR DISCHARGE WITH O2 SAT 93-98% ON 2 L NC  PT STATES JIANG HAS IMPROVED AND SHE NOTES MINIMAL COUGH  PT'S NAUSEA AND DIARRHEA HAS RESOLVED  PT WILL CONTINUE VIT C,D AND ZINC AS WELL AS PEPCID

## 2022-02-18 NOTE — ASSESSMENT & PLAN NOTE
PT IS AT RISK FOR VTE  VTE PPX IS LOVENOX/TEDS/EARLY AMBULATION  02/10/2022  CONTINUE VTE PPX  02/11/2022  CONTINUE VTE PPX, CONTINUE TEDS AT HOME, PT ALLERGIC TO ASA  2/13/22 TEDs. Lovenox  02/14/2022  TEDS ALLERGIC TO ASA/PT TO CONTINUE AMBULATION

## 2022-02-21 ENCOUNTER — OFFICE VISIT (OUTPATIENT)
Dept: FAMILY MEDICINE | Facility: CLINIC | Age: 67
End: 2022-02-21
Payer: MEDICARE

## 2022-02-21 VITALS
WEIGHT: 205 LBS | SYSTOLIC BLOOD PRESSURE: 136 MMHG | TEMPERATURE: 98 F | DIASTOLIC BLOOD PRESSURE: 72 MMHG | OXYGEN SATURATION: 98 % | BODY MASS INDEX: 35 KG/M2 | HEART RATE: 61 BPM | RESPIRATION RATE: 18 BRPM | HEIGHT: 64 IN

## 2022-02-21 DIAGNOSIS — J12.82 PNEUMONIA DUE TO COVID-19 VIRUS: Primary | ICD-10-CM

## 2022-02-21 DIAGNOSIS — U07.1 PNEUMONIA DUE TO COVID-19 VIRUS: Primary | ICD-10-CM

## 2022-02-21 DIAGNOSIS — N39.0 URINARY TRACT INFECTION WITHOUT HEMATURIA, SITE UNSPECIFIED: ICD-10-CM

## 2022-02-21 DIAGNOSIS — U09.9 POST-COVID-19 CONDITION: ICD-10-CM

## 2022-02-21 LAB
BILIRUB SERPL-MCNC: ABNORMAL MG/DL
BLOOD URINE, POC: ABNORMAL
COLOR, POC UA: ABNORMAL
GLUCOSE UR QL STRIP: ABNORMAL
KETONES UR QL STRIP: ABNORMAL
LEUKOCYTE ESTERASE URINE, POC: ABNORMAL
NITRITE, POC UA: ABNORMAL
PH, POC UA: 5.5
PROTEIN, POC: ABNORMAL
SPECIFIC GRAVITY, POC UA: >=1.03
UROBILINOGEN, POC UA: 0.2

## 2022-02-21 PROCEDURE — 3075F SYST BP GE 130 - 139MM HG: CPT | Mod: ,,, | Performed by: FAMILY MEDICINE

## 2022-02-21 PROCEDURE — 81003 POCT URINALYSIS W/O SCOPE: ICD-10-PCS | Mod: QW,,, | Performed by: FAMILY MEDICINE

## 2022-02-21 PROCEDURE — 3008F PR BODY MASS INDEX (BMI) DOCUMENTED: ICD-10-PCS | Mod: ,,, | Performed by: FAMILY MEDICINE

## 2022-02-21 PROCEDURE — 1160F RVW MEDS BY RX/DR IN RCRD: CPT | Mod: ,,, | Performed by: FAMILY MEDICINE

## 2022-02-21 PROCEDURE — 81003 URINALYSIS AUTO W/O SCOPE: CPT | Mod: QW,,, | Performed by: FAMILY MEDICINE

## 2022-02-21 PROCEDURE — 1159F PR MEDICATION LIST DOCUMENTED IN MEDICAL RECORD: ICD-10-PCS | Mod: ,,, | Performed by: FAMILY MEDICINE

## 2022-02-21 PROCEDURE — 99213 OFFICE O/P EST LOW 20 MIN: CPT | Mod: ,,, | Performed by: FAMILY MEDICINE

## 2022-02-21 PROCEDURE — 3075F PR MOST RECENT SYSTOLIC BLOOD PRESS GE 130-139MM HG: ICD-10-PCS | Mod: ,,, | Performed by: FAMILY MEDICINE

## 2022-02-21 PROCEDURE — 1160F PR REVIEW ALL MEDS BY PRESCRIBER/CLIN PHARMACIST DOCUMENTED: ICD-10-PCS | Mod: ,,, | Performed by: FAMILY MEDICINE

## 2022-02-21 PROCEDURE — 3078F DIAST BP <80 MM HG: CPT | Mod: ,,, | Performed by: FAMILY MEDICINE

## 2022-02-21 PROCEDURE — 3008F BODY MASS INDEX DOCD: CPT | Mod: ,,, | Performed by: FAMILY MEDICINE

## 2022-02-21 PROCEDURE — 1159F MED LIST DOCD IN RCRD: CPT | Mod: ,,, | Performed by: FAMILY MEDICINE

## 2022-02-21 PROCEDURE — 1111F PR DISCHARGE MEDS RECONCILED W/ CURRENT OUTPATIENT MED LIST: ICD-10-PCS | Mod: ,,, | Performed by: FAMILY MEDICINE

## 2022-02-21 PROCEDURE — 3078F PR MOST RECENT DIASTOLIC BLOOD PRESSURE < 80 MM HG: ICD-10-PCS | Mod: ,,, | Performed by: FAMILY MEDICINE

## 2022-02-21 PROCEDURE — 1111F DSCHRG MED/CURRENT MED MERGE: CPT | Mod: ,,, | Performed by: FAMILY MEDICINE

## 2022-02-21 PROCEDURE — 99213 PR OFFICE/OUTPT VISIT, EST, LEVL III, 20-29 MIN: ICD-10-PCS | Mod: ,,, | Performed by: FAMILY MEDICINE

## 2022-02-21 RX ORDER — ALBUTEROL SULFATE 90 UG/1
2 AEROSOL, METERED RESPIRATORY (INHALATION) EVERY 6 HOURS
Qty: 18 G | Refills: 3 | Status: SHIPPED | OUTPATIENT
Start: 2022-02-21 | End: 2023-02-21

## 2022-02-21 RX ORDER — AZITHROMYCIN 250 MG/1
TABLET, FILM COATED ORAL
Qty: 6 TABLET | Refills: 0 | Status: SHIPPED | OUTPATIENT
Start: 2022-02-21 | End: 2022-02-26

## 2022-02-21 NOTE — PROGRESS NOTES
Beverly Rodríguez is a 66 y.o. female seen today for hospital follow-up.  Her medications were reviewed and reconciled.  Patient was admitted with COVID pneumonia and urinary tract infection and a follow-up urinalysis here today was normal.  Patient's x-ray is abnormal still with diffuse patchy infiltrates.  Patient reports she is fatigued and short of breath but is afebrile and is recovering.      Past Medical History:   Diagnosis Date    Abnormal LFTs     Asthma     ? 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 Rescue 100 mL 3    ascorbic acid, vitamin C, (VITAMIN C) 1000 MG tablet Take 1,000 mg by mouth once daily.      cholecalciferol, vitamin D3, 125 mcg (5,000 unit) capsule Take 5,000 Units by mouth once daily.      famotidine (PEPCID) 20 MG tablet Take 1 tablet (20 mg total) by mouth once daily. 30 tablet 11    Lactobacillus rhamnosus GG (CULTURELLE) 10 billion cell capsule Take 1 capsule by mouth once daily.      metoprolol succinate (TOPROL-XL) 25 MG 24 hr tablet Take 1 tablet (25 mg total) by mouth once daily. 90 tablet 1    thyroid, pork, (ARMOUR THYROID) 15 mg Tab Take 5 tablets (75 mg total) by mouth before breakfast. 150 tablet 11    [DISCONTINUED] albuterol (PROVENTIL/VENTOLIN HFA) 90 mcg/actuation inhaler Inhale 2 puffs into the lungs every 6 (six) hours. Rescue 18 g 0     No current facility-administered medications on file prior to visit.       There is no immunization history on file for this patient.    Review of  Systems   Constitutional: Negative for fever, malaise/fatigue and weight loss.   Respiratory: Positive for cough and shortness of breath.    Cardiovascular: Negative for chest pain and palpitations.   Gastrointestinal: Negative for nausea and vomiting.   Genitourinary: Negative for dysuria, frequency, hematuria and urgency.   Psychiatric/Behavioral: Negative for depression.        Vitals:    02/21/22 1019   BP: 136/72   Pulse: 61   Resp: 18   Temp: 97.5 °F (36.4 °C)       Physical Exam  Vitals reviewed.   Constitutional:       Appearance: Normal appearance.   HENT:      Head: Normocephalic.   Eyes:      Extraocular Movements: Extraocular movements intact.      Conjunctiva/sclera: Conjunctivae normal.      Pupils: Pupils are equal, round, and reactive to light.   Neck:      Thyroid: No thyroid mass or thyromegaly.   Cardiovascular:      Rate and Rhythm: Normal rate and regular rhythm.      Heart sounds: Normal heart sounds. No murmur heard.    No gallop.   Pulmonary:      Effort: Pulmonary effort is normal. No respiratory distress.      Breath sounds: Examination of the left-lower field reveals rhonchi. Rhonchi present. No wheezing or rales.   Skin:     General: Skin is warm and dry.      Coloration: Skin is not jaundiced or pale.   Neurological:      Mental Status: She is alert.   Psychiatric:         Mood and Affect: Mood normal.         Behavior: Behavior normal.         Thought Content: Thought content normal.         Judgment: Judgment normal.          Assessment and Plan  Pneumonia due to COVID-19 virus  -     albuterol (PROVENTIL/VENTOLIN HFA) 90 mcg/actuation inhaler; Inhale 2 puffs into the lungs every 6 (six) hours. Rescue  Dispense: 18 g; Refill: 3  -     azithromycin (Z-NAY) 250 MG tablet; Take 2 tablets by mouth on day 1; Take 1 tablet by mouth on days 2-5  Dispense: 6 tablet; Refill: 0    Urinary tract infection without hematuria, site unspecified  -     POCT URINALYSIS W/O SCOPE    Post-COVID-19  condition  -     X-Ray Chest PA And Lateral; Future; Expected date: 02/21/2022            Return to clinic in 2 weeks for follow-up and a repeat chest x-ray or as needed if symptoms worsen.  To the ER for severe or worsening shortness of breath.  Patient will be monitored by home health and we will set her up with a social work evaluation to help her with meals and meeting her activities of daily living.    Health Maintenance Topics with due status: Not Due       Topic Last Completion Date    Lipid Panel 07/22/2021    Colorectal Cancer Screening 12/29/2021

## 2022-02-25 ENCOUNTER — DOCUMENTATION ONLY (OUTPATIENT)
Dept: REHABILITATION | Facility: HOSPITAL | Age: 67
End: 2022-02-25
Payer: MEDICARE

## 2022-02-25 NOTE — PROGRESS NOTES
Outpatient Therapy Discharge Summary     Name: Beverly Rodríguez  [facility] Number: [id number]    Therapy Diagnosis: No diagnosis found.  Physician: No ref. provider found    Physician Orders: PT Eval and Treat   Medical Diagnosis from Referral: Arthralgia  Evaluation Date: 11/17/2021    Date of Last visit: 1/24/22  Total Visits Received: 7      Assessment    Goals:  All Goals Not Met    Short Term Goals: 4 weeks   1. Patient will be able to ambulate to the mailbox with no fatigue in the legs  2. Patient be able to complete all activities of daily living with 6/10 pain    Long Term Goals: 8 weeks   1. Patient will be able to sleep ~ 4 hours without waking from leg pain  2. Patient will report no leg buckling x 1 month  3. Patient will have 4+/5 manual muscle test for the lower extremities   4. Patient will complete all activities of daily living with 4/10 pain      Discharge reason: Patient has been hospitalized    Patient continues to have a high pain level. Has several health concerns that seem to be limiting the progression to full capability of reaching goals set in therapy.    Plan   This patient is discharged from Physical Therapy.

## 2022-03-07 ENCOUNTER — OFFICE VISIT (OUTPATIENT)
Dept: FAMILY MEDICINE | Facility: CLINIC | Age: 67
End: 2022-03-07
Payer: MEDICARE

## 2022-03-07 VITALS
DIASTOLIC BLOOD PRESSURE: 77 MMHG | OXYGEN SATURATION: 99 % | SYSTOLIC BLOOD PRESSURE: 130 MMHG | TEMPERATURE: 98 F | BODY MASS INDEX: 35 KG/M2 | HEART RATE: 67 BPM | RESPIRATION RATE: 18 BRPM | HEIGHT: 64 IN | WEIGHT: 205 LBS

## 2022-03-07 DIAGNOSIS — U07.1 PNEUMONIA DUE TO COVID-19 VIRUS: Primary | ICD-10-CM

## 2022-03-07 DIAGNOSIS — J12.82 PNEUMONIA DUE TO COVID-19 VIRUS: Primary | ICD-10-CM

## 2022-03-07 DIAGNOSIS — J30.9 ALLERGIC RHINITIS, UNSPECIFIED SEASONALITY, UNSPECIFIED TRIGGER: ICD-10-CM

## 2022-03-07 PROCEDURE — 1160F RVW MEDS BY RX/DR IN RCRD: CPT | Mod: ,,, | Performed by: FAMILY MEDICINE

## 2022-03-07 PROCEDURE — 1111F PR DISCHARGE MEDS RECONCILED W/ CURRENT OUTPATIENT MED LIST: ICD-10-PCS | Mod: ,,, | Performed by: FAMILY MEDICINE

## 2022-03-07 PROCEDURE — 1159F PR MEDICATION LIST DOCUMENTED IN MEDICAL RECORD: ICD-10-PCS | Mod: ,,, | Performed by: FAMILY MEDICINE

## 2022-03-07 PROCEDURE — 3075F SYST BP GE 130 - 139MM HG: CPT | Mod: ,,, | Performed by: FAMILY MEDICINE

## 2022-03-07 PROCEDURE — 99213 OFFICE O/P EST LOW 20 MIN: CPT | Mod: ,,, | Performed by: FAMILY MEDICINE

## 2022-03-07 PROCEDURE — 99213 PR OFFICE/OUTPT VISIT, EST, LEVL III, 20-29 MIN: ICD-10-PCS | Mod: ,,, | Performed by: FAMILY MEDICINE

## 2022-03-07 PROCEDURE — 1111F DSCHRG MED/CURRENT MED MERGE: CPT | Mod: ,,, | Performed by: FAMILY MEDICINE

## 2022-03-07 PROCEDURE — 3008F PR BODY MASS INDEX (BMI) DOCUMENTED: ICD-10-PCS | Mod: ,,, | Performed by: FAMILY MEDICINE

## 2022-03-07 PROCEDURE — 3078F PR MOST RECENT DIASTOLIC BLOOD PRESSURE < 80 MM HG: ICD-10-PCS | Mod: ,,, | Performed by: FAMILY MEDICINE

## 2022-03-07 PROCEDURE — 1159F MED LIST DOCD IN RCRD: CPT | Mod: ,,, | Performed by: FAMILY MEDICINE

## 2022-03-07 PROCEDURE — 1160F PR REVIEW ALL MEDS BY PRESCRIBER/CLIN PHARMACIST DOCUMENTED: ICD-10-PCS | Mod: ,,, | Performed by: FAMILY MEDICINE

## 2022-03-07 PROCEDURE — 3075F PR MOST RECENT SYSTOLIC BLOOD PRESS GE 130-139MM HG: ICD-10-PCS | Mod: ,,, | Performed by: FAMILY MEDICINE

## 2022-03-07 PROCEDURE — 3008F BODY MASS INDEX DOCD: CPT | Mod: ,,, | Performed by: FAMILY MEDICINE

## 2022-03-07 PROCEDURE — 3078F DIAST BP <80 MM HG: CPT | Mod: ,,, | Performed by: FAMILY MEDICINE

## 2022-03-07 RX ORDER — FLUTICASONE PROPIONATE 50 MCG
2 SPRAY, SUSPENSION (ML) NASAL DAILY
Qty: 48 G | Refills: 2 | Status: SHIPPED | OUTPATIENT
Start: 2022-03-07 | End: 2023-10-10

## 2022-03-07 NOTE — PROGRESS NOTES
Beverly Rodríguez is a 66 y.o. female seen today for follow-up on her COVID pneumonia.  She reports her shortness of breath has improved but she is still symptomatic when she exerts herself.  She has so far been afebrile.  She has also had increased symptoms of nasal congestion and postnasal drainage.      Past Medical History:   Diagnosis Date    Abnormal LFTs     Asthma     ? 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 Rescue 100 mL 3    albuterol (PROVENTIL/VENTOLIN HFA) 90 mcg/actuation inhaler Inhale 2 puffs into the lungs every 6 (six) hours. Rescue 18 g 3    ascorbic acid, vitamin C, (VITAMIN C) 1000 MG tablet Take 1,000 mg by mouth once daily.      cholecalciferol, vitamin D3, 125 mcg (5,000 unit) capsule Take 5,000 Units by mouth once daily.      famotidine (PEPCID) 20 MG tablet Take 1 tablet (20 mg total) by mouth once daily. 30 tablet 11    Lactobacillus rhamnosus GG (CULTURELLE) 10 billion cell capsule Take 1 capsule by mouth once daily.      metoprolol succinate (TOPROL-XL) 25 MG 24 hr tablet Take 1 tablet (25 mg total) by mouth once daily. 90 tablet 1    thyroid, pork, (ARMOUR THYROID) 15 mg Tab Take 5 tablets (75 mg total) by mouth before breakfast. 150 tablet 11     No current facility-administered medications on file prior to visit.       There is no immunization history on file for this patient.    Review of Systems   Constitutional: Negative for fever, malaise/fatigue and weight loss.   HENT: Positive for congestion.     Respiratory: Positive for shortness of breath. Negative for hemoptysis and wheezing.    Cardiovascular: Negative for chest pain and palpitations.   Gastrointestinal: Negative for nausea and vomiting.   Psychiatric/Behavioral: Negative for depression.        Vitals:    03/07/22 1259   BP: 130/77   Pulse: 67   Resp: 18   Temp: 98.1 °F (36.7 °C)       Physical Exam  Vitals reviewed.   Constitutional:       Appearance: Normal appearance.   HENT:      Head: Normocephalic.      Nose:      Right Turbinates: Swollen.      Left Turbinates: Swollen.      Comments: She has clear postnasal drainage.  Eyes:      Extraocular Movements: Extraocular movements intact.      Conjunctiva/sclera: Conjunctivae normal.      Pupils: Pupils are equal, round, and reactive to light.   Neck:      Thyroid: No thyroid mass or thyromegaly.   Cardiovascular:      Rate and Rhythm: Normal rate and regular rhythm.      Heart sounds: Normal heart sounds. No murmur heard.    No gallop.   Pulmonary:      Effort: Pulmonary effort is normal. No respiratory distress.      Breath sounds: Examination of the right-lower field reveals rhonchi. Examination of the left-lower field reveals rhonchi. Rhonchi present. No wheezing or rales.   Skin:     General: Skin is warm and dry.      Coloration: Skin is not jaundiced or pale.   Neurological:      Mental Status: She is alert.   Psychiatric:         Mood and Affect: Mood normal.         Behavior: Behavior normal.         Thought Content: Thought content normal.         Judgment: Judgment normal.          Assessment and Plan  Pneumonia due to COVID-19 virus  -     X-Ray Chest PA And Lateral; Future; Expected date: 03/07/2022    Allergic rhinitis, unspecified seasonality, unspecified trigger            Return to clinic in 3 weeks her x-rays appear to show persistent patchy infiltrates.    Health Maintenance Topics with due status: Not Due       Topic Last Completion Date    Lipid Panel 07/22/2021    Colorectal  Cancer Screening 12/29/2021

## 2022-03-14 ENCOUNTER — TELEPHONE (OUTPATIENT)
Dept: FAMILY MEDICINE | Facility: CLINIC | Age: 67
End: 2022-03-14
Payer: MEDICARE

## 2022-03-14 NOTE — TELEPHONE ENCOUNTER
----- Message from Prem Vincent MD sent at 3/7/2022  2:19 PM CST -----  Slowly resolving COVID pneumonia.  Make sure she follows up in 3 weeks.

## 2022-03-28 ENCOUNTER — OFFICE VISIT (OUTPATIENT)
Dept: FAMILY MEDICINE | Facility: CLINIC | Age: 67
End: 2022-03-28
Payer: MEDICARE

## 2022-03-28 VITALS
OXYGEN SATURATION: 97 % | SYSTOLIC BLOOD PRESSURE: 132 MMHG | RESPIRATION RATE: 19 BRPM | TEMPERATURE: 99 F | WEIGHT: 212 LBS | DIASTOLIC BLOOD PRESSURE: 72 MMHG | BODY MASS INDEX: 36.19 KG/M2 | HEART RATE: 61 BPM | HEIGHT: 64 IN

## 2022-03-28 DIAGNOSIS — J12.82 PNEUMONIA DUE TO COVID-19 VIRUS: Primary | ICD-10-CM

## 2022-03-28 DIAGNOSIS — U07.1 PNEUMONIA DUE TO COVID-19 VIRUS: Primary | ICD-10-CM

## 2022-03-28 PROCEDURE — 1159F MED LIST DOCD IN RCRD: CPT | Mod: ,,, | Performed by: FAMILY MEDICINE

## 2022-03-28 PROCEDURE — 3078F DIAST BP <80 MM HG: CPT | Mod: ,,, | Performed by: FAMILY MEDICINE

## 2022-03-28 PROCEDURE — 99212 OFFICE O/P EST SF 10 MIN: CPT | Mod: ,,, | Performed by: FAMILY MEDICINE

## 2022-03-28 PROCEDURE — 3008F BODY MASS INDEX DOCD: CPT | Mod: ,,, | Performed by: FAMILY MEDICINE

## 2022-03-28 PROCEDURE — 3008F PR BODY MASS INDEX (BMI) DOCUMENTED: ICD-10-PCS | Mod: ,,, | Performed by: FAMILY MEDICINE

## 2022-03-28 PROCEDURE — 1160F RVW MEDS BY RX/DR IN RCRD: CPT | Mod: ,,, | Performed by: FAMILY MEDICINE

## 2022-03-28 PROCEDURE — 1159F PR MEDICATION LIST DOCUMENTED IN MEDICAL RECORD: ICD-10-PCS | Mod: ,,, | Performed by: FAMILY MEDICINE

## 2022-03-28 PROCEDURE — 3078F PR MOST RECENT DIASTOLIC BLOOD PRESSURE < 80 MM HG: ICD-10-PCS | Mod: ,,, | Performed by: FAMILY MEDICINE

## 2022-03-28 PROCEDURE — 3075F PR MOST RECENT SYSTOLIC BLOOD PRESS GE 130-139MM HG: ICD-10-PCS | Mod: ,,, | Performed by: FAMILY MEDICINE

## 2022-03-28 PROCEDURE — 1160F PR REVIEW ALL MEDS BY PRESCRIBER/CLIN PHARMACIST DOCUMENTED: ICD-10-PCS | Mod: ,,, | Performed by: FAMILY MEDICINE

## 2022-03-28 PROCEDURE — 3075F SYST BP GE 130 - 139MM HG: CPT | Mod: ,,, | Performed by: FAMILY MEDICINE

## 2022-03-28 PROCEDURE — 99212 PR OFFICE/OUTPT VISIT, EST, LEVL II, 10-19 MIN: ICD-10-PCS | Mod: ,,, | Performed by: FAMILY MEDICINE

## 2022-03-28 RX ORDER — THYROID 60 MG/1
60 TABLET ORAL
COMMUNITY
End: 2022-11-02 | Stop reason: SDUPTHER

## 2022-03-28 NOTE — PROGRESS NOTES
Beverly Rodríguez is a 66 y.o. female seen today for follow-up on her pneumonia.  She reports that she is feeling much better and is afebrile.  She does have a residual dry cough but this is mostly nonproductive.  Patient's x-ray is currently pending.      Past Medical History:   Diagnosis Date    Abnormal LFTs     Asthma     ? 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 Rescue 100 mL 3    albuterol (PROVENTIL/VENTOLIN HFA) 90 mcg/actuation inhaler Inhale 2 puffs into the lungs every 6 (six) hours. Rescue 18 g 3    ascorbic acid, vitamin C, (VITAMIN C) 1000 MG tablet Take 1,000 mg by mouth once daily.      cholecalciferol, vitamin D3, 125 mcg (5,000 unit) capsule Take 5,000 Units by mouth once daily.      famotidine (PEPCID) 20 MG tablet Take 1 tablet (20 mg total) by mouth once daily. 30 tablet 11    fluticasone propionate (FLONASE) 50 mcg/actuation nasal spray 2 sprays (100 mcg total) by Each Nostril route once daily. 48 g 2    Lactobacillus rhamnosus GG (CULTURELLE) 10 billion cell capsule Take 1 capsule by mouth once daily.      metoprolol succinate (TOPROL-XL) 25 MG 24 hr tablet Take 1 tablet (25 mg total) by mouth once daily. 90 tablet 1    thyroid, pork, (ARMOUR THYROID) 15 mg Tab Take 5 tablets (75 mg total) by mouth before breakfast. (Patient taking differently: Take 15 mg by mouth before breakfast.) 150 tablet 11    thyroid, pork, (ARMOUR THYROID) 60 mg Tab Take 60 mg by mouth before breakfast.       No current  facility-administered medications on file prior to visit.       There is no immunization history on file for this patient.    Review of Systems   Constitutional: Negative for fever, malaise/fatigue and weight loss.   Respiratory: Positive for cough. Negative for shortness of breath.    Cardiovascular: Negative for chest pain and palpitations.   Gastrointestinal: Negative for nausea and vomiting.   Psychiatric/Behavioral: Negative for depression.        Vitals:    03/28/22 1337   BP: 132/72   Pulse: 61   Resp: 19   Temp: 98.6 °F (37 °C)       Physical Exam  Vitals reviewed.   Constitutional:       Appearance: Normal appearance.   HENT:      Head: Normocephalic.   Eyes:      Extraocular Movements: Extraocular movements intact.      Conjunctiva/sclera: Conjunctivae normal.      Pupils: Pupils are equal, round, and reactive to light.   Neck:      Thyroid: No thyroid mass or thyromegaly.   Cardiovascular:      Rate and Rhythm: Normal rate and regular rhythm.      Heart sounds: Normal heart sounds. No murmur heard.    No gallop.   Pulmonary:      Effort: Pulmonary effort is normal. No respiratory distress.      Breath sounds: Rhonchi present. No wheezing or rales.   Skin:     General: Skin is warm and dry.      Coloration: Skin is not jaundiced or pale.   Neurological:      Mental Status: She is alert.   Psychiatric:         Mood and Affect: Mood normal.         Behavior: Behavior normal.         Thought Content: Thought content normal.         Judgment: Judgment normal.          Assessment and Plan  Pneumonia due to COVID-19 virus  -     X-Ray Chest PA And Lateral; Future; Expected date: 03/28/2022            Return to clinic  once her x-ray report is in.    Health Maintenance Topics with due status: Not Due       Topic Last Completion Date    Lipid Panel 07/22/2021    Colorectal Cancer Screening 12/29/2021

## 2022-03-29 ENCOUNTER — TELEPHONE (OUTPATIENT)
Dept: FAMILY MEDICINE | Facility: CLINIC | Age: 67
End: 2022-03-29
Payer: MEDICARE

## 2022-03-29 NOTE — TELEPHONE ENCOUNTER
----- Message from Prem Vincent MD sent at 3/29/2022  8:01 AM CDT -----  Her chest x-ray is improved.  She does have residual markings suggestive of slowly resolving COVID pneumonia.  I recommend she return to the clinic in 1 month for follow-up chest x-ray evaluation.

## 2022-04-28 ENCOUNTER — OFFICE VISIT (OUTPATIENT)
Dept: FAMILY MEDICINE | Facility: CLINIC | Age: 67
End: 2022-04-28
Payer: MEDICARE

## 2022-04-28 ENCOUNTER — TELEPHONE (OUTPATIENT)
Dept: FAMILY MEDICINE | Facility: CLINIC | Age: 67
End: 2022-04-28
Payer: MEDICARE

## 2022-04-28 VITALS
WEIGHT: 212 LBS | HEIGHT: 64 IN | SYSTOLIC BLOOD PRESSURE: 120 MMHG | OXYGEN SATURATION: 98 % | RESPIRATION RATE: 18 BRPM | DIASTOLIC BLOOD PRESSURE: 82 MMHG | BODY MASS INDEX: 36.19 KG/M2 | TEMPERATURE: 98 F | HEART RATE: 78 BPM

## 2022-04-28 DIAGNOSIS — J12.82 PNEUMONIA DUE TO COVID-19 VIRUS: ICD-10-CM

## 2022-04-28 DIAGNOSIS — M25.532 LEFT WRIST PAIN: Primary | ICD-10-CM

## 2022-04-28 DIAGNOSIS — U07.1 PNEUMONIA DUE TO COVID-19 VIRUS: ICD-10-CM

## 2022-04-28 PROCEDURE — 1159F MED LIST DOCD IN RCRD: CPT | Mod: ,,, | Performed by: FAMILY MEDICINE

## 2022-04-28 PROCEDURE — 1160F PR REVIEW ALL MEDS BY PRESCRIBER/CLIN PHARMACIST DOCUMENTED: ICD-10-PCS | Mod: ,,, | Performed by: FAMILY MEDICINE

## 2022-04-28 PROCEDURE — 99213 OFFICE O/P EST LOW 20 MIN: CPT | Mod: ,,, | Performed by: FAMILY MEDICINE

## 2022-04-28 PROCEDURE — 3079F PR MOST RECENT DIASTOLIC BLOOD PRESSURE 80-89 MM HG: ICD-10-PCS | Mod: ,,, | Performed by: FAMILY MEDICINE

## 2022-04-28 PROCEDURE — 3008F PR BODY MASS INDEX (BMI) DOCUMENTED: ICD-10-PCS | Mod: ,,, | Performed by: FAMILY MEDICINE

## 2022-04-28 PROCEDURE — 1160F RVW MEDS BY RX/DR IN RCRD: CPT | Mod: ,,, | Performed by: FAMILY MEDICINE

## 2022-04-28 PROCEDURE — 3074F PR MOST RECENT SYSTOLIC BLOOD PRESSURE < 130 MM HG: ICD-10-PCS | Mod: ,,, | Performed by: FAMILY MEDICINE

## 2022-04-28 PROCEDURE — 3008F BODY MASS INDEX DOCD: CPT | Mod: ,,, | Performed by: FAMILY MEDICINE

## 2022-04-28 PROCEDURE — 99213 PR OFFICE/OUTPT VISIT, EST, LEVL III, 20-29 MIN: ICD-10-PCS | Mod: ,,, | Performed by: FAMILY MEDICINE

## 2022-04-28 PROCEDURE — 3079F DIAST BP 80-89 MM HG: CPT | Mod: ,,, | Performed by: FAMILY MEDICINE

## 2022-04-28 PROCEDURE — 3074F SYST BP LT 130 MM HG: CPT | Mod: ,,, | Performed by: FAMILY MEDICINE

## 2022-04-28 PROCEDURE — 1159F PR MEDICATION LIST DOCUMENTED IN MEDICAL RECORD: ICD-10-PCS | Mod: ,,, | Performed by: FAMILY MEDICINE

## 2022-04-28 NOTE — TELEPHONE ENCOUNTER
----- Message from Prem Vincent MD sent at 4/28/2022  4:29 PM CDT -----  This patient's chest x-ray continues to improve and I recommend rechecking in 3 months.  Patient's x-ray of her wrist does confirm some arthritis and if she is having symptoms I recommend an orthopedic evaluation.

## 2022-04-28 NOTE — PROGRESS NOTES
Beverly Rodríguez is a 66 y.o. female seen today for follow-up on her post COVID pneumonia.  She will need a follow-up chest x-ray today.  Patient reports he is feeling well with no shortness of breath or cough and she has been afebrile.  Patient is complaining of her left wrist and she has had persistent pain affecting this area since the dog bite approximately 1 year ago.      Past Medical History:   Diagnosis Date    Abnormal LFTs     Asthma     ? 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 Rescue 100 mL 3    albuterol (PROVENTIL/VENTOLIN HFA) 90 mcg/actuation inhaler Inhale 2 puffs into the lungs every 6 (six) hours. Rescue 18 g 3    ascorbic acid, vitamin C, (VITAMIN C) 1000 MG tablet Take 1,000 mg by mouth once daily.      cholecalciferol, vitamin D3, 125 mcg (5,000 unit) capsule Take 5,000 Units by mouth once daily.      famotidine (PEPCID) 20 MG tablet Take 1 tablet (20 mg total) by mouth once daily. 30 tablet 11    fluticasone propionate (FLONASE) 50 mcg/actuation nasal spray 2 sprays (100 mcg total) by Each Nostril route once daily. 48 g 2    Lactobacillus rhamnosus GG (CULTURELLE) 10 billion cell capsule Take 1 capsule by mouth once daily.      metoprolol succinate (TOPROL-XL) 25 MG 24 hr tablet Take 1 tablet (25 mg total) by mouth once daily. 90 tablet 1    thyroid, pork, (ARMOUR THYROID) 15 mg Tab Take 5 tablets (75 mg total) by mouth before breakfast. (Patient taking differently: Take 15 mg by mouth before breakfast.) 150  tablet 11    thyroid, pork, (ARMOUR THYROID) 60 mg Tab Take 60 mg by mouth before breakfast.       No current facility-administered medications on file prior to visit.       There is no immunization history on file for this patient.    Review of Systems   Constitutional: Negative for fever, malaise/fatigue and weight loss.   Respiratory: Negative for cough and shortness of breath.    Cardiovascular: Negative for chest pain and palpitations.   Gastrointestinal: Negative for nausea and vomiting.   Musculoskeletal: Positive for joint pain.   Psychiatric/Behavioral: Negative for depression.        Vitals:    04/28/22 1302   BP: 120/82   Pulse: 78   Resp: 18   Temp: 97.6 °F (36.4 °C)       Physical Exam  Vitals reviewed.   Constitutional:       Appearance: Normal appearance.   HENT:      Head: Normocephalic.   Eyes:      Extraocular Movements: Extraocular movements intact.      Conjunctiva/sclera: Conjunctivae normal.      Pupils: Pupils are equal, round, and reactive to light.   Neck:      Thyroid: No thyroid mass or thyromegaly.   Cardiovascular:      Rate and Rhythm: Normal rate and regular rhythm.      Heart sounds: Normal heart sounds. No murmur heard.    No gallop.   Pulmonary:      Effort: Pulmonary effort is normal. No respiratory distress.      Breath sounds: Normal breath sounds. No wheezing or rales.   Musculoskeletal:      Left wrist: Tenderness present. Decreased range of motion.        Arms:    Skin:     General: Skin is warm and dry.      Coloration: Skin is not jaundiced or pale.   Neurological:      Mental Status: She is alert.   Psychiatric:         Mood and Affect: Mood normal.         Behavior: Behavior normal.         Thought Content: Thought content normal.         Judgment: Judgment normal.          Assessment and Plan  Left wrist pain  -     X-Ray Wrist 2 View Left; Future; Expected date: 04/28/2022    Pneumonia due to COVID-19 virus  -     X-Ray Chest PA And Lateral; Future; Expected date:  04/28/2022            Return to clinic in July or as needed once or x-rays are in.  May consider a pulmonology and orthopedic evaluation.    Health Maintenance Topics with due status: Not Due       Topic Last Completion Date    Lipid Panel 07/22/2021    Colorectal Cancer Screening 12/29/2021

## 2022-07-28 ENCOUNTER — OFFICE VISIT (OUTPATIENT)
Dept: FAMILY MEDICINE | Facility: CLINIC | Age: 67
End: 2022-07-28
Payer: MEDICARE

## 2022-07-28 VITALS
TEMPERATURE: 98 F | SYSTOLIC BLOOD PRESSURE: 132 MMHG | OXYGEN SATURATION: 98 % | WEIGHT: 212 LBS | HEIGHT: 64 IN | HEART RATE: 60 BPM | RESPIRATION RATE: 18 BRPM | DIASTOLIC BLOOD PRESSURE: 76 MMHG | BODY MASS INDEX: 36.19 KG/M2

## 2022-07-28 DIAGNOSIS — I10 HYPERTENSION, UNSPECIFIED TYPE: ICD-10-CM

## 2022-07-28 DIAGNOSIS — J12.82 PNEUMONIA DUE TO COVID-19 VIRUS: Primary | ICD-10-CM

## 2022-07-28 DIAGNOSIS — U07.1 PNEUMONIA DUE TO COVID-19 VIRUS: Primary | ICD-10-CM

## 2022-07-28 PROCEDURE — 3078F DIAST BP <80 MM HG: CPT | Mod: ,,, | Performed by: FAMILY MEDICINE

## 2022-07-28 PROCEDURE — 1159F PR MEDICATION LIST DOCUMENTED IN MEDICAL RECORD: ICD-10-PCS | Mod: ,,, | Performed by: FAMILY MEDICINE

## 2022-07-28 PROCEDURE — 99213 OFFICE O/P EST LOW 20 MIN: CPT | Mod: ,,, | Performed by: FAMILY MEDICINE

## 2022-07-28 PROCEDURE — 1160F PR REVIEW ALL MEDS BY PRESCRIBER/CLIN PHARMACIST DOCUMENTED: ICD-10-PCS | Mod: ,,, | Performed by: FAMILY MEDICINE

## 2022-07-28 PROCEDURE — 1160F RVW MEDS BY RX/DR IN RCRD: CPT | Mod: ,,, | Performed by: FAMILY MEDICINE

## 2022-07-28 PROCEDURE — 3008F PR BODY MASS INDEX (BMI) DOCUMENTED: ICD-10-PCS | Mod: ,,, | Performed by: FAMILY MEDICINE

## 2022-07-28 PROCEDURE — 3075F SYST BP GE 130 - 139MM HG: CPT | Mod: ,,, | Performed by: FAMILY MEDICINE

## 2022-07-28 PROCEDURE — 3008F BODY MASS INDEX DOCD: CPT | Mod: ,,, | Performed by: FAMILY MEDICINE

## 2022-07-28 PROCEDURE — 99213 PR OFFICE/OUTPT VISIT, EST, LEVL III, 20-29 MIN: ICD-10-PCS | Mod: ,,, | Performed by: FAMILY MEDICINE

## 2022-07-28 PROCEDURE — 3075F PR MOST RECENT SYSTOLIC BLOOD PRESS GE 130-139MM HG: ICD-10-PCS | Mod: ,,, | Performed by: FAMILY MEDICINE

## 2022-07-28 PROCEDURE — 1159F MED LIST DOCD IN RCRD: CPT | Mod: ,,, | Performed by: FAMILY MEDICINE

## 2022-07-28 PROCEDURE — 3078F PR MOST RECENT DIASTOLIC BLOOD PRESSURE < 80 MM HG: ICD-10-PCS | Mod: ,,, | Performed by: FAMILY MEDICINE

## 2022-07-28 RX ORDER — METOPROLOL SUCCINATE 25 MG/1
25 TABLET, EXTENDED RELEASE ORAL DAILY
Qty: 90 TABLET | Refills: 1 | Status: SHIPPED | OUTPATIENT
Start: 2022-07-28 | End: 2022-11-02 | Stop reason: SDUPTHER

## 2022-07-28 NOTE — PROGRESS NOTES
Beverly Rodríguez is a 66 y.o. female seen today for follow-up for her pneumonia as well as her hypertension.  He will need a follow-up chest x-ray today.  She has a past medical history of vitamin-D deficiency but defers treatment currently.  She reports that her lipids were recently checked by Cardiology but she was unable to tolerate the medication and will contact her cardiologist.    Past Medical History:   Diagnosis Date    Abnormal LFTs     Asthma     ? 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 Rescue 100 mL 3    albuterol (PROVENTIL/VENTOLIN HFA) 90 mcg/actuation inhaler Inhale 2 puffs into the lungs every 6 (six) hours. Rescue 18 g 3    ascorbic acid, vitamin C, (VITAMIN C) 1000 MG tablet Take 1,000 mg by mouth once daily.      cholecalciferol, vitamin D3, 125 mcg (5,000 unit) capsule Take 5,000 Units by mouth once daily.      famotidine (PEPCID) 20 MG tablet Take 1 tablet (20 mg total) by mouth once daily. 30 tablet 11    fluticasone propionate (FLONASE) 50 mcg/actuation nasal spray 2 sprays (100 mcg total) by Each Nostril route once daily. 48 g 2    Lactobacillus rhamnosus GG (CULTURELLE) 10 billion cell capsule Take 1 capsule by mouth once daily.      thyroid, pork, (ARMOUR THYROID) 60 mg Tab Take 60 mg by mouth before breakfast.      [DISCONTINUED] metoprolol succinate (TOPROL-XL) 25 MG 24 hr tablet Take 1 tablet (25 mg total) by mouth once daily. 90 tablet 1    [DISCONTINUED] thyroid, pork, (ARMOUR THYROID) 15 mg Tab  Take 5 tablets (75 mg total) by mouth before breakfast. (Patient taking differently: Take 15 mg by mouth before breakfast.) 150 tablet 11     No current facility-administered medications on file prior to visit.       There is no immunization history on file for this patient.    Review of Systems   Constitutional: Negative for fever, malaise/fatigue and weight loss.   Respiratory: Negative for shortness of breath.    Cardiovascular: Negative for chest pain and palpitations.   Gastrointestinal: Negative for nausea and vomiting.   Musculoskeletal: Positive for joint pain and myalgias. Negative for falls.   Psychiatric/Behavioral: Negative for depression. The patient is nervous/anxious.         Vitals:    07/28/22 1303   BP: 132/76   Pulse: 60   Resp: 18   Temp: 97.8 °F (36.6 °C)       Physical Exam  Vitals reviewed.   Constitutional:       Appearance: Normal appearance.   HENT:      Head: Normocephalic.   Eyes:      Extraocular Movements: Extraocular movements intact.      Conjunctiva/sclera: Conjunctivae normal.      Pupils: Pupils are equal, round, and reactive to light.   Neck:      Thyroid: No thyroid mass or thyromegaly.   Cardiovascular:      Rate and Rhythm: Normal rate and regular rhythm.      Heart sounds: Murmur heard.    Systolic murmur is present with a grade of 3/6.    No gallop.   Pulmonary:      Effort: Pulmonary effort is normal. No respiratory distress.      Breath sounds: Normal breath sounds. No wheezing or rales.   Musculoskeletal:      Lumbar back: Spasms and tenderness present. Decreased range of motion.      Right knee: Decreased range of motion.      Left knee: Decreased range of motion.      Comments: She has an unsteady cane dependent antalgic gait.   Skin:     General: Skin is warm and dry.      Coloration: Skin is not jaundiced or pale.   Neurological:      Mental Status: She is alert.   Psychiatric:         Mood and Affect: Mood normal.         Behavior: Behavior normal.         Thought  Content: Thought content normal.         Judgment: Judgment normal.          Assessment and Plan  Hypertension, unspecified type  -     metoprolol succinate (TOPROL-XL) 25 MG 24 hr tablet; Take 1 tablet (25 mg total) by mouth once daily.  Dispense: 90 tablet; Refill: 1            Return to clinic in 3 months or as needed.  Due to her risk for falls and history of lumbar disc disease and joint disease patient will be set up for a handicap parking space.  Patient be set up for a prescription for an in-home emotional support animal.    Health Maintenance Topics with due status: Not Due       Topic Last Completion Date    Lipid Panel 07/22/2021    Colorectal Cancer Screening 12/29/2021    Influenza Vaccine Not Due

## 2022-07-29 ENCOUNTER — TELEPHONE (OUTPATIENT)
Dept: FAMILY MEDICINE | Facility: CLINIC | Age: 67
End: 2022-07-29
Payer: MEDICARE

## 2022-07-29 NOTE — TELEPHONE ENCOUNTER
----- Message from Prem Vincent MD sent at 7/28/2022  2:44 PM CDT -----  Her infiltrate has resolved

## 2022-11-02 DIAGNOSIS — I10 HYPERTENSION, UNSPECIFIED TYPE: ICD-10-CM

## 2022-11-03 RX ORDER — THYROID 60 MG/1
60 TABLET ORAL
Qty: 90 TABLET | Refills: 1 | Status: SHIPPED | OUTPATIENT
Start: 2022-11-03 | End: 2022-11-04 | Stop reason: SDUPTHER

## 2022-11-03 RX ORDER — METOPROLOL SUCCINATE 25 MG/1
25 TABLET, EXTENDED RELEASE ORAL DAILY
Qty: 90 TABLET | Refills: 1 | Status: SHIPPED | OUTPATIENT
Start: 2022-11-03 | End: 2023-05-09 | Stop reason: SDUPTHER

## 2022-11-04 ENCOUNTER — OFFICE VISIT (OUTPATIENT)
Dept: FAMILY MEDICINE | Facility: CLINIC | Age: 67
End: 2022-11-04
Payer: MEDICARE

## 2022-11-04 VITALS
DIASTOLIC BLOOD PRESSURE: 86 MMHG | OXYGEN SATURATION: 95 % | WEIGHT: 212 LBS | BODY MASS INDEX: 36.19 KG/M2 | HEART RATE: 52 BPM | SYSTOLIC BLOOD PRESSURE: 144 MMHG | RESPIRATION RATE: 18 BRPM | HEIGHT: 64 IN

## 2022-11-04 DIAGNOSIS — R20.2 NUMBNESS AND TINGLING IN BOTH HANDS: ICD-10-CM

## 2022-11-04 DIAGNOSIS — R26.81 UNSTEADY GAIT: ICD-10-CM

## 2022-11-04 DIAGNOSIS — M54.50 CHRONIC MIDLINE LOW BACK PAIN WITHOUT SCIATICA: ICD-10-CM

## 2022-11-04 DIAGNOSIS — G89.29 CHRONIC MIDLINE LOW BACK PAIN WITHOUT SCIATICA: ICD-10-CM

## 2022-11-04 DIAGNOSIS — E03.9 HYPOTHYROIDISM, UNSPECIFIED TYPE: Primary | ICD-10-CM

## 2022-11-04 DIAGNOSIS — R20.0 NUMBNESS AND TINGLING IN BOTH HANDS: ICD-10-CM

## 2022-11-04 DIAGNOSIS — M54.2 NECK PAIN: ICD-10-CM

## 2022-11-04 PROCEDURE — 1159F PR MEDICATION LIST DOCUMENTED IN MEDICAL RECORD: ICD-10-PCS | Mod: ,,, | Performed by: FAMILY MEDICINE

## 2022-11-04 PROCEDURE — 3079F PR MOST RECENT DIASTOLIC BLOOD PRESSURE 80-89 MM HG: ICD-10-PCS | Mod: ,,, | Performed by: FAMILY MEDICINE

## 2022-11-04 PROCEDURE — 1101F PR PT FALLS ASSESS DOC 0-1 FALLS W/OUT INJ PAST YR: ICD-10-PCS | Mod: ,,, | Performed by: FAMILY MEDICINE

## 2022-11-04 PROCEDURE — 3288F FALL RISK ASSESSMENT DOCD: CPT | Mod: ,,, | Performed by: FAMILY MEDICINE

## 2022-11-04 PROCEDURE — 1160F RVW MEDS BY RX/DR IN RCRD: CPT | Mod: ,,, | Performed by: FAMILY MEDICINE

## 2022-11-04 PROCEDURE — 1160F PR REVIEW ALL MEDS BY PRESCRIBER/CLIN PHARMACIST DOCUMENTED: ICD-10-PCS | Mod: ,,, | Performed by: FAMILY MEDICINE

## 2022-11-04 PROCEDURE — 3077F SYST BP >= 140 MM HG: CPT | Mod: ,,, | Performed by: FAMILY MEDICINE

## 2022-11-04 PROCEDURE — 99214 PR OFFICE/OUTPT VISIT, EST, LEVL IV, 30-39 MIN: ICD-10-PCS | Mod: ,,, | Performed by: FAMILY MEDICINE

## 2022-11-04 PROCEDURE — 3008F PR BODY MASS INDEX (BMI) DOCUMENTED: ICD-10-PCS | Mod: ,,, | Performed by: FAMILY MEDICINE

## 2022-11-04 PROCEDURE — 3008F BODY MASS INDEX DOCD: CPT | Mod: ,,, | Performed by: FAMILY MEDICINE

## 2022-11-04 PROCEDURE — 1101F PT FALLS ASSESS-DOCD LE1/YR: CPT | Mod: ,,, | Performed by: FAMILY MEDICINE

## 2022-11-04 PROCEDURE — 3079F DIAST BP 80-89 MM HG: CPT | Mod: ,,, | Performed by: FAMILY MEDICINE

## 2022-11-04 PROCEDURE — 3288F PR FALLS RISK ASSESSMENT DOCUMENTED: ICD-10-PCS | Mod: ,,, | Performed by: FAMILY MEDICINE

## 2022-11-04 PROCEDURE — 1159F MED LIST DOCD IN RCRD: CPT | Mod: ,,, | Performed by: FAMILY MEDICINE

## 2022-11-04 PROCEDURE — 3077F PR MOST RECENT SYSTOLIC BLOOD PRESSURE >= 140 MM HG: ICD-10-PCS | Mod: ,,, | Performed by: FAMILY MEDICINE

## 2022-11-04 PROCEDURE — 99214 OFFICE O/P EST MOD 30 MIN: CPT | Mod: ,,, | Performed by: FAMILY MEDICINE

## 2022-11-04 RX ORDER — THYROID 60 MG/1
60 TABLET ORAL
Qty: 90 TABLET | Refills: 1 | Status: SHIPPED | OUTPATIENT
Start: 2022-11-04

## 2022-11-04 NOTE — PROGRESS NOTES
Beverly Rodríguez is a 67 y.o. female seen today for refill for thyroid medication.  Her TSH was to goal in August.  She denies any chest pain or shortness of breath but has had increasing neck pain and low back pain.  Patient has had a worsening unsteady gait and has had some near falls.  She has also had worsening tingling and numbness affecting her hands bilaterally.    Past Medical History:   Diagnosis Date    Abnormal LFTs     Asthma     ? 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 Rescue 100 mL 3    albuterol (PROVENTIL/VENTOLIN HFA) 90 mcg/actuation inhaler Inhale 2 puffs into the lungs every 6 (six) hours. Rescue 18 g 3    ascorbic acid, vitamin C, (VITAMIN C) 1000 MG tablet Take 1,000 mg by mouth once daily.      cholecalciferol, vitamin D3, 125 mcg (5,000 unit) capsule Take 5,000 Units by mouth once daily.      famotidine (PEPCID) 20 MG tablet Take 1 tablet (20 mg total) by mouth once daily. 30 tablet 11    fluticasone propionate (FLONASE) 50 mcg/actuation nasal spray 2 sprays (100 mcg total) by Each Nostril route once daily. 48 g 2    Lactobacillus rhamnosus GG (CULTURELLE) 10 billion cell capsule Take 1 capsule by mouth once daily.      metoprolol succinate (TOPROL-XL) 25 MG 24 hr tablet Take 1 tablet (25 mg total) by mouth once daily. 90 tablet 1    [DISCONTINUED] thyroid, pork, (ARMOUR THYROID) 60 mg Tab Take 1 tablet (60 mg total) by mouth before breakfast. 90 tablet 1     No current facility-administered medications on file prior to  visit.       There is no immunization history on file for this patient.    Review of Systems   Constitutional:  Negative for fever, malaise/fatigue and weight loss.   Respiratory:  Negative for shortness of breath.    Cardiovascular:  Negative for chest pain and palpitations.   Gastrointestinal:  Negative for nausea and vomiting.   Musculoskeletal:  Positive for back pain, myalgias and neck pain.   Neurological:  Positive for tingling, sensory change and weakness.   Psychiatric/Behavioral:  Negative for depression.       Vitals:    11/04/22 1510   BP: (!) 144/86   Pulse: (!) 52   Resp: 18       Physical Exam  Vitals reviewed.   Constitutional:       Appearance: Normal appearance.   HENT:      Head: Normocephalic.   Eyes:      Extraocular Movements: Extraocular movements intact.      Conjunctiva/sclera: Conjunctivae normal.      Pupils: Pupils are equal, round, and reactive to light.   Neck:      Thyroid: No thyroid mass or thyromegaly.   Cardiovascular:      Rate and Rhythm: Normal rate and regular rhythm.      Heart sounds: Normal heart sounds. No murmur heard.    No gallop.   Pulmonary:      Effort: Pulmonary effort is normal. No respiratory distress.      Breath sounds: Normal breath sounds. No wheezing or rales.   Musculoskeletal:      Cervical back: Spasms and tenderness present. Decreased range of motion.      Lumbar back: Spasms and tenderness present. Decreased range of motion.      Comments: Patient's gait is slow and slightly unsteady.   Skin:     General: Skin is warm and dry.      Coloration: Skin is not jaundiced or pale.   Neurological:      Mental Status: She is alert.   Psychiatric:         Mood and Affect: Mood normal.         Behavior: Behavior normal.         Thought Content: Thought content normal.         Judgment: Judgment normal.        Assessment and Plan  Hypothyroidism, unspecified type  -     thyroid, pork, (ARMOUR THYROID) 60 mg Tab; Take 1 tablet (60 mg total) by mouth before breakfast.   Dispense: 90 tablet; Refill: 1    Neck pain  -     X-Ray Cervical Spine 2 or 3 Views; Future; Expected date: 11/04/2022  -     Ambulatory referral/consult to Physical/Occupational Therapy; Future; Expected date: 11/11/2022    Chronic midline low back pain without sciatica  -     X-Ray Lumbar Spine Ap And Lateral; Future; Expected date: 11/04/2022  -     Ambulatory referral/consult to Physical/Occupational Therapy; Future; Expected date: 11/11/2022    Unsteady gait  -     Ambulatory referral/consult to Physical/Occupational Therapy; Future; Expected date: 11/11/2022  -     Ambulatory referral/consult to Neurology; Future; Expected date: 11/11/2022  -     Vitamin B12; Future; Expected date: 11/11/2022  -     Vitamin D; Future; Expected date: 11/11/2022  -     Folate; Future; Expected date: 11/11/2022  -     Homocysteine, Serum; Future; Expected date: 11/11/2022    Numbness and tingling in both hands  -     Ambulatory referral/consult to Neurology; Future; Expected date: 11/11/2022  -     CBC Auto Differential; Future; Expected date: 11/11/2022  -     Comprehensive Metabolic Panel; Future; Expected date: 11/11/2022  -     Vitamin B12; Future; Expected date: 11/11/2022  -     Vitamin D; Future; Expected date: 11/11/2022  -     Folate; Future; Expected date: 11/11/2022  -     Homocysteine, Serum; Future; Expected date: 11/11/2022          Return to clinic in middle December as needed.  Patient will bring in her blood pressure log at that visit.    Health Maintenance Topics with due status: Not Due       Topic Last Completion Date    Lipid Panel 07/22/2021    Colorectal Cancer Screening 12/29/2021

## 2022-11-07 ENCOUNTER — TELEPHONE (OUTPATIENT)
Dept: FAMILY MEDICINE | Facility: CLINIC | Age: 67
End: 2022-11-07
Payer: MEDICARE

## 2022-11-07 NOTE — TELEPHONE ENCOUNTER
----- Message from Prem Vincent MD sent at 11/7/2022  7:50 AM CST -----  Degin changes of the lumbar spine recommend PT eval

## 2022-11-07 NOTE — TELEPHONE ENCOUNTER
----- Message from Prem Vincent MD sent at 11/7/2022  7:50 AM CST -----  Degin changes of the c spine

## 2022-11-09 DIAGNOSIS — Z71.89 COMPLEX CARE COORDINATION: ICD-10-CM

## 2022-11-11 ENCOUNTER — TELEPHONE (OUTPATIENT)
Dept: FAMILY MEDICINE | Facility: CLINIC | Age: 67
End: 2022-11-11
Payer: MEDICARE

## 2022-11-11 NOTE — TELEPHONE ENCOUNTER
Pt notified and verbalized understanding. Pt reports she would like to wait until follow up to discuss labs due to other appointments scheduled. Verbalized understanding.

## 2022-11-11 NOTE — TELEPHONE ENCOUNTER
----- Message from Prem Vincent MD sent at 11/10/2022 10:59 AM CST -----  Office visit for renal function and multiple vitamin deficiencies.

## 2022-11-28 PROBLEM — E04.2 MULTIPLE THYROID NODULES: Status: ACTIVE | Noted: 2022-01-17

## 2022-11-28 PROBLEM — K90.0 CELIAC DISEASE: Status: ACTIVE | Noted: 2022-01-17

## 2022-11-28 PROBLEM — E66.01 MORBID OBESITY: Status: ACTIVE | Noted: 2022-01-17

## 2022-12-09 ENCOUNTER — OFFICE VISIT (OUTPATIENT)
Dept: FAMILY MEDICINE | Facility: CLINIC | Age: 67
End: 2022-12-09
Payer: MEDICARE

## 2022-12-09 VITALS
BODY MASS INDEX: 36.7 KG/M2 | HEIGHT: 64 IN | OXYGEN SATURATION: 97 % | DIASTOLIC BLOOD PRESSURE: 88 MMHG | RESPIRATION RATE: 18 BRPM | SYSTOLIC BLOOD PRESSURE: 128 MMHG | WEIGHT: 215 LBS | HEART RATE: 51 BPM | TEMPERATURE: 97 F

## 2022-12-09 DIAGNOSIS — E55.9 VITAMIN D DEFICIENCY: ICD-10-CM

## 2022-12-09 DIAGNOSIS — E53.8 B12 DEFICIENCY: Primary | ICD-10-CM

## 2022-12-09 DIAGNOSIS — E53.8 FOLATE DEFICIENCY: ICD-10-CM

## 2022-12-09 DIAGNOSIS — E03.9 HYPOTHYROIDISM, UNSPECIFIED TYPE: ICD-10-CM

## 2022-12-09 DIAGNOSIS — E78.5 HYPERLIPIDEMIA, UNSPECIFIED HYPERLIPIDEMIA TYPE: ICD-10-CM

## 2022-12-09 PROCEDURE — 99214 OFFICE O/P EST MOD 30 MIN: CPT | Mod: 25,,, | Performed by: FAMILY MEDICINE

## 2022-12-09 PROCEDURE — 99214 PR OFFICE/OUTPT VISIT, EST, LEVL IV, 30-39 MIN: ICD-10-PCS | Mod: 25,,, | Performed by: FAMILY MEDICINE

## 2022-12-09 PROCEDURE — 1159F MED LIST DOCD IN RCRD: CPT | Mod: ,,, | Performed by: FAMILY MEDICINE

## 2022-12-09 PROCEDURE — 1101F PT FALLS ASSESS-DOCD LE1/YR: CPT | Mod: ,,, | Performed by: FAMILY MEDICINE

## 2022-12-09 PROCEDURE — 1160F RVW MEDS BY RX/DR IN RCRD: CPT | Mod: ,,, | Performed by: FAMILY MEDICINE

## 2022-12-09 PROCEDURE — 96372 THER/PROPH/DIAG INJ SC/IM: CPT | Mod: ,,, | Performed by: FAMILY MEDICINE

## 2022-12-09 PROCEDURE — 3074F SYST BP LT 130 MM HG: CPT | Mod: ,,, | Performed by: FAMILY MEDICINE

## 2022-12-09 PROCEDURE — 3074F PR MOST RECENT SYSTOLIC BLOOD PRESSURE < 130 MM HG: ICD-10-PCS | Mod: ,,, | Performed by: FAMILY MEDICINE

## 2022-12-09 PROCEDURE — 1160F PR REVIEW ALL MEDS BY PRESCRIBER/CLIN PHARMACIST DOCUMENTED: ICD-10-PCS | Mod: ,,, | Performed by: FAMILY MEDICINE

## 2022-12-09 PROCEDURE — 3288F FALL RISK ASSESSMENT DOCD: CPT | Mod: ,,, | Performed by: FAMILY MEDICINE

## 2022-12-09 PROCEDURE — 1101F PR PT FALLS ASSESS DOC 0-1 FALLS W/OUT INJ PAST YR: ICD-10-PCS | Mod: ,,, | Performed by: FAMILY MEDICINE

## 2022-12-09 PROCEDURE — 3008F BODY MASS INDEX DOCD: CPT | Mod: ,,, | Performed by: FAMILY MEDICINE

## 2022-12-09 PROCEDURE — 3008F PR BODY MASS INDEX (BMI) DOCUMENTED: ICD-10-PCS | Mod: ,,, | Performed by: FAMILY MEDICINE

## 2022-12-09 PROCEDURE — 3079F PR MOST RECENT DIASTOLIC BLOOD PRESSURE 80-89 MM HG: ICD-10-PCS | Mod: ,,, | Performed by: FAMILY MEDICINE

## 2022-12-09 PROCEDURE — 96372 PR INJECTION,THERAP/PROPH/DIAG2ST, IM OR SUBCUT: ICD-10-PCS | Mod: ,,, | Performed by: FAMILY MEDICINE

## 2022-12-09 PROCEDURE — 3288F PR FALLS RISK ASSESSMENT DOCUMENTED: ICD-10-PCS | Mod: ,,, | Performed by: FAMILY MEDICINE

## 2022-12-09 PROCEDURE — 1159F PR MEDICATION LIST DOCUMENTED IN MEDICAL RECORD: ICD-10-PCS | Mod: ,,, | Performed by: FAMILY MEDICINE

## 2022-12-09 PROCEDURE — 3079F DIAST BP 80-89 MM HG: CPT | Mod: ,,, | Performed by: FAMILY MEDICINE

## 2022-12-09 RX ORDER — EZETIMIBE 10 MG/1
10 TABLET ORAL DAILY
Qty: 90 TABLET | Refills: 3 | Status: SHIPPED | OUTPATIENT
Start: 2022-12-09 | End: 2023-03-09

## 2022-12-09 RX ORDER — ERGOCALCIFEROL 1.25 MG/1
50000 CAPSULE ORAL
Qty: 4 CAPSULE | Refills: 2 | Status: SHIPPED | OUTPATIENT
Start: 2022-12-09 | End: 2023-03-17

## 2022-12-09 RX ORDER — CYANOCOBALAMIN 1000 UG/ML
1000 INJECTION, SOLUTION INTRAMUSCULAR; SUBCUTANEOUS ONCE
Status: COMPLETED | OUTPATIENT
Start: 2022-12-09 | End: 2022-12-09

## 2022-12-09 RX ADMIN — CYANOCOBALAMIN 1000 MCG: 1000 INJECTION, SOLUTION INTRAMUSCULAR; SUBCUTANEOUS at 08:12

## 2022-12-09 NOTE — PROGRESS NOTES
Beverly Rodríguez is a 67 y.o. female seen today for follow-up on her lab work.  She did have a mildly elevated creatinine and reports that she has been using over-the-counter pain relievers which may involve NSAIDs.  I encouraged her to discontinue these medications use Tylenol as needed only.  Patient also had a deficiency and B12 folate and vitamin-D which we will attend to today.  He has had 1 fall secondary to unsteady gait and I encouraged her to continue to use her walker and cane.    Past Medical History:   Diagnosis Date    Abnormal LFTs     Asthma     ? 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 Rescue 100 mL 3    albuterol (PROVENTIL/VENTOLIN HFA) 90 mcg/actuation inhaler Inhale 2 puffs into the lungs every 6 (six) hours. Rescue 18 g 3    ascorbic acid, vitamin C, (VITAMIN C) 1000 MG tablet Take 1,000 mg by mouth once daily.      famotidine (PEPCID) 20 MG tablet Take 1 tablet (20 mg total) by mouth once daily. 30 tablet 11    fluticasone propionate (FLONASE) 50 mcg/actuation nasal spray 2 sprays (100 mcg total) by Each Nostril route once daily. 48 g 2    Lactobacillus rhamnosus GG (CULTURELLE) 10 billion cell capsule Take 1 capsule by mouth once daily.      metoprolol succinate (TOPROL-XL) 25 MG 24 hr tablet Take 1 tablet (25 mg total) by mouth once daily. 90 tablet 1    thyroid, pork, (ARMOUR THYROID) 60 mg Tab Take 1 tablet (60 mg total) by mouth before breakfast. 90 tablet 1    [DISCONTINUED] cholecalciferol, vitamin D3, 125 mcg (5,000  unit) capsule Take 5,000 Units by mouth once daily.       No current facility-administered medications on file prior to visit.       There is no immunization history on file for this patient.    Review of Systems   Constitutional:  Negative for fever, malaise/fatigue and weight loss.   Respiratory:  Negative for shortness of breath.    Cardiovascular:  Negative for chest pain and palpitations.   Gastrointestinal:  Negative for nausea and vomiting.   Musculoskeletal:  Positive for falls, joint pain and myalgias.   Neurological:  Positive for sensory change and weakness.   Psychiatric/Behavioral:  Negative for depression.       Vitals:    12/09/22 0813   BP: 128/88   Pulse: (!) 51   Resp: 18   Temp: 97.3 °F (36.3 °C)       Physical Exam  Eyes:      Conjunctiva/sclera: Conjunctivae normal.   Pulmonary:      Effort: Pulmonary effort is normal.   Neurological:      Mental Status: She is alert.   Psychiatric:         Mood and Affect: Mood normal.         Behavior: Behavior normal.         Thought Content: Thought content normal.         Judgment: Judgment normal.        Assessment and Plan  B12 deficiency  -     cyanocobalamin injection 1,000 mcg  -     Homocysteine, Serum; Future; Expected date: 03/09/2023  -     Vitamin B12; Future; Expected date: 03/09/2023    Folate deficiency  -     Folate; Future; Expected date: 03/09/2023    Vitamin D deficiency  -     Vitamin D; Future; Expected date: 03/09/2023    Hyperlipidemia, unspecified hyperlipidemia type  -     ezetimibe (ZETIA) 10 mg tablet; Take 1 tablet (10 mg total) by mouth once daily.  Dispense: 90 tablet; Refill: 3  -     CBC Auto Differential; Future; Expected date: 03/09/2023  -     Comprehensive Metabolic Panel; Future; Expected date: 03/09/2023  -     Lipid Panel; Future; Expected date: 03/09/2023    Hypothyroidism, unspecified type  -     TSH; Future; Expected date: 03/09/2023    Other orders  -     ergocalciferol (ERGOCALCIFEROL) 50,000 unit Cap; Take 1  capsule (50,000 Units total) by mouth every 7 days.  Dispense: 4 capsule; Refill: 2          Return to clinic monthly for B12 injection x3 and then an office visit in 3 months after she has rechecked her labs.    Health Maintenance Topics with due status: Not Due       Topic Last Completion Date    Lipid Panel 07/22/2021    Colorectal Cancer Screening 12/29/2021

## 2022-12-12 NOTE — PROGRESS NOTES
Subjective:       Patient ID: Beverly Rodríguez is a 67 y.o. female.    Chief Complaint:  No chief complaint on file.      History of Present Illness  This pleasant 67 year old right handed  female presents to the clinic as a new patient referral from Dr. CROW Vincent for neuropathy and unsteady gait. Patient states burning, numbness, and tingling in the hands and feet started 6 months ago and has progressively gotten worse. She states the symptoms are intermittent and rated as a 5 on a scale of 0 to 10. She reports the burning, numbness and tingling are worse at night and with rest. She denies diabetes but states she had a back and neck injury in the past. She reports back and neck pain started greater than 17 years ago. The back pain is located in the lumbar region and radiates to the legs. She states the back pain is intermittent and rated as a 8 to 9 on a scale of 0 to 10 with a dull aching pain. She denies back surgery and is currently doing home health physical therapy. She states neck pain radiates to the arms and is intermittent. She rates the neck pain as a 5 to 6 on a scale of 0 to 10 with a dull aching pain. She denies any neck surgery. Discussed conservative treatment for the neuropathy, back and neck pain. She desires not to start any new medications at this time. She reports 2 to 4 falls in the last 4 months. Discussed fall precautions in detail and written education given. Discussed using her walker or 4 prong cane for better balance. She is agreeable to have the MRI of the brain and cervical spine to evaluate the falls and a EMG NCS to evaluate the neuropathy symptoms. Her PMH includes asthma, HTN, COVID, Hypothyroidism, DM, Celiac disease, increased LFT, B12 and vitamin D deficiencies, and bradycardia. Her family medical history includes heart disease, kidney disease and cancer. She denies smoking or drinking alcohol and states she had a total hysterectomy at age 26. Discussed the plan in detail  with Neurologist Dr. LEYLA Coleman and the patient and they are in agreement with the plan. All her questions were answered at today's visit.       Review of Systems  Review of Systems   Constitutional:  Negative for activity change, diaphoresis, fever and unexpected weight change.   HENT:  Negative for congestion, ear pain, facial swelling, hearing loss, tinnitus, trouble swallowing and voice change.    Eyes:  Negative for photophobia, pain and visual disturbance.   Respiratory:  Negative for chest tightness, shortness of breath and wheezing.    Cardiovascular:  Negative for chest pain, palpitations and leg swelling.   Gastrointestinal:  Negative for constipation, diarrhea, nausea and vomiting.   Genitourinary:  Negative for difficulty urinating.   Musculoskeletal:  Positive for gait problem. Negative for back pain, neck pain and neck stiffness.   Skin:  Negative for color change, pallor, rash and wound.   Neurological:  Positive for numbness. Negative for dizziness, tremors, seizures, syncope, facial asymmetry, speech difficulty, weakness, light-headedness and headaches.   Psychiatric/Behavioral:  Negative for agitation, behavioral problems, confusion, decreased concentration and hallucinations. The patient is not nervous/anxious and is not hyperactive.      Objective:      Neurologic Exam     Mental Status   Oriented to person, place, and time.   Oriented to person.   Oriented to place.   Oriented to time.   Attention: normal. Concentration: normal.   Speech: speech is normal   Level of consciousness: alert  Knowledge: good.     Cranial Nerves     CN II   Visual fields full to confrontation.     CN III, IV, VI   Pupils are equal, round, and reactive to light.  Extraocular motions are normal.   Right pupil: Size: 3 mm. Shape: regular. Reactivity: brisk.   Left pupil: Size: 3 mm. Shape: regular. Reactivity: brisk.   CN III: no CN III palsy  CN VI: no CN VI palsy    CN V   Facial sensation intact.     CN VII   Facial  expression full, symmetric.     CN VIII   CN VIII normal.   Hearing: intact    CN IX, X   CN IX normal.   CN X normal.     CN XI   CN XI normal.     CN XII   CN XII normal.     Motor Exam   Muscle bulk: normal  Overall muscle tone: normal  Right arm pronator drift: absent  Left arm pronator drift: absent    Strength   Right deltoid: 5/5  Left deltoid: 5/5  Right biceps: 5/5  Left biceps: 5/5  Right triceps: 5/5  Left triceps: 5/5  Right quadriceps: 5/5  Left quadriceps: 4/5  Right hamstrin/5  Left hamstrin/5    Sensory Exam   Light touch normal.   Vibration normal.   Proprioception normal.   Pinprick normal.     Phalen sign negative, Tinel's sign positive on left     Gait, Coordination, and Reflexes     Gait  Gait: normal    Coordination   Romberg: positive  Finger to nose coordination: normal  Heel to shin coordination: normal  Tandem walking coordination: normal    Tremor   Resting tremor: absent  Intention tremor: absent  Action tremor: absent    Reflexes   Right brachioradialis: 2+  Left brachioradialis: 2+  Right biceps: 2+  Left biceps: 2+  Right triceps: 2+  Left triceps: 2+  Right patellar: 3+  Left patellar: 3+  Right achilles: 2+  Left achilles: 2+  Right : 4+  Left : 4+  Slow antalgic ambulation with cane     Physical Exam  Constitutional:       General: She is not in acute distress.  HENT:      Head: Normocephalic.      Nose: Nose normal.      Mouth/Throat:      Mouth: Mucous membranes are moist.   Eyes:      Extraocular Movements: Extraocular movements intact and EOM normal.      Pupils: Pupils are equal, round, and reactive to light.   Cardiovascular:      Rate and Rhythm: Normal rate and regular rhythm.      Heart sounds: Normal heart sounds. No murmur heard.  Pulmonary:      Effort: Pulmonary effort is normal. No respiratory distress.      Breath sounds: Normal breath sounds. No wheezing, rhonchi or rales.   Musculoskeletal:         General: No swelling, tenderness, deformity or signs  of injury. Normal range of motion.      Cervical back: Normal range of motion. No rigidity or tenderness.      Right lower leg: No edema.      Left lower leg: No edema.   Skin:     General: Skin is warm and dry.      Capillary Refill: Capillary refill takes less than 2 seconds.      Coloration: Skin is not jaundiced or pale.      Findings: No bruising, erythema, lesion or rash.   Neurological:      Mental Status: She is alert and oriented to person, place, and time.      Coordination: Romberg Test abnormal. Finger-Nose-Finger Test and Heel to Shin Test normal.      Gait: Gait is intact. Tandem walk normal.      Deep Tendon Reflexes:      Reflex Scores:       Tricep reflexes are 2+ on the right side and 2+ on the left side.       Bicep reflexes are 2+ on the right side and 2+ on the left side.       Brachioradialis reflexes are 2+ on the right side and 2+ on the left side.       Patellar reflexes are 3+ on the right side and 3+ on the left side.       Achilles reflexes are 2+ on the right side and 2+ on the left side.  Psychiatric:         Mood and Affect: Mood normal.         Speech: Speech normal.         Behavior: Behavior normal.         Assessment:     Problem List Items Addressed This Visit          Endocrine    Vitamin D deficiency       Palliative Care    On long term drug therapy    Relevant Orders    Protein Electrophoresis, Serum with Reflex FELIZ    CHAD EIA w/ Reflex to dsDNA/ROMINA    Syphilis Antibody with reflex to RPR    Sedimentation Rate       Other    Gait difficulty - Primary    Relevant Orders    Ambulatory referral/consult to Neurology    MRI Brain W WO Contrast    MRI Cervical Spine W WO Cont    Creatinine, Serum    Paresthesia    Relevant Orders    Protein Electrophoresis, Serum with Reflex FELIZ    CHAD EIA w/ Reflex to dsDNA/ROMINA    Syphilis Antibody with reflex to RPR    Sedimentation Rate    Ambulatory referral/consult to Neurology    Falls frequently    Relevant Orders    Protein Electrophoresis,  Serum with Reflex FELIZ    CHAD EIA w/ Reflex to dsDNA/ROMINA    Syphilis Antibody with reflex to RPR    Sedimentation Rate    Ambulatory referral/consult to Neurology    MRI Brain W WO Contrast    MRI Cervical Spine W WO Cont    Creatinine, Serum         Plan:     Patient desires to wait on starting any new medications  Referral for EMG NCS all 4 extremities Dr. FARAZ Lara   MRI of the brain with and without contrast to evaluate falls and gait difficulty   MRI of the cervical spine with and without contrast to evaluate falls and gait difficulty   Labs: PPN panel   Fall precautions, use walker or 4 prong cane for better balance  Continue Home Health physical therapy   Follow up with Neurology in 3 months or sooner if needed

## 2022-12-15 ENCOUNTER — OFFICE VISIT (OUTPATIENT)
Dept: NEUROLOGY | Facility: CLINIC | Age: 67
End: 2022-12-15
Payer: MEDICARE

## 2022-12-15 VITALS
SYSTOLIC BLOOD PRESSURE: 126 MMHG | HEART RATE: 63 BPM | OXYGEN SATURATION: 98 % | BODY MASS INDEX: 36.88 KG/M2 | DIASTOLIC BLOOD PRESSURE: 76 MMHG | WEIGHT: 216 LBS | HEIGHT: 64 IN

## 2022-12-15 DIAGNOSIS — Z79.899 ON LONG TERM DRUG THERAPY: ICD-10-CM

## 2022-12-15 DIAGNOSIS — R20.2 PARESTHESIA: ICD-10-CM

## 2022-12-15 DIAGNOSIS — E55.9 VITAMIN D DEFICIENCY: ICD-10-CM

## 2022-12-15 DIAGNOSIS — R26.9 GAIT DIFFICULTY: Primary | ICD-10-CM

## 2022-12-15 DIAGNOSIS — R29.6 FALLS FREQUENTLY: ICD-10-CM

## 2022-12-15 PROCEDURE — 3288F FALL RISK ASSESSMENT DOCD: CPT | Mod: CPTII,,, | Performed by: NURSE PRACTITIONER

## 2022-12-15 PROCEDURE — 1160F PR REVIEW ALL MEDS BY PRESCRIBER/CLIN PHARMACIST DOCUMENTED: ICD-10-PCS | Mod: CPTII,,, | Performed by: NURSE PRACTITIONER

## 2022-12-15 PROCEDURE — 3078F PR MOST RECENT DIASTOLIC BLOOD PRESSURE < 80 MM HG: ICD-10-PCS | Mod: CPTII,,, | Performed by: NURSE PRACTITIONER

## 2022-12-15 PROCEDURE — 3008F BODY MASS INDEX DOCD: CPT | Mod: CPTII,,, | Performed by: NURSE PRACTITIONER

## 2022-12-15 PROCEDURE — 1159F MED LIST DOCD IN RCRD: CPT | Mod: CPTII,,, | Performed by: NURSE PRACTITIONER

## 2022-12-15 PROCEDURE — 1100F PTFALLS ASSESS-DOCD GE2>/YR: CPT | Mod: CPTII,,, | Performed by: NURSE PRACTITIONER

## 2022-12-15 PROCEDURE — 1160F RVW MEDS BY RX/DR IN RCRD: CPT | Mod: CPTII,,, | Performed by: NURSE PRACTITIONER

## 2022-12-15 PROCEDURE — 3078F DIAST BP <80 MM HG: CPT | Mod: CPTII,,, | Performed by: NURSE PRACTITIONER

## 2022-12-15 PROCEDURE — 3008F PR BODY MASS INDEX (BMI) DOCUMENTED: ICD-10-PCS | Mod: CPTII,,, | Performed by: NURSE PRACTITIONER

## 2022-12-15 PROCEDURE — 99203 PR OFFICE/OUTPT VISIT, NEW, LEVL III, 30-44 MIN: ICD-10-PCS | Mod: S$PBB,,, | Performed by: NURSE PRACTITIONER

## 2022-12-15 PROCEDURE — 3074F SYST BP LT 130 MM HG: CPT | Mod: CPTII,,, | Performed by: NURSE PRACTITIONER

## 2022-12-15 PROCEDURE — 99215 OFFICE O/P EST HI 40 MIN: CPT | Mod: PBBFAC | Performed by: NURSE PRACTITIONER

## 2022-12-15 PROCEDURE — 1159F PR MEDICATION LIST DOCUMENTED IN MEDICAL RECORD: ICD-10-PCS | Mod: CPTII,,, | Performed by: NURSE PRACTITIONER

## 2022-12-15 PROCEDURE — 3288F PR FALLS RISK ASSESSMENT DOCUMENTED: ICD-10-PCS | Mod: CPTII,,, | Performed by: NURSE PRACTITIONER

## 2022-12-15 PROCEDURE — 3074F PR MOST RECENT SYSTOLIC BLOOD PRESSURE < 130 MM HG: ICD-10-PCS | Mod: CPTII,,, | Performed by: NURSE PRACTITIONER

## 2022-12-15 PROCEDURE — 1100F PR PT FALLS ASSESS DOC 2+ FALLS/FALL W/INJURY/YR: ICD-10-PCS | Mod: CPTII,,, | Performed by: NURSE PRACTITIONER

## 2022-12-15 PROCEDURE — 99203 OFFICE O/P NEW LOW 30 MIN: CPT | Mod: S$PBB,,, | Performed by: NURSE PRACTITIONER

## 2022-12-15 NOTE — PATIENT INSTRUCTIONS
Patient desires to wait on starting any new medications  Referral for EMG NCS all 4 extremities Dr. FARAZ Lara   MRI of the brain with and without contrast to evaluate falls and gait difficulty   MRI of the cervical spine with and without contrast to evaluate falls and gait difficulty   Labs: PPN panel   Fall precautions, use walker or 4 prong cane for better balance  Continue Home Health physical therapy   Follow up with Neurology in 3 months or sooner if needed

## 2022-12-21 ENCOUNTER — TELEPHONE (OUTPATIENT)
Dept: NEUROLOGY | Facility: CLINIC | Age: 67
End: 2022-12-21
Payer: MEDICAID

## 2022-12-21 NOTE — TELEPHONE ENCOUNTER
Left VM  ----- Message from Sacha Menezes NP sent at 12/20/2022 12:04 PM CST -----  Please let patient know her albumin was slightly elevated and her creatinine was slightly elevated suggesting mild dehydration, have her increase her water just a little if no contraindications, other labs looked good, thanks

## 2022-12-29 ENCOUNTER — TELEPHONE (OUTPATIENT)
Dept: FAMILY MEDICINE | Facility: CLINIC | Age: 67
End: 2022-12-29
Payer: MEDICAID

## 2022-12-29 NOTE — TELEPHONE ENCOUNTER
----- Message from Corry Ibrahim sent at 12/29/2022 10:26 AM CST -----  Pt. Has questions about taking B-12 and would like a call @[phone]      Thank you !

## 2023-01-17 ENCOUNTER — CLINICAL SUPPORT (OUTPATIENT)
Dept: FAMILY MEDICINE | Facility: CLINIC | Age: 68
End: 2023-01-17
Payer: MEDICARE

## 2023-01-17 DIAGNOSIS — E53.8 B12 DEFICIENCY: Primary | ICD-10-CM

## 2023-01-17 PROCEDURE — 96372 THER/PROPH/DIAG INJ SC/IM: CPT | Mod: ,,, | Performed by: FAMILY MEDICINE

## 2023-01-17 PROCEDURE — 96372 PR INJECTION,THERAP/PROPH/DIAG2ST, IM OR SUBCUT: ICD-10-PCS | Mod: ,,, | Performed by: FAMILY MEDICINE

## 2023-01-17 RX ORDER — CYANOCOBALAMIN 1000 UG/ML
1000 INJECTION, SOLUTION INTRAMUSCULAR; SUBCUTANEOUS
Status: COMPLETED | OUTPATIENT
Start: 2023-01-17 | End: 2023-01-17

## 2023-01-17 RX ADMIN — CYANOCOBALAMIN 1000 MCG: 1000 INJECTION, SOLUTION INTRAMUSCULAR; SUBCUTANEOUS at 10:01

## 2023-02-14 ENCOUNTER — CLINICAL SUPPORT (OUTPATIENT)
Dept: FAMILY MEDICINE | Facility: CLINIC | Age: 68
End: 2023-02-14
Payer: MEDICARE

## 2023-02-14 DIAGNOSIS — E53.8 B12 DEFICIENCY: Primary | ICD-10-CM

## 2023-02-14 PROCEDURE — 96372 THER/PROPH/DIAG INJ SC/IM: CPT | Mod: ,,, | Performed by: FAMILY MEDICINE

## 2023-02-14 PROCEDURE — 96372 PR INJECTION,THERAP/PROPH/DIAG2ST, IM OR SUBCUT: ICD-10-PCS | Mod: ,,, | Performed by: FAMILY MEDICINE

## 2023-02-14 RX ORDER — CYANOCOBALAMIN 1000 UG/ML
1000 INJECTION, SOLUTION INTRAMUSCULAR; SUBCUTANEOUS ONCE
Status: COMPLETED | OUTPATIENT
Start: 2023-02-14 | End: 2023-02-14

## 2023-02-14 RX ADMIN — CYANOCOBALAMIN 1000 MCG: 1000 INJECTION, SOLUTION INTRAMUSCULAR; SUBCUTANEOUS at 09:02

## 2023-03-09 ENCOUNTER — OFFICE VISIT (OUTPATIENT)
Dept: FAMILY MEDICINE | Facility: CLINIC | Age: 68
End: 2023-03-09
Payer: MEDICARE

## 2023-03-09 VITALS
OXYGEN SATURATION: 98 % | SYSTOLIC BLOOD PRESSURE: 122 MMHG | TEMPERATURE: 98 F | WEIGHT: 216 LBS | DIASTOLIC BLOOD PRESSURE: 86 MMHG | HEIGHT: 64 IN | HEART RATE: 55 BPM | RESPIRATION RATE: 20 BRPM | BODY MASS INDEX: 36.88 KG/M2

## 2023-03-09 DIAGNOSIS — E03.9 HYPOTHYROIDISM, UNSPECIFIED TYPE: ICD-10-CM

## 2023-03-09 DIAGNOSIS — N76.0 ACUTE VAGINITIS: Primary | ICD-10-CM

## 2023-03-09 DIAGNOSIS — E55.9 VITAMIN D DEFICIENCY: ICD-10-CM

## 2023-03-09 DIAGNOSIS — E53.8 FOLATE DEFICIENCY: ICD-10-CM

## 2023-03-09 DIAGNOSIS — E53.8 B12 DEFICIENCY: ICD-10-CM

## 2023-03-09 DIAGNOSIS — E78.5 HYPERLIPIDEMIA, UNSPECIFIED HYPERLIPIDEMIA TYPE: ICD-10-CM

## 2023-03-09 LAB
25(OH)D3 SERPL-MCNC: 45.4 NG/ML
ALBUMIN SERPL BCP-MCNC: 4.4 G/DL (ref 3.5–5)
ALBUMIN/GLOB SERPL: 1.3 {RATIO}
ALP SERPL-CCNC: 90 U/L (ref 55–142)
ALT SERPL W P-5'-P-CCNC: 26 U/L (ref 13–56)
ANION GAP SERPL CALCULATED.3IONS-SCNC: 8 MMOL/L (ref 7–16)
AST SERPL W P-5'-P-CCNC: 19 U/L (ref 15–37)
BASOPHILS # BLD AUTO: 0.06 K/UL (ref 0–0.2)
BASOPHILS NFR BLD AUTO: 1.1 % (ref 0–1)
BILIRUB SERPL-MCNC: 0.6 MG/DL (ref ?–1.2)
BUN SERPL-MCNC: 22 MG/DL (ref 7–18)
BUN/CREAT SERPL: 22 (ref 6–20)
CALCIUM SERPL-MCNC: 9.6 MG/DL (ref 8.5–10.1)
CHLORIDE SERPL-SCNC: 103 MMOL/L (ref 98–107)
CHOLEST SERPL-MCNC: 234 MG/DL (ref 0–200)
CHOLEST/HDLC SERPL: 5.2 {RATIO}
CO2 SERPL-SCNC: 30 MMOL/L (ref 21–32)
CREAT SERPL-MCNC: 0.98 MG/DL (ref 0.55–1.02)
DIFFERENTIAL METHOD BLD: ABNORMAL
EGFR (NO RACE VARIABLE) (RUSH/TITUS): 64 ML/MIN/1.73M²
EOSINOPHIL # BLD AUTO: 0.17 K/UL (ref 0–0.5)
EOSINOPHIL NFR BLD AUTO: 3.1 % (ref 1–4)
ERYTHROCYTE [DISTWIDTH] IN BLOOD BY AUTOMATED COUNT: 12.8 % (ref 11.5–14.5)
FOLATE SERPL-MCNC: 19.7 NG/ML (ref 3.1–17.5)
GLOBULIN SER-MCNC: 3.4 G/DL (ref 2–4)
GLUCOSE SERPL-MCNC: 84 MG/DL (ref 74–106)
HCT VFR BLD AUTO: 39.6 % (ref 38–47)
HCYS SERPL-SCNC: 13.6 ΜMOL/L (ref 3.2–10.7)
HDLC SERPL-MCNC: 45 MG/DL (ref 40–60)
HGB BLD-MCNC: 12.7 G/DL (ref 12–16)
IMM GRANULOCYTES # BLD AUTO: 0.01 K/UL (ref 0–0.04)
IMM GRANULOCYTES NFR BLD: 0.2 % (ref 0–0.4)
LDLC SERPL CALC-MCNC: 163 MG/DL
LDLC/HDLC SERPL: 3.6 {RATIO}
LYMPHOCYTES # BLD AUTO: 1.26 K/UL (ref 1–4.8)
LYMPHOCYTES NFR BLD AUTO: 23 % (ref 27–41)
MCH RBC QN AUTO: 29.1 PG (ref 27–31)
MCHC RBC AUTO-ENTMCNC: 32.1 G/DL (ref 32–36)
MCV RBC AUTO: 90.6 FL (ref 80–96)
MONOCYTES # BLD AUTO: 0.38 K/UL (ref 0–0.8)
MONOCYTES NFR BLD AUTO: 6.9 % (ref 2–6)
MPC BLD CALC-MCNC: 10.6 FL (ref 9.4–12.4)
NEUTROPHILS # BLD AUTO: 3.61 K/UL (ref 1.8–7.7)
NEUTROPHILS NFR BLD AUTO: 65.7 % (ref 53–65)
NONHDLC SERPL-MCNC: 189 MG/DL
NRBC # BLD AUTO: 0 X10E3/UL
NRBC, AUTO (.00): 0 %
PLATELET # BLD AUTO: 291 K/UL (ref 150–400)
POTASSIUM SERPL-SCNC: 4.5 MMOL/L (ref 3.5–5.1)
PROT SERPL-MCNC: 7.8 G/DL (ref 6.4–8.2)
RBC # BLD AUTO: 4.37 M/UL (ref 4.2–5.4)
SODIUM SERPL-SCNC: 136 MMOL/L (ref 136–145)
TRIGL SERPL-MCNC: 128 MG/DL (ref 35–150)
TSH SERPL DL<=0.005 MIU/L-ACNC: 2.17 UIU/ML (ref 0.36–3.74)
VIT B12 SERPL-MCNC: 1276 PG/ML (ref 193–986)
VLDLC SERPL-MCNC: 26 MG/DL
WBC # BLD AUTO: 5.49 K/UL (ref 4.5–11)

## 2023-03-09 PROCEDURE — 3074F SYST BP LT 130 MM HG: CPT | Mod: ,,, | Performed by: FAMILY MEDICINE

## 2023-03-09 PROCEDURE — 1159F PR MEDICATION LIST DOCUMENTED IN MEDICAL RECORD: ICD-10-PCS | Mod: ,,, | Performed by: FAMILY MEDICINE

## 2023-03-09 PROCEDURE — 3079F DIAST BP 80-89 MM HG: CPT | Mod: ,,, | Performed by: FAMILY MEDICINE

## 2023-03-09 PROCEDURE — 82607 VITAMIN B-12: CPT | Mod: ,,, | Performed by: CLINICAL MEDICAL LABORATORY

## 2023-03-09 PROCEDURE — 80050 GENERAL HEALTH PANEL: CPT | Mod: ,,, | Performed by: CLINICAL MEDICAL LABORATORY

## 2023-03-09 PROCEDURE — 99213 PR OFFICE/OUTPT VISIT, EST, LEVL III, 20-29 MIN: ICD-10-PCS | Mod: ,,, | Performed by: FAMILY MEDICINE

## 2023-03-09 PROCEDURE — 80050 PR  GENERAL HEALTH PANEL: ICD-10-PCS | Mod: ,,, | Performed by: CLINICAL MEDICAL LABORATORY

## 2023-03-09 PROCEDURE — 1159F MED LIST DOCD IN RCRD: CPT | Mod: ,,, | Performed by: FAMILY MEDICINE

## 2023-03-09 PROCEDURE — 99213 OFFICE O/P EST LOW 20 MIN: CPT | Mod: ,,, | Performed by: FAMILY MEDICINE

## 2023-03-09 PROCEDURE — 1160F PR REVIEW ALL MEDS BY PRESCRIBER/CLIN PHARMACIST DOCUMENTED: ICD-10-PCS | Mod: ,,, | Performed by: FAMILY MEDICINE

## 2023-03-09 PROCEDURE — 82746 ASSAY OF FOLIC ACID SERUM: CPT | Mod: ,,, | Performed by: CLINICAL MEDICAL LABORATORY

## 2023-03-09 PROCEDURE — 82306 VITAMIN D: ICD-10-PCS | Mod: ,,, | Performed by: CLINICAL MEDICAL LABORATORY

## 2023-03-09 PROCEDURE — 80061 LIPID PANEL: CPT | Mod: ,,, | Performed by: CLINICAL MEDICAL LABORATORY

## 2023-03-09 PROCEDURE — 83090 ASSAY OF HOMOCYSTEINE: CPT | Mod: ,,, | Performed by: CLINICAL MEDICAL LABORATORY

## 2023-03-09 PROCEDURE — 1160F RVW MEDS BY RX/DR IN RCRD: CPT | Mod: ,,, | Performed by: FAMILY MEDICINE

## 2023-03-09 PROCEDURE — 82746 FOLATE: ICD-10-PCS | Mod: ,,, | Performed by: CLINICAL MEDICAL LABORATORY

## 2023-03-09 PROCEDURE — 3079F PR MOST RECENT DIASTOLIC BLOOD PRESSURE 80-89 MM HG: ICD-10-PCS | Mod: ,,, | Performed by: FAMILY MEDICINE

## 2023-03-09 PROCEDURE — 80061 LIPID PANEL: ICD-10-PCS | Mod: ,,, | Performed by: CLINICAL MEDICAL LABORATORY

## 2023-03-09 PROCEDURE — 83090 HOMOCYSTEINE, SERUM: ICD-10-PCS | Mod: ,,, | Performed by: CLINICAL MEDICAL LABORATORY

## 2023-03-09 PROCEDURE — 3008F PR BODY MASS INDEX (BMI) DOCUMENTED: ICD-10-PCS | Mod: ,,, | Performed by: FAMILY MEDICINE

## 2023-03-09 PROCEDURE — 3008F BODY MASS INDEX DOCD: CPT | Mod: ,,, | Performed by: FAMILY MEDICINE

## 2023-03-09 PROCEDURE — 82306 VITAMIN D 25 HYDROXY: CPT | Mod: ,,, | Performed by: CLINICAL MEDICAL LABORATORY

## 2023-03-09 PROCEDURE — 82607 VITAMIN B12: ICD-10-PCS | Mod: ,,, | Performed by: CLINICAL MEDICAL LABORATORY

## 2023-03-09 PROCEDURE — 3074F PR MOST RECENT SYSTOLIC BLOOD PRESSURE < 130 MM HG: ICD-10-PCS | Mod: ,,, | Performed by: FAMILY MEDICINE

## 2023-03-09 RX ORDER — FLUCONAZOLE 150 MG/1
TABLET ORAL
Qty: 2 TABLET | Refills: 2 | Status: SHIPPED | OUTPATIENT
Start: 2023-03-09 | End: 2023-06-12

## 2023-03-09 RX ORDER — THYROID 15 MG/1
15 TABLET ORAL
COMMUNITY
Start: 2023-03-08 | End: 2024-03-07

## 2023-03-09 NOTE — PROGRESS NOTES
Beverly Rodríguez is a 67 y.o. female seen today for follow-up on her hypothyroidism hyperlipidemia vitamin-D deficiency and B12 deficiency.  She is fasting today for follow-up lab work.  She reports she is doing well we discussed increasing her water intake for some evidence of dehydration on recent lab work.  Patient has had 1 near fall but no falls as of her last visit.  Today, she is asking for refill on her Diflucan for a vaginal yeast infection.    Past Medical History:   Diagnosis Date    Abnormal LFTs     Asthma     ? 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 Rescue 100 mL 3    ascorbic acid, vitamin C, (VITAMIN C) 1000 MG tablet Take 1,000 mg by mouth once daily.      ergocalciferol (ERGOCALCIFEROL) 50,000 unit Cap Take 1 capsule (50,000 Units total) by mouth every 7 days. 4 capsule 2    fluticasone propionate (FLONASE) 50 mcg/actuation nasal spray 2 sprays (100 mcg total) by Each Nostril route once daily. 48 g 2    Lactobacillus rhamnosus GG (CULTURELLE) 10 billion cell capsule Take 1 capsule by mouth once daily.      metoprolol succinate (TOPROL-XL) 25 MG 24 hr tablet Take 1 tablet (25 mg total) by mouth once daily. 90 tablet 1    thyroid, pork, (ARMOUR THYROID) 15 mg Tab Take 15 mg by mouth.      thyroid, pork, (ARMOUR THYROID) 60 mg Tab Take 1 tablet (60 mg total) by mouth before breakfast. 90 tablet 1    ezetimibe (ZETIA) 10 mg tablet Take 1 tablet (10 mg total) by mouth once daily. (Patient not taking: Reported on 3/9/2023) 90 tablet 3     famotidine (PEPCID) 20 MG tablet Take 1 tablet (20 mg total) by mouth once daily. 30 tablet 11     No current facility-administered medications on file prior to visit.       There is no immunization history on file for this patient.    Review of Systems   Constitutional:  Positive for malaise/fatigue. Negative for fever and weight loss.   Respiratory:  Negative for shortness of breath.    Cardiovascular:  Negative for chest pain and palpitations.   Gastrointestinal:  Negative for nausea and vomiting.   Musculoskeletal:  Positive for back pain and myalgias. Negative for falls.   Psychiatric/Behavioral:  Negative for depression.       Vitals:    03/09/23 1248   BP: 122/86   Pulse: (!) 55   Resp: 20   Temp: 98.2 °F (36.8 °C)       Physical Exam  Vitals reviewed.   Constitutional:       Appearance: Normal appearance.   HENT:      Head: Normocephalic.   Eyes:      Extraocular Movements: Extraocular movements intact.      Conjunctiva/sclera: Conjunctivae normal.      Pupils: Pupils are equal, round, and reactive to light.   Neck:      Thyroid: No thyroid mass or thyromegaly.   Cardiovascular:      Rate and Rhythm: Normal rate and regular rhythm.      Heart sounds: Normal heart sounds. No murmur heard.    No gallop.   Pulmonary:      Effort: Pulmonary effort is normal. No respiratory distress.      Breath sounds: Normal breath sounds. No wheezing or rales.   Skin:     General: Skin is warm and dry.      Coloration: Skin is not jaundiced or pale.   Neurological:      Mental Status: She is alert.   Psychiatric:         Mood and Affect: Mood normal.         Behavior: Behavior normal.         Thought Content: Thought content normal.         Judgment: Judgment normal.        Assessment and Plan  Acute vaginitis  -     fluconazole (DIFLUCAN) 150 MG Tab; Take 1 today and then repeat dose in 3 days.  Dispense: 2 tablet; Refill: 2            Return to clinic as needed once her lab work is in.  Patient requests a follow-up  evaluation by home health for physical therapy and occupational therapy for her neck pain and unsteady gait.    Health Maintenance Topics with due status: Not Due       Topic Last Completion Date    Lipid Panel 07/22/2021    Colorectal Cancer Screening 12/29/2021

## 2023-03-10 ENCOUNTER — TELEPHONE (OUTPATIENT)
Dept: FAMILY MEDICINE | Facility: CLINIC | Age: 68
End: 2023-03-10
Payer: MEDICARE

## 2023-03-10 NOTE — LETTER
March 10, 2023    Beverly Rodríguez  [facility] [place] [address] [place] [address]             [facility]  [address] [place] [address] [place] [address]  Phone: [phone]  Fax: [phone] Dear Ms. Rodríguez:    Please contact the clinic in regards to your recent labs.        Sincerely,      Fabian Puckett LPN

## 2023-03-10 NOTE — TELEPHONE ENCOUNTER
----- Message from Prem Vincent MD sent at 3/10/2023  7:54 AM CST -----  Vitamin-D and B12 levels are within normal limits but her elevated homocystine level suggest functional B12 deficiency.  I recommend continuing her B12 injections for now.  Her thyroid levels are perfect so continue current regimen.  Office visit for LDL

## 2023-03-14 NOTE — PROGRESS NOTES
Subjective:       Patient ID: Beverly Rodríguez is a 67 y.o. female.    Chief Complaint:  No chief complaint on file.      History of Present Illness  This pleasant 67 year old right handed  female presents to the clinic for follow up of neuropathy and unsteady gait. Patient states she is doing well today.  She denies any change since her last visit. She reports burning, numbness, and tingling in the hands and feet started 9 months ago and is about the same as last visit. She states the symptoms are intermittent and rated as a 5 on a scale of 0 to 10. She reports the burning, numbness and tingling are worse at night and with rest. She denies diabetes but states she had a back and neck injury in the past. She reports back and neck pain started greater than 17 years ago. The back pain is located in the lumbar region and radiates to the legs. She states the back pain is intermittent and rated as a 8 to 9 on a scale of 0 to 10 with a dull aching pain. She denies back surgery and is currently doing home health physical therapy. She states neck pain radiates to the arms and is intermittent. She rates the neck pain as a 5 to 6 on a scale of 0 to 10 with a dull aching pain. She denies any neck surgery. Discussed conservative treatment for the neuropathy, back and neck pain. She desires not to start any new medications at this time. She reports 1 fall since December 2022 without any head injury. Discussed fall precautions in detail and written education given. Discussed using her walker or 4 prong cane for better balance. Discussed MRI of the Brain and Cervical spine that were unrevealing and does not explain her falls. It did show some mastoiditis/mastoid effusion and she was instructed to follow up with PCP for this. Discussed getting the MRI of the lumbar spine but she desires to wait until the EMG NCS was completed.  She reports they were waiting on her MRI results to schedule her EMG NCS.  Her PMH includes asthma,  HTN, COVID, Hypothyroidism, DM, Celiac disease, increased LFT, B12 and vitamin D deficiencies, and bradycardia. Her family medical history includes heart disease, kidney disease and cancer. She denies smoking or drinking alcohol and states she had a total hysterectomy at age 26. Discussed the plan in detail with Neurologist Dr. LEYLA Coleman and the patient and they are in agreement with the plan. All her questions were answered at today's visit. I spent 20 minutes counseling this patient.      MRI of the brain with and without contrast done on February 10, 2023 at [facility] showed small amount of fluid in the left mastoid air cells could indicate mastoiditis/mastoid effusion. No other significant findings.     MRI of the cervical spine without contrast done on February 10, 2023 at [facility] showed multilevel spondylitic changes as described above.       Review of Systems  Review of Systems   Constitutional:  Negative for activity change, diaphoresis, fever and unexpected weight change.   HENT:  Negative for congestion, ear pain, facial swelling, hearing loss, tinnitus, trouble swallowing and voice change.    Eyes:  Negative for photophobia, pain and visual disturbance.   Respiratory:  Negative for chest tightness, shortness of breath and wheezing.    Cardiovascular:  Negative for chest pain, palpitations and leg swelling.   Gastrointestinal:  Negative for constipation, diarrhea, nausea and vomiting.   Genitourinary:  Negative for difficulty urinating.   Musculoskeletal:  Positive for gait problem. Negative for back pain, neck pain and neck stiffness.   Skin:  Negative for color change, pallor, rash and wound.   Neurological:  Positive for numbness. Negative for dizziness, tremors, seizures, syncope, facial asymmetry, speech difficulty, weakness, light-headedness and headaches.   Psychiatric/Behavioral:  Negative for agitation, behavioral problems, confusion, decreased concentration and hallucinations. The patient is  not nervous/anxious and is not hyperactive.      Objective:      Neurologic Exam     Mental Status   Oriented to person, place, and time.   Oriented to person.   Oriented to place.   Oriented to time.   Attention: normal. Concentration: normal.   Speech: speech is normal   Level of consciousness: alert  Knowledge: good.     Cranial Nerves     CN II   Visual fields full to confrontation.     CN III, IV, VI   Pupils are equal, round, and reactive to light.  Extraocular motions are normal.   Right pupil: Size: 3 mm. Shape: regular. Reactivity: brisk.   Left pupil: Size: 3 mm. Shape: regular. Reactivity: brisk.   CN III: no CN III palsy  CN VI: no CN VI palsy    CN V   Facial sensation intact.     CN VII   Facial expression full, symmetric.     CN VIII   CN VIII normal.   Hearing: intact    CN IX, X   CN IX normal.   CN X normal.     CN XI   CN XI normal.     CN XII   CN XII normal.     Motor Exam   Muscle bulk: normal  Overall muscle tone: normal  Right arm pronator drift: absent  Left arm pronator drift: absent    Strength   Right deltoid: 5/5  Left deltoid: 5/5  Right biceps: 5/5  Left biceps: 5/5  Right triceps: 5/5  Left triceps: 5/5  Right quadriceps: 5/5  Left quadriceps: 5/5  Right hamstrin/5  Left hamstrin/5    Sensory Exam   Light touch normal.     Gait, Coordination, and Reflexes     Gait  Gait: normal    Coordination   Romberg: positive  Finger to nose coordination: normal    Tremor   Resting tremor: absent  Intention tremor: absent  Action tremor: absent    Reflexes   Right brachioradialis: 2+  Left brachioradialis: 2+  Right biceps: 2+  Left biceps: 2+  Right triceps: 2+  Left triceps: 2+  Right patellar: 2+  Left patellar: 2+  Right achilles: 2+  Left achilles: 2+  Right : 4+  Left : 4+  Ambulates with a cane      Physical Exam  Constitutional:       General: She is not in acute distress.  HENT:      Head: Normocephalic.      Nose: Nose normal.      Mouth/Throat:      Mouth: Mucous  membranes are moist.   Eyes:      Extraocular Movements: Extraocular movements intact and EOM normal.      Pupils: Pupils are equal, round, and reactive to light.   Cardiovascular:      Rate and Rhythm: Regular rhythm. Bradycardia present.      Heart sounds: Normal heart sounds. No murmur heard.     Comments: Asymptomatic bradycardia   Pulmonary:      Effort: Pulmonary effort is normal. No respiratory distress.      Breath sounds: Normal breath sounds. No wheezing, rhonchi or rales.   Musculoskeletal:         General: No swelling, tenderness, deformity or signs of injury. Normal range of motion.      Cervical back: Normal range of motion. No rigidity or tenderness.      Right lower leg: No edema.      Left lower leg: No edema.   Skin:     General: Skin is warm and dry.      Capillary Refill: Capillary refill takes less than 2 seconds.      Coloration: Skin is not jaundiced or pale.      Findings: No bruising, erythema, lesion or rash.   Neurological:      Mental Status: She is alert and oriented to person, place, and time.      Coordination: Romberg Test abnormal. Finger-Nose-Finger Test normal.      Gait: Gait is intact.      Deep Tendon Reflexes:      Reflex Scores:       Tricep reflexes are 2+ on the right side and 2+ on the left side.       Bicep reflexes are 2+ on the right side and 2+ on the left side.       Brachioradialis reflexes are 2+ on the right side and 2+ on the left side.       Patellar reflexes are 2+ on the right side and 2+ on the left side.       Achilles reflexes are 2+ on the right side and 2+ on the left side.  Psychiatric:         Mood and Affect: Mood normal.         Speech: Speech normal.         Behavior: Behavior normal.         Assessment:     Problem List Items Addressed This Visit          Endocrine    Vitamin D deficiency       Palliative Care    On long term drug therapy       Other    Gait difficulty - Primary    Paresthesia    Falls frequently         Plan:     Patient desires to  wait on starting any new medications  Keep appointment for EMG NCS all 4 extremities Dr. FARAZ Lara   Fax MRI results to Dr. FARAZ Lara   Consider MRI of the lumbar after EMG NCS if needed     Fall precautions, use walker or 4 prong cane for better balance  Continue Home Health physical therapy   Follow up with Neurology in 3 months or sooner if needed

## 2023-03-17 ENCOUNTER — OFFICE VISIT (OUTPATIENT)
Dept: NEUROLOGY | Facility: CLINIC | Age: 68
End: 2023-03-17
Payer: MEDICARE

## 2023-03-17 VITALS
HEART RATE: 51 BPM | WEIGHT: 216 LBS | OXYGEN SATURATION: 98 % | BODY MASS INDEX: 36.88 KG/M2 | DIASTOLIC BLOOD PRESSURE: 82 MMHG | HEIGHT: 64 IN | SYSTOLIC BLOOD PRESSURE: 149 MMHG

## 2023-03-17 DIAGNOSIS — R26.9 GAIT DIFFICULTY: Primary | ICD-10-CM

## 2023-03-17 DIAGNOSIS — R20.2 PARESTHESIA: ICD-10-CM

## 2023-03-17 DIAGNOSIS — R29.6 FALLS FREQUENTLY: ICD-10-CM

## 2023-03-17 DIAGNOSIS — Z79.899 ON LONG TERM DRUG THERAPY: ICD-10-CM

## 2023-03-17 DIAGNOSIS — E55.9 VITAMIN D DEFICIENCY: ICD-10-CM

## 2023-03-17 PROCEDURE — 3077F PR MOST RECENT SYSTOLIC BLOOD PRESSURE >= 140 MM HG: ICD-10-PCS | Mod: CPTII,,, | Performed by: NURSE PRACTITIONER

## 2023-03-17 PROCEDURE — 3288F PR FALLS RISK ASSESSMENT DOCUMENTED: ICD-10-PCS | Mod: CPTII,,, | Performed by: NURSE PRACTITIONER

## 2023-03-17 PROCEDURE — 3008F PR BODY MASS INDEX (BMI) DOCUMENTED: ICD-10-PCS | Mod: CPTII,,, | Performed by: NURSE PRACTITIONER

## 2023-03-17 PROCEDURE — 99213 OFFICE O/P EST LOW 20 MIN: CPT | Mod: S$PBB,,, | Performed by: NURSE PRACTITIONER

## 2023-03-17 PROCEDURE — 3008F BODY MASS INDEX DOCD: CPT | Mod: CPTII,,, | Performed by: NURSE PRACTITIONER

## 2023-03-17 PROCEDURE — 3079F PR MOST RECENT DIASTOLIC BLOOD PRESSURE 80-89 MM HG: ICD-10-PCS | Mod: CPTII,,, | Performed by: NURSE PRACTITIONER

## 2023-03-17 PROCEDURE — 1160F RVW MEDS BY RX/DR IN RCRD: CPT | Mod: CPTII,,, | Performed by: NURSE PRACTITIONER

## 2023-03-17 PROCEDURE — 3288F FALL RISK ASSESSMENT DOCD: CPT | Mod: CPTII,,, | Performed by: NURSE PRACTITIONER

## 2023-03-17 PROCEDURE — 1159F MED LIST DOCD IN RCRD: CPT | Mod: CPTII,,, | Performed by: NURSE PRACTITIONER

## 2023-03-17 PROCEDURE — 1160F PR REVIEW ALL MEDS BY PRESCRIBER/CLIN PHARMACIST DOCUMENTED: ICD-10-PCS | Mod: CPTII,,, | Performed by: NURSE PRACTITIONER

## 2023-03-17 PROCEDURE — 99214 OFFICE O/P EST MOD 30 MIN: CPT | Mod: PBBFAC | Performed by: NURSE PRACTITIONER

## 2023-03-17 PROCEDURE — 1101F PT FALLS ASSESS-DOCD LE1/YR: CPT | Mod: CPTII,,, | Performed by: NURSE PRACTITIONER

## 2023-03-17 PROCEDURE — 99213 PR OFFICE/OUTPT VISIT, EST, LEVL III, 20-29 MIN: ICD-10-PCS | Mod: S$PBB,,, | Performed by: NURSE PRACTITIONER

## 2023-03-17 PROCEDURE — 3079F DIAST BP 80-89 MM HG: CPT | Mod: CPTII,,, | Performed by: NURSE PRACTITIONER

## 2023-03-17 PROCEDURE — 1101F PR PT FALLS ASSESS DOC 0-1 FALLS W/OUT INJ PAST YR: ICD-10-PCS | Mod: CPTII,,, | Performed by: NURSE PRACTITIONER

## 2023-03-17 PROCEDURE — 3077F SYST BP >= 140 MM HG: CPT | Mod: CPTII,,, | Performed by: NURSE PRACTITIONER

## 2023-03-17 PROCEDURE — 1159F PR MEDICATION LIST DOCUMENTED IN MEDICAL RECORD: ICD-10-PCS | Mod: CPTII,,, | Performed by: NURSE PRACTITIONER

## 2023-03-17 NOTE — PATIENT INSTRUCTIONS
Patient desires to wait on starting any new medications  Keep appointment for EMG NCS all 4 extremities Dr. FARAZ Lara   Fax MRI results to Dr. FARAZ Lara   Consider MRI of the lumbar after EMG NCS if needed     Fall precautions, use walker or 4 prong cane for better balance  Continue Home Health physical therapy   Follow up with Neurology in 3 months or sooner if needed

## 2023-04-19 ENCOUNTER — EXTERNAL HOME HEALTH (OUTPATIENT)
Dept: HOME HEALTH SERVICES | Facility: HOSPITAL | Age: 68
End: 2023-04-19
Payer: MEDICARE

## 2023-05-09 DIAGNOSIS — I10 HYPERTENSION, UNSPECIFIED TYPE: ICD-10-CM

## 2023-05-09 RX ORDER — METOPROLOL SUCCINATE 25 MG/1
25 TABLET, EXTENDED RELEASE ORAL DAILY
Qty: 90 TABLET | Refills: 1 | Status: ON HOLD | OUTPATIENT
Start: 2023-05-09 | End: 2023-10-04 | Stop reason: HOSPADM

## 2023-05-19 ENCOUNTER — PATIENT OUTREACH (OUTPATIENT)
Dept: ADMINISTRATIVE | Facility: HOSPITAL | Age: 68
End: 2023-05-19

## 2023-05-19 NOTE — PROGRESS NOTES
Health maintenance record review for population health care gaps    Closing care gaps for [employer] insurance.      Was able to retrieve some of the care gaps for [employer] at this time Was able to retrieve some of the care gaps for [employer] at this time (EGFR date to [employer] file)

## 2023-05-31 ENCOUNTER — OFFICE VISIT (OUTPATIENT)
Dept: DERMATOLOGY | Facility: CLINIC | Age: 68
End: 2023-05-31
Payer: MEDICARE

## 2023-05-31 VITALS — WEIGHT: 216.06 LBS | BODY MASS INDEX: 36.89 KG/M2 | HEIGHT: 64 IN

## 2023-05-31 DIAGNOSIS — L30.9 DERMATITIS, UNSPECIFIED: Primary | ICD-10-CM

## 2023-05-31 DIAGNOSIS — L13.0 DERMATITIS HERPETIFORMIS: ICD-10-CM

## 2023-05-31 PROCEDURE — 1160F RVW MEDS BY RX/DR IN RCRD: CPT | Mod: CPTII,,, | Performed by: DERMATOLOGY

## 2023-05-31 PROCEDURE — 3008F PR BODY MASS INDEX (BMI) DOCUMENTED: ICD-10-PCS | Mod: CPTII,,, | Performed by: DERMATOLOGY

## 2023-05-31 PROCEDURE — 3008F BODY MASS INDEX DOCD: CPT | Mod: CPTII,,, | Performed by: DERMATOLOGY

## 2023-05-31 PROCEDURE — 1159F PR MEDICATION LIST DOCUMENTED IN MEDICAL RECORD: ICD-10-PCS | Mod: CPTII,,, | Performed by: DERMATOLOGY

## 2023-05-31 PROCEDURE — 1159F MED LIST DOCD IN RCRD: CPT | Mod: CPTII,,, | Performed by: DERMATOLOGY

## 2023-05-31 PROCEDURE — 99214 PR OFFICE/OUTPT VISIT, EST, LEVL IV, 30-39 MIN: ICD-10-PCS | Mod: ,,, | Performed by: DERMATOLOGY

## 2023-05-31 PROCEDURE — 99214 OFFICE O/P EST MOD 30 MIN: CPT | Mod: ,,, | Performed by: DERMATOLOGY

## 2023-05-31 PROCEDURE — 1160F PR REVIEW ALL MEDS BY PRESCRIBER/CLIN PHARMACIST DOCUMENTED: ICD-10-PCS | Mod: CPTII,,, | Performed by: DERMATOLOGY

## 2023-05-31 RX ORDER — DAPSONE 25 MG/1
25 TABLET ORAL 2 TIMES DAILY
Qty: 60 TABLET | Refills: 2 | Status: SHIPPED | OUTPATIENT
Start: 2023-05-31 | End: 2023-06-12

## 2023-05-31 RX ORDER — TRIAMCINOLONE ACETONIDE 1 MG/G
CREAM TOPICAL
Qty: 453.6 G | Refills: 2 | Status: SHIPPED | OUTPATIENT
Start: 2023-05-31 | End: 2023-07-13 | Stop reason: SDUPTHER

## 2023-05-31 NOTE — PROGRESS NOTES
[facility]   Shamika Valencia MD    Patient Name: Beverly Rodríguez  Patient YOB: 1955   Date of Service: 5/31/23    CC: Rash    HPI: Beverly Rodríguez is a 67 y.o. female here today for Rash, located on the abdomen.  Rash has been present for several months.  Previous treatments include none.      Past Medical History:   Diagnosis Date    Abnormal LFTs     Asthma     ? 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    Nausea vomiting and diarrhea 2/7/2022     Past Surgical History:   Procedure Laterality Date    APPENDECTOMY      CHOLECYSTECTOMY      HYSTERECTOMY      TONSILLECTOMY       Review of patient's allergies indicates:   Allergen Reactions    Aricept [donepezil]     Aspirin     Codeine     Crestor [rosuvastatin]     Flagyl [metronidazole]     Hydrocodone     Imitrex [sumatriptan]     Influenza virus vaccines     Opioids - morphine analogues     Penicillins     Tramadol     Clindamycin Nausea Only    Latex, natural rubber Rash       Current Outpatient Medications:     albuterol (PROVENTIL) 2.5 mg /3 mL (0.083 %) nebulizer solution, Take 3 mLs (2.5 mg total) by nebulization every 6 (six) hours as needed for Wheezing. Rescue, Disp: 100 mL, Rfl: 3    ascorbic acid, vitamin C, (VITAMIN C) 1000 MG tablet, Take 1,000 mg by mouth once daily., Disp: , Rfl:     dapsone 25 MG Tab, Take 1 tablet (25 mg total) by mouth 2 (two) times daily., Disp: 60 tablet, Rfl: 2    famotidine (PEPCID) 20 MG tablet, Take 1 tablet (20 mg total) by mouth once daily., Disp: 30 tablet, Rfl: 11    fluconazole (DIFLUCAN) 150 MG Tab, Take 1 today and then repeat dose in 3 days., Disp: 2 tablet, Rfl: 2    fluticasone propionate (FLONASE) 50 mcg/actuation nasal spray, 2 sprays (100 mcg total) by Each Nostril route once daily., Disp: 48 g, Rfl: 2    Lactobacillus rhamnosus GG (CULTURELLE) 10 billion cell capsule, Take 1 capsule by mouth once daily., Disp: , Rfl:     metoprolol succinate (TOPROL-XL)  25 MG 24 hr tablet, Take 1 tablet (25 mg total) by mouth once daily., Disp: 90 tablet, Rfl: 1    thyroid, pork, (ARMOUR THYROID) 15 mg Tab, Take 15 mg by mouth., Disp: , Rfl:     thyroid, pork, (ARMOUR THYROID) 60 mg Tab, Take 1 tablet (60 mg total) by mouth before breakfast., Disp: 90 tablet, Rfl: 1    triamcinolone acetonide 0.1% (KENALOG) 0.1 % cream, Apply to AA on body BID PRN flares tapering with improvement, Disp: 453.6 g, Rfl: 2    ROS: A focused review of systems was obtained and negative.     Exam: A focused skin exam was performed. All areas examined were normal except as mentioned in the assessment and plan below.  General Appearance of the patient is well developed and well nourished.  Orientation: alert and oriented x 3.  Mood and affect: pleasant.    Assessment:   The encounter diagnosis was Dermatitis, unspecified.    Plan:   Medications Ordered This Encounter   Medications    dapsone 25 MG Tab     Sig: Take 1 tablet (25 mg total) by mouth 2 (two) times daily.     Dispense:  60 tablet     Refill:  2    triamcinolone acetonide 0.1% (KENALOG) 0.1 % cream     Sig: Apply to AA on body BID PRN flares tapering with improvement     Dispense:  453.6 g     Refill:  2     Dermatitis Herpetiformis  - Symmetrical erythematous papules on the arms and abdomen  Status: Inadequately controlled     Plan: Counseling.  I counseled the patient regarding the following:  Contact office if: Rash fails to resolve despite treatment.    - emphasized importance of gluten free diet    - Will perform a re-trial of dapsone. Will start triamcinolone cream.   - will re-refer to GI for celiac since Dr. Potter is no longer in [place]     Dermatitis Unspecified  - psoriasiform, macerated plaques on the inframammary chest   DDx: ACD vs DH vs psoriasis vs intertrigo     Plan: Counseling  I counseled the patient regarding the following:  Skin care: Patient instructed to use gentle skin care including dove unscented soap, CeraVe  moisturizing cream, and fragrance free laundry detergent.  Expectations: The patient understands that there is not a definitive diagnosis at this time. Further testing or empiric therapy may be necessary to diagnose and improve the condition.  Contact office if: The patient develops a fever, or rash dramatically worsens despite treatment.     - will request patch testing records from [facility]     High Risk Medication Monitoring (Z79.899) : The risks and benefits of the medication were reviewed in full with the patient. Should any side effects occur, the patient will stop the medication and contact me immediately.     Will check CBC & CMP in a month    Dapsone Counseling- I discussed with the patient the risks of dapsone including but not limited to hemolytic  anemia, agranulocytosis, rashes, methemoglobinemia, kidney failure, peripheral neuropathy, headaches, GI  upset, and liver toxicity. Patients who start dapsone require monitoring including baseline LFTs and weekly CBCs  for the first month, then every month thereafter. The patient verbalized understanding of the proper use and  possible adverse effects of dapsone. All of the patient's questions and concerns were addressed.      Follow up in about 6 weeks (around 7/12/2023) for [facility], unspec..    Shamika Valencia MD

## 2023-06-01 RX ORDER — SULFASALAZINE 500 MG/1
500 TABLET ORAL DAILY
Qty: 30 TABLET | Refills: 0 | Status: CANCELLED | OUTPATIENT
Start: 2023-06-01 | End: 2023-07-01

## 2023-06-12 ENCOUNTER — OFFICE VISIT (OUTPATIENT)
Dept: GASTROENTEROLOGY | Facility: CLINIC | Age: 68
End: 2023-06-12
Payer: MEDICARE

## 2023-06-12 VITALS
OXYGEN SATURATION: 96 % | HEART RATE: 56 BPM | SYSTOLIC BLOOD PRESSURE: 132 MMHG | WEIGHT: 210.38 LBS | HEIGHT: 64 IN | DIASTOLIC BLOOD PRESSURE: 71 MMHG | BODY MASS INDEX: 35.92 KG/M2

## 2023-06-12 DIAGNOSIS — K59.04 CHRONIC IDIOPATHIC CONSTIPATION: Primary | ICD-10-CM

## 2023-06-12 DIAGNOSIS — R13.10 DYSPHAGIA, UNSPECIFIED TYPE: ICD-10-CM

## 2023-06-12 PROCEDURE — 1159F MED LIST DOCD IN RCRD: CPT | Mod: CPTII,,,

## 2023-06-12 PROCEDURE — 99214 PR OFFICE/OUTPT VISIT, EST, LEVL IV, 30-39 MIN: ICD-10-PCS | Mod: S$PBB,,,

## 2023-06-12 PROCEDURE — 99214 OFFICE O/P EST MOD 30 MIN: CPT | Mod: S$PBB,,,

## 2023-06-12 PROCEDURE — 3075F SYST BP GE 130 - 139MM HG: CPT | Mod: CPTII,,,

## 2023-06-12 PROCEDURE — 3008F PR BODY MASS INDEX (BMI) DOCUMENTED: ICD-10-PCS | Mod: CPTII,,,

## 2023-06-12 PROCEDURE — 1159F PR MEDICATION LIST DOCUMENTED IN MEDICAL RECORD: ICD-10-PCS | Mod: CPTII,,,

## 2023-06-12 PROCEDURE — 3288F FALL RISK ASSESSMENT DOCD: CPT | Mod: CPTII,,,

## 2023-06-12 PROCEDURE — 3288F PR FALLS RISK ASSESSMENT DOCUMENTED: ICD-10-PCS | Mod: CPTII,,,

## 2023-06-12 PROCEDURE — 1100F PR PT FALLS ASSESS DOC 2+ FALLS/FALL W/INJURY/YR: ICD-10-PCS | Mod: CPTII,,,

## 2023-06-12 PROCEDURE — 99214 OFFICE O/P EST MOD 30 MIN: CPT | Mod: PBBFAC

## 2023-06-12 PROCEDURE — 3078F DIAST BP <80 MM HG: CPT | Mod: CPTII,,,

## 2023-06-12 PROCEDURE — 1100F PTFALLS ASSESS-DOCD GE2>/YR: CPT | Mod: CPTII,,,

## 2023-06-12 PROCEDURE — 3008F BODY MASS INDEX DOCD: CPT | Mod: CPTII,,,

## 2023-06-12 PROCEDURE — 1160F PR REVIEW ALL MEDS BY PRESCRIBER/CLIN PHARMACIST DOCUMENTED: ICD-10-PCS | Mod: CPTII,,,

## 2023-06-12 PROCEDURE — 3075F PR MOST RECENT SYSTOLIC BLOOD PRESS GE 130-139MM HG: ICD-10-PCS | Mod: CPTII,,,

## 2023-06-12 PROCEDURE — 3078F PR MOST RECENT DIASTOLIC BLOOD PRESSURE < 80 MM HG: ICD-10-PCS | Mod: CPTII,,,

## 2023-06-12 PROCEDURE — 1160F RVW MEDS BY RX/DR IN RCRD: CPT | Mod: CPTII,,,

## 2023-06-12 RX ORDER — CALCIUM CARBONATE 200(500)MG
2 TABLET,CHEWABLE ORAL NIGHTLY PRN
COMMUNITY

## 2023-06-12 NOTE — PATIENT INSTRUCTIONS
Drink 64 ounces of water daily  Miralax powder 1 capful 17 grams in 8 ounces of liquid daily for constipation

## 2023-06-12 NOTE — PROGRESS NOTES
Beverly Rodríguez is a 67 y.o. female here for Rash (To abd; referred by Dr. Valencia)        PCP: Prem Vincent  Referring Provider: No referring provider defined for this encounter.     HPI:  Ms. Rodríguez is a 68 yo female with PMH of celiac disease with Dermatitis Herpetiformis who presents to clinic today with complaint of chronic constipation, BRBPR, and dysphagia. She was referred by Dr. Valencia, dermatology. She reports she has been on a gluten free since [id number]. Gliadin >150 in 2021. At present, she has rash generalized over abdomen. She reports noticing her constipation has worsened since modifying her diet. She also notes intermittent episodes of BRBPR (hx of hemorrhoids). She has been experiencing dysphagia over the past several months. Reports feeling like her food/medications are getting stuck. Denies any feeling of acid reflux.     Previous colonoscopy and EGD 12/2021 Dr. Potter - 1 small colon polyp removed (TA polyp). Large external hemorrhoids.  Normal EGD.              ROS:  Review of Systems   Constitutional:  Negative for appetite change and unexpected weight change.   HENT:  Positive for trouble swallowing.    Gastrointestinal:  Positive for constipation and reflux. Negative for abdominal pain, diarrhea, nausea and vomiting.   Musculoskeletal:  Negative for back pain.   Integumentary:  Positive for rash. Negative for color change.        PMHX:  has a past medical history of Abnormal LFTs, Asthma, Diabetes mellitus, Hypertension, Hypothyroidism, and Nausea vomiting and diarrhea (2/7/2022).    PSHX:  has a past surgical history that includes Cholecystectomy; Tonsillectomy; Hysterectomy; and Appendectomy.    PFHX: family history includes Cancer in her paternal aunt; Heart disease in her father and mother; Kidney disease in her mother.    PSlHX:  reports that she has quit smoking. Her smoking use included cigarettes. She has a 0.20 pack-year smoking history. She has been exposed to tobacco smoke. She  has never used smokeless tobacco. She reports that she does not currently use alcohol. She reports that she does not use drugs.        Review of patient's allergies indicates:   Allergen Reactions    Aricept [donepezil]     Aspirin     Codeine     Crestor [rosuvastatin]     Dapsone      nausea    Flagyl [metronidazole]     Hydrocodone     Imitrex [sumatriptan]     Influenza virus vaccines     Opioids - morphine analogues     Penicillins     Tramadol     Clindamycin Nausea Only    Latex, natural rubber Rash       Medication List with Changes/Refills   Current Medications    ALBUTEROL (PROVENTIL) 2.5 MG /3 ML (0.083 %) NEBULIZER SOLUTION    Take 3 mLs (2.5 mg total) by nebulization every 6 (six) hours as needed for Wheezing. Rescue    ASCORBIC ACID, VITAMIN C, (VITAMIN C) 1000 MG TABLET    Take 1,000 mg by mouth once daily.    CALCIUM CARBONATE (TUMS) 200 MG CALCIUM (500 MG) CHEWABLE TABLET    Take 2 tablets by mouth nightly as needed for Heartburn.    FLUTICASONE PROPIONATE (FLONASE) 50 MCG/ACTUATION NASAL SPRAY    2 sprays (100 mcg total) by Each Nostril route once daily.    METOPROLOL SUCCINATE (TOPROL-XL) 25 MG 24 HR TABLET    Take 1 tablet (25 mg total) by mouth once daily.    THYROID, PORK, (ARMOUR THYROID) 15 MG TAB    Take 15 mg by mouth.    THYROID, PORK, (ARMOUR THYROID) 60 MG TAB    Take 1 tablet (60 mg total) by mouth before breakfast.    TRIAMCINOLONE ACETONIDE 0.1% (KENALOG) 0.1 % CREAM    Apply to AA on body BID PRN flares tapering with improvement   Discontinued Medications    DAPSONE 25 MG TAB    Take 1 tablet (25 mg total) by mouth 2 (two) times daily.    FAMOTIDINE (PEPCID) 20 MG TABLET    Take 1 tablet (20 mg total) by mouth once daily.    FLUCONAZOLE (DIFLUCAN) 150 MG TAB    Take 1 today and then repeat dose in 3 days.    LACTOBACILLUS RHAMNOSUS GG (CULTURELLE) 10 BILLION CELL CAPSULE    Take 1 capsule by mouth once daily.        Objective Findings:  Vital Signs:  /71   Pulse (!) 56   Ht  "5' 4" (1.626 m)   Wt 95.4 kg (210 lb 6.4 oz)   SpO2 96%   BMI 36.12 kg/m²  Body mass index is 36.12 kg/m².    Physical Exam:  Physical Exam  Vitals reviewed.   Constitutional:       General: She is not in acute distress.     Appearance: Normal appearance.   HENT:      Mouth/Throat:      Mouth: Mucous membranes are moist.   Cardiovascular:      Rate and Rhythm: Regular rhythm.      Pulses: Normal pulses.   Pulmonary:      Effort: Pulmonary effort is normal.      Breath sounds: Normal breath sounds.   Abdominal:      General: Bowel sounds are normal. There is no distension.      Palpations: Abdomen is soft. There is no mass.      Tenderness: There is no abdominal tenderness. There is no guarding.   Musculoskeletal:         General: Normal range of motion.   Skin:     General: Skin is warm and dry.      Findings: Rash (generalized over abdomen) present.   Neurological:      Mental Status: She is alert and oriented to person, place, and time.   Psychiatric:         Mood and Affect: Mood normal.        Labs:  Lab Results   Component Value Date    WBC 5.49 03/09/2023    HGB 12.7 03/09/2023    HCT 39.6 03/09/2023    MCV 90.6 03/09/2023    RDW 12.8 03/09/2023     03/09/2023    LYMPH 23.0 (L) 03/09/2023    LYMPH 1.26 03/09/2023    MONO 6.9 (H) 03/09/2023    EOS 0.17 03/09/2023    BASO 0.06 03/09/2023     Lab Results   Component Value Date     03/09/2023    K 4.5 03/09/2023     03/09/2023    CO2 30 03/09/2023    GLU 84 03/09/2023    BUN 22 (H) 03/09/2023    CREATININE 0.98 03/09/2023    CALCIUM 9.6 03/09/2023    PROT 7.8 03/09/2023    ALBUMIN 4.4 03/09/2023    BILITOT 0.6 03/09/2023    ALKPHOS 90 03/09/2023    AST 19 03/09/2023    ALT 26 03/09/2023         Imaging: No results found.      Assessment:  Beverly Rodríguez is a 67 y.o. female here with:  1. Chronic idiopathic constipation    2. Dysphagia, unspecified type          Recommendations:  1. EGD w/ dilation for dysphagia  2. Miralax 17 gm daily to twice " daily for constipation  3. Continue gluten-free diet; additional diet information was provided to patient  4. Consider DEXA scan at follow-up for management along with Vit D, Vit b12 & folate   5. Screening colonoscopy due for repeat 12/29/2028     Follow up in about 6 months (around 12/12/2023).      Order summary:  Orders Placed This Encounter    EGD w Dilation       Thank you for allowing me to participate in the care of Beverly Rodríguez.      Chantal Zarate, FNP-BC, AG-ACNP-BC

## 2023-06-16 ENCOUNTER — PATIENT MESSAGE (OUTPATIENT)
Dept: ADMINISTRATIVE | Facility: HOSPITAL | Age: 68
End: 2023-06-16

## 2023-06-16 ENCOUNTER — PATIENT OUTREACH (OUTPATIENT)
Dept: ADMINISTRATIVE | Facility: HOSPITAL | Age: 68
End: 2023-06-16

## 2023-06-16 NOTE — PROGRESS NOTES
[facility] chart audit for the following:Mammogram.  No documentation found in [facility], [facility] [employer], or Care Everywhere for mammogram since 2014.  Message sent to patient on portal about this. Comment placed in the chart that patient needs a mammogram. No upcoming appt scheduled at this time with PCP.  Reset due date on postponed HM.

## 2023-06-24 ENCOUNTER — PATIENT MESSAGE (OUTPATIENT)
Dept: ADMINISTRATIVE | Facility: HOSPITAL | Age: 68
End: 2023-06-24

## 2023-06-26 ENCOUNTER — PATIENT OUTREACH (OUTPATIENT)
Dept: ADMINISTRATIVE | Facility: HOSPITAL | Age: 68
End: 2023-06-26

## 2023-06-26 DIAGNOSIS — Z12.31 ENCOUNTER FOR SCREENING MAMMOGRAM FOR MALIGNANT NEOPLASM OF BREAST: Primary | ICD-10-CM

## 2023-06-26 NOTE — PROGRESS NOTES
Health maintenance record review for population health care gaps    Placed order for patient to have yearly mammogram    Patient stated would like to use [facility] for mammogram.    Educated patient about the walk-in process and to continue to keep follow up appointment  with pcp    Health Maintenance Due   Topic Date Due    Hepatitis C Screening  Never done    COVID-19 Vaccine (1) Never done    Pneumococcal Vaccines (Age 65+) (1 - PCV) Never done    TETANUS VACCINE  Never done    Hemoglobin A1c (Diabetic Prevention Screening)  Never done    DEXA Scan  Never done    Shingles Vaccine (1 of 2) Never done    Mammogram  10/27/2015

## 2023-06-29 ENCOUNTER — OFFICE VISIT (OUTPATIENT)
Dept: NEUROLOGY | Facility: CLINIC | Age: 68
End: 2023-06-29
Payer: MEDICARE

## 2023-06-29 VITALS
WEIGHT: 210.38 LBS | BODY MASS INDEX: 35.92 KG/M2 | HEART RATE: 49 BPM | SYSTOLIC BLOOD PRESSURE: 160 MMHG | HEIGHT: 64 IN | DIASTOLIC BLOOD PRESSURE: 70 MMHG | RESPIRATION RATE: 18 BRPM | OXYGEN SATURATION: 95 %

## 2023-06-29 DIAGNOSIS — R29.6 FALLS FREQUENTLY: Primary | ICD-10-CM

## 2023-06-29 PROCEDURE — 1101F PT FALLS ASSESS-DOCD LE1/YR: CPT | Mod: CPTII,,, | Performed by: PSYCHIATRY & NEUROLOGY

## 2023-06-29 PROCEDURE — 3077F SYST BP >= 140 MM HG: CPT | Mod: CPTII,,, | Performed by: PSYCHIATRY & NEUROLOGY

## 2023-06-29 PROCEDURE — 99214 OFFICE O/P EST MOD 30 MIN: CPT | Mod: S$PBB,,, | Performed by: PSYCHIATRY & NEUROLOGY

## 2023-06-29 PROCEDURE — 1160F PR REVIEW ALL MEDS BY PRESCRIBER/CLIN PHARMACIST DOCUMENTED: ICD-10-PCS | Mod: CPTII,,, | Performed by: PSYCHIATRY & NEUROLOGY

## 2023-06-29 PROCEDURE — 99215 OFFICE O/P EST HI 40 MIN: CPT | Mod: PBBFAC | Performed by: PSYCHIATRY & NEUROLOGY

## 2023-06-29 PROCEDURE — 3078F DIAST BP <80 MM HG: CPT | Mod: CPTII,,, | Performed by: PSYCHIATRY & NEUROLOGY

## 2023-06-29 PROCEDURE — 3008F PR BODY MASS INDEX (BMI) DOCUMENTED: ICD-10-PCS | Mod: CPTII,,, | Performed by: PSYCHIATRY & NEUROLOGY

## 2023-06-29 PROCEDURE — 1159F MED LIST DOCD IN RCRD: CPT | Mod: CPTII,,, | Performed by: PSYCHIATRY & NEUROLOGY

## 2023-06-29 PROCEDURE — 3288F PR FALLS RISK ASSESSMENT DOCUMENTED: ICD-10-PCS | Mod: CPTII,,, | Performed by: PSYCHIATRY & NEUROLOGY

## 2023-06-29 PROCEDURE — 1125F AMNT PAIN NOTED PAIN PRSNT: CPT | Mod: CPTII,,, | Performed by: PSYCHIATRY & NEUROLOGY

## 2023-06-29 PROCEDURE — 99214 PR OFFICE/OUTPT VISIT, EST, LEVL IV, 30-39 MIN: ICD-10-PCS | Mod: S$PBB,,, | Performed by: PSYCHIATRY & NEUROLOGY

## 2023-06-29 PROCEDURE — 1159F PR MEDICATION LIST DOCUMENTED IN MEDICAL RECORD: ICD-10-PCS | Mod: CPTII,,, | Performed by: PSYCHIATRY & NEUROLOGY

## 2023-06-29 PROCEDURE — 3288F FALL RISK ASSESSMENT DOCD: CPT | Mod: CPTII,,, | Performed by: PSYCHIATRY & NEUROLOGY

## 2023-06-29 PROCEDURE — 3008F BODY MASS INDEX DOCD: CPT | Mod: CPTII,,, | Performed by: PSYCHIATRY & NEUROLOGY

## 2023-06-29 PROCEDURE — 3078F PR MOST RECENT DIASTOLIC BLOOD PRESSURE < 80 MM HG: ICD-10-PCS | Mod: CPTII,,, | Performed by: PSYCHIATRY & NEUROLOGY

## 2023-06-29 PROCEDURE — 1125F PR PAIN SEVERITY QUANTIFIED, PAIN PRESENT: ICD-10-PCS | Mod: CPTII,,, | Performed by: PSYCHIATRY & NEUROLOGY

## 2023-06-29 PROCEDURE — 3077F PR MOST RECENT SYSTOLIC BLOOD PRESSURE >= 140 MM HG: ICD-10-PCS | Mod: CPTII,,, | Performed by: PSYCHIATRY & NEUROLOGY

## 2023-06-29 PROCEDURE — 1160F RVW MEDS BY RX/DR IN RCRD: CPT | Mod: CPTII,,, | Performed by: PSYCHIATRY & NEUROLOGY

## 2023-06-29 PROCEDURE — 1101F PR PT FALLS ASSESS DOC 0-1 FALLS W/OUT INJ PAST YR: ICD-10-PCS | Mod: CPTII,,, | Performed by: PSYCHIATRY & NEUROLOGY

## 2023-06-29 NOTE — PROGRESS NOTES
Subjective:       Patient ID: Beverly Rodríguez is a 67 y.o. female     Chief Complaint:    Chief Complaint   Patient presents with    Follow-up     Neuropathy        Allergies:  Aricept [donepezil]; Aspirin; Codeine; Crestor [rosuvastatin]; Dapsone; Flagyl [metronidazole]; Hydrocodone; Imitrex [sumatriptan]; Influenza virus vaccines; Opioids - morphine analogues; Penicillins; Tramadol; Clindamycin; and Latex, natural rubber    Current Medications:    Outpatient Encounter Medications as of 6/29/2023   Medication Sig Dispense Refill    albuterol (PROVENTIL) 2.5 mg /3 mL (0.083 %) nebulizer solution Take 3 mLs (2.5 mg total) by nebulization every 6 (six) hours as needed for Wheezing. Rescue 100 mL 3    ascorbic acid, vitamin C, (VITAMIN C) 1000 MG tablet Take 1,000 mg by mouth once daily.      calcium carbonate (TUMS) 200 mg calcium (500 mg) chewable tablet Take 2 tablets by mouth nightly as needed for Heartburn.      fluticasone propionate (FLONASE) 50 mcg/actuation nasal spray 2 sprays (100 mcg total) by Each Nostril route once daily. 48 g 2    metoprolol succinate (TOPROL-XL) 25 MG 24 hr tablet Take 1 tablet (25 mg total) by mouth once daily. 90 tablet 1    thyroid, pork, (ARMOUR THYROID) 15 mg Tab Take 15 mg by mouth.      thyroid, pork, (ARMOUR THYROID) 60 mg Tab Take 1 tablet (60 mg total) by mouth before breakfast. 90 tablet 1    triamcinolone acetonide 0.1% (KENALOG) 0.1 % cream Apply to AA on body BID PRN flares tapering with improvement 453.6 g 2     No facility-administered encounter medications on file as of 6/29/2023.       History of Present Illness  66 yo WF w/ prev falls from unkwown reasons? Mri brain and C spine showed nothing acute only DDD to be expected at age and nothing to cause her falls? She did have bad knees requiring Ortho surgery which could have been reason for falls most likely?  She denies excess meds although has taken many sedating pain meds in past  Her BP is also very high and she is  "worried        Past Medical History:   Diagnosis Date    Abnormal LFTs     Asthma     ? 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    Nausea vomiting and diarrhea 2/7/2022       Past Surgical History:   Procedure Laterality Date    APPENDECTOMY      CHOLECYSTECTOMY      HYSTERECTOMY      TONSILLECTOMY         Social History  Ms. Rodríguez  reports that she has quit smoking. Her smoking use included cigarettes. She has a 0.20 pack-year smoking history. She has been exposed to tobacco smoke. She has never used smokeless tobacco. She reports that she does not currently use alcohol. She reports that she does not use drugs.    Family History  Ms.'s Rodríguez family history includes Cancer in her paternal aunt; Heart disease in her father and mother; Kidney disease in her mother.    Review of Systems  Review of Systems   All other systems reviewed and are negative.   Objective:   BP (!) 160/70 (BP Location: Left arm, Patient Position: Sitting, BP Method: Large (Manual))   Pulse (!) 49   Resp 18   Ht 5' 4" (1.626 m)   Wt 95.4 kg (210 lb 6.4 oz)   SpO2 95%   BMI 36.12 kg/m²    NEUROLOGICAL EXAMINATION:     MENTAL STATUS   Oriented to person, place, and time.   Speech: slurred   Level of consciousness: alert  Knowledge: consistent with education.     CRANIAL NERVES   Cranial nerves II through XII intact.     MOTOR EXAM     Strength   Strength 5/5 throughout.     GAIT AND COORDINATION        Antalgic gait w/ cane        Physical Exam  Vitals reviewed.   Constitutional:       Appearance: She is obese.   Neurological:      Mental Status: She is alert and oriented to person, place, and time. Mental status is at baseline.      Cranial Nerves: Cranial nerves 2-12 are intact.      Motor: Motor strength is normal.   Psychiatric:         Speech: Speech is slurred.        Assessment:     There are no diagnoses linked to this encounter.     Primary Diagnosis and ICD10  No primary diagnosis " found.    Plan:     There are no Patient Instructions on file for this visit.    There are no discontinued medications.    Requested Prescriptions      No prescriptions requested or ordered in this encounter

## 2023-06-29 NOTE — PATIENT INSTRUCTIONS
Cont current meds   Phys activity as tolerated   Needs tighter control of HTN -instructed to address w/ PCP   F/u prn

## 2023-06-30 ENCOUNTER — EXTERNAL CHRONIC CARE MANAGEMENT (OUTPATIENT)
Dept: FAMILY MEDICINE | Facility: CLINIC | Age: 68
End: 2023-06-30
Payer: MEDICARE

## 2023-06-30 PROCEDURE — G0511 CCM/BHI BY RHC/FQHC 20MIN MO: HCPCS | Mod: ,,, | Performed by: FAMILY MEDICINE

## 2023-06-30 PROCEDURE — G0511 PR CHRONIC CARE MGMT, RHC OR FQHC ONLY, 20 MINS OR MORE: ICD-10-PCS | Mod: ,,, | Performed by: FAMILY MEDICINE

## 2023-07-13 ENCOUNTER — OFFICE VISIT (OUTPATIENT)
Dept: DERMATOLOGY | Facility: CLINIC | Age: 68
End: 2023-07-13
Payer: MEDICARE

## 2023-07-13 VITALS — HEIGHT: 64 IN | WEIGHT: 210.31 LBS | BODY MASS INDEX: 35.9 KG/M2

## 2023-07-13 DIAGNOSIS — L08.9 SKIN INFECTION: ICD-10-CM

## 2023-07-13 DIAGNOSIS — L13.0 DERMATITIS HERPETIFORMIS: Primary | ICD-10-CM

## 2023-07-13 PROCEDURE — 3008F PR BODY MASS INDEX (BMI) DOCUMENTED: ICD-10-PCS | Mod: CPTII,,, | Performed by: DERMATOLOGY

## 2023-07-13 PROCEDURE — 3008F BODY MASS INDEX DOCD: CPT | Mod: CPTII,,, | Performed by: DERMATOLOGY

## 2023-07-13 PROCEDURE — 1160F PR REVIEW ALL MEDS BY PRESCRIBER/CLIN PHARMACIST DOCUMENTED: ICD-10-PCS | Mod: CPTII,,, | Performed by: DERMATOLOGY

## 2023-07-13 PROCEDURE — 1160F RVW MEDS BY RX/DR IN RCRD: CPT | Mod: CPTII,,, | Performed by: DERMATOLOGY

## 2023-07-13 PROCEDURE — 1159F PR MEDICATION LIST DOCUMENTED IN MEDICAL RECORD: ICD-10-PCS | Mod: CPTII,,, | Performed by: DERMATOLOGY

## 2023-07-13 PROCEDURE — 99214 PR OFFICE/OUTPT VISIT, EST, LEVL IV, 30-39 MIN: ICD-10-PCS | Mod: ,,, | Performed by: DERMATOLOGY

## 2023-07-13 PROCEDURE — 1159F MED LIST DOCD IN RCRD: CPT | Mod: CPTII,,, | Performed by: DERMATOLOGY

## 2023-07-13 PROCEDURE — 99214 OFFICE O/P EST MOD 30 MIN: CPT | Mod: ,,, | Performed by: DERMATOLOGY

## 2023-07-13 RX ORDER — MUPIROCIN 20 MG/G
OINTMENT TOPICAL
Qty: 30 G | Refills: 11 | Status: SHIPPED | OUTPATIENT
Start: 2023-07-13

## 2023-07-13 RX ORDER — TRIAMCINOLONE ACETONIDE 1 MG/G
CREAM TOPICAL
Qty: 453.6 G | Refills: 2 | Status: SHIPPED | OUTPATIENT
Start: 2023-07-13

## 2023-07-13 NOTE — PROGRESS NOTES
[facility]   Shamika Valencia MD    Patient Name: Beverly Rodríguez  Patient YOB: 1955   Date of Service: 7/13/23    CC: Follow-up Dermatitis herpetiformis.    HPI: Beverly Rodríguez is a 67 y.o. female here today for follow-up of DH., last seen 05/2023.  Previous treatments include dapsone, triamcinolone and gluten avoidance.  Overall, the DH is stable.  Treatment plan was not followed as directed.    Past Medical History:   Diagnosis Date    Abnormal LFTs     Asthma     ? 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    Nausea vomiting and diarrhea 2/7/2022     Past Surgical History:   Procedure Laterality Date    APPENDECTOMY      CHOLECYSTECTOMY      HYSTERECTOMY      TONSILLECTOMY       Review of patient's allergies indicates:   Allergen Reactions    Aricept [donepezil]     Aspirin     Codeine     Crestor [rosuvastatin]     Dapsone      nausea    Flagyl [metronidazole]     Hydrocodone     Imitrex [sumatriptan]     Influenza virus vaccines     Opioids - morphine analogues     Penicillins     Tramadol     Clindamycin Nausea Only    Latex, natural rubber Rash       Current Outpatient Medications:     albuterol (PROVENTIL) 2.5 mg /3 mL (0.083 %) nebulizer solution, Take 3 mLs (2.5 mg total) by nebulization every 6 (six) hours as needed for Wheezing. Rescue, Disp: 100 mL, Rfl: 3    ascorbic acid, vitamin C, (VITAMIN C) 1000 MG tablet, Take 1,000 mg by mouth once daily., Disp: , Rfl:     calcium carbonate (TUMS) 200 mg calcium (500 mg) chewable tablet, Take 2 tablets by mouth nightly as needed for Heartburn., Disp: , Rfl:     fluticasone propionate (FLONASE) 50 mcg/actuation nasal spray, 2 sprays (100 mcg total) by Each Nostril route once daily., Disp: 48 g, Rfl: 2    metoprolol succinate (TOPROL-XL) 25 MG 24 hr tablet, Take 1 tablet (25 mg total) by mouth once daily., Disp: 90 tablet, Rfl: 1    mupirocin (BACTROBAN) 2 % ointment, Apply to AA on body BID PRN flares  tapering with improvement, Disp: 30 g, Rfl: 11    thyroid, pork, (ARMOUR THYROID) 15 mg Tab, Take 15 mg by mouth., Disp: , Rfl:     thyroid, pork, (ARMOUR THYROID) 60 mg Tab, Take 1 tablet (60 mg total) by mouth before breakfast., Disp: 90 tablet, Rfl: 1    triamcinolone acetonide 0.1% (KENALOG) 0.1 % cream, Apply to AA on body BID PRN flares tapering with improvement, Disp: 453.6 g, Rfl: 2    ROS: A focused review of systems was obtained and negative.     Exam: A focused skin exam was performed. All areas examined were normal except as mentioned in the assessment and plan below.  General Appearance of the patient is well developed and well nourished.  Orientation: alert and oriented x 3.  Mood and affect: pleasant.    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 A diagnosis of Skin infection was also pertinent to this visit.    Plan:   Medications Ordered This Encounter   Medications    mupirocin (BACTROBAN) 2 % ointment     Sig: Apply to AA on body BID PRN flares tapering with improvement     Dispense:  30 g     Refill:  11    triamcinolone acetonide 0.1% (KENALOG) 0.1 % cream     Sig: Apply to AA on body BID PRN flares tapering with improvement     Dispense:  453.6 g     Refill:  2     Dermatitis Herpetiformis  - Symmetrical erythematous papules  Status: Inadequately controlled     Plan: Counseling.  I counseled the patient regarding the following:  Contact office if: Rash fails to resolve despite treatment.    - improvement after seeing GI and education on gluten avoidance but still not at treatment goal  - Patient reports having reaction to dapsone after retrial so will continue to hold  - sulfasalazine contraindicated in relation to ASA allergy  - Patient reports ASA causes rash on feet  - Continue triamcinolone  - Encouraged importance of strict gluten-free diet.  - continue FU w/ GI    Skin Infection, NOS   - crusted lesion on the elbow    Plan: Counseling  I counseled the  patient regarding the following:  Skin care: Patients with purulence or fluid collections should have their wounds re-opened, drained, cultured and irrigated. All wound infections should be treated with antibiotics.  Expectations: Wound Infections usually occur 4-7 days postoperatively. Patients exhibit, pain, rednes, swelling, cellulitic changes and fever.  Contact Office if: Wound Infection fails to respond to treatment or worsens, patient develops a fever, or if redness spreads despite antibiotics.    - Begin mupirocin    Follow up in about 6 months (around 1/13/2024) for FU Dermatitis.    Shamika Valencia MD

## 2023-07-18 DIAGNOSIS — R26.81 UNSTEADY GAIT: Primary | ICD-10-CM

## 2023-07-18 DIAGNOSIS — R29.6 FALLS FREQUENTLY: ICD-10-CM

## 2023-07-31 ENCOUNTER — EXTERNAL CHRONIC CARE MANAGEMENT (OUTPATIENT)
Dept: FAMILY MEDICINE | Facility: CLINIC | Age: 68
End: 2023-07-31
Payer: MEDICARE

## 2023-07-31 PROCEDURE — G0511 CCM/BHI BY RHC/FQHC 20MIN MO: HCPCS | Mod: ,,, | Performed by: FAMILY MEDICINE

## 2023-07-31 PROCEDURE — G0511 PR CHRONIC CARE MGMT, RHC OR FQHC ONLY, 20 MINS OR MORE: ICD-10-PCS | Mod: ,,, | Performed by: FAMILY MEDICINE

## 2023-08-03 RX ORDER — ERGOCALCIFEROL 1.25 MG/1
50000 CAPSULE ORAL
COMMUNITY
Start: 2023-06-30 | End: 2023-10-10

## 2023-08-03 RX ORDER — EZETIMIBE 10 MG/1
1 TABLET ORAL DAILY
Status: ON HOLD | COMMUNITY
Start: 2023-06-30 | End: 2023-10-04 | Stop reason: HOSPADM

## 2023-08-09 ENCOUNTER — HOSPITAL ENCOUNTER (OUTPATIENT)
Dept: GASTROENTEROLOGY | Facility: HOSPITAL | Age: 68
Discharge: HOME | End: 2023-08-09
Payer: MEDICARE

## 2023-08-09 ENCOUNTER — ANESTHESIA EVENT (OUTPATIENT)
Dept: GASTROENTEROLOGY | Facility: HOSPITAL | Age: 68
End: 2023-08-09
Payer: MEDICARE

## 2023-08-09 ENCOUNTER — ANESTHESIA (OUTPATIENT)
Dept: GASTROENTEROLOGY | Facility: HOSPITAL | Age: 68
End: 2023-08-09
Payer: MEDICARE

## 2023-08-09 ENCOUNTER — TELEPHONE (OUTPATIENT)
Dept: FAMILY MEDICINE | Facility: CLINIC | Age: 68
End: 2023-08-09
Payer: MEDICARE

## 2023-08-09 VITALS
OXYGEN SATURATION: 94 % | HEART RATE: 45 BPM | DIASTOLIC BLOOD PRESSURE: 69 MMHG | RESPIRATION RATE: 17 BRPM | SYSTOLIC BLOOD PRESSURE: 136 MMHG | TEMPERATURE: 98 F

## 2023-08-09 DIAGNOSIS — R13.10 DYSPHAGIA, UNSPECIFIED TYPE: ICD-10-CM

## 2023-08-09 DIAGNOSIS — R13.19 ESOPHAGEAL DYSPHAGIA: ICD-10-CM

## 2023-08-09 PROCEDURE — 88305 TISSUE EXAM BY PATHOLOGIST: CPT | Mod: 26,,, | Performed by: PATHOLOGY

## 2023-08-09 PROCEDURE — D9220A PRA ANESTHESIA: Mod: ,,,

## 2023-08-09 PROCEDURE — 43248 PR EGD, FLEX, W/DILATION OVER GUIDEWIRE: ICD-10-PCS | Mod: ,,, | Performed by: INTERNAL MEDICINE

## 2023-08-09 PROCEDURE — 25000003 PHARM REV CODE 250

## 2023-08-09 PROCEDURE — 43239 EGD BIOPSY SINGLE/MULTIPLE: CPT | Mod: 59 | Performed by: INTERNAL MEDICINE

## 2023-08-09 PROCEDURE — 37000009 HC ANESTHESIA EA ADD 15 MINS

## 2023-08-09 PROCEDURE — 88305 TISSUE EXAM BY PATHOLOGIST: CPT | Mod: TC,SUR | Performed by: INTERNAL MEDICINE

## 2023-08-09 PROCEDURE — 63600175 PHARM REV CODE 636 W HCPCS

## 2023-08-09 PROCEDURE — 43239 EGD BIOPSY SINGLE/MULTIPLE: CPT | Mod: 59,,, | Performed by: INTERNAL MEDICINE

## 2023-08-09 PROCEDURE — 88342 SURGICAL PATHOLOGY: ICD-10-PCS | Mod: 26,,, | Performed by: PATHOLOGY

## 2023-08-09 PROCEDURE — 37000008 HC ANESTHESIA 1ST 15 MINUTES

## 2023-08-09 PROCEDURE — 43248 EGD GUIDE WIRE INSERTION: CPT | Performed by: INTERNAL MEDICINE

## 2023-08-09 PROCEDURE — 88342 IMHCHEM/IMCYTCHM 1ST ANTB: CPT | Mod: 26,,, | Performed by: PATHOLOGY

## 2023-08-09 PROCEDURE — 88305 SURGICAL PATHOLOGY: ICD-10-PCS | Mod: 26,,, | Performed by: PATHOLOGY

## 2023-08-09 PROCEDURE — 43239 PR EGD, FLEX, W/BIOPSY, SGL/MULTI: ICD-10-PCS | Mod: 59,,, | Performed by: INTERNAL MEDICINE

## 2023-08-09 PROCEDURE — D9220A PRA ANESTHESIA: ICD-10-PCS | Mod: ,,,

## 2023-08-09 PROCEDURE — 43248 EGD GUIDE WIRE INSERTION: CPT | Mod: ,,, | Performed by: INTERNAL MEDICINE

## 2023-08-09 RX ORDER — LIDOCAINE HYDROCHLORIDE 20 MG/ML
INJECTION, SOLUTION EPIDURAL; INFILTRATION; INTRACAUDAL; PERINEURAL
Status: DISCONTINUED | OUTPATIENT
Start: 2023-08-09 | End: 2023-08-09

## 2023-08-09 RX ORDER — SODIUM CHLORIDE 9 MG/ML
INJECTION, SOLUTION INTRAVENOUS CONTINUOUS PRN
Status: DISCONTINUED | OUTPATIENT
Start: 2023-08-09 | End: 2023-08-09

## 2023-08-09 RX ORDER — PROPOFOL 10 MG/ML
VIAL (ML) INTRAVENOUS
Status: DISCONTINUED | OUTPATIENT
Start: 2023-08-09 | End: 2023-08-09

## 2023-08-09 RX ORDER — SODIUM CHLORIDE 0.9 % (FLUSH) 0.9 %
10 SYRINGE (ML) INJECTION
Status: DISCONTINUED | OUTPATIENT
Start: 2023-08-09 | End: 2023-08-10 | Stop reason: HOSPADM

## 2023-08-09 RX ADMIN — PROPOFOL 80 MG: 10 INJECTION, EMULSION INTRAVENOUS at 02:08

## 2023-08-09 RX ADMIN — SODIUM CHLORIDE: 9 INJECTION, SOLUTION INTRAVENOUS at 02:08

## 2023-08-09 RX ADMIN — PROPOFOL 30 MG: 10 INJECTION, EMULSION INTRAVENOUS at 02:08

## 2023-08-09 RX ADMIN — BENZOCAINE 1 CAN: 200 SPRAY DENTAL; ORAL; PERIODONTAL at 02:08

## 2023-08-09 RX ADMIN — LIDOCAINE HYDROCHLORIDE 100 MG: 20 INJECTION, SOLUTION INTRAVENOUS at 02:08

## 2023-08-09 NOTE — TELEPHONE ENCOUNTER
----- Message from Apurva Ch MA sent at 8/3/2023 10:20 AM CDT -----  Pt called to see if she is still supposed to be taking her b12.  She can be reached at .

## 2023-08-09 NOTE — TRANSFER OF CARE
Anesthesia Transfer of Care Note    Patient: Beverly Rodríguez    Procedure(s) Performed: EGD with dilation    Patient location: GI    Anesthesia Type: general and MAC    Transport from OR: Transported from OR on room air with adequate spontaneous ventilation    Post pain: adequate analgesia    Post assessment: no apparent anesthetic complications    Post vital signs: stable    Level of consciousness: responds to stimulation    Nausea/Vomiting: no nausea/vomiting    Complications: none    Transfer of care protocol was followed      Last vitals:   Visit Vitals  /61   Pulse (!) 58   Temp 36.4 °C (97.6 °F)   Resp 20   SpO2 98%   Breastfeeding No      60

## 2023-08-09 NOTE — DISCHARGE INSTRUCTIONS
Procedure Date  8/9/23     Impression  Overall Impression:   Grade A esophagitis in the GE junction  Mild abnormal mucosa, consistent with gastritis in the antrum; performed cold forceps biopsies  The upper third of the esophagus, middle third of the esophagus, lower third of the esophagus, cardia, fundus of the stomach, body of the stomach, incisura, duodenal bulb, 1st part of the duodenum and 2nd part of the duodenum appeared normal. Performed random biopsy to rule out eosinophilic esophagitis.  Dilated in the esophagus with Mike-Rebekah dilator to 51 Fr ending size. Dilation caused improved passage of the scope     Recommendation    Await pathology results  Continue daily acid reflux medications  Avoid foods that trigger reflux     Follow up with PCP  Follow up in GI clinic as needed      Outcome of procedure: successful EGD  Disposition: patient to recovery following procedure; discharge to home when appropriate parameters met  Provisions for follow up: please call my office for any unexpected symptoms like chest or abdominal pain or bleeding following your procedure.  Final Diagnosis: dysphagia     Continue home medications as instructed. No driving for 24 hrs. No signing legal documents for 24 hrs. Drink plenty of fluids

## 2023-08-09 NOTE — ANESTHESIA POSTPROCEDURE EVALUATION
Anesthesia Post Evaluation    Patient: Beverly Rodríguez    Procedure(s) Performed: EGD with dilation    Final Anesthesia Type: general      Patient location during evaluation: GI PACU  Patient participation: Yes- Able to Participate  Level of consciousness: awake and alert  Post-procedure vital signs: reviewed and stable  Pain management: adequate  Airway patency: patent    PONV status at discharge: No PONV  Anesthetic complications: no      Cardiovascular status: blood pressure returned to baseline  Respiratory status: unassisted and spontaneous ventilation  Hydration status: euvolemic  Follow-up not needed.          Vitals Value Taken Time   /69 08/09/23 1513   Temp 97.9f 08/09/23 1514   Pulse 48 08/09/23 1514   Resp 13 08/09/23 1514   SpO2 95 % 08/09/23 1514   Vitals shown include unvalidated device data.      No case tracking events are documented in the log.      Pain/Carol Score: Carol Score: 8 (8/9/2023  2:53 PM)

## 2023-08-09 NOTE — H&P
Gastroenterology Pre-procedure H&P    Chief Complaint: Esophageal dysphagia    History of Present Illness    Beverly Rodríguez is a 67 y.o. female that  has a past medical history of Abnormal LFTs, Asthma, Diabetes mellitus, Hypertension, Hypothyroidism, and Nausea vomiting and diarrhea (2/7/2022).     Patient with GERD and progressive dysphagia to solids (suprasternal). Has had dilation in the past with improvement and comes in today for dilation.      Past Medical History:   Diagnosis Date    Abnormal LFTs     Asthma     ? 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    Nausea vomiting and diarrhea 2/7/2022       Past Surgical History:   Procedure Laterality Date    APPENDECTOMY      CHOLECYSTECTOMY      HYSTERECTOMY      TONSILLECTOMY         Family History   Problem Relation Age of Onset    Heart disease Mother     Kidney disease Mother     Heart disease Father     Cancer Paternal Aunt        Social History     Socioeconomic History    Marital status:    Tobacco Use    Smoking status: Former     Current packs/day: 0.20     Average packs/day: 0.2 packs/day for 1 year (0.2 ttl pk-yrs)     Types: Cigarettes     Passive exposure: Current    Smokeless tobacco: Never   Substance and Sexual Activity    Alcohol use: Not Currently    Drug use: Never    Sexual activity: Not Currently       Current Outpatient Medications   Medication Sig Dispense Refill    albuterol (PROVENTIL) 2.5 mg /3 mL (0.083 %) nebulizer solution Take 3 mLs (2.5 mg total) by nebulization every 6 (six) hours as needed for Wheezing. Rescue 100 mL 3    ascorbic acid, vitamin C, (VITAMIN C) 1000 MG tablet Take 1,000 mg by mouth once daily.      calcium carbonate (TUMS) 200 mg calcium (500 mg) chewable tablet Take 2 tablets by mouth nightly as needed for Heartburn.      ergocalciferol (ERGOCALCIFEROL) 50,000 unit Cap Take 50,000 Units by mouth every 7 days.      ezetimibe (ZETIA) 10 mg tablet Take 1 tablet by mouth  once daily.      fluticasone propionate (FLONASE) 50 mcg/actuation nasal spray 2 sprays (100 mcg total) by Each Nostril route once daily. 48 g 2    metoprolol succinate (TOPROL-XL) 25 MG 24 hr tablet Take 1 tablet (25 mg total) by mouth once daily. 90 tablet 1    mupirocin (BACTROBAN) 2 % ointment Apply to AA on body BID PRN flares tapering with improvement 30 g 11    thyroid, pork, (ARMOUR THYROID) 15 mg Tab Take 15 mg by mouth.      thyroid, pork, (ARMOUR THYROID) 60 mg Tab Take 1 tablet (60 mg total) by mouth before breakfast. 90 tablet 1    triamcinolone acetonide 0.1% (KENALOG) 0.1 % cream Apply to AA on body BID PRN flares tapering with improvement 453.6 g 2     No current facility-administered medications for this encounter.       Review of patient's allergies indicates:   Allergen Reactions    Aricept [donepezil]     Aspirin     Codeine     Crestor [rosuvastatin]     Dapsone      nausea    Flagyl [metronidazole]     Hydrocodone     Imitrex [sumatriptan]     Influenza virus vaccines     Opioids - morphine analogues     Penicillins     Tramadol     Clindamycin Nausea Only    Latex, natural rubber Rash       Objective:  Vitals:    08/09/23 1329   BP: (!) 173/84   Pulse: 63   Resp: 14   SpO2: 96%        GEN: normal appearing, NAD, AAO x3  HENT: NCAT, anicteric, OP benign  CV: normal rate, regular rhythm  RESP: CTA, symmetric rise, unlabored  ABD: soft, ND, no guarding or TTP  SKIN: warm and dry  NEURO: grossly afocal    Assessment and Plan:    Proceed with:    EGD for dysphagia       Bassem England MD  Gastroenterology

## 2023-08-09 NOTE — ANESTHESIA PREPROCEDURE EVALUATION
08/09/2023  Beverly Rodríguez is a 67 y.o., female.      Pre-op Assessment    I have reviewed the Patient Summary Reports.     I have reviewed the Nursing Notes. I have reviewed the NPO Status.   I have reviewed the Medications.     Review of Systems  Anesthesia Hx:  No problems with previous Anesthesia    Social:  Non-Smoker, No Alcohol Use    Hematology/Oncology:  Hematology Normal   Oncology Normal     EENT/Dental:EENT/Dental Normal   Cardiovascular:   Hypertension    Pulmonary:   Asthma    Renal/:  Renal/ Normal     Hepatic/GI:  Hepatic/GI Normal    Musculoskeletal:  Musculoskeletal Normal    Neurological:  Neurology Normal    Endocrine:   Diabetes Hypothyroidism  Obesity / BMI > 30  Dermatological:  Skin Normal    Psych:  Psychiatric Normal           Physical Exam  General: Well nourished, Cooperative, Alert and Oriented    Airway:  Mallampati: II   Mouth Opening: Normal  TM Distance: Normal  Tongue: Normal  Neck ROM: Normal ROM    Dental:  Intact    Chest/Lungs:  Clear to auscultation, Normal Respiratory Rate    Heart:  Rate: Normal  Rhythm: Regular Rhythm  Sounds: Normal    Abdomen:  Normal, Soft, Nontender        Anesthesia Plan  Type of Anesthesia, risks & benefits discussed:    Anesthesia Type: Gen Natural Airway, MAC  Intra-op Monitoring Plan: Standard ASA Monitors  Post Op Pain Control Plan: multimodal analgesia and IV/PO Opioids PRN  Induction:  IV  Informed Consent: Informed consent signed with the Patient and all parties understand the risks and agree with anesthesia plan.  All questions answered.   ASA Score: 3  Day of Surgery Review of History & Physical: I have interviewed and examined the patient. I have reviewed the patient's H&P dated:     Ready For Surgery From Anesthesia Perspective.     .

## 2023-08-10 LAB
ESTROGEN SERPL-MCNC: NORMAL PG/ML
INSULIN SERPL-ACNC: NORMAL U[IU]/ML
LAB AP GROSS DESCRIPTION: NORMAL
LAB AP LABORATORY NOTES: NORMAL
T3RU NFR SERPL: NORMAL %

## 2023-08-31 ENCOUNTER — EXTERNAL CHRONIC CARE MANAGEMENT (OUTPATIENT)
Dept: FAMILY MEDICINE | Facility: CLINIC | Age: 68
End: 2023-08-31
Payer: MEDICARE

## 2023-08-31 PROCEDURE — G0511 PR CHRONIC CARE MGMT, RHC OR FQHC ONLY, 20 MINS OR MORE: ICD-10-PCS | Mod: ,,, | Performed by: FAMILY MEDICINE

## 2023-08-31 PROCEDURE — G0511 CCM/BHI BY RHC/FQHC 20MIN MO: HCPCS | Mod: ,,, | Performed by: FAMILY MEDICINE

## 2023-09-07 NOTE — ASSESSMENT & PLAN NOTE
[facility] Order per PRASHANTH Rome to set up colonoscopy, diagnosis screening.    TSH pending  Continue home med (Beltrami thyroid 75 mg daily)

## 2023-09-29 ENCOUNTER — HOSPITAL ENCOUNTER (OUTPATIENT)
Facility: HOSPITAL | Age: 68
Discharge: HOME | End: 2023-10-04
Attending: EMERGENCY MEDICINE | Admitting: INTERNAL MEDICINE
Payer: MEDICARE

## 2023-09-29 DIAGNOSIS — I95.9 HYPOTENSIVE EPISODE: Primary | ICD-10-CM

## 2023-09-29 DIAGNOSIS — R06.02 SHORTNESS OF BREATH: ICD-10-CM

## 2023-09-29 DIAGNOSIS — R00.1 BRADYCARDIA: ICD-10-CM

## 2023-09-29 DIAGNOSIS — I21.4 NSTEMI (NON-ST ELEVATED MYOCARDIAL INFARCTION): ICD-10-CM

## 2023-09-29 DIAGNOSIS — R07.9 CHEST PAIN, UNSPECIFIED TYPE: ICD-10-CM

## 2023-09-29 DIAGNOSIS — R07.9 CHEST PAIN: ICD-10-CM

## 2023-09-29 DIAGNOSIS — R53.1 GENERAL WEAKNESS: ICD-10-CM

## 2023-09-29 DIAGNOSIS — R94.39 ABNORMAL CARDIOVASCULAR STRESS TEST: ICD-10-CM

## 2023-09-29 DIAGNOSIS — I21.9 MYOCARDIAL INFARCTION, UNSPECIFIED MI TYPE, UNSPECIFIED ARTERY: ICD-10-CM

## 2023-09-29 LAB
ALBUMIN SERPL BCP-MCNC: 3.9 G/DL (ref 3.5–5)
ALBUMIN/GLOB SERPL: 1.1 {RATIO}
ALP SERPL-CCNC: 72 U/L (ref 55–142)
ALT SERPL W P-5'-P-CCNC: 33 U/L (ref 13–56)
ANION GAP SERPL CALCULATED.3IONS-SCNC: 10 MMOL/L (ref 7–16)
APTT PPP: 28.8 SECONDS (ref 25.2–37.3)
AST SERPL W P-5'-P-CCNC: 21 U/L (ref 15–37)
BASOPHILS # BLD AUTO: 0.06 K/UL (ref 0–0.2)
BASOPHILS NFR BLD AUTO: 1.1 % (ref 0–1)
BILIRUB SERPL-MCNC: 0.6 MG/DL (ref ?–1.2)
BILIRUB UR QL STRIP: NEGATIVE
BUN SERPL-MCNC: 13 MG/DL (ref 7–18)
BUN/CREAT SERPL: 13 (ref 6–20)
CALCIUM SERPL-MCNC: 9.5 MG/DL (ref 8.5–10.1)
CHLORIDE SERPL-SCNC: 102 MMOL/L (ref 98–107)
CLARITY UR: CLEAR
CO2 SERPL-SCNC: 28 MMOL/L (ref 21–32)
COLOR UR: YELLOW
CREAT SERPL-MCNC: 1.02 MG/DL (ref 0.55–1.02)
D DIMER PPP FEU-MCNC: <0.27 ΜG/ML (ref 0–0.47)
DIFFERENTIAL METHOD BLD: ABNORMAL
EGFR (NO RACE VARIABLE) (RUSH/TITUS): 61 ML/MIN/1.73M2
EOSINOPHIL # BLD AUTO: 0.17 K/UL (ref 0–0.5)
EOSINOPHIL NFR BLD AUTO: 3.2 % (ref 1–4)
ERYTHROCYTE [DISTWIDTH] IN BLOOD BY AUTOMATED COUNT: 12.1 % (ref 11.5–14.5)
GLOBULIN SER-MCNC: 3.6 G/DL (ref 2–4)
GLUCOSE SERPL-MCNC: 108 MG/DL (ref 74–106)
GLUCOSE SERPL-MCNC: 128 MG/DL (ref 70–105)
GLUCOSE UR STRIP-MCNC: NORMAL MG/DL
HCO3 UR-SCNC: 28.4 MMOL/L (ref 21–28)
HCT VFR BLD AUTO: 37.6 % (ref 38–47)
HCT VFR BLD CALC: 40 % (ref 35–51)
HGB BLD-MCNC: 12.8 G/DL (ref 12–16)
IMM GRANULOCYTES # BLD AUTO: 0 K/UL (ref 0–0.04)
IMM GRANULOCYTES NFR BLD: 0 % (ref 0–0.4)
INR BLD: 1.08
KETONES UR STRIP-SCNC: 40 MG/DL
LACTATE SERPL-SCNC: 1.6 MMOL/L (ref 0.4–2)
LDH SERPL L TO P-CCNC: 1.4 MMOL/L (ref 0.3–1.2)
LEUKOCYTE ESTERASE UR QL STRIP: ABNORMAL
LYMPHOCYTES # BLD AUTO: 1.28 K/UL (ref 1–4.8)
LYMPHOCYTES NFR BLD AUTO: 23.9 % (ref 27–41)
MAGNESIUM SERPL-MCNC: 2.2 MG/DL (ref 1.7–2.3)
MCH RBC QN AUTO: 28.8 PG (ref 27–31)
MCHC RBC AUTO-ENTMCNC: 34 G/DL (ref 32–36)
MCV RBC AUTO: 84.5 FL (ref 80–96)
MONOCYTES # BLD AUTO: 0.42 K/UL (ref 0–0.8)
MONOCYTES NFR BLD AUTO: 7.8 % (ref 2–6)
MPC BLD CALC-MCNC: 11.4 FL (ref 9.4–12.4)
MUCOUS, UA: ABNORMAL /LPF
NEUTROPHILS # BLD AUTO: 3.43 K/UL (ref 1.8–7.7)
NEUTROPHILS NFR BLD AUTO: 64 % (ref 53–65)
NITRITE UR QL STRIP: NEGATIVE
NRBC # BLD AUTO: 0 X10E3/UL
NRBC, AUTO (.00): 0 %
NT-PROBNP SERPL-MCNC: 82 PG/ML (ref 1–125)
PCO2 BLDA: 40 MMHG (ref 35–48)
PH SMN: 7.46 [PH] (ref 7.35–7.45)
PH UR STRIP: 6 PH UNITS
PLATELET # BLD AUTO: 284 K/UL (ref 150–400)
PO2 BLDA: 375 MMHG (ref 83–108)
POC BASE EXCESS: 4.2 MMOL/L (ref -2–3)
POC CO2: 29.6 MMOL/L
POC IONIZED CALCIUM: 1.19 MMOL/L (ref 1.15–1.35)
POC SATURATED O2: 100 % (ref 95–98)
POCT GLUCOSE: 131 MG/DL (ref 60–95)
POTASSIUM BLD-SCNC: 3 MMOL/L (ref 3.4–4.5)
POTASSIUM SERPL-SCNC: 3 MMOL/L (ref 3.5–5.1)
PROT SERPL-MCNC: 7.5 G/DL (ref 6.4–8.2)
PROT UR QL STRIP: 200
PROTHROMBIN TIME: 13.9 SECONDS (ref 11.7–14.7)
RBC # BLD AUTO: 4.45 M/UL (ref 4.2–5.4)
RBC # UR STRIP: NEGATIVE /UL
RBC #/AREA URNS HPF: 1 /HPF
SODIUM BLD-SCNC: 135 MMOL/L (ref 136–145)
SODIUM SERPL-SCNC: 137 MMOL/L (ref 136–145)
SP GR UR STRIP: 1.01
SQUAMOUS #/AREA URNS LPF: ABNORMAL /HPF
TROPONIN I SERPL DL<=0.01 NG/ML-MCNC: 15.5 PG/ML
TROPONIN I SERPL DL<=0.01 NG/ML-MCNC: 45.3 PG/ML
UROBILINOGEN UR STRIP-ACNC: 3 MG/DL
WBC # BLD AUTO: 5.36 K/UL (ref 4.5–11)
WBC #/AREA URNS HPF: 10 /HPF

## 2023-09-29 PROCEDURE — 82962 GLUCOSE BLOOD TEST: CPT

## 2023-09-29 PROCEDURE — 85730 THROMBOPLASTIN TIME PARTIAL: CPT | Performed by: EMERGENCY MEDICINE

## 2023-09-29 PROCEDURE — 82330 ASSAY OF CALCIUM: CPT

## 2023-09-29 PROCEDURE — 82803 BLOOD GASES ANY COMBINATION: CPT

## 2023-09-29 PROCEDURE — 87040 BLOOD CULTURE FOR BACTERIA: CPT | Performed by: EMERGENCY MEDICINE

## 2023-09-29 PROCEDURE — 80053 COMPREHEN METABOLIC PANEL: CPT | Performed by: EMERGENCY MEDICINE

## 2023-09-29 PROCEDURE — 83735 ASSAY OF MAGNESIUM: CPT | Performed by: EMERGENCY MEDICINE

## 2023-09-29 PROCEDURE — 83605 ASSAY OF LACTIC ACID: CPT

## 2023-09-29 PROCEDURE — 63600175 PHARM REV CODE 636 W HCPCS: Performed by: EMERGENCY MEDICINE

## 2023-09-29 PROCEDURE — 85379 FIBRIN DEGRADATION QUANT: CPT | Performed by: EMERGENCY MEDICINE

## 2023-09-29 PROCEDURE — 83880 ASSAY OF NATRIURETIC PEPTIDE: CPT | Performed by: EMERGENCY MEDICINE

## 2023-09-29 PROCEDURE — 84132 ASSAY OF SERUM POTASSIUM: CPT | Mod: 59

## 2023-09-29 PROCEDURE — 85610 PROTHROMBIN TIME: CPT | Performed by: EMERGENCY MEDICINE

## 2023-09-29 PROCEDURE — 96365 THER/PROPH/DIAG IV INF INIT: CPT | Mod: 59

## 2023-09-29 PROCEDURE — 93010 EKG 12-LEAD: ICD-10-PCS | Mod: ,,, | Performed by: INTERNAL MEDICINE

## 2023-09-29 PROCEDURE — 81001 URINALYSIS AUTO W/SCOPE: CPT | Performed by: EMERGENCY MEDICINE

## 2023-09-29 PROCEDURE — 82962 GLUCOSE BLOOD TEST: CPT | Mod: 91

## 2023-09-29 PROCEDURE — 96361 HYDRATE IV INFUSION ADD-ON: CPT

## 2023-09-29 PROCEDURE — 93005 ELECTROCARDIOGRAM TRACING: CPT

## 2023-09-29 PROCEDURE — 84484 ASSAY OF TROPONIN QUANT: CPT | Performed by: EMERGENCY MEDICINE

## 2023-09-29 PROCEDURE — 99285 EMERGENCY DEPT VISIT HI MDM: CPT | Mod: 25

## 2023-09-29 PROCEDURE — 82947 ASSAY GLUCOSE BLOOD QUANT: CPT

## 2023-09-29 PROCEDURE — 85025 COMPLETE CBC W/AUTO DIFF WBC: CPT | Performed by: EMERGENCY MEDICINE

## 2023-09-29 PROCEDURE — 85014 HEMATOCRIT: CPT

## 2023-09-29 PROCEDURE — 99285 PR EMERGENCY DEPT VISIT,LEVEL V: ICD-10-PCS | Mod: ,,, | Performed by: EMERGENCY MEDICINE

## 2023-09-29 PROCEDURE — 84295 ASSAY OF SERUM SODIUM: CPT

## 2023-09-29 PROCEDURE — 93010 ELECTROCARDIOGRAM REPORT: CPT | Mod: ,,, | Performed by: INTERNAL MEDICINE

## 2023-09-29 PROCEDURE — 83605 ASSAY OF LACTIC ACID: CPT | Performed by: EMERGENCY MEDICINE

## 2023-09-29 PROCEDURE — 96375 TX/PRO/DX INJ NEW DRUG ADDON: CPT

## 2023-09-29 PROCEDURE — 99285 EMERGENCY DEPT VISIT HI MDM: CPT | Mod: ,,, | Performed by: EMERGENCY MEDICINE

## 2023-09-29 PROCEDURE — 25500020 PHARM REV CODE 255: Performed by: EMERGENCY MEDICINE

## 2023-09-29 PROCEDURE — 25000003 PHARM REV CODE 250: Performed by: EMERGENCY MEDICINE

## 2023-09-29 RX ORDER — DAPSONE 25 MG/1
TABLET ORAL
Status: ON HOLD | COMMUNITY
Start: 2023-06-30 | End: 2023-10-04 | Stop reason: HOSPADM

## 2023-09-29 RX ORDER — SODIUM CHLORIDE AND POTASSIUM CHLORIDE 300; 900 MG/100ML; MG/100ML
INJECTION, SOLUTION INTRAVENOUS CONTINUOUS
Status: DISCONTINUED | OUTPATIENT
Start: 2023-09-29 | End: 2023-09-30

## 2023-09-29 RX ORDER — KETOROLAC TROMETHAMINE 15 MG/ML
15 INJECTION, SOLUTION INTRAMUSCULAR; INTRAVENOUS
Status: COMPLETED | OUTPATIENT
Start: 2023-09-29 | End: 2023-09-29

## 2023-09-29 RX ADMIN — AZTREONAM 2000 MG: 2 INJECTION, POWDER, LYOPHILIZED, FOR SOLUTION INTRAMUSCULAR; INTRAVENOUS at 10:09

## 2023-09-29 RX ADMIN — KETOROLAC TROMETHAMINE 15 MG: 15 INJECTION, SOLUTION INTRAMUSCULAR; INTRAVENOUS at 10:09

## 2023-09-29 RX ADMIN — SODIUM CHLORIDE 1000 ML: 9 INJECTION, SOLUTION INTRAVENOUS at 08:09

## 2023-09-29 RX ADMIN — POTASSIUM CHLORIDE AND SODIUM CHLORIDE: 900; 300 INJECTION, SOLUTION INTRAVENOUS at 10:09

## 2023-09-29 RX ADMIN — IOPAMIDOL 100 ML: 755 INJECTION, SOLUTION INTRAVENOUS at 09:09

## 2023-09-29 NOTE — Clinical Note
Son notified of procedure plans and case and pt status. Informed him I will call at case end and Physician will speak with him regarding procedure findings, pt status, and any plans for therapy.

## 2023-09-29 NOTE — Clinical Note
Patient transported to [facility] via stretcher.  Patient tolerated well.  Bedside report and assessment with DANIS Collins.  Puncture site stable.  No signs of bleeding or hematoma.  Dressing in place.  Pulse palpable.  VS stable.  Patient education complete.

## 2023-09-29 NOTE — Clinical Note
Son called back and notified that Physician no longer in lab. Informed that pt's procedure was over and she was back in her room. Verbalized understanding. Denies questions.

## 2023-09-29 NOTE — Clinical Note
R groin cleaned with chlorahexadine wipes and chloraprepped after site was clipped per TONIE Centeno RN

## 2023-09-30 ENCOUNTER — EXTERNAL CHRONIC CARE MANAGEMENT (OUTPATIENT)
Dept: FAMILY MEDICINE | Facility: CLINIC | Age: 68
End: 2023-09-30
Payer: MEDICARE

## 2023-09-30 PROBLEM — R07.9 CHEST PAIN: Status: ACTIVE | Noted: 2023-09-30

## 2023-09-30 PROBLEM — R00.1 BRADYCARDIA: Status: ACTIVE | Noted: 2023-09-30

## 2023-09-30 PROBLEM — E78.5 HYPERLIPIDEMIA: Status: ACTIVE | Noted: 2023-09-30

## 2023-09-30 PROBLEM — I95.9 HYPOTENSIVE EPISODE: Status: ACTIVE | Noted: 2023-09-30

## 2023-09-30 PROBLEM — E66.01 MORBID OBESITY: Status: RESOLVED | Noted: 2022-01-17 | Resolved: 2023-09-30

## 2023-09-30 PROBLEM — I21.9 MYOCARDIAL INFARCTION: Status: ACTIVE | Noted: 2023-09-30

## 2023-09-30 LAB
ANION GAP SERPL CALCULATED.3IONS-SCNC: 9 MMOL/L (ref 7–16)
AORTIC ROOT ANNULUS: 2.16 CM
AORTIC VALVE CUSP SEPERATION: 1.99 CM
AV INDEX (PROSTH): 0.84
AV MEAN GRADIENT: 5 MMHG
AV PEAK GRADIENT: 2 MMHG
AV VALVE AREA BY VELOCITY RATIO: 4.05 CM²
AV VALVE AREA: 2.38 CM²
AV VELOCITY RATIO: 1.43
BASOPHILS # BLD AUTO: 0.05 K/UL (ref 0–0.2)
BASOPHILS NFR BLD AUTO: 0.9 % (ref 0–1)
BSA FOR ECHO PROCEDURE: 1.97 M2
BUN SERPL-MCNC: 13 MG/DL (ref 7–18)
BUN/CREAT SERPL: 15 (ref 6–20)
CALCIUM SERPL-MCNC: 8.5 MG/DL (ref 8.5–10.1)
CHLORIDE SERPL-SCNC: 107 MMOL/L (ref 98–107)
CO2 SERPL-SCNC: 27 MMOL/L (ref 21–32)
CREAT SERPL-MCNC: 0.84 MG/DL (ref 0.55–1.02)
CV ECHO LV RWT: 0.39 CM
DIFFERENTIAL METHOD BLD: ABNORMAL
DOP CALC AO PEAK VEL: 0.77 M/S
DOP CALC AO VTI: 32.2 CM
DOP CALC LVOT AREA: 2.8 CM2
DOP CALC LVOT DIAMETER: 1.9 CM
DOP CALC LVOT PEAK VEL: 1.1 M/S
DOP CALC LVOT STROKE VOLUME: 76.51 CM3
DOP CALCLVOT PEAK VEL VTI: 27 CM
E WAVE DECELERATION TIME: 225.06 MSEC
E/A RATIO: 1.29
E/E' RATIO: 11.33 M/S
ECHO LV POSTERIOR WALL: 0.9 CM (ref 0.6–1.1)
EGFR (NO RACE VARIABLE) (RUSH/TITUS): 77 ML/MIN/1.73M2
EOSINOPHIL # BLD AUTO: 0.15 K/UL (ref 0–0.5)
EOSINOPHIL NFR BLD AUTO: 2.8 % (ref 1–4)
ERYTHROCYTE [DISTWIDTH] IN BLOOD BY AUTOMATED COUNT: 12.1 % (ref 11.5–14.5)
EST. AVERAGE GLUCOSE BLD GHB EST-MCNC: 81 MG/DL
FRACTIONAL SHORTENING: 56 % (ref 28–44)
GLUCOSE SERPL-MCNC: 81 MG/DL (ref 74–106)
HBA1C MFR BLD HPLC: 5 % (ref 4.5–6.6)
HCT VFR BLD AUTO: 30.2 % (ref 38–47)
HGB BLD-MCNC: 10.4 G/DL (ref 12–16)
IMM GRANULOCYTES # BLD AUTO: 0.02 K/UL (ref 0–0.04)
IMM GRANULOCYTES NFR BLD: 0.4 % (ref 0–0.4)
INTERVENTRICULAR SEPTUM: 1.18 CM (ref 0.6–1.1)
LEFT ATRIUM SIZE: 2.8 CM
LEFT ATRIUM VOLUME INDEX MOD: 32.1 ML/M2
LEFT ATRIUM VOLUME MOD: 61.22 CM3
LEFT INTERNAL DIMENSION IN SYSTOLE: 2 CM (ref 2.1–4)
LEFT VENTRICLE DIASTOLIC VOLUME INDEX: 50.54 ML/M2
LEFT VENTRICLE DIASTOLIC VOLUME: 96.54 ML
LEFT VENTRICLE MASS INDEX: 87 G/M2
LEFT VENTRICLE SYSTOLIC VOLUME INDEX: 6.7 ML/M2
LEFT VENTRICLE SYSTOLIC VOLUME: 12.79 ML
LEFT VENTRICULAR INTERNAL DIMENSION IN DIASTOLE: 4.58 CM (ref 3.5–6)
LEFT VENTRICULAR MASS: 166.45 G
LV LATERAL E/E' RATIO: 9.44 M/S
LV SEPTAL E/E' RATIO: 14.17 M/S
LVOT MG: 2.76 MMHG
LVOT MV: 0.79 CM/S
LYMPHOCYTES # BLD AUTO: 1.1 K/UL (ref 1–4.8)
LYMPHOCYTES NFR BLD AUTO: 20.9 % (ref 27–41)
MCH RBC QN AUTO: 29.2 PG (ref 27–31)
MCHC RBC AUTO-ENTMCNC: 34.4 G/DL (ref 32–36)
MCV RBC AUTO: 84.8 FL (ref 80–96)
MONOCYTES # BLD AUTO: 0.41 K/UL (ref 0–0.8)
MONOCYTES NFR BLD AUTO: 7.8 % (ref 2–6)
MPC BLD CALC-MCNC: 11.4 FL (ref 9.4–12.4)
MV PEAK A VEL: 0.66 M/S
MV PEAK E VEL: 0.85 M/S
NEUTROPHILS # BLD AUTO: 3.54 K/UL (ref 1.8–7.7)
NEUTROPHILS NFR BLD AUTO: 67.2 % (ref 53–65)
NRBC # BLD AUTO: 0 X10E3/UL
NRBC, AUTO (.00): 0 %
OHS LV EJECTION FRACTION SIMPSONS BIPLANE MOD: 53 %
PISA AR MAX VEL: 0.02 M/S
PISA TR MAX VEL: 2.03 M/S
PLATELET # BLD AUTO: 244 K/UL (ref 150–400)
POTASSIUM SERPL-SCNC: 3.7 MMOL/L (ref 3.5–5.1)
PV PEAK GRADIENT: 5 MMHG
PV PEAK VELOCITY: 1.07 M/S
RA VOL SYS: 25.15 ML
RBC # BLD AUTO: 3.56 M/UL (ref 4.2–5.4)
RIGHT ATRIAL AREA: 11 CM2
RIGHT VENTRICLE DIASTOLIC LENGTH: 5.6 CM
RIGHT VENTRICLE DIASTOLIC MID DIMENSION: 1.8 CM
RIGHT VENTRICULAR END-DIASTOLIC DIMENSION: 3 CM
RIGHT VENTRICULAR LENGTH IN DIASTOLE (APICAL 4-CHAMBER VIEW): 5.63 CM
RV MID DIAMA: 1.78 CM
SODIUM SERPL-SCNC: 139 MMOL/L (ref 136–145)
T4 FREE SERPL-MCNC: 0.9 NG/DL (ref 0.76–1.46)
TDI LATERAL: 0.09 M/S
TDI SEPTAL: 0.06 M/S
TDI: 0.08 M/S
TR MAX PG: 16 MMHG
TROPONIN I SERPL DL<=0.01 NG/ML-MCNC: 54.8 PG/ML
TROPONIN I SERPL DL<=0.01 NG/ML-MCNC: 63 PG/ML
TSH SERPL DL<=0.005 MIU/L-ACNC: 1.02 UIU/ML (ref 0.36–3.74)
WBC # BLD AUTO: 5.27 K/UL (ref 4.5–11)
Z-SCORE OF LEFT VENTRICULAR DIMENSION IN END DIASTOLE: -1.6
Z-SCORE OF LEFT VENTRICULAR DIMENSION IN END SYSTOLE: -3.98

## 2023-09-30 PROCEDURE — 93005 ELECTROCARDIOGRAM TRACING: CPT

## 2023-09-30 PROCEDURE — 63600175 PHARM REV CODE 636 W HCPCS: Performed by: EMERGENCY MEDICINE

## 2023-09-30 PROCEDURE — 63600175 PHARM REV CODE 636 W HCPCS: Performed by: FAMILY MEDICINE

## 2023-09-30 PROCEDURE — 99223 PR INITIAL HOSPITAL CARE,LEVL III: ICD-10-PCS | Mod: ,,, | Performed by: INTERNAL MEDICINE

## 2023-09-30 PROCEDURE — 83036 HEMOGLOBIN GLYCOSYLATED A1C: CPT | Performed by: GENERAL PRACTICE

## 2023-09-30 PROCEDURE — 85025 COMPLETE CBC W/AUTO DIFF WBC: CPT | Performed by: GENERAL PRACTICE

## 2023-09-30 PROCEDURE — 96375 TX/PRO/DX INJ NEW DRUG ADDON: CPT

## 2023-09-30 PROCEDURE — 84484 ASSAY OF TROPONIN QUANT: CPT | Performed by: EMERGENCY MEDICINE

## 2023-09-30 PROCEDURE — 99223 PR INITIAL HOSPITAL CARE,LEVL III: ICD-10-PCS | Mod: AI,,, | Performed by: INTERNAL MEDICINE

## 2023-09-30 PROCEDURE — 63600175 PHARM REV CODE 636 W HCPCS: Performed by: GENERAL PRACTICE

## 2023-09-30 PROCEDURE — 93010 EKG 12-LEAD: ICD-10-PCS | Mod: 77,,, | Performed by: STUDENT IN AN ORGANIZED HEALTH CARE EDUCATION/TRAINING PROGRAM

## 2023-09-30 PROCEDURE — 84484 ASSAY OF TROPONIN QUANT: CPT | Mod: 91 | Performed by: GENERAL PRACTICE

## 2023-09-30 PROCEDURE — G0378 HOSPITAL OBSERVATION PER HR: HCPCS

## 2023-09-30 PROCEDURE — 96366 THER/PROPH/DIAG IV INF ADDON: CPT

## 2023-09-30 PROCEDURE — 11000001 HC ACUTE MED/SURG PRIVATE ROOM

## 2023-09-30 PROCEDURE — G0511 PR CHRONIC CARE MGMT, RHC OR FQHC ONLY, 20 MINS OR MORE: ICD-10-PCS | Mod: ,,, | Performed by: FAMILY MEDICINE

## 2023-09-30 PROCEDURE — 84443 ASSAY THYROID STIM HORMONE: CPT | Performed by: EMERGENCY MEDICINE

## 2023-09-30 PROCEDURE — 99223 1ST HOSP IP/OBS HIGH 75: CPT | Mod: AI,,, | Performed by: INTERNAL MEDICINE

## 2023-09-30 PROCEDURE — 84439 ASSAY OF FREE THYROXINE: CPT | Performed by: EMERGENCY MEDICINE

## 2023-09-30 PROCEDURE — 93010 ELECTROCARDIOGRAM REPORT: CPT | Mod: ,,, | Performed by: INTERNAL MEDICINE

## 2023-09-30 PROCEDURE — 96372 THER/PROPH/DIAG INJ SC/IM: CPT | Mod: 59 | Performed by: GENERAL PRACTICE

## 2023-09-30 PROCEDURE — 93010 ELECTROCARDIOGRAM REPORT: CPT | Mod: 77,,, | Performed by: STUDENT IN AN ORGANIZED HEALTH CARE EDUCATION/TRAINING PROGRAM

## 2023-09-30 PROCEDURE — 25000003 PHARM REV CODE 250: Performed by: GENERAL PRACTICE

## 2023-09-30 PROCEDURE — 80048 BASIC METABOLIC PNL TOTAL CA: CPT | Performed by: GENERAL PRACTICE

## 2023-09-30 PROCEDURE — 99223 1ST HOSP IP/OBS HIGH 75: CPT | Mod: ,,, | Performed by: INTERNAL MEDICINE

## 2023-09-30 PROCEDURE — G0511 CCM/BHI BY RHC/FQHC 20MIN MO: HCPCS | Mod: ,,, | Performed by: FAMILY MEDICINE

## 2023-09-30 PROCEDURE — 93010 EKG 12-LEAD: ICD-10-PCS | Mod: 76,,, | Performed by: INTERNAL MEDICINE

## 2023-09-30 PROCEDURE — 93010 ELECTROCARDIOGRAM REPORT: CPT | Mod: 76,,, | Performed by: INTERNAL MEDICINE

## 2023-09-30 RX ORDER — ENOXAPARIN SODIUM 100 MG/ML
40 INJECTION SUBCUTANEOUS EVERY 24 HOURS
Status: DISCONTINUED | OUTPATIENT
Start: 2023-09-30 | End: 2023-10-04 | Stop reason: HOSPADM

## 2023-09-30 RX ORDER — ATORVASTATIN CALCIUM 80 MG/1
80 TABLET, FILM COATED ORAL DAILY
Status: DISCONTINUED | OUTPATIENT
Start: 2023-09-30 | End: 2023-10-04 | Stop reason: HOSPADM

## 2023-09-30 RX ORDER — POLYETHYLENE GLYCOL 3350 17 G/17G
17 POWDER, FOR SOLUTION ORAL 2 TIMES DAILY PRN
Status: DISCONTINUED | OUTPATIENT
Start: 2023-09-30 | End: 2023-10-04 | Stop reason: HOSPADM

## 2023-09-30 RX ORDER — THYROID 30 MG/1
60 TABLET ORAL
Status: DISCONTINUED | OUTPATIENT
Start: 2023-09-30 | End: 2023-09-30 | Stop reason: CLARIF

## 2023-09-30 RX ORDER — SIMETHICONE 80 MG
1 TABLET,CHEWABLE ORAL 4 TIMES DAILY PRN
Status: DISCONTINUED | OUTPATIENT
Start: 2023-09-30 | End: 2023-10-04 | Stop reason: HOSPADM

## 2023-09-30 RX ORDER — ACETAMINOPHEN 325 MG/1
650 TABLET ORAL EVERY 4 HOURS PRN
Status: DISCONTINUED | OUTPATIENT
Start: 2023-09-30 | End: 2023-10-04 | Stop reason: HOSPADM

## 2023-09-30 RX ORDER — ONDANSETRON 2 MG/ML
4 INJECTION INTRAMUSCULAR; INTRAVENOUS EVERY 8 HOURS PRN
Status: DISCONTINUED | OUTPATIENT
Start: 2023-09-30 | End: 2023-10-04 | Stop reason: HOSPADM

## 2023-09-30 RX ORDER — TALC
6 POWDER (GRAM) TOPICAL NIGHTLY PRN
Status: DISCONTINUED | OUTPATIENT
Start: 2023-09-30 | End: 2023-10-04 | Stop reason: HOSPADM

## 2023-09-30 RX ORDER — THYROID 30 MG/1
60 TABLET ORAL
Status: DISCONTINUED | OUTPATIENT
Start: 2023-10-03 | End: 2023-10-04 | Stop reason: HOSPADM

## 2023-09-30 RX ORDER — ATROPINE SULFATE 0.4 MG/ML
0.4 INJECTION, SOLUTION ENDOTRACHEAL; INTRAMEDULLARY; INTRAMUSCULAR; INTRAVENOUS; SUBCUTANEOUS
Status: COMPLETED | OUTPATIENT
Start: 2023-09-30 | End: 2023-09-30

## 2023-09-30 RX ORDER — SODIUM CHLORIDE 0.9 % (FLUSH) 0.9 %
10 SYRINGE (ML) INJECTION EVERY 12 HOURS PRN
Status: DISCONTINUED | OUTPATIENT
Start: 2023-09-30 | End: 2023-10-04 | Stop reason: HOSPADM

## 2023-09-30 RX ORDER — CLOPIDOGREL BISULFATE 75 MG/1
75 TABLET ORAL DAILY
Status: DISCONTINUED | OUTPATIENT
Start: 2023-09-30 | End: 2023-10-04

## 2023-09-30 RX ORDER — THYROID 60 MG/1
60 TABLET ORAL
Status: COMPLETED | OUTPATIENT
Start: 2023-10-01 | End: 2023-10-02

## 2023-09-30 RX ADMIN — ATROPINE SULFATE 0.4 MG: 0.4 INJECTION, SOLUTION INTRAVENOUS at 12:09

## 2023-09-30 RX ADMIN — ENOXAPARIN SODIUM 40 MG: 100 INJECTION SUBCUTANEOUS at 05:09

## 2023-09-30 RX ADMIN — SODIUM CHLORIDE, POTASSIUM CHLORIDE, SODIUM LACTATE AND CALCIUM CHLORIDE 500 ML: 600; 310; 30; 20 INJECTION, SOLUTION INTRAVENOUS at 06:09

## 2023-09-30 RX ADMIN — CLOPIDOGREL BISULFATE 75 MG: 75 TABLET ORAL at 09:09

## 2023-09-30 RX ADMIN — ATORVASTATIN CALCIUM 80 MG: 80 TABLET, FILM COATED ORAL at 09:09

## 2023-09-30 NOTE — ED NOTES
In and out cath to obtain urine specimen, per patient request. During this time, patient c/o chest pain. Vitals stable. When finishing, external female catheter applied. Patient states no longer having chest pain. States it comes and goes. Patient advised any change in status or if she feels any different to call out and make us aware. Patient verbalized understanding. Also 02 100% on 15L NRB, patient decreased to 2L nasal cannula. Bed in low position. Cb in reach.

## 2023-09-30 NOTE — HPI
CC: Chest pain, abdominal pain.      Pt presented  to the ER with complaints of abdominal pain starting 9/28, waxing and waning, becoming more severe on 9/29.  She describes abdominal pain as epigastric burning sensation, associated with some cramping spasming which comes on spontaneously, lasts up to thirty minutes before calms down for an hour or two before pain reoccurs.  She otes is coming and going spontaneously.  She also complaints of mid sternal chest pain, 6/10, associated with nausea and shortness of breath which lasts five to ten minutes before resolving spontaneously..  She reports she has felt constipated for several weeks.  She is pain free at present.

## 2023-09-30 NOTE — ED NOTES
Assumed care of patient, patient's heart rate is running between 30's to 50's.  Attending MD aware and has requested cardiology consult.

## 2023-09-30 NOTE — ASSESSMENT & PLAN NOTE
Chest pain, bradycardia and hypotension on presentation. No history of coronary artery disease. Patient reports heart catheterization more than 10 years ago, no stents placed. Her cardiologist is Dr. Powell at [facility].  EKG sinus bradycardia, no specific ST changes  ALEJANDRO score 4, Sona ACS score 120, Heart score 6  Patient with allergy to aspirin will start Plavix and statin  Holding BB due to bradycardia  Obtain Echo  Cardiology consult, recommendations appreciated

## 2023-09-30 NOTE — ED PROVIDER NOTES
Encounter Date: 9/29/2023    SCRIBE #1 NOTE: I, Agnes Valdez, am scribing for, and in the presence of,  Gustavo Bunn MD. I have scribed the entire note.       History     Chief Complaint   Patient presents with    Abdominal Pain     EMS from home. Call went out abdominal pain. EMS reports when arrived at patient's home started c/o chest pain.    Chest Pain     EMS from home     68 y.o. female presents to the ED via EMS. Patient initially called for abdominal pain but started complaining of chest pain that radiated to the jaw. EMS stated the patient's blood pressure was 168/98. Blood pressure dropped as well as heart rate. EMS stated heart rate dropped to 30 and patient felt faint then fell unresponsive. Patient was given 2 nitro and 1 atropine en route. Patient became awake and alert upon arrival. Patient complained of abdominal pain, chest pain radiating through jaw, n/v, SOB, fever, melena, constipation. Patient stated the fever started today. She has been constipated and has been taking a laxative for about a week now. Patient's last bowel movement was this morning. Patient stated she wears ox 2L at night. Shx includes cholecystectomy, hysterectomy, tonsillectomy. Hx includes high cholesterol, celiac disease. Patient had a heart cath done back in the 90s and has not had trouble since. Denied hx of MI. Patient stated she used to take lasix but has been taken off.     The history is provided by the patient and the EMS personnel. No  was used.     Review of patient's allergies indicates:   Allergen Reactions    Aricept [donepezil]     Codeine     Crestor [rosuvastatin]      Makes her sleepy    Dapsone      nausea    Flagyl [metronidazole]     Hydrocodone     Imitrex [sumatriptan]     Influenza virus vaccines     Opioids - morphine analogues     Penicillins     Tramadol     Aspirin Rash    Clindamycin Nausea Only    Latex, natural rubber Rash     Past Medical History:   Diagnosis Date     Abnormal LFTs     Asthma     ? 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    Nausea vomiting and diarrhea 2/7/2022     Past Surgical History:   Procedure Laterality Date    APPENDECTOMY      CHOLECYSTECTOMY      HYSTERECTOMY      TONSILLECTOMY       Family History   Problem Relation Age of Onset    Heart disease Mother     Kidney disease Mother     Heart disease Father     Cancer Paternal Aunt      Social History     Tobacco Use    Smoking status: Former     Current packs/day: 0.20     Average packs/day: 0.2 packs/day for 1 year (0.2 ttl pk-yrs)     Types: Cigarettes     Passive exposure: Current    Smokeless tobacco: Never   Substance Use Topics    Alcohol use: Not Currently    Drug use: Never     Review of Systems   Constitutional:  Positive for fever.   HENT: Negative.     Eyes: Negative.    Respiratory:  Positive for shortness of breath.    Cardiovascular:  Positive for chest pain (radiating through jaw).   Gastrointestinal:  Positive for abdominal pain, blood in stool, constipation, nausea and vomiting.   Endocrine: Negative.    Genitourinary: Negative.    Musculoskeletal: Negative.    Skin: Negative.    Allergic/Immunologic: Negative.    Neurological: Negative.    Hematological: Negative.    Psychiatric/Behavioral: Negative.     All other systems reviewed and are negative.      Physical Exam     Initial Vitals   BP Pulse Resp Temp SpO2   09/29/23 1958 09/29/23 1958 09/29/23 2006 09/29/23 2051 09/29/23 1958   (!) 89/53 97 16 97.6 °F (36.4 °C) (!) 91 %      MAP       --                Physical Exam    Vitals reviewed.  Constitutional: She is diaphoretic.   HENT:   Head: Normocephalic.   Eyes: Conjunctivae are normal. Pupils are equal, round, and reactive to light.   Cardiovascular:  Normal rate, regular rhythm, normal heart sounds and intact distal pulses.           Pulmonary/Chest: Breath sounds normal.   Abdominal: Abdomen is soft. Bowel sounds are normal. There is  generalized abdominal tenderness (mild).     Neurological: She is alert and oriented to person, place, and time.   Skin: Skin is warm. There is cyanosis. There is pallor.   Psychiatric: She has a normal mood and affect.         ED Course   Procedures  Labs Reviewed   COMPREHENSIVE METABOLIC PANEL - Abnormal; Notable for the following components:       Result Value    Potassium 3.0 (*)     Glucose 108 (*)     All other components within normal limits   URINALYSIS, REFLEX TO URINE CULTURE - Abnormal; Notable for the following components:    Leukocytes, UA Trace (*)     Protein,  (*)     Ketones, UA 40 (*)     Urobilinogen, UA 3 (*)     All other components within normal limits   CBC WITH DIFFERENTIAL - Abnormal; Notable for the following components:    Hematocrit 37.6 (*)     Lymphocytes % 23.9 (*)     Monocytes % 7.8 (*)     Basophils % 1.1 (*)     All other components within normal limits   URINALYSIS, MICROSCOPIC - Abnormal; Notable for the following components:    WBC, UA 10 (*)     Squamous Epithelial Cells, UA Occasional (*)     Mucous Occasional (*)     All other components within normal limits   TROPONIN I - Abnormal; Notable for the following components:    Troponin I High Sensitivity 63.0 (*)     All other components within normal limits   POCT GLUCOSE MONITORING CONTINUOUS - Abnormal; Notable for the following components:    POC Glucose 128 (*)     All other components within normal limits   TROPONIN I - Normal   APTT - Normal   PROTIME-INR - Normal   MAGNESIUM - Normal   LACTIC ACID, PLASMA - Normal   D DIMER, QUANTITATIVE - Normal   NT-PRO NATRIURETIC PEPTIDE - Normal   TROPONIN I - Normal   TSH - Normal   T4, FREE - Normal   CULTURE, BLOOD   CULTURE, BLOOD   CBC W/ AUTO DIFFERENTIAL    Narrative:     The following orders were created for panel order CBC auto differential.  Procedure                               Abnormality         Status                     ---------                                -----------         ------                     CBC with Differential[[id number]]        Abnormal            Final result                 Please view results for these tests on the individual orders.   EXTRA TUBES    Narrative:     The following orders were created for panel order EXTRA TUBES.  Procedure                               Abnormality         Status                     ---------                               -----------         ------                     Light Blue Top Hold[[id number]]                                                        Gold Top Hold[[id number]]                                   In process                   Please view results for these tests on the individual orders.   GOLD TOP HOLD   BASIC METABOLIC PANEL   CBC W/ AUTO DIFFERENTIAL    Narrative:     The following orders were created for panel order CBC with Automated Differential.  Procedure                               Abnormality         Status                     ---------                               -----------         ------                     CBC with Differential[[id number]]                                                        Please view results for these tests on the individual orders.   CBC WITH DIFFERENTIAL   HEMOGLOBIN A1C   TROPONIN I          Imaging Results              CT Abdomen Pelvis With Contrast (In process)                      X-Ray Chest 1 View (Final result)  Result time 09/29/23 20:54:23      Final result by Gustavo Brown MD (09/29/23 20:54:23)                   Impression:      Possible minimal left basilar atelectasis or infectious/inflammatory infiltrate.  Correlate with patient complaints.    Place of service: [facility]      Electronically signed by: Gustavo Brown  Date:    09/29/2023  Time:    20:54               Narrative:    EXAMINATION:  XR CHEST 1 VIEW    CLINICAL HISTORY:  Shortness of breath    TECHNIQUE:  Chest 1 the    COMPARISON:  Prior radiograph  07/28/2022    FINDINGS:  The cardiomediastinal silhouette is within normal limits. Upper lungs are predominantly clear.  Minimal patchy left basilar interstitial opacities are noted but are nonspecific.  There is no pneumothorax or pleural effusion.    There is no acute osseous or soft tissue abnormality.                                       Medications   0.9 % NaCl with KCl 40 mEq infusion (0 mL/hr Intravenous Stopped 9/30/23 0300)   sodium chloride 0.9% flush 10 mL (has no administration in time range)   enoxaparin injection 40 mg (has no administration in time range)   acetaminophen tablet 650 mg (has no administration in time range)   polyethylene glycol packet 17 g (has no administration in time range)   ondansetron injection 4 mg (has no administration in time range)   thyroid (pork) tablet 60 mg (has no administration in time range)   melatonin tablet 6 mg (has no administration in time range)   simethicone chewable tablet 80 mg (has no administration in time range)   clopidogreL tablet 75 mg (75 mg Oral Not Given 9/30/23 0230)   atorvastatin tablet 80 mg (has no administration in time range)   sodium chloride 0.9% bolus 1,000 mL 1,000 mL (0 mLs Intravenous Stopped 9/29/23 2114)   aztreonam (AZACTAM) 2,000 mg in dextrose 5 % in water (D5W) 100 mL IVPB (MB+) (0 mg Intravenous Stopped 9/29/23 2349)   iopamidoL (ISOVUE-370) injection 100 mL (100 mLs Intravenous Given 9/29/23 2158)   ketorolac injection 15 mg (15 mg Intravenous Given 9/29/23 2249)   atropine injection 0.4 mg (0.4 mg Intravenous Given 9/30/23 0032)     Medical Decision Making  MDM    Patient presents for emergent evaluation of acute chest pain bradycardia episode hypotension that poses a threat to life and/or bodily function.    In the ED patient found to have acute bradycardia chest pain hypotension.    I ordered labs and personally reviewed them.  Labs significant for high sensitivity troponin was normal but significant delta troponin discussed  this change with hospitalist.  The change occurred after admission  TSH T4 negative  I ordered X-rays and personally reviewed them and reviewed the radiologist interpretation.  Xray significant for no acute abnormality chest x-ray.    I ordered EKG and personally reviewed it.  EKG significant for bradycardia no ST elevation.    I ordered CT scan and personally reviewed it and reviewed the radiologist interpretation.  CT significant for CT abdomen pelvis no acute abnormality.      Admission MDM  I discussed the patient presentation and findings with the consultant for hospital medicine (speciality).    Patient was managed in the ED with IV atropine Toradol aztreonam normal saline.    The response to treatment was improved.    Patient required emergent consultation to Hospital Medicine (admitting physician) for admission.     Amount and/or Complexity of Data Reviewed  Labs: ordered.  Radiology: ordered.    Risk  Prescription drug management.  Decision regarding hospitalization.              Attending Attestation:           Physician Attestation for Scribe:  Physician Attestation Statement for Scribe #1: I, Gustavo Bunn MD, reviewed documentation, as scribed by Agnes Valdez in my presence, and it is both accurate and complete.                             Clinical Impression:   Final diagnoses:  [R07.9] Chest pain  [R06.02] Shortness of breath  [R53.1] General weakness  [I95.9] Hypotensive episode (Primary)  [R00.1] Bradycardia        ED Disposition Condition    Admit Stable                Gustavo Bunn MD  09/30/23 0353

## 2023-09-30 NOTE — ASSESSMENT & PLAN NOTE
Bradycardia in the ED treated with atropine  Initial EKG showing HR 46, unspecific ST changes  After IV atropine HR 60-66  Will obtain and Echo and consult cardiology due to concomitant elevated troponin concerning for ACS

## 2023-09-30 NOTE — CONSULTS
[facility] - Emergency Department  Cardiology  Consult Note    Patient Name: Beverly Rodríguez  MRN: [id number]  Admission Date: 9/29/2023  Hospital Length of Stay: 0 days  Code Status: Full Code   Attending Provider: Rosemarie Mireles DO   Consulting Provider: Roberto Kilgore DO  Primary Care Physician: Prem Vincent MD  Principal Problem:Chest pain    Patient information was obtained from patient and ER records.     Consults  Subjective:     Chief Complaint:  Chest pain     HPI:   CC: Chest pain, abdominal pain.      Pt presented  to the ER with complaints of abdominal pain starting 9/28, waxing and waning, becoming more severe on 9/29.  She describes abdominal pain as epigastric burning sensation, associated with some cramping spasming which comes on spontaneously, lasts up to thirty minutes before calms down for an hour or two before pain reoccurs.  She otes is coming and going spontaneously.  She also complaints of mid sternal chest pain, 6/10, associated with nausea and shortness of breath which lasts five to ten minutes before resolving spontaneously..  She reports she has felt constipated for several weeks.  She is pain free at present.       Past Medical History:   Diagnosis Date    Abnormal LFTs     Asthma     ? diagnosis    Diabetes mellitus     Hypertension     Hypothyroidism     from birth    Nausea vomiting and diarrhea 2/7/2022       Past Surgical History:   Procedure Laterality Date    APPENDECTOMY      CHOLECYSTECTOMY      HYSTERECTOMY      TONSILLECTOMY         Review of patient's allergies indicates:   Allergen Reactions    Aricept [donepezil]     Codeine     Crestor [rosuvastatin]      Makes her sleepy    Dapsone      nausea    Flagyl [metronidazole]     Hydrocodone     Imitrex [sumatriptan]     Influenza virus vaccines     Opioids - morphine analogues     Penicillins     Tramadol     Aspirin Rash    Clindamycin Nausea Only    Latex, natural rubber Rash        No current facility-administered medications on file prior to encounter.     Current Outpatient Medications on File Prior to Encounter   Medication Sig    albuterol (PROVENTIL) 2.5 mg /3 mL (0.083 %) nebulizer solution Take 3 mLs (2.5 mg total) by nebulization every 6 (six) hours as needed for Wheezing. Rescue    ascorbic acid, vitamin C, (VITAMIN C) 1000 MG tablet Take 1,000 mg by mouth once daily.    calcium carbonate (TUMS) 200 mg calcium (500 mg) chewable tablet Take 2 tablets by mouth nightly as needed for Heartburn.    dapsone 25 MG Tab     ergocalciferol (ERGOCALCIFEROL) 50,000 unit Cap Take 50,000 Units by mouth every 7 days.    ezetimibe (ZETIA) 10 mg tablet Take 1 tablet by mouth once daily.    fluticasone propionate (FLONASE) 50 mcg/actuation nasal spray 2 sprays (100 mcg total) by Each Nostril route once daily.    metoprolol succinate (TOPROL-XL) 25 MG 24 hr tablet Take 1 tablet (25 mg total) by mouth once daily.    mupirocin (BACTROBAN) 2 % ointment Apply to AA on body BID PRN flares tapering with improvement    thyroid, pork, (ARMOUR THYROID) 15 mg Tab Take 15 mg by mouth.    thyroid, pork, (ARMOUR THYROID) 60 mg Tab Take 1 tablet (60 mg total) by mouth before breakfast.    triamcinolone acetonide 0.1% (KENALOG) 0.1 % cream Apply to AA on body BID PRN flares tapering with improvement     Family History       Problem Relation (Age of Onset)    Cancer Paternal Aunt    Heart disease Mother, Father    Kidney disease Mother          Tobacco Use    Smoking status: Former     Current packs/day: 0.20     Average packs/day: 0.2 packs/day for 1 year (0.2 ttl pk-yrs)     Types: Cigarettes     Passive exposure: Current    Smokeless tobacco: Never   Substance and Sexual Activity    Alcohol use: Not Currently    Drug use: Never    Sexual activity: Not Currently     Review of Systems   Constitutional: Positive for decreased appetite. Negative for diaphoresis, malaise/fatigue, night sweats and  weight gain.   HENT:  Positive for congestion. Negative for ear pain, hearing loss, nosebleeds and sore throat.    Eyes:  Negative for blurred vision, double vision, pain, photophobia and visual disturbance.   Cardiovascular:  Positive for chest pain. Negative for claudication, dyspnea on exertion, irregular heartbeat, leg swelling, near-syncope, orthopnea, palpitations and syncope.   Respiratory:  Negative for cough, shortness of breath, sleep disturbances due to breathing, snoring and wheezing.    Endocrine: Negative for cold intolerance, heat intolerance, polydipsia, polyphagia and polyuria.   Hematologic/Lymphatic: Negative for bleeding problem. Does not bruise/bleed easily.   Skin:  Negative for dry skin, flushing, itching, rash and skin cancer.   Musculoskeletal:  Positive for myalgias. Negative for arthritis, back pain, falls, joint pain, muscle cramps and muscle weakness.   Gastrointestinal:  Positive for abdominal pain, change in bowel habit and constipation. Negative for diarrhea, dysphagia, heartburn, nausea and vomiting.   Genitourinary: Negative.  Negative for bladder incontinence, dysuria, flank pain, frequency and nocturia.   Neurological:  Positive for light-headedness, loss of balance and paresthesias. Negative for dizziness, focal weakness, headaches, numbness and seizures.   Psychiatric/Behavioral:  Negative for depression, memory loss and substance abuse. The patient is nervous/anxious.    Allergic/Immunologic: Negative.  Negative for environmental allergies.     Objective:     Vital Signs (Most Recent):  Temp: 98.9 °F (37.2 °C) (09/30/23 1600)  Pulse: (!) 41 (09/30/23 1600)  Resp: 14 (09/30/23 1600)  BP: 131/62 (09/30/23 1600)  SpO2: 98 % (09/30/23 1600) Vital Signs (24h Range):  Temp:  [97.6 °F (36.4 °C)-98.9 °F (37.2 °C)] 98.9 °F (37.2 °C)  Pulse:  [39-97] 41  Resp:  [10-19] 14  SpO2:  [91 %-100 %] 98 %  BP: ()/(44-90) 131/62     Weight: 86.2 kg (190 lb)  Body mass index is 32.61  kg/m².    SpO2: 98 %       No intake or output data in the 24 hours ending 09/30/23 1717    Lines/Drains/Airways       Peripheral Intravenous Line  Duration                  Peripheral IV - Single Lumen 09/29/23 2015 18 G Left Antecubital <1 day                     Physical Exam  Vitals and nursing note reviewed.   Constitutional:       Appearance: She is obese. She is ill-appearing.   HENT:      Head: Normocephalic and atraumatic.      Right Ear: External ear normal.      Left Ear: External ear normal.   Eyes:      General: No scleral icterus.        Right eye: No discharge.         Left eye: No discharge.      Extraocular Movements: Extraocular movements intact.      Conjunctiva/sclera: Conjunctivae normal.      Pupils: Pupils are equal, round, and reactive to light.   Cardiovascular:      Rate and Rhythm: Normal rate and regular rhythm.      Pulses: Normal pulses.      Heart sounds: Normal heart sounds. No murmur heard.     No friction rub. No gallop.   Pulmonary:      Effort: Pulmonary effort is normal.      Breath sounds: Normal breath sounds. No wheezing, rhonchi or rales.   Chest:      Chest wall: No tenderness.   Abdominal:      General: Abdomen is flat. Bowel sounds are normal. There is no distension.      Palpations: Abdomen is soft.      Tenderness: There is no abdominal tenderness. There is no guarding or rebound.   Musculoskeletal:         General: No swelling or tenderness. Normal range of motion.      Cervical back: Normal range of motion and neck supple.      Right lower leg: Edema present.      Left lower leg: Edema present.   Skin:     General: Skin is warm and dry.      Findings: No erythema or rash.   Neurological:      General: No focal deficit present.      Mental Status: She is alert and oriented to person, place, and time.      Cranial Nerves: No cranial nerve deficit.      Motor: No weakness.      Gait: Gait normal.   Psychiatric:         Mood and Affect: Mood normal.         Behavior:  "Behavior normal.         Thought Content: Thought content normal.         Judgment: Judgment normal.      Comments: anxious          Significant Labs: Troponin No results for input(s): "TROPONINI" in the last 48 hours.    Significant Imaging:     Assessment and Plan:     * Chest pain  1. Chest pain is atypical as described, hx "Normal 'LHC" approximaterly one week ago,   2. Recommend optimize medical treatment, , risk factor modificatons  Will obtain old records, , previous [facility] fims, pt may be a candidate for [facility] if unable to control heart rate.    Myocardial infarction  Troponin  Elevation consistent  With type 2 myocardial necrosis due to demand ischemia, hx normal cath approximately ten years ago, negative stress 2 years ago at [facility], old records requeseted, Plan lexiscan cardiolyte Monday, will review echo when available.     Bradycardia  Suspect due to Toprol XL, will continue to hold, monitor heart rate and bp response off beta blockaid    Hypothyroidism  On  Oral thyriod replacement        VTE Risk Mitigation (From admission, onward)         Ordered     enoxaparin injection 40 mg  Daily         09/30/23 0102     IP VTE HIGH RISK PATIENT  Once         09/30/23 0102     Place sequential compression device  Until discontinued         09/30/23 0102                Thank you for your consult. I will follow-up with patient. Please contact us if you have any additional questions.    Roberto Kilgore, DO  Cardiology   [facility] - Emergency Department    "

## 2023-09-30 NOTE — ASSESSMENT & PLAN NOTE
Patient reports not taking home statin. She reports statin makes her sleepy  Lipid panel 6 months ago showing total cholesterol 234, HDL 45, [id number]  Will start statin and monitor for possible side effects

## 2023-09-30 NOTE — ED NOTES
Dr Mireles present in Department to see admitted patients.  Dr Mireles aware of pulse rate being in the low 40's and reviewed EKG and states that she would like to wait on any medications until cardiology sees patient so as not to skew anything for them.

## 2023-09-30 NOTE — ED NOTES
Patient has not had any urine output with her PureWick today, bladder scan performed and patient has 531 ml of urine.  Dr Mireles contacted regarding in and out cath.  Okay to do in and out cath.  500 ml of marisa colored urine returned.

## 2023-09-30 NOTE — ED TRIAGE NOTES
EMS reports en route to hosp patient had a status change. Patient initial HR in 80s, patient went unresponsive and HR dropped in 30s. 1 dose atropine given. Patient at facility, awake, alert and oriented. B/P on lower end, patient c/o chest pain and nausea.

## 2023-09-30 NOTE — SUBJECTIVE & OBJECTIVE
Past Medical History:   Diagnosis Date    Abnormal LFTs     Asthma     ? 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    Nausea vomiting and diarrhea 2/7/2022       Past Surgical History:   Procedure Laterality Date    APPENDECTOMY      CHOLECYSTECTOMY      HYSTERECTOMY      TONSILLECTOMY         Review of patient's allergies indicates:   Allergen Reactions    Aricept [donepezil]     Codeine     Crestor [rosuvastatin]      Makes her sleepy    Dapsone      nausea    Flagyl [metronidazole]     Hydrocodone     Imitrex [sumatriptan]     Influenza virus vaccines     Opioids - morphine analogues     Penicillins     Tramadol     Aspirin Rash    Clindamycin Nausea Only    Latex, natural rubber Rash       No current facility-administered medications on file prior to encounter.     Current Outpatient Medications on File Prior to Encounter   Medication Sig    albuterol (PROVENTIL) 2.5 mg /3 mL (0.083 %) nebulizer solution Take 3 mLs (2.5 mg total) by nebulization every 6 (six) hours as needed for Wheezing. Rescue    ascorbic acid, vitamin C, (VITAMIN C) 1000 MG tablet Take 1,000 mg by mouth once daily.    calcium carbonate (TUMS) 200 mg calcium (500 mg) chewable tablet Take 2 tablets by mouth nightly as needed for Heartburn.    dapsone 25 MG Tab     ergocalciferol (ERGOCALCIFEROL) 50,000 unit Cap Take 50,000 Units by mouth every 7 days.    ezetimibe (ZETIA) 10 mg tablet Take 1 tablet by mouth once daily.    fluticasone propionate (FLONASE) 50 mcg/actuation nasal spray 2 sprays (100 mcg total) by Each Nostril route once daily.    metoprolol succinate (TOPROL-XL) 25 MG 24 hr tablet Take 1 tablet (25 mg total) by mouth once daily.    mupirocin (BACTROBAN) 2 % ointment Apply to AA on body BID PRN flares tapering with improvement    thyroid, pork, (ARMOUR THYROID) 15 mg Tab Take 15 mg by mouth.    thyroid, pork, (ARMOUR THYROID) 60 mg Tab Take 1 tablet (60 mg total) by mouth before breakfast.     triamcinolone acetonide 0.1% (KENALOG) 0.1 % cream Apply to AA on body BID PRN flares tapering with improvement     Family History       Problem Relation (Age of Onset)    Cancer Paternal Aunt    Heart disease Mother, Father    Kidney disease Mother          Tobacco Use    Smoking status: Former     Current packs/day: 0.20     Average packs/day: 0.2 packs/day for 1 year (0.2 ttl pk-yrs)     Types: Cigarettes     Passive exposure: Current    Smokeless tobacco: Never   Substance and Sexual Activity    Alcohol use: Not Currently    Drug use: Never    Sexual activity: Not Currently     Review of Systems   Constitutional: Positive for decreased appetite. Negative for diaphoresis, malaise/fatigue, night sweats and weight gain.   HENT:  Positive for congestion. Negative for ear pain, hearing loss, nosebleeds and sore throat.    Eyes:  Negative for blurred vision, double vision, pain, photophobia and visual disturbance.   Cardiovascular:  Positive for chest pain. Negative for claudication, dyspnea on exertion, irregular heartbeat, leg swelling, near-syncope, orthopnea, palpitations and syncope.   Respiratory:  Negative for cough, shortness of breath, sleep disturbances due to breathing, snoring and wheezing.    Endocrine: Negative for cold intolerance, heat intolerance, polydipsia, polyphagia and polyuria.   Hematologic/Lymphatic: Negative for bleeding problem. Does not bruise/bleed easily.   Skin:  Negative for dry skin, flushing, itching, rash and skin cancer.   Musculoskeletal:  Positive for myalgias. Negative for arthritis, back pain, falls, joint pain, muscle cramps and muscle weakness.   Gastrointestinal:  Positive for abdominal pain, change in bowel habit and constipation. Negative for diarrhea, dysphagia, heartburn, nausea and vomiting.   Genitourinary: Negative.  Negative for bladder incontinence, dysuria, flank pain, frequency and nocturia.   Neurological:  Positive for light-headedness, loss of balance and  paresthesias. Negative for dizziness, focal weakness, headaches, numbness and seizures.   Psychiatric/Behavioral:  Negative for depression, memory loss and substance abuse. The patient is nervous/anxious.    Allergic/Immunologic: Negative.  Negative for environmental allergies.     Objective:     Vital Signs (Most Recent):  Temp: 98.9 °F (37.2 °C) (09/30/23 1600)  Pulse: (!) 41 (09/30/23 1600)  Resp: 14 (09/30/23 1600)  BP: 131/62 (09/30/23 1600)  SpO2: 98 % (09/30/23 1600) Vital Signs (24h Range):  Temp:  [97.6 °F (36.4 °C)-98.9 °F (37.2 °C)] 98.9 °F (37.2 °C)  Pulse:  [39-97] 41  Resp:  [10-19] 14  SpO2:  [91 %-100 %] 98 %  BP: ()/(44-90) 131/62     Weight: 86.2 kg (190 lb)  Body mass index is 32.61 kg/m².    SpO2: 98 %       No intake or output data in the 24 hours ending 09/30/23 1717    Lines/Drains/Airways       Peripheral Intravenous Line  Duration                  Peripheral IV - Single Lumen 09/29/23 2015 18 G Left Antecubital <1 day                     Physical Exam  Vitals and nursing note reviewed.   Constitutional:       Appearance: She is obese. She is ill-appearing.   HENT:      Head: Normocephalic and atraumatic.      Right Ear: External ear normal.      Left Ear: External ear normal.   Eyes:      General: No scleral icterus.        Right eye: No discharge.         Left eye: No discharge.      Extraocular Movements: Extraocular movements intact.      Conjunctiva/sclera: Conjunctivae normal.      Pupils: Pupils are equal, round, and reactive to light.   Cardiovascular:      Rate and Rhythm: Normal rate and regular rhythm.      Pulses: Normal pulses.      Heart sounds: Normal heart sounds. No murmur heard.     No friction rub. No gallop.   Pulmonary:      Effort: Pulmonary effort is normal.      Breath sounds: Normal breath sounds. No wheezing, rhonchi or rales.   Chest:      Chest wall: No tenderness.   Abdominal:      General: Abdomen is flat. Bowel sounds are normal. There is no distension.     "  Palpations: Abdomen is soft.      Tenderness: There is no abdominal tenderness. There is no guarding or rebound.   Musculoskeletal:         General: No swelling or tenderness. Normal range of motion.      Cervical back: Normal range of motion and neck supple.      Right lower leg: Edema present.      Left lower leg: Edema present.   Skin:     General: Skin is warm and dry.      Findings: No erythema or rash.   Neurological:      General: No focal deficit present.      Mental Status: She is alert and oriented to person, place, and time.      Cranial Nerves: No cranial nerve deficit.      Motor: No weakness.      Gait: Gait normal.   Psychiatric:         Mood and Affect: Mood normal.         Behavior: Behavior normal.         Thought Content: Thought content normal.         Judgment: Judgment normal.      Comments: anxious          Significant Labs: Troponin No results for input(s): "TROPONINI" in the last 48 hours.    Significant Imaging:   "

## 2023-09-30 NOTE — SUBJECTIVE & OBJECTIVE
Past Medical History:   Diagnosis Date    Abnormal LFTs     Asthma     ? 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    Nausea vomiting and diarrhea 2/7/2022       Past Surgical History:   Procedure Laterality Date    APPENDECTOMY      CHOLECYSTECTOMY      HYSTERECTOMY      TONSILLECTOMY         Review of patient's allergies indicates:   Allergen Reactions    Aricept [donepezil]     Aspirin     Codeine     Crestor [rosuvastatin]     Dapsone      nausea    Flagyl [metronidazole]     Hydrocodone     Imitrex [sumatriptan]     Influenza virus vaccines     Opioids - morphine analogues     Penicillins     Tramadol     Clindamycin Nausea Only    Latex, natural rubber Rash       No current facility-administered medications on file prior to encounter.     Current Outpatient Medications on File Prior to Encounter   Medication Sig    albuterol (PROVENTIL) 2.5 mg /3 mL (0.083 %) nebulizer solution Take 3 mLs (2.5 mg total) by nebulization every 6 (six) hours as needed for Wheezing. Rescue    ascorbic acid, vitamin C, (VITAMIN C) 1000 MG tablet Take 1,000 mg by mouth once daily.    calcium carbonate (TUMS) 200 mg calcium (500 mg) chewable tablet Take 2 tablets by mouth nightly as needed for Heartburn.    dapsone 25 MG Tab     ergocalciferol (ERGOCALCIFEROL) 50,000 unit Cap Take 50,000 Units by mouth every 7 days.    ezetimibe (ZETIA) 10 mg tablet Take 1 tablet by mouth once daily.    fluticasone propionate (FLONASE) 50 mcg/actuation nasal spray 2 sprays (100 mcg total) by Each Nostril route once daily.    metoprolol succinate (TOPROL-XL) 25 MG 24 hr tablet Take 1 tablet (25 mg total) by mouth once daily.    mupirocin (BACTROBAN) 2 % ointment Apply to AA on body BID PRN flares tapering with improvement    thyroid, pork, (ARMOUR THYROID) 15 mg Tab Take 15 mg by mouth.    thyroid, pork, (ARMOUR THYROID) 60 mg Tab Take 1 tablet (60 mg total) by mouth before breakfast.    triamcinolone acetonide  0.1% (KENALOG) 0.1 % cream Apply to AA on body BID PRN flares tapering with improvement     Family History       Problem Relation (Age of Onset)    Cancer Paternal Aunt    Heart disease Mother, Father    Kidney disease Mother          Tobacco Use    Smoking status: Former     Current packs/day: 0.20     Average packs/day: 0.2 packs/day for 1 year (0.2 ttl pk-yrs)     Types: Cigarettes     Passive exposure: Current    Smokeless tobacco: Never   Substance and Sexual Activity    Alcohol use: Not Currently    Drug use: Never    Sexual activity: Not Currently     Review of Systems   Constitutional:  Negative for activity change, appetite change, chills and fever.   HENT:  Positive for tinnitus. Negative for hearing loss and trouble swallowing.    Eyes:  Negative for visual disturbance.   Respiratory:  Positive for shortness of breath. Negative for cough.    Cardiovascular:  Positive for chest pain. Negative for leg swelling.   Gastrointestinal:  Positive for abdominal pain, constipation, nausea and vomiting. Negative for diarrhea.   Genitourinary:  Negative for difficulty urinating and hematuria.   Musculoskeletal:  Positive for gait problem. Negative for arthralgias, back pain and myalgias.   Skin:  Positive for rash. Negative for color change and pallor.   Neurological:  Positive for headaches. Negative for light-headedness and numbness.   Psychiatric/Behavioral:  Negative for agitation, confusion and sleep disturbance.      Objective:     Vital Signs (Most Recent):  Temp: 97.6 °F (36.4 °C) (09/29/23 2051)  Pulse: (!) 59 (09/30/23 0136)  Resp: 14 (09/30/23 0136)  BP: (!) 113/56 (09/30/23 0136)  SpO2: 100 % (09/30/23 0136) Vital Signs (24h Range):  Temp:  [97.6 °F (36.4 °C)] 97.6 °F (36.4 °C)  Pulse:  [46-97] 59  Resp:  [11-19] 14  SpO2:  [91 %-100 %] 100 %  BP: ()/(44-77) 113/56     Weight: 86.2 kg (190 lb)  Body mass index is 32.61 kg/m².     Physical Exam  Vitals and nursing note reviewed.   Constitutional:        General: She is not in acute distress.     Appearance: Normal appearance. She is obese. She is not toxic-appearing.   HENT:      Head: Normocephalic and atraumatic.      Right Ear: External ear normal.      Left Ear: External ear normal.      Nose: Nose normal.      Mouth/Throat:      Mouth: Mucous membranes are dry.      Pharynx: Oropharynx is clear.   Eyes:      Extraocular Movements: Extraocular movements intact.      Conjunctiva/sclera: Conjunctivae normal.      Pupils: Pupils are equal, round, and reactive to light.   Cardiovascular:      Rate and Rhythm: Regular rhythm. Bradycardia present.      Pulses: Normal pulses.      Heart sounds: Normal heart sounds. No murmur heard.  Pulmonary:      Effort: Pulmonary effort is normal. No respiratory distress.      Breath sounds: Normal breath sounds. No wheezing, rhonchi or rales.   Abdominal:      General: Bowel sounds are normal. There is no distension.      Palpations: Abdomen is soft.      Tenderness: There is abdominal tenderness. There is no right CVA tenderness, left CVA tenderness, guarding or rebound.   Musculoskeletal:         General: Normal range of motion.      Cervical back: Normal range of motion and neck supple. No tenderness.      Right lower leg: No edema.      Left lower leg: No edema.   Skin:     General: Skin is warm and dry.      Capillary Refill: Capillary refill takes less than 2 seconds.      Findings: Rash present.      Comments: Healing rash on abdomen   Neurological:      General: No focal deficit present.      Mental Status: She is alert and oriented to person, place, and time.      Cranial Nerves: No cranial nerve deficit.      Sensory: No sensory deficit.      Motor: No weakness.   Psychiatric:         Mood and Affect: Mood normal.         Behavior: Behavior normal.         Thought Content: Thought content normal.              CRANIAL NERVES     CN III, IV, VI   Pupils are equal, round, and reactive to light.       Significant Labs: All  pertinent labs within the past 24 hours have been reviewed.    Significant Imaging: I have reviewed all pertinent imaging results/findings within the past 24 hours.

## 2023-09-30 NOTE — H&P
[facility] - Emergency Department  Hospital Medicine  History & Physical    Patient Name: Beverly Rodríguez  MRN: [id number]  Patient Class: IP- Inpatient  Admission Date: 9/29/2023  Attending Physician: Gael Madrigal MD   Primary Care Provider: Prem Vincent MD         Patient information was obtained from patient, past medical records and ER records.     Subjective:     Principal Problem:Chest pain    Chief Complaint:   Chief Complaint   Patient presents with    Abdominal Pain     EMS from home. Call went out abdominal pain. EMS reports when arrived at patient's home started c/o chest pain.    Chest Pain     EMS from home        HPI: Patient is a 68 year old female, with past medical history of hypertension, hypothyroidism and celiac disease, who presents to [facility] via EMS for evaluation of chest and abdominal pain. Patient states she was experiencing nausea, vomiting, shortness of breath, chest pain, bilateral jaw pain that radiated down her arm along with abdominal pain lasting about 30 minutes. She started to have these symptoms on Thursday 9/28, but begin to worsen the next day. Patient also mentions she has been having constipation for the past week and has taken stool softeners. Her last bowel movement was yesterday morning. Patient has diabetes mellitus listed on her history but she is currently not on home medications and most recent glucose levels have been wnl.    Patient lives alone. She is able to perform daily activities, lately with the help of a walker or cane. She now has home health coming by for the past 2-3 weeks due to having issues with ringing in ears and loss of balance. Her last fall was several months ago. Patient also mentions she is on home oxygen 2 liters, only used at night.      In the ED, initial presenting vitals were blood pressure: 89/53, temperature: 97.6, pulse: 97, respirations: 16, O2 sat: 91%. EKG was obtained showing bradycardia with heart rate of  46 bpm and patient was treated with IV atropine. Right bundle branch block and possible idioventricular rhythm were also noted on EKG. Imaging completed included chest x-ray which showed possible minimal left basilar atelectasis or infectious/inflammatory infiltrate. CT of abdomen and pelvis with contrast results pending. Labs demonstrated WBC: 5.36, hemoglobin: 12.8, hematocrit: 37.6, platelet: 284, sodium: 137, potassium: 3.0, chloride: 102, CO2: 28, BUN: 13, Cr: 1.02, glucose: 108, Protime:13.9, INR: 1.08, aPTT: 28.8, D-Dimer: <0.27, ProBNP: 82, Troponin: 15, 45 and 63, will continue to trend. The patient was given aztreonam in dextrose 5% in water 100 mL and ketorolac injection 15 mg in the ED.     The patient will be admitted for continued care and medical management.      Past Medical History:   Diagnosis Date    Abnormal LFTs     Asthma     ? 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    Nausea vomiting and diarrhea 2/7/2022       Past Surgical History:   Procedure Laterality Date    APPENDECTOMY      CHOLECYSTECTOMY      HYSTERECTOMY      TONSILLECTOMY         Review of patient's allergies indicates:   Allergen Reactions    Aricept [donepezil]     Aspirin     Codeine     Crestor [rosuvastatin]     Dapsone      nausea    Flagyl [metronidazole]     Hydrocodone     Imitrex [sumatriptan]     Influenza virus vaccines     Opioids - morphine analogues     Penicillins     Tramadol     Clindamycin Nausea Only    Latex, natural rubber Rash       No current facility-administered medications on file prior to encounter.     Current Outpatient Medications on File Prior to Encounter   Medication Sig    albuterol (PROVENTIL) 2.5 mg /3 mL (0.083 %) nebulizer solution Take 3 mLs (2.5 mg total) by nebulization every 6 (six) hours as needed for Wheezing. Rescue    ascorbic acid, vitamin C, (VITAMIN C) 1000 MG tablet Take 1,000 mg by mouth once daily.    calcium carbonate (TUMS) 200 mg  calcium (500 mg) chewable tablet Take 2 tablets by mouth nightly as needed for Heartburn.    dapsone 25 MG Tab     ergocalciferol (ERGOCALCIFEROL) 50,000 unit Cap Take 50,000 Units by mouth every 7 days.    ezetimibe (ZETIA) 10 mg tablet Take 1 tablet by mouth once daily.    fluticasone propionate (FLONASE) 50 mcg/actuation nasal spray 2 sprays (100 mcg total) by Each Nostril route once daily.    metoprolol succinate (TOPROL-XL) 25 MG 24 hr tablet Take 1 tablet (25 mg total) by mouth once daily.    mupirocin (BACTROBAN) 2 % ointment Apply to AA on body BID PRN flares tapering with improvement    thyroid, pork, (ARMOUR THYROID) 15 mg Tab Take 15 mg by mouth.    thyroid, pork, (ARMOUR THYROID) 60 mg Tab Take 1 tablet (60 mg total) by mouth before breakfast.    triamcinolone acetonide 0.1% (KENALOG) 0.1 % cream Apply to AA on body BID PRN flares tapering with improvement     Family History       Problem Relation (Age of Onset)    Cancer Paternal Aunt    Heart disease Mother, Father    Kidney disease Mother          Tobacco Use    Smoking status: Former     Current packs/day: 0.20     Average packs/day: 0.2 packs/day for 1 year (0.2 ttl pk-yrs)     Types: Cigarettes     Passive exposure: Current    Smokeless tobacco: Never   Substance and Sexual Activity    Alcohol use: Not Currently    Drug use: Never    Sexual activity: Not Currently     Review of Systems   Constitutional:  Negative for activity change, appetite change, chills and fever.   HENT:  Positive for tinnitus. Negative for hearing loss and trouble swallowing.    Eyes:  Negative for visual disturbance.   Respiratory:  Positive for shortness of breath. Negative for cough.    Cardiovascular:  Positive for chest pain. Negative for leg swelling.   Gastrointestinal:  Positive for abdominal pain, constipation, nausea and vomiting. Negative for diarrhea.   Genitourinary:  Negative for difficulty urinating and hematuria.   Musculoskeletal:  Positive for gait problem.  Negative for arthralgias, back pain and myalgias.   Skin:  Positive for rash. Negative for color change and pallor.   Neurological:  Positive for headaches. Negative for light-headedness and numbness.   Psychiatric/Behavioral:  Negative for agitation, confusion and sleep disturbance.      Objective:     Vital Signs (Most Recent):  Temp: 97.6 °F (36.4 °C) (09/29/23 2051)  Pulse: (!) 59 (09/30/23 0136)  Resp: 14 (09/30/23 0136)  BP: (!) 113/56 (09/30/23 0136)  SpO2: 100 % (09/30/23 0136) Vital Signs (24h Range):  Temp:  [97.6 °F (36.4 °C)] 97.6 °F (36.4 °C)  Pulse:  [46-97] 59  Resp:  [11-19] 14  SpO2:  [91 %-100 %] 100 %  BP: ()/(44-77) 113/56     Weight: 86.2 kg (190 lb)  Body mass index is 32.61 kg/m².     Physical Exam  Vitals and nursing note reviewed.   Constitutional:       General: She is not in acute distress.     Appearance: Normal appearance. She is obese. She is not toxic-appearing.   HENT:      Head: Normocephalic and atraumatic.      Right Ear: External ear normal.      Left Ear: External ear normal.      Nose: Nose normal.      Mouth/Throat:      Mouth: Mucous membranes are dry.      Pharynx: Oropharynx is clear.   Eyes:      Extraocular Movements: Extraocular movements intact.      Conjunctiva/sclera: Conjunctivae normal.      Pupils: Pupils are equal, round, and reactive to light.   Cardiovascular:      Rate and Rhythm: Regular rhythm. Bradycardia present.      Pulses: Normal pulses.      Heart sounds: Normal heart sounds. No murmur heard.  Pulmonary:      Effort: Pulmonary effort is normal. No respiratory distress.      Breath sounds: Normal breath sounds. No wheezing, rhonchi or rales.   Abdominal:      General: Bowel sounds are normal. There is no distension.      Palpations: Abdomen is soft.      Tenderness: There is abdominal tenderness. There is no right CVA tenderness, left CVA tenderness, guarding or rebound.   Musculoskeletal:         General: Normal range of motion.      Cervical back:  Normal range of motion and neck supple. No tenderness.      Right lower leg: No edema.      Left lower leg: No edema.   Skin:     General: Skin is warm and dry.      Capillary Refill: Capillary refill takes less than 2 seconds.      Findings: Rash present.      Comments: Healing rash on abdomen   Neurological:      General: No focal deficit present.      Mental Status: She is alert and oriented to person, place, and time.      Cranial Nerves: No cranial nerve deficit.      Sensory: No sensory deficit.      Motor: No weakness.   Psychiatric:         Mood and Affect: Mood normal.         Behavior: Behavior normal.         Thought Content: Thought content normal.              CRANIAL NERVES     CN III, IV, VI   Pupils are equal, round, and reactive to light.       Significant Labs: All pertinent labs within the past 24 hours have been reviewed.    Significant Imaging: I have reviewed all pertinent imaging results/findings within the past 24 hours.    Assessment/Plan:     * Chest pain  Chest pain, bradycardia and hypotension on presentation. No history of coronary artery disease. Patient reports heart catheterization more than 10 years ago, no stents placed. Her cardiologist is Dr. Powell at [facility].  EKG sinus bradycardia, no specific ST changes  ALEJANDRO score 4, Sona ACS score 120, Heart score 6  Patient with allergy to aspirin will start Plavix and statin  Holding BB due to bradycardia  NPO  Obtain Echo  Cardiology consult, recommendations appreciated        Bradycardia  Bradycardia in the ED treated with atropine  Initial EKG showing HR 46, unspecific ST changes  After IV atropine HR 60-66  Will obtain and Echo and consult cardiology due to concomitant elevated troponin concerning for ACS      Hyperlipidemia  Patient reports not taking home statin. She reports statin makes her sleepy  Lipid panel 6 months ago showing total cholesterol 234, HDL 45, [id number]  Will start statin and monitor for possible side effects          Celiac disease  History of celiac disease with occasional constipation but no GI bleed. Vague abdominal pain over the past week. Abdominal tenderness on exam.  CT abdomen with contrast pending      Hypertension  Hypotension and bradycardia on presentation  Holding home metoprolol  Monitor BP    Hypothyroidism  TSH and T4 wnl  Patient takes home thyroid (pork). She reports the synthroid does not correct her hypothyroidism  Will continue home thyroid (pork)      VTE Risk Mitigation (From admission, onward)           Ordered     enoxaparin injection 40 mg  Daily         09/30/23 0102     IP VTE HIGH RISK PATIENT  Once         09/30/23 0102     Place sequential compression device  Until discontinued         09/30/23 0102                               Parveen Magana MD  Department of Hospital Medicine  [facility] - Emergency Department

## 2023-09-30 NOTE — HPI
Patient is a 68 year old female, with past medical history of hypertension, hypothyroidism and celiac disease, who presents to [facility] via EMS for evaluation of chest and abdominal pain. Patient states she was experiencing nausea, vomiting, shortness of breath, chest pain, bilateral jaw pain that radiated down her arm along with abdominal pain lasting about 30 minutes. She started to have these symptoms on Thursday 9/28, but begin to worsen the next day. Patient also mentions she has been having constipation for the past week and has taken stool softeners. Her last bowel movement was yesterday morning. Patient has diabetes mellitus listed on her history but she is currently not on home medications and most recent glucose levels have been wnl.    Patient lives alone. She is able to perform daily activities, lately with the help of a walker or cane. She now has home health coming by for the past 2-3 weeks due to having issues with ringing in ears and loss of balance. Her last fall was several months ago. Patient also mentions she is on home oxygen 2 liters, only used at night.      In the ED, initial presenting vitals were blood pressure: 89/53, temperature: 97.6, pulse: 97, respirations: 16, O2 sat: 91%. EKG was obtained showing bradycardia with heart rate of 46 bpm and patient was treated with IV atropine. Right bundle branch block and possible idioventricular rhythm were also noted on EKG. Imaging completed included chest x-ray which showed possible minimal left basilar atelectasis or infectious/inflammatory infiltrate. CT of abdomen and pelvis with contrast results pending. Labs demonstrated WBC: 5.36, hemoglobin: 12.8, hematocrit: 37.6, platelet: 284, sodium: 137, potassium: 3.0, chloride: 102, CO2: 28, BUN: 13, Cr: 1.02, glucose: 108, Protime:13.9, INR: 1.08, aPTT: 28.8, D-Dimer: <0.27, ProBNP: 82, Troponin: 15, 45 and 63, will continue to trend. The patient was given aztreonam in dextrose 5%  in water 100 mL and ketorolac injection 15 mg in the ED.     The patient will be admitted for continued care and medical management.

## 2023-09-30 NOTE — ASSESSMENT & PLAN NOTE
"1. Chest pain is atypical as described, hx "Normal 'LHC" approximaterly one week ago,   2. Recommend optimize medical treatment, , risk factor modificatons  Will obtain old records, , previous LHC fims, pt may be a candidate for LHC if unable to control heart rate.  "

## 2023-09-30 NOTE — ED NOTES
Dr. Madrigal in ER, showed EKG and updated on patient status. Notified of heart rate staying in 40s at the moment. No new orders at this time.

## 2023-09-30 NOTE — ASSESSMENT & PLAN NOTE
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

## 2023-09-30 NOTE — ASSESSMENT & PLAN NOTE
Suspect due to Toprol XL, will continue to hold, monitor heart rate and bp response off beta blockaid

## 2023-09-30 NOTE — ASSESSMENT & PLAN NOTE
Improved. Cardiology following with echocardiogram pending.  ---  Chest pain, bradycardia and hypotension on presentation. No history of coronary artery disease. Patient reports heart catheterization more than 10 years ago, no stents placed. Her cardiologist is Dr. Powell at [facility].  EKG sinus bradycardia, no specific ST changes  ALEJANDRO score 4, Sona ACS score 120, Heart score 6  Patient with allergy to aspirin will start Plavix and statin  Holding BB due to bradycardia  NPO  Obtain Echo  Cardiology consult, recommendations appreciated

## 2023-09-30 NOTE — ASSESSMENT & PLAN NOTE
History of celiac disease with occasional constipation but no GI bleed. Vague abdominal pain over the past week. Abdominal tenderness on exam.  CT abdomen with contrast pending

## 2023-10-01 LAB
BASOPHILS # BLD AUTO: 0.07 K/UL (ref 0–0.2)
BASOPHILS NFR BLD AUTO: 1.2 % (ref 0–1)
DIFFERENTIAL METHOD BLD: ABNORMAL
EOSINOPHIL # BLD AUTO: 0.3 K/UL (ref 0–0.5)
EOSINOPHIL NFR BLD AUTO: 5 % (ref 1–4)
ERYTHROCYTE [DISTWIDTH] IN BLOOD BY AUTOMATED COUNT: 12.4 % (ref 11.5–14.5)
HCT VFR BLD AUTO: 35.3 % (ref 38–47)
HGB BLD-MCNC: 11.6 G/DL (ref 12–16)
IMM GRANULOCYTES # BLD AUTO: 0.01 K/UL (ref 0–0.04)
IMM GRANULOCYTES NFR BLD: 0.2 % (ref 0–0.4)
LYMPHOCYTES # BLD AUTO: 1.36 K/UL (ref 1–4.8)
LYMPHOCYTES NFR BLD AUTO: 22.6 % (ref 27–41)
MCH RBC QN AUTO: 29.1 PG (ref 27–31)
MCHC RBC AUTO-ENTMCNC: 32.9 G/DL (ref 32–36)
MCV RBC AUTO: 88.7 FL (ref 80–96)
MONOCYTES # BLD AUTO: 0.43 K/UL (ref 0–0.8)
MONOCYTES NFR BLD AUTO: 7.1 % (ref 2–6)
MPC BLD CALC-MCNC: 11.7 FL (ref 9.4–12.4)
NEUTROPHILS # BLD AUTO: 3.85 K/UL (ref 1.8–7.7)
NEUTROPHILS NFR BLD AUTO: 63.9 % (ref 53–65)
NRBC # BLD AUTO: 0 X10E3/UL
NRBC, AUTO (.00): 0 %
PLATELET # BLD AUTO: 255 K/UL (ref 150–400)
RBC # BLD AUTO: 3.98 M/UL (ref 4.2–5.4)
WBC # BLD AUTO: 6.02 K/UL (ref 4.5–11)

## 2023-10-01 PROCEDURE — 99232 PR SUBSEQUENT HOSPITAL CARE,LEVL II: ICD-10-PCS | Mod: ,,, | Performed by: FAMILY MEDICINE

## 2023-10-01 PROCEDURE — 99233 PR SUBSEQUENT HOSPITAL CARE,LEVL III: ICD-10-PCS | Mod: ,,, | Performed by: INTERNAL MEDICINE

## 2023-10-01 PROCEDURE — 25000003 PHARM REV CODE 250: Performed by: FAMILY MEDICINE

## 2023-10-01 PROCEDURE — G0378 HOSPITAL OBSERVATION PER HR: HCPCS

## 2023-10-01 PROCEDURE — 99233 SBSQ HOSP IP/OBS HIGH 50: CPT | Mod: ,,, | Performed by: INTERNAL MEDICINE

## 2023-10-01 PROCEDURE — 25000003 PHARM REV CODE 250: Performed by: GENERAL PRACTICE

## 2023-10-01 PROCEDURE — 11000001 HC ACUTE MED/SURG PRIVATE ROOM

## 2023-10-01 PROCEDURE — 96372 THER/PROPH/DIAG INJ SC/IM: CPT | Performed by: GENERAL PRACTICE

## 2023-10-01 PROCEDURE — 85025 COMPLETE CBC W/AUTO DIFF WBC: CPT | Performed by: GENERAL PRACTICE

## 2023-10-01 PROCEDURE — 63600175 PHARM REV CODE 636 W HCPCS: Performed by: GENERAL PRACTICE

## 2023-10-01 PROCEDURE — 99232 SBSQ HOSP IP/OBS MODERATE 35: CPT | Mod: ,,, | Performed by: FAMILY MEDICINE

## 2023-10-01 RX ORDER — BISACODYL 5 MG
10 TABLET, DELAYED RELEASE (ENTERIC COATED) ORAL DAILY PRN
Status: DISCONTINUED | OUTPATIENT
Start: 2023-10-01 | End: 2023-10-04 | Stop reason: HOSPADM

## 2023-10-01 RX ORDER — MICONAZOLE NITRATE 2 %
POWDER (GRAM) TOPICAL 2 TIMES DAILY
Status: DISCONTINUED | OUTPATIENT
Start: 2023-10-01 | End: 2023-10-04 | Stop reason: HOSPADM

## 2023-10-01 RX ORDER — TRIAMCINOLONE ACETONIDE 1 MG/ML
LOTION TOPICAL 2 TIMES DAILY
Status: DISCONTINUED | OUTPATIENT
Start: 2023-10-01 | End: 2023-10-01

## 2023-10-01 RX ORDER — TRIAMCINOLONE ACETONIDE 1 MG/G
CREAM TOPICAL 2 TIMES DAILY
Status: DISCONTINUED | OUTPATIENT
Start: 2023-10-01 | End: 2023-10-04 | Stop reason: HOSPADM

## 2023-10-01 RX ADMIN — BISACODYL 10 MG: 5 TABLET, COATED ORAL at 12:10

## 2023-10-01 RX ADMIN — TRIAMCINOLONE ACETONIDE: 1 CREAM TOPICAL at 02:10

## 2023-10-01 RX ADMIN — ENOXAPARIN SODIUM 40 MG: 100 INJECTION SUBCUTANEOUS at 04:10

## 2023-10-01 RX ADMIN — POLYETHYLENE GLYCOL 3350 17 G: 17 POWDER, FOR SOLUTION ORAL at 12:10

## 2023-10-01 RX ADMIN — TRIAMCINOLONE ACETONIDE: 1 CREAM TOPICAL at 09:10

## 2023-10-01 RX ADMIN — MICONAZOLE NITRATE 2 % TOPICAL POWDER: at 09:10

## 2023-10-01 RX ADMIN — LEVOTHYROXINE, LIOTHYRONINE 60 MG: 38; 9 TABLET ORAL at 05:10

## 2023-10-01 RX ADMIN — CLOPIDOGREL BISULFATE 75 MG: 75 TABLET ORAL at 09:10

## 2023-10-01 RX ADMIN — ATORVASTATIN CALCIUM 80 MG: 80 TABLET, FILM COATED ORAL at 09:10

## 2023-10-01 NOTE — SUBJECTIVE & OBJECTIVE
"Interval History:    No significant events overnight, patient with no new complaints this morning. States she is feeling somewhat better although does continue to be bradycardic. Required I/O cath yesterday - denies history of urinary retention but states that when she is in new places and stressful situations she feels like she "can't go". Will get post-void scan prior to discharge.     Review of Systems   Constitutional:  Negative for activity change, appetite change, chills and fever.   HENT:  Negative for hearing loss, tinnitus and trouble swallowing.    Eyes:  Negative for visual disturbance.   Respiratory:  Negative for cough and shortness of breath.    Cardiovascular:  Negative for chest pain and leg swelling.   Gastrointestinal:  Positive for constipation. Negative for abdominal pain, diarrhea, nausea and vomiting.   Genitourinary:  Negative for difficulty urinating and hematuria.   Musculoskeletal:  Positive for gait problem. Negative for arthralgias, back pain and myalgias.   Skin:  Positive for rash. Negative for color change and pallor.   Neurological:  Positive for headaches. Negative for light-headedness and numbness.   Psychiatric/Behavioral:  Negative for agitation, confusion and sleep disturbance.      Objective:     Vital Signs (Most Recent):  Temp: 98.4 °F (36.9 °C) (09/30/23 2201)  Pulse: (!) 53 (10/01/23 0939)  Resp: 16 (10/01/23 0939)  BP: 121/61 (10/01/23 0939)  SpO2: 96 % (10/01/23 0939) Vital Signs (24h Range):  Temp:  [98.4 °F (36.9 °C)-98.9 °F (37.2 °C)] 98.4 °F (36.9 °C)  Pulse:  [37-62] 53  Resp:  [10-19] 16  SpO2:  [94 %-100 %] 96 %  BP: ()/(45-93) 121/61     Weight: 86.2 kg (190 lb)  Body mass index is 32.61 kg/m².     Physical Exam  Vitals and nursing note reviewed.   Constitutional:       General: She is not in acute distress.     Appearance: Normal appearance. She is obese. She is not toxic-appearing.   HENT:      Head: Normocephalic and atraumatic.      Right Ear: External " ear normal.      Left Ear: External ear normal.      Nose: Nose normal.      Mouth/Throat:      Mouth: Mucous membranes are dry.      Pharynx: Oropharynx is clear.   Eyes:      Extraocular Movements: Extraocular movements intact.      Conjunctiva/sclera: Conjunctivae normal.   Cardiovascular:      Rate and Rhythm: Regular rhythm. Bradycardia present.      Pulses: Normal pulses.      Heart sounds: Normal heart sounds. No murmur heard.  Pulmonary:      Effort: Pulmonary effort is normal. No respiratory distress.      Breath sounds: Normal breath sounds. No wheezing, rhonchi or rales.   Abdominal:      General: Bowel sounds are normal. There is no distension.      Palpations: Abdomen is soft.      Tenderness: There is abdominal tenderness. There is no right CVA tenderness, left CVA tenderness, guarding or rebound.   Musculoskeletal:         General: Normal range of motion.      Cervical back: Normal range of motion and neck supple. No tenderness.      Right lower leg: No edema.      Left lower leg: No edema.   Skin:     General: Skin is warm and dry.      Capillary Refill: Capillary refill takes less than 2 seconds.      Findings: Rash present.      Comments: Healing rash on abdomen   Neurological:      General: No focal deficit present.      Mental Status: She is alert and oriented to person, place, and time.      Cranial Nerves: No cranial nerve deficit.      Sensory: No sensory deficit.      Motor: No weakness.   Psychiatric:         Mood and Affect: Mood normal.         Behavior: Behavior normal.         Thought Content: Thought content normal.                Significant Labs: All pertinent labs within the past 24 hours have been reviewed.    Significant Imaging: I have reviewed all pertinent imaging results/findings within the past 24 hours.

## 2023-10-01 NOTE — NURSING
Pt received from Er Nurse Mary Carmen via wheelchair, pt oriented to room. Vitals obtained, pt lying in bed, resp even and unlabored, skin w/d, nadn. Telemonitor placed on patient. Resp even, bed low, call light in reach.

## 2023-10-01 NOTE — ASSESSMENT & PLAN NOTE
Improved. CT abdomen-pelvis unremarkable. Continue supportive care and gluten free diet.   ---  History of celiac disease with occasional constipation but no GI bleed. Vague abdominal pain over the past week. Abdominal tenderness on exam.

## 2023-10-01 NOTE — PROGRESS NOTES
[facility] - [address] [place] [address] Medicine  Progress Note    Patient Name: Beverly Rodríguez  MRN: [id number]  Patient Class: IP- Inpatient   Admission Date: 9/29/2023  Length of Stay: 1 days  Attending Physician: Rosemarie Mireles DO  Primary Care Provider: Prem Vincent MD        Subjective:     Principal Problem:Chest pain        HPI:  Patient is a 68 year old female, with past medical history of hypertension, hypothyroidism and celiac disease, who presents to [facility] via EMS for evaluation of chest and abdominal pain. Patient states she was experiencing nausea, vomiting, shortness of breath, chest pain, bilateral jaw pain that radiated down her arm along with abdominal pain lasting about 30 minutes. She started to have these symptoms on Thursday 9/28, but begin to worsen the next day. Patient also mentions she has been having constipation for the past week and has taken stool softeners. Her last bowel movement was yesterday morning. Patient has diabetes mellitus listed on her history but she is currently not on home medications and most recent glucose levels have been wnl.    Patient lives alone. She is able to perform daily activities, lately with the help of a walker or cane. She now has home health coming by for the past 2-3 weeks due to having issues with ringing in ears and loss of balance. Her last fall was several months ago. Patient also mentions she is on home oxygen 2 liters, only used at night.      In the ED, initial presenting vitals were blood pressure: 89/53, temperature: 97.6, pulse: 97, respirations: 16, O2 sat: 91%. EKG was obtained showing bradycardia with heart rate of 46 bpm and patient was treated with IV atropine. Right bundle branch block and possible idioventricular rhythm were also noted on EKG. Imaging completed included chest x-ray which showed possible minimal left basilar atelectasis or infectious/inflammatory infiltrate. CT of abdomen  "and pelvis with contrast results pending. Labs demonstrated WBC: 5.36, hemoglobin: 12.8, hematocrit: 37.6, platelet: 284, sodium: 137, potassium: 3.0, chloride: 102, CO2: 28, BUN: 13, Cr: 1.02, glucose: 108, Protime:13.9, INR: 1.08, aPTT: 28.8, D-Dimer: <0.27, ProBNP: 82, Troponin: 15, 45 and 63, will continue to trend. The patient was given aztreonam in dextrose 5% in water 100 mL and ketorolac injection 15 mg in the ED.     The patient will be admitted for continued care and medical management.      Overview/Hospital Course:  No notes on file    Interval History:    No significant events overnight, patient with no new complaints this morning. States she is feeling somewhat better although does continue to be bradycardic. Required I/O cath yesterday - denies history of urinary retention but states that when she is in new places and stressful situations she feels like she "can't go". Will get post-void scan prior to discharge.     Review of Systems   Constitutional:  Negative for activity change, appetite change, chills and fever.   HENT:  Negative for hearing loss, tinnitus and trouble swallowing.    Eyes:  Negative for visual disturbance.   Respiratory:  Negative for cough and shortness of breath.    Cardiovascular:  Negative for chest pain and leg swelling.   Gastrointestinal:  Positive for constipation. Negative for abdominal pain, diarrhea, nausea and vomiting.   Genitourinary:  Negative for difficulty urinating and hematuria.   Musculoskeletal:  Positive for gait problem. Negative for arthralgias, back pain and myalgias.   Skin:  Positive for rash. Negative for color change and pallor.   Neurological:  Positive for headaches. Negative for light-headedness and numbness.   Psychiatric/Behavioral:  Negative for agitation, confusion and sleep disturbance.      Objective:     Vital Signs (Most Recent):  Temp: 98.4 °F (36.9 °C) (09/30/23 2201)  Pulse: (!) 53 (10/01/23 0939)  Resp: 16 (10/01/23 0939)  BP: 121/61 " (10/01/23 0939)  SpO2: 96 % (10/01/23 0939) Vital Signs (24h Range):  Temp:  [98.4 °F (36.9 °C)-98.9 °F (37.2 °C)] 98.4 °F (36.9 °C)  Pulse:  [37-62] 53  Resp:  [10-19] 16  SpO2:  [94 %-100 %] 96 %  BP: ()/(45-93) 121/61     Weight: 86.2 kg (190 lb)  Body mass index is 32.61 kg/m².     Physical Exam  Vitals and nursing note reviewed.   Constitutional:       General: She is not in acute distress.     Appearance: Normal appearance. She is obese. She is not toxic-appearing.   HENT:      Head: Normocephalic and atraumatic.      Right Ear: External ear normal.      Left Ear: External ear normal.      Nose: Nose normal.      Mouth/Throat:      Mouth: Mucous membranes are dry.      Pharynx: Oropharynx is clear.   Eyes:      Extraocular Movements: Extraocular movements intact.      Conjunctiva/sclera: Conjunctivae normal.   Cardiovascular:      Rate and Rhythm: Regular rhythm. Bradycardia present.      Pulses: Normal pulses.      Heart sounds: Normal heart sounds. No murmur heard.  Pulmonary:      Effort: Pulmonary effort is normal. No respiratory distress.      Breath sounds: Normal breath sounds. No wheezing, rhonchi or rales.   Abdominal:      General: Bowel sounds are normal. There is no distension.      Palpations: Abdomen is soft.      Tenderness: There is abdominal tenderness. There is no right CVA tenderness, left CVA tenderness, guarding or rebound.   Musculoskeletal:         General: Normal range of motion.      Cervical back: Normal range of motion and neck supple. No tenderness.      Right lower leg: No edema.      Left lower leg: No edema.   Skin:     General: Skin is warm and dry.      Capillary Refill: Capillary refill takes less than 2 seconds.      Findings: Rash present.      Comments: Healing rash on abdomen   Neurological:      General: No focal deficit present.      Mental Status: She is alert and oriented to person, place, and time.      Cranial Nerves: No cranial nerve deficit.      Sensory: No  sensory deficit.      Motor: No weakness.   Psychiatric:         Mood and Affect: Mood normal.         Behavior: Behavior normal.         Thought Content: Thought content normal.                Significant Labs: All pertinent labs within the past 24 hours have been reviewed.    Significant Imaging: I have reviewed all pertinent imaging results/findings within the past 24 hours.      Assessment/Plan:      * Chest pain  Improved. Cardiology following with echocardiogram pending.  ---  Chest pain, bradycardia and hypotension on presentation. No history of coronary artery disease. Patient reports heart catheterization more than 10 years ago, no stents placed. Her cardiologist is Dr. Powell at [facility].  EKG sinus bradycardia, no specific ST changes  ALEJANDRO score 4, Sona ACS score 120, Heart score 6  Patient with allergy to aspirin will start Plavix and statin  Holding BB due to bradycardia  NPO  Obtain Echo  Cardiology consult, recommendations appreciated        Hyperlipidemia  Patient reports not taking home statin. She reports statin makes her sleepy  Lipid panel 6 months ago showing total cholesterol 234, HDL 45, [id number]  Will start statin and monitor for possible side effects         Bradycardia  Stable, HR 50s this AM with no associated symptoms. Cardiology following.  ---  Bradycardia in the ED treated with atropine  Initial EKG showing HR 46, unspecific ST changes  After IV atropine HR 60-66  Will obtain and Echo and consult cardiology due to concomitant elevated troponin concerning for ACS      Celiac disease  Improved. CT abdomen-pelvis unremarkable. Continue supportive care and gluten free diet.   ---  History of celiac disease with occasional constipation but no GI bleed. Vague abdominal pain over the past week. Abdominal tenderness on exam.      Hypertension  Well controlled. Continue current management.    Hypothyroidism  Managed by endocrinology in [place].     TSH and T4 wnl  Patient takes home thyroid  (pork). She reports the synthroid does not correct her hypothyroidism  Will continue home medication.       VTE Risk Mitigation (From admission, onward)         Ordered     enoxaparin injection 40 mg  Daily         09/30/23 0102     IP VTE HIGH RISK PATIENT  Once         09/30/23 0102     Place sequential compression device  Until discontinued         09/30/23 0102                Discharge Planning   SANDY:      Code Status: Full Code   Is the patient medically ready for discharge?:     Reason for patient still in hospital (select all that apply): Treatment                     Rosemarie Mireles DO  Department of Hospital Medicine   [facility] - [facility] Telemetry

## 2023-10-01 NOTE — ED NOTES
Assumed care of patient, resting in bed quietly without distress.  Patient has been assigned a room on [address] but room is not cleaned.

## 2023-10-01 NOTE — ASSESSMENT & PLAN NOTE
Managed by endocrinology in [place].     TSH and T4 wnl  Patient takes home thyroid (pork). She reports the synthroid does not correct her hypothyroidism  Will continue home medication.

## 2023-10-01 NOTE — ASSESSMENT & PLAN NOTE
Stable, HR 50s this AM with no associated symptoms. Cardiology following.  ---  Bradycardia in the ED treated with atropine  Initial EKG showing HR 46, unspecific ST changes  After IV atropine HR 60-66  Will obtain and Echo and consult cardiology due to concomitant elevated troponin concerning for ACS

## 2023-10-02 PROBLEM — R94.39 ABNORMAL CARDIOVASCULAR STRESS TEST: Status: ACTIVE | Noted: 2023-10-02

## 2023-10-02 LAB
ANION GAP SERPL CALCULATED.3IONS-SCNC: 9 MMOL/L (ref 7–16)
BASOPHILS # BLD AUTO: 0.06 K/UL (ref 0–0.2)
BASOPHILS NFR BLD AUTO: 0.9 % (ref 0–1)
BUN SERPL-MCNC: 14 MG/DL (ref 7–18)
BUN/CREAT SERPL: 18 (ref 6–20)
CALCIUM SERPL-MCNC: 8.8 MG/DL (ref 8.5–10.1)
CHLORIDE SERPL-SCNC: 106 MMOL/L (ref 98–107)
CHOLEST SERPL-MCNC: 102 MG/DL (ref 0–200)
CHOLEST/HDLC SERPL: 3.1 {RATIO}
CO2 SERPL-SCNC: 27 MMOL/L (ref 21–32)
CREAT SERPL-MCNC: 0.77 MG/DL (ref 0.55–1.02)
DIFFERENTIAL METHOD BLD: ABNORMAL
EGFR (NO RACE VARIABLE) (RUSH/TITUS): 85 ML/MIN/1.73M2
EOSINOPHIL # BLD AUTO: 0.28 K/UL (ref 0–0.5)
EOSINOPHIL NFR BLD AUTO: 4.2 % (ref 1–4)
ERYTHROCYTE [DISTWIDTH] IN BLOOD BY AUTOMATED COUNT: 12.2 % (ref 11.5–14.5)
GLUCOSE SERPL-MCNC: 95 MG/DL (ref 74–106)
HCT VFR BLD AUTO: 35.3 % (ref 38–47)
HDLC SERPL-MCNC: 33 MG/DL (ref 40–60)
HGB BLD-MCNC: 11.9 G/DL (ref 12–16)
IMM GRANULOCYTES # BLD AUTO: 0.01 K/UL (ref 0–0.04)
IMM GRANULOCYTES NFR BLD: 0.1 % (ref 0–0.4)
LDLC SERPL CALC-MCNC: 49 MG/DL
LDLC/HDLC SERPL: 1.5 {RATIO}
LYMPHOCYTES # BLD AUTO: 1.39 K/UL (ref 1–4.8)
LYMPHOCYTES NFR BLD AUTO: 20.7 % (ref 27–41)
MCH RBC QN AUTO: 29 PG (ref 27–31)
MCHC RBC AUTO-ENTMCNC: 33.7 G/DL (ref 32–36)
MCV RBC AUTO: 86.1 FL (ref 80–96)
MONOCYTES # BLD AUTO: 0.5 K/UL (ref 0–0.8)
MONOCYTES NFR BLD AUTO: 7.4 % (ref 2–6)
MPC BLD CALC-MCNC: 11.6 FL (ref 9.4–12.4)
NEUTROPHILS # BLD AUTO: 4.48 K/UL (ref 1.8–7.7)
NEUTROPHILS NFR BLD AUTO: 66.7 % (ref 53–65)
NONHDLC SERPL-MCNC: 69 MG/DL
NRBC # BLD AUTO: 0 X10E3/UL
NRBC, AUTO (.00): 0 %
PLATELET # BLD AUTO: 249 K/UL (ref 150–400)
POTASSIUM SERPL-SCNC: 3.4 MMOL/L (ref 3.5–5.1)
RBC # BLD AUTO: 4.1 M/UL (ref 4.2–5.4)
SODIUM SERPL-SCNC: 139 MMOL/L (ref 136–145)
TRIGL SERPL-MCNC: 101 MG/DL (ref 35–150)
VLDLC SERPL-MCNC: 20 MG/DL
WBC # BLD AUTO: 6.72 K/UL (ref 4.5–11)

## 2023-10-02 PROCEDURE — 80061 LIPID PANEL: CPT | Performed by: NURSE PRACTITIONER

## 2023-10-02 PROCEDURE — 63600175 PHARM REV CODE 636 W HCPCS: Performed by: FAMILY MEDICINE

## 2023-10-02 PROCEDURE — 25000003 PHARM REV CODE 250: Performed by: GENERAL PRACTICE

## 2023-10-02 PROCEDURE — 25000003 PHARM REV CODE 250: Performed by: FAMILY MEDICINE

## 2023-10-02 PROCEDURE — 99232 PR SUBSEQUENT HOSPITAL CARE,LEVL II: ICD-10-PCS | Mod: ,,, | Performed by: FAMILY MEDICINE

## 2023-10-02 PROCEDURE — 99214 OFFICE O/P EST MOD 30 MIN: CPT | Mod: ,,, | Performed by: NURSE PRACTITIONER

## 2023-10-02 PROCEDURE — 94761 N-INVAS EAR/PLS OXIMETRY MLT: CPT

## 2023-10-02 PROCEDURE — 80048 BASIC METABOLIC PNL TOTAL CA: CPT | Performed by: GENERAL PRACTICE

## 2023-10-02 PROCEDURE — 85025 COMPLETE CBC W/AUTO DIFF WBC: CPT | Performed by: GENERAL PRACTICE

## 2023-10-02 PROCEDURE — G0378 HOSPITAL OBSERVATION PER HR: HCPCS

## 2023-10-02 PROCEDURE — 99214 PR OFFICE/OUTPT VISIT, EST, LEVL IV, 30-39 MIN: ICD-10-PCS | Mod: ,,, | Performed by: NURSE PRACTITIONER

## 2023-10-02 PROCEDURE — 99232 SBSQ HOSP IP/OBS MODERATE 35: CPT | Mod: ,,, | Performed by: FAMILY MEDICINE

## 2023-10-02 RX ORDER — DOCUSATE SODIUM 100 MG/1
100 CAPSULE, LIQUID FILLED ORAL DAILY
Status: DISCONTINUED | OUTPATIENT
Start: 2023-10-02 | End: 2023-10-04 | Stop reason: HOSPADM

## 2023-10-02 RX ORDER — REGADENOSON 0.08 MG/ML
0.4 INJECTION, SOLUTION INTRAVENOUS ONCE
Status: COMPLETED | OUTPATIENT
Start: 2023-10-02 | End: 2023-10-02

## 2023-10-02 RX ORDER — GLYCERIN 1 G/1
1 SUPPOSITORY RECTAL ONCE
Status: COMPLETED | OUTPATIENT
Start: 2023-10-02 | End: 2023-10-02

## 2023-10-02 RX ADMIN — MICONAZOLE NITRATE 2 % TOPICAL POWDER: at 11:10

## 2023-10-02 RX ADMIN — TRIAMCINOLONE ACETONIDE: 1 CREAM TOPICAL at 11:10

## 2023-10-02 RX ADMIN — TRIAMCINOLONE ACETONIDE: 1 CREAM TOPICAL at 09:10

## 2023-10-02 RX ADMIN — ACETAMINOPHEN 650 MG: 325 TABLET ORAL at 11:10

## 2023-10-02 RX ADMIN — LEVOTHYROXINE, LIOTHYRONINE 60 MG: 38; 9 TABLET ORAL at 05:10

## 2023-10-02 RX ADMIN — ATORVASTATIN CALCIUM 80 MG: 80 TABLET, FILM COATED ORAL at 11:10

## 2023-10-02 RX ADMIN — GLYCERIN 1 SUPPOSITORY: 2 SUPPOSITORY RECTAL at 01:10

## 2023-10-02 RX ADMIN — MICONAZOLE NITRATE 2 % TOPICAL POWDER: at 09:10

## 2023-10-02 RX ADMIN — CLOPIDOGREL BISULFATE 75 MG: 75 TABLET ORAL at 11:10

## 2023-10-02 RX ADMIN — REGADENOSON 0.4 MG: 0.08 INJECTION, SOLUTION INTRAVENOUS at 10:10

## 2023-10-02 RX ADMIN — DOCUSATE SODIUM 100 MG: 100 CAPSULE, LIQUID FILLED ORAL at 01:10

## 2023-10-02 NOTE — ASSESSMENT & PLAN NOTE
Suspect due to Toprol XL, will continue to hold, monitor heart rate and bp response off beta blockaid    10/2/2023:  - HR 30s-40s initially, now 50s-60s  - Continue holding  BB

## 2023-10-02 NOTE — ASSESSMENT & PLAN NOTE
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     10/2/2023:  - Records rec'd and reviewed from Dr. Powell  - Kalpana today was abnormal, see full report above  - [facility] tomorrow

## 2023-10-02 NOTE — PROGRESS NOTES
[facility] - [address] [place] [address] Medicine  Progress Note    Patient Name: Beverly Rodríguez  MRN: [id number]  Patient Class: OP- Observation   Admission Date: 9/29/2023  Length of Stay: 2 days  Attending Physician: Rosemarie Mireles DO  Primary Care Provider: Prem Vincent MD        Subjective:     Principal Problem:Chest pain        HPI:  Patient is a 68 year old female, with past medical history of hypertension, hypothyroidism and celiac disease, who presents to [facility] via EMS for evaluation of chest and abdominal pain. Patient states she was experiencing nausea, vomiting, shortness of breath, chest pain, bilateral jaw pain that radiated down her arm along with abdominal pain lasting about 30 minutes. She started to have these symptoms on Thursday 9/28, but begin to worsen the next day. Patient also mentions she has been having constipation for the past week and has taken stool softeners. Her last bowel movement was yesterday morning. Patient has diabetes mellitus listed on her history but she is currently not on home medications and most recent glucose levels have been wnl.    Patient lives alone. She is able to perform daily activities, lately with the help of a walker or cane. She now has home health coming by for the past 2-3 weeks due to having issues with ringing in ears and loss of balance. Her last fall was several months ago. Patient also mentions she is on home oxygen 2 liters, only used at night.      In the ED, initial presenting vitals were blood pressure: 89/53, temperature: 97.6, pulse: 97, respirations: 16, O2 sat: 91%. EKG was obtained showing bradycardia with heart rate of 46 bpm and patient was treated with IV atropine. Right bundle branch block and possible idioventricular rhythm were also noted on EKG. Imaging completed included chest x-ray which showed possible minimal left basilar atelectasis or infectious/inflammatory infiltrate. CT of abdomen  and pelvis with contrast results pending. Labs demonstrated WBC: 5.36, hemoglobin: 12.8, hematocrit: 37.6, platelet: 284, sodium: 137, potassium: 3.0, chloride: 102, CO2: 28, BUN: 13, Cr: 1.02, glucose: 108, Protime:13.9, INR: 1.08, aPTT: 28.8, D-Dimer: <0.27, ProBNP: 82, Troponin: 15, 45 and 63, will continue to trend. The patient was given aztreonam in dextrose 5% in water 100 mL and ketorolac injection 15 mg in the ED.     The patient will be admitted for continued care and medical management.      Overview/Hospital Course:  No notes on file    Interval History:    No significant events overnight, patient with no new complaints this morning. Abnormal NM stress test this AM with plans for [facility] tomorrow. Otherwise doing well, continue current management.    Review of Systems   Constitutional:  Negative for activity change, appetite change, chills and fever.   HENT:  Negative for hearing loss, tinnitus and trouble swallowing.    Eyes:  Negative for visual disturbance.   Respiratory:  Negative for cough and shortness of breath.    Cardiovascular:  Negative for chest pain and leg swelling.   Gastrointestinal:  Negative for abdominal pain, constipation, diarrhea, nausea and vomiting.   Genitourinary:  Negative for difficulty urinating and hematuria.   Musculoskeletal:  Positive for gait problem. Negative for arthralgias, back pain and myalgias.   Skin:  Positive for rash. Negative for color change and pallor.   Neurological:  Positive for headaches. Negative for light-headedness and numbness.   Psychiatric/Behavioral:  Negative for agitation, confusion and sleep disturbance.      Objective:     Vital Signs (Most Recent):  Temp: 97.2 °F (36.2 °C) (10/02/23 1100)  Pulse: 64 (10/02/23 1100)  Resp: 18 (10/02/23 1100)  BP: 121/79 (10/02/23 1100)  SpO2: 97 % (10/02/23 1100) Vital Signs (24h Range):  Temp:  [96.9 °F (36.1 °C)-97.7 °F (36.5 °C)] 97.2 °F (36.2 °C)  Pulse:  [51-64] 64  Resp:  [18-20] 18  SpO2:  [95 %-98 %] 97  %  BP: (121-155)/() 121/79     Weight: 86.2 kg (190 lb 0.6 oz)  Body mass index is 32.62 kg/m².     Physical Exam  Vitals and nursing note reviewed.   Constitutional:       General: She is not in acute distress.     Appearance: Normal appearance. She is obese. She is not toxic-appearing.   HENT:      Head: Normocephalic and atraumatic.      Right Ear: External ear normal.      Left Ear: External ear normal.      Nose: Nose normal.      Mouth/Throat:      Mouth: Mucous membranes are moist.   Eyes:      Extraocular Movements: Extraocular movements intact.      Conjunctiva/sclera: Conjunctivae normal.   Cardiovascular:      Rate and Rhythm: Normal rate and regular rhythm.      Pulses: Normal pulses.      Heart sounds: Normal heart sounds. No murmur heard.  Pulmonary:      Effort: Pulmonary effort is normal. No respiratory distress.      Breath sounds: Normal breath sounds. No wheezing, rhonchi or rales.   Abdominal:      General: Bowel sounds are normal. There is no distension.      Palpations: Abdomen is soft.      Tenderness: There is no right CVA tenderness, left CVA tenderness, guarding or rebound.   Musculoskeletal:         General: Normal range of motion.      Cervical back: Normal range of motion and neck supple. No tenderness.      Right lower leg: No edema.      Left lower leg: No edema.   Skin:     General: Skin is warm and dry.      Capillary Refill: Capillary refill takes less than 2 seconds.      Findings: Rash present.      Comments: Healing rash on abdomen   Neurological:      General: No focal deficit present.      Mental Status: She is alert and oriented to person, place, and time.      Cranial Nerves: No cranial nerve deficit.      Sensory: No sensory deficit.      Motor: No weakness.   Psychiatric:         Mood and Affect: Mood normal.         Behavior: Behavior normal.         Thought Content: Thought content normal.                Significant Labs: All pertinent labs within the past 24 hours  have been reviewed.    Significant Imaging: I have reviewed all pertinent imaging results/findings within the past 24 hours.      Assessment/Plan:      * Chest pain  Improved. Cardiology following with echocardiogram pending.  ---  Chest pain, bradycardia and hypotension on presentation. No history of coronary artery disease. Patient reports heart catheterization more than 10 years ago, no stents placed. Her cardiologist is Dr. Powell at [facility].  EKG sinus bradycardia, no specific ST changes  ALEJANDRO score 4, Sona ACS score 120, Heart score 6  Patient with allergy to aspirin will start Plavix and statin  Holding BB due to bradycardia  NPO  Obtain Echo  Cardiology consult, recommendations appreciated        Myocardial infarction  Abnormal NM scan today, plan for [facility] tomorrow.       Hyperlipidemia  Patient reports not taking home statin. She reports statin makes her sleepy  Lipid panel 6 months ago showing total cholesterol 234, HDL 45, [id number]  Will start statin and monitor for possible side effects         Bradycardia  Stable, HR 50s this AM with no associated symptoms. Cardiology following.  ---  Bradycardia in the ED treated with atropine  Initial EKG showing HR 46, unspecific ST changes  After IV atropine HR 60-66  Will obtain and Echo and consult cardiology due to concomitant elevated troponin concerning for ACS      Celiac disease  Improved. CT abdomen-pelvis unremarkable. Continue supportive care and gluten free diet.   ---  History of celiac disease with occasional constipation but no GI bleed. Vague abdominal pain over the past week. Abdominal tenderness on exam.      Hypertension  Well controlled. Continue current management.    Hypothyroidism  Managed by endocrinology in [place].     TSH and T4 wnl  Patient takes home thyroid (pork). She reports the synthroid does not correct her hypothyroidism  Will continue home medication.       VTE Risk Mitigation (From admission, onward)         Ordered     enoxaparin  injection 40 mg  Daily         09/30/23 0102     IP VTE HIGH RISK PATIENT  Once         09/30/23 0102     Place sequential compression device  Until discontinued         09/30/23 0102                Discharge Planning   SANDY:      Code Status: Full Code   Is the patient medically ready for discharge?:     Reason for patient still in hospital (select all that apply): Treatment                     Rosemarie Mireles DO  Department of Hospital Medicine   [facility]

## 2023-10-02 NOTE — PROGRESS NOTES
[facility]  Cardiology  Progress Note    Patient Name: Beverly Rodríguez  MRN: [id number]  Admission Date: 9/29/2023  Hospital Length of Stay: 2 days  Code Status: Full Code   Attending Physician: Rosemarie Mireles DO   Primary Care Physician: Prem Vincent MD  Expected Discharge Date:   Principal Problem:Chest pain    Subjective:     Hospital Course:   Pt reports back pain and abdominal pain have resolved.       Interval History: Patient underwent Lexiscan stress test today that was abnormal, showing reversible perfusion defect of the apical to mid anterior wall segment suggestive of ischemia. Results discussed with patient by Dr. Stanley with plans for [facility] tomorrow.    Review of Systems   Constitutional: Positive for decreased appetite. Negative for diaphoresis.   HENT:  Positive for congestion.    Eyes: Negative.    Cardiovascular:  Positive for chest pain. Negative for dyspnea on exertion, leg swelling, near-syncope, orthopnea, palpitations and syncope.   Respiratory:  Negative for cough and shortness of breath.    Hematologic/Lymphatic: Negative for bleeding problem.   Musculoskeletal:  Positive for myalgias.   Gastrointestinal:  Positive for abdominal pain, change in bowel habit and constipation. Negative for nausea and vomiting.   Genitourinary: Negative.    Neurological:  Positive for light-headedness. Negative for dizziness.   Psychiatric/Behavioral:  The patient is nervous/anxious.      Objective:     Vital Signs (Most Recent):  Temp: 97.2 °F (36.2 °C) (10/02/23 1100)  Pulse: 64 (10/02/23 1100)  Resp: 18 (10/02/23 1100)  BP: 121/79 (10/02/23 1100)  SpO2: 97 % (10/02/23 1100) Vital Signs (24h Range):  Temp:  [96.9 °F (36.1 °C)-98.4 °F (36.9 °C)] 97.2 °F (36.2 °C)  Pulse:  [51-64] 64  Resp:  [18-20] 18  SpO2:  [95 %-98 %] 97 %  BP: (121-155)/() 121/79     Weight: 86.2 kg (190 lb 0.6 oz)  Body mass index is 32.62 kg/m².     SpO2: 97 %       No intake or output  "data in the 24 hours ending 10/02/23 1408    Lines/Drains/Airways       Peripheral Intravenous Line  Duration                  Peripheral IV - Single Lumen 09/29/23 2015 18 G Left Antecubital 2 days                       Physical Exam  Vitals reviewed.   Constitutional:       General: She is not in acute distress.  Eyes:      General: No scleral icterus.  Cardiovascular:      Rate and Rhythm: Normal rate and regular rhythm.      Heart sounds: Murmur heard.   Pulmonary:      Effort: Pulmonary effort is normal.      Breath sounds: Normal breath sounds. No wheezing, rhonchi or rales.   Abdominal:      General: Bowel sounds are normal.      Palpations: Abdomen is soft.   Musculoskeletal:      Right lower leg: No edema.      Left lower leg: No edema.   Skin:     General: Skin is warm and dry.   Neurological:      Mental Status: She is alert and oriented to person, place, and time.            Significant Labs: ABG: No results for input(s): "PH", "PCO2", "HCO3", "POCSATURATED", "BE" in the last 48 hours., Blood Culture: No results for input(s): "LABBLOO" in the last 48 hours., BMP:   Recent Labs   Lab 10/02/23  0347   GLU 95      K 3.4*      CO2 27   BUN 14   CREATININE 0.77   CALCIUM 8.8   , CMP   Recent Labs   Lab 10/02/23  0347      K 3.4*      CO2 27   GLU 95   BUN 14   CREATININE 0.77   CALCIUM 8.8   ANIONGAP 9   , CBC   Recent Labs   Lab 10/01/23  0420 10/02/23  0347   WBC 6.02 6.72   HGB 11.6* 11.9*   HCT 35.3* 35.3*    249   , INR No results for input(s): "INR", "PROTIME" in the last 48 hours., Lipid Panel No results for input(s): "CHOL", "HDL", "LDLCALC", "TRIG", "CHOLHDL" in the last 48 hours., and Troponin No results for input(s): "TROPONINI" in the last 48 hours.    Significant Imaging: Echocardiogram: Transthoracic echo (TTE) complete (Cupid Only):   Results for orders placed or performed during the hospital encounter of 09/29/23   Echo   Result Value Ref Range    LVOT stroke " volume 76.51 cm3    LVIDd 4.58 3.5 - 6.0 cm    LV Systolic Volume 12.79 mL    LV Systolic Volume Index 6.7 mL/m2    LVIDs 2.00 (A) 2.1 - 4.0 cm    LV Diastolic Volume 96.54 mL    LV Diastolic Volume Index 50.54 mL/m2    IVS 1.18 (A) 0.6 - 1.1 cm    LVOT diameter 1.90 cm    LVOT area 2.8 cm2    FS 56 (A) 28 - 44 %    Left Ventricle Relative Wall Thickness 0.39 cm    Posterior Wall 0.90 0.6 - 1.1 cm    LV mass 166.45 g    LV Mass Index 87 g/m2    MV Peak E Howard 0.85 m/s    TDI LATERAL 0.09 m/s    TDI SEPTAL 0.06 m/s    E/E' ratio 11.33 m/s    MV Peak A Howard 0.66 m/s    TR Max Howard 2.03 m/s    E/A ratio 1.29     E wave deceleration time 225.06 msec    LV SEPTAL E/E' RATIO 14.17 m/s    LV LATERAL E/E' RATIO 9.44 m/s    LVOT peak howard 1.10 m/s    Left Ventricular Outflow Tract Mean Velocity 0.79 cm/s    Left Ventricular Outflow Tract Mean Gradient 2.76 mmHg    LA volume (mod) 61.22 cm3    LA Volume Index (Mod) 32.1 mL/m2    Right ventricular length in diastole (apical 4-chamber view) 5.63 cm    RV mid diameter 1.78 cm    RA area 11.0 cm2    Right Atrium Volume Systolic 25.15 mL    AR Max Howard 0.02 m/s    AV mean gradient 5 mmHg    AV peak gradient 2 mmHg    Ao peak howard 0.77 m/s    Ao VTI 32.20 cm    LVOT peak VTI 27.00 cm    AV valve area 2.38 cm²    AV Velocity Ratio 1.43     AV index (prosthetic) 0.84     PREETI by Velocity Ratio 4.05 cm²    Triscuspid Valve Regurgitation Peak Gradient 16 mmHg    PV PEAK VELOCITY 1.07 m/s    PV peak gradient 5 mmHg    Ao root annulus 2.16 cm    Mean e' 0.08 m/s    ZLVIDS -3.98     ZLVIDD -1.60     AORTIC VALVE CUSP SEPERATION 1.99 cm    BSA 1.97 m2    Fritz's Biplane MOD Ejection Fraction 53 %    LA size 2.8 cm    RVDD 3.00 cm    RV-patel mid d 1.8 cm    RV-patel length 5.6 cm    Narrative      Left Ventricle: The left ventricle is normal in size. Normal wall   thickness. Normal wall motion. There is low normal systolic function with   a visually estimated ejection fraction of 50 - 55%. There  is normal   diastolic function.    Right Ventricle: Normal right ventricular cavity size. Systolic   function is normal.    Aortic Valve: The aortic valve is a trileaflet valve.     , EKG: No results found for this or any previous visit. ,     Stress Test: NM Myocardial Perfusion Spect Multi Pharmacologic  Order: [id number]   Status: Final result      Visible to patient: Yes (not seen)      Next appt: 12/11/2023 at 10:20 AM in Gastroenterology (ALICIA Lee)      0 Result Notes  Details    Reading Physician Reading Date Result Priority   Gray Stanley MD  [phone] 10/2/2023 Routine     Narrative & Impression  EXAMINATION:  NM MYOCARDIAL PERFUSION SPECT MULTI PHARM     CLINICAL HISTORY:  CAD screening, high CAD risk, not treadmill candidate;     TECHNIQUE:  SPECT images in short, vertical and horizontal long axis were acquired 30 minutes after the injection of 11 mCi of Tc-99m sestamibi at rest and 34 mCi during a cardiac stress. The clinical stress and ECG portion of the study is to be read separately.     COMPARISON:  None.     FINDINGS:  The quality of the study is good.     Stress SPECT images demonstrate small size, severe intensity, reversible perfusion defect of the apical to mid anterior wall segment suggestive of ischemia.  Removed     The gated post-stress images reveal normal wall motion and normal systolic wall thickening with an estimated LVEF of 76 %. The LV cavity is not dilated with an end-diastolic volume of 74 ml and an end-systolic volume of and ml.     TID ratio is normal 0.94     Impression:     1. Cardiac SPECT is positive for ischemia of the apical to mid anterior wall segment suggestive of ischemia.  This is a intermediate risk study.  2. the global left ventricular systolic function is normal with an LV ejection fraction of 76 % and no evidence of LV dilatation. Wall motion is normal.        Electronically signed by: Gray Stanley  Date:                                             10/02/2023  Time:                                           13:01     , and X-Ray: CXR: X-Ray Chest 1 View (CXR):   Results for orders placed or performed during the hospital encounter of 09/29/23   X-Ray Chest 1 View    Narrative    EXAMINATION:  XR CHEST 1 VIEW    CLINICAL HISTORY:  Shortness of breath    TECHNIQUE:  Chest 1 the    COMPARISON:  Prior radiograph 07/28/2022    FINDINGS:  The cardiomediastinal silhouette is within normal limits. Upper lungs are predominantly clear.  Minimal patchy left basilar interstitial opacities are noted but are nonspecific.  There is no pneumothorax or pleural effusion.    There is no acute osseous or soft tissue abnormality.      Impression    Possible minimal left basilar atelectasis or infectious/inflammatory infiltrate.  Correlate with patient complaints.    Place of service: [facility]      Electronically signed by: Gustavo Brown  Date:    09/29/2023  Time:    20:54     Assessment and Plan:     Brief HPI: Pt presented  to the ER with complaints of abdominal pain starting 9/28, waxing and waning, becoming more severe on 9/29.  She describes abdominal pain as epigastric burning sensation, associated with some cramping spasming which comes on spontaneously, lasts up to thirty minutes before calms down for an hour or two before pain reoccurs.  She otes is coming and going spontaneously.  She also complaints of mid sternal chest pain, 6/10, associated with nausea and shortness of breath which lasts five to ten minutes before resolving spontaneously..  She reports she has felt constipated for several weeks.  She is pain free at present.    * Chest pain  - Patient seen and evaluated by Dr. Kilgore over the weekend  - Troponin 15, 45, 63; EKG SR with RBBB    10/2/2023:  - Underwent Lexiscan this morning which was abnormal showing reversible perfusion defect of the apical to mid anterior wall segment suggestive of ischemia. Results discussed with patient by  "Dr. Stanley with plans for [facility] tomorrow.  - Procedure, risk, and benefits discussed in detail with patient, she verbalized understanding and wishes to proceed. Consent obtained and on chart.  - Patient reports allergy to aspirin "causes a rash on top of her feet". We will initiate desensitization protocol today.  - Tolerating Plavix without problems  - Further recommendations to follow    Myocardial infarction  Troponin  Elevation consistent  With type 2 myocardial necrosis due to demand ischemia, hx normal cath approximately ten years ago, negative stress 2 years ago at [facility], old records requeseted, Plan lexiscan cardiolyte Monday, will review echo when available.     10/2/2023:  - Records rec'd and reviewed from Dr. Powell  - Lexiscan today was abnormal, see full report above  - [facility] tomorrow    Hyperlipidemia  10/2/2023:  -  in 3/2023  - Repeat lipid pending  - Records rec'd from Dr. Powell, patient with hx of intolerance to Lipitor and Crestor  - Currently on lipitor 80mg, will continue monitoring    Bradycardia  Suspect due to Toprol XL, will continue to hold, monitor heart rate and bp response off beta blockaid    10/2/2023:  - HR 30s-40s initially, now 50s-60s  - Continue holding  BB    Hypothyroidism  On  Oral thyriod replacement        VTE Risk Mitigation (From admission, onward)         Ordered     enoxaparin injection 40 mg  Daily         09/30/23 0102     IP VTE HIGH RISK PATIENT  Once         09/30/23 0102     Place sequential compression device  Until discontinued         09/30/23 0102                Ebony Haynes, ALICIA  Cardiology  [facility] - [address] [place], [place] [address]etry  "

## 2023-10-02 NOTE — PLAN OF CARE
[facility]  Initial Discharge Assessment       Primary Care Provider: Prem Vincent MD    Admission Diagnosis: Shortness of breath [R06.02]  Bradycardia [R00.1]  General weakness [R53.1]  Hypotensive episode [I95.9]  Chest pain [R07.9]  Chest pain, unspecified type [R07.9]  Myocardial infarction, unspecified MI type, unspecified artery [I21.9]    Admission Date: 9/29/2023  Expected Discharge Date:     Transition of Care Barriers: None    Payor: HU[employer] MANAGED MEDICARE / Plan: HU[employer]NA MEDICARE PPO / Product Type: Medicare Advantage /     Extended Emergency Contact Information  Primary Emergency Contact: Hang Orozco  Home Phone: [phone]  Mobile Phone: [phone]  Relation: Son  Preferred language: English   needed? No    Discharge Plan A: Home  Discharge Plan B: Home with family      [facility] [address]  [address] [place] [address]  Phone: [phone] Fax: [phone]    [facility] [place] - [address]  [address] [facility] [address]  Phone: [phone] Fax: [phone]      Initial Assessment (most recent)       Adult Discharge Assessment - 10/02/23 1612          Discharge Assessment    Assessment Type Discharge Planning Assessment     Source of Information patient     People in Home alone     Do you expect to return to your current living situation? Yes     Do you have help at home or someone to help you manage your care at home? No     Prior to hospitilization cognitive status: Unable to Assess     Current cognitive status: Alert/Oriented     Walking or Climbing Stairs ambulation difficulty, requires equipment     Mobility Management cane and rollator     Home Accessibility wheelchair accessible     Equipment Currently Used at Home cane, straight;rollator     Readmission within 30 days? No     Patient currently being followed by outpatient case management? No     Do you currently  have service(s) that help you manage your care at home? No     Do you take prescription medications? Yes     Do you have prescription coverage? Yes     Coverage [employer] Managed Medicare     Do you have any problems affording any of your prescribed medications? No     Is the patient taking medications as prescribed? yes     Who is going to help you get home at discharge? Medicaid Transport [phone]     How do you get to doctors appointments? car, drives self;health plan transportation     Are you on dialysis? No     Do you take coumadin? No     DME Needed Upon Discharge  none     Discharge Plan discussed with: Patient     Transition of Care Barriers None     Discharge Plan A Home     Discharge Plan B Home with family        Physical Activity    On average, how many days per week do you engage in moderate to strenuous exercise (like a brisk walk)? 0 days     On average, how many minutes do you engage in exercise at this level? 0 min        Financial Resource Strain    How hard is it for you to pay for the very basics like food, housing, medical care, and heating? Not hard at all        Housing Stability    In the last 12 months, was there a time when you were not able to pay the mortgage or rent on time? No     In the last 12 months, was there a time when you did not have a steady place to sleep or slept in a shelter (including now)? No        Transportation Needs    In the past 12 months, has lack of transportation kept you from medical appointments or from getting medications? No     In the past 12 months, has lack of transportation kept you from meetings, work, or from getting things needed for daily living? No        Food Insecurity    Within the past 12 months, you worried that your food would run out before you got the money to buy more. Never true     Within the past 12 months, the food you bought just didn't last and you didn't have money to get more. Never true        Stress    Do you feel stress -  tense, restless, nervous, or anxious, or unable to sleep at night because your mind is troubled all the time - these days? Not at all        Social Connections    In a typical week, how many times do you talk on the phone with family, friends, or neighbors? More than three times a week     How often do you get together with friends or relatives? More than three times a week     How often do you attend Mormon or Sikh services? Never     Do you belong to any clubs or organizations such as Mormon groups, unions, fraternal or athletic groups, or school groups? No     How often do you attend meetings of the clubs or organizations you belong to? Never     Are you , , , , never , or living with a partner?         Alcohol Use    Q1: How often do you have a drink containing alcohol? Never     Q2: How many drinks containing alcohol do you have on a typical day when you are drinking? Patient does not drink     Q3: How often do you have six or more drinks on one occasion? Never                   Pt lives at home alone, not current with hh and uses a cane and rollator when needed.  Pt plans to dc back home when medically ready for dc. Choice signed for [facility] if pt needs cardiac rehab. SDOH questions completed. SW will cont to follow for dc needs.

## 2023-10-02 NOTE — ASSESSMENT & PLAN NOTE
10/2/2023:  -  in 3/2023  - Repeat lipid pending  - Records rec'd from Dr. Powell, patient with hx of intolerance to Lipitor and Crestor  - Currently on lipitor 80mg, will continue monitoring

## 2023-10-02 NOTE — SUBJECTIVE & OBJECTIVE
Interval History: Patient underwent Lexiscan stress test today that was abnormal, showing reversible perfusion defect of the apical to mid anterior wall segment suggestive of ischemia. Results discussed with patient by Dr. Stanley with plans for [facility] tomorrow.    Review of Systems   Constitutional: Positive for decreased appetite. Negative for diaphoresis.   HENT:  Positive for congestion.    Eyes: Negative.    Cardiovascular:  Positive for chest pain. Negative for dyspnea on exertion, leg swelling, near-syncope, orthopnea, palpitations and syncope.   Respiratory:  Negative for cough and shortness of breath.    Hematologic/Lymphatic: Negative for bleeding problem.   Musculoskeletal:  Positive for myalgias.   Gastrointestinal:  Positive for abdominal pain, change in bowel habit and constipation. Negative for nausea and vomiting.   Genitourinary: Negative.    Neurological:  Positive for light-headedness. Negative for dizziness.   Psychiatric/Behavioral:  The patient is nervous/anxious.      Objective:     Vital Signs (Most Recent):  Temp: 97.2 °F (36.2 °C) (10/02/23 1100)  Pulse: 64 (10/02/23 1100)  Resp: 18 (10/02/23 1100)  BP: 121/79 (10/02/23 1100)  SpO2: 97 % (10/02/23 1100) Vital Signs (24h Range):  Temp:  [96.9 °F (36.1 °C)-98.4 °F (36.9 °C)] 97.2 °F (36.2 °C)  Pulse:  [51-64] 64  Resp:  [18-20] 18  SpO2:  [95 %-98 %] 97 %  BP: (121-155)/() 121/79     Weight: 86.2 kg (190 lb 0.6 oz)  Body mass index is 32.62 kg/m².     SpO2: 97 %       No intake or output data in the 24 hours ending 10/02/23 1408    Lines/Drains/Airways       Peripheral Intravenous Line  Duration                  Peripheral IV - Single Lumen 09/29/23 2015 18 G Left Antecubital 2 days                       Physical Exam  Vitals reviewed.   Constitutional:       General: She is not in acute distress.  Eyes:      General: No scleral icterus.  Cardiovascular:      Rate and Rhythm: Normal rate and regular rhythm.      Heart sounds: Murmur  "heard.   Pulmonary:      Effort: Pulmonary effort is normal.      Breath sounds: Normal breath sounds. No wheezing, rhonchi or rales.   Abdominal:      General: Bowel sounds are normal.      Palpations: Abdomen is soft.   Musculoskeletal:      Right lower leg: No edema.      Left lower leg: No edema.   Skin:     General: Skin is warm and dry.   Neurological:      Mental Status: She is alert and oriented to person, place, and time.            Significant Labs: ABG: No results for input(s): "PH", "PCO2", "HCO3", "POCSATURATED", "BE" in the last 48 hours., Blood Culture: No results for input(s): "LABBLOO" in the last 48 hours., BMP:   Recent Labs   Lab 10/02/23  0347   GLU 95      K 3.4*      CO2 27   BUN 14   CREATININE 0.77   CALCIUM 8.8   , CMP   Recent Labs   Lab 10/02/23  0347      K 3.4*      CO2 27   GLU 95   BUN 14   CREATININE 0.77   CALCIUM 8.8   ANIONGAP 9   , CBC   Recent Labs   Lab 10/01/23  0420 10/02/23  0347   WBC 6.02 6.72   HGB 11.6* 11.9*   HCT 35.3* 35.3*    249   , INR No results for input(s): "INR", "PROTIME" in the last 48 hours., Lipid Panel No results for input(s): "CHOL", "HDL", "LDLCALC", "TRIG", "CHOLHDL" in the last 48 hours., and Troponin No results for input(s): "TROPONINI" in the last 48 hours.    Significant Imaging: Echocardiogram: Transthoracic echo (TTE) complete (Cupid Only):   Results for orders placed or performed during the hospital encounter of 09/29/23   Echo   Result Value Ref Range    LVOT stroke volume 76.51 cm3    LVIDd 4.58 3.5 - 6.0 cm    LV Systolic Volume 12.79 mL    LV Systolic Volume Index 6.7 mL/m2    LVIDs 2.00 (A) 2.1 - 4.0 cm    LV Diastolic Volume 96.54 mL    LV Diastolic Volume Index 50.54 mL/m2    IVS 1.18 (A) 0.6 - 1.1 cm    LVOT diameter 1.90 cm    LVOT area 2.8 cm2    FS 56 (A) 28 - 44 %    Left Ventricle Relative Wall Thickness 0.39 cm    Posterior Wall 0.90 0.6 - 1.1 cm    LV mass 166.45 g    LV Mass Index 87 g/m2    MV Peak " E Howard 0.85 m/s    TDI LATERAL 0.09 m/s    TDI SEPTAL 0.06 m/s    E/E' ratio 11.33 m/s    MV Peak A Howard 0.66 m/s    TR Max Howard 2.03 m/s    E/A ratio 1.29     E wave deceleration time 225.06 msec    LV SEPTAL E/E' RATIO 14.17 m/s    LV LATERAL E/E' RATIO 9.44 m/s    LVOT peak howard 1.10 m/s    Left Ventricular Outflow Tract Mean Velocity 0.79 cm/s    Left Ventricular Outflow Tract Mean Gradient 2.76 mmHg    LA volume (mod) 61.22 cm3    LA Volume Index (Mod) 32.1 mL/m2    Right ventricular length in diastole (apical 4-chamber view) 5.63 cm    RV mid diameter 1.78 cm    RA area 11.0 cm2    Right Atrium Volume Systolic 25.15 mL    AR Max Howard 0.02 m/s    AV mean gradient 5 mmHg    AV peak gradient 2 mmHg    Ao peak howard 0.77 m/s    Ao VTI 32.20 cm    LVOT peak VTI 27.00 cm    AV valve area 2.38 cm²    AV Velocity Ratio 1.43     AV index (prosthetic) 0.84     PREETI by Velocity Ratio 4.05 cm²    Triscuspid Valve Regurgitation Peak Gradient 16 mmHg    PV PEAK VELOCITY 1.07 m/s    PV peak gradient 5 mmHg    Ao root annulus 2.16 cm    Mean e' 0.08 m/s    ZLVIDS -3.98     ZLVIDD -1.60     AORTIC VALVE CUSP SEPERATION 1.99 cm    BSA 1.97 m2    Fritz's Biplane MOD Ejection Fraction 53 %    LA size 2.8 cm    RVDD 3.00 cm    RV-patel mid d 1.8 cm    RV-patel length 5.6 cm    Narrative      Left Ventricle: The left ventricle is normal in size. Normal wall   thickness. Normal wall motion. There is low normal systolic function with   a visually estimated ejection fraction of 50 - 55%. There is normal   diastolic function.    Right Ventricle: Normal right ventricular cavity size. Systolic   function is normal.    Aortic Valve: The aortic valve is a trileaflet valve.     , EKG: No results found for this or any previous visit. ,     Stress Test: NM Myocardial Perfusion Spect Multi Pharmacologic  Order: [id number]  Status: Final result     Visible to patient: Yes (not seen)     Next appt: 12/11/2023 at 10:20 AM in Gastroenterology ([facility]  ALICIA Zarate)     0 Result Notes  Details    Reading Physician Reading Date Result Priority   Gray Stanley MD  [phone] 10/2/2023 Routine     Narrative & Impression  EXAMINATION:  NM MYOCARDIAL PERFUSION SPECT MULTI PHARM     CLINICAL HISTORY:  CAD screening, high CAD risk, not treadmill candidate;     TECHNIQUE:  SPECT images in short, vertical and horizontal long axis were acquired 30 minutes after the injection of 11 mCi of Tc-99m sestamibi at rest and 34 mCi during a cardiac stress. The clinical stress and ECG portion of the study is to be read separately.     COMPARISON:  None.     FINDINGS:  The quality of the study is good.     Stress SPECT images demonstrate small size, severe intensity, reversible perfusion defect of the apical to mid anterior wall segment suggestive of ischemia.  Removed     The gated post-stress images reveal normal wall motion and normal systolic wall thickening with an estimated LVEF of 76 %. The LV cavity is not dilated with an end-diastolic volume of 74 ml and an end-systolic volume of and ml.     TID ratio is normal 0.94     Impression:     1. Cardiac SPECT is positive for ischemia of the apical to mid anterior wall segment suggestive of ischemia.  This is a intermediate risk study.  2. the global left ventricular systolic function is normal with an LV ejection fraction of 76 % and no evidence of LV dilatation. Wall motion is normal.        Electronically signed by: Gray Stanley  Date:                                            10/02/2023  Time:                                           13:01     , and X-Ray: CXR: X-Ray Chest 1 View (CXR):   Results for orders placed or performed during the hospital encounter of 09/29/23   X-Ray Chest 1 View    Narrative    EXAMINATION:  XR CHEST 1 VIEW    CLINICAL HISTORY:  Shortness of breath    TECHNIQUE:  Chest 1 the    COMPARISON:  Prior radiograph 07/28/2022    FINDINGS:  The cardiomediastinal silhouette is within normal limits.  Upper lungs are predominantly clear.  Minimal patchy left basilar interstitial opacities are noted but are nonspecific.  There is no pneumothorax or pleural effusion.    There is no acute osseous or soft tissue abnormality.      Impression    Possible minimal left basilar atelectasis or infectious/inflammatory infiltrate.  Correlate with patient complaints.    Place of service: [facility]      Electronically signed by: Gustavo Brown  Date:    09/29/2023  Time:    20:54

## 2023-10-02 NOTE — PROGRESS NOTES
Chief Complaint:  Chest pain      HPI:   CC: Chest pain, abdominal pain.        Pt reports chest pain has resolved, abdominal pain is improved.                Past Medical History:   Diagnosis Date    Abnormal LFTs      Asthma       ? diagnosis    Diabetes mellitus      Hypertension      Hypothyroidism       from birth    Nausea vomiting and diarrhea 2/7/2022               Past Surgical History:   Procedure Laterality Date    APPENDECTOMY        CHOLECYSTECTOMY        HYSTERECTOMY        TONSILLECTOMY                   Review of patient's allergies indicates:   Allergen Reactions    Aricept [donepezil]      Codeine      Crestor [rosuvastatin]         Makes her sleepy    Dapsone         nausea    Flagyl [metronidazole]      Hydrocodone      Imitrex [sumatriptan]      Influenza virus vaccines      Opioids - morphine analogues      Penicillins      Tramadol      Aspirin Rash    Clindamycin Nausea Only    Latex, natural rubber Rash         No current facility-administered medications on file prior to encounter.           Current Outpatient Medications on File Prior to Encounter   Medication Sig    albuterol (PROVENTIL) 2.5 mg /3 mL (0.083 %) nebulizer solution Take 3 mLs (2.5 mg total) by nebulization every 6 (six) hours as needed for Wheezing. Rescue    ascorbic acid, vitamin C, (VITAMIN C) 1000 MG tablet Take 1,000 mg by mouth once daily.    calcium carbonate (TUMS) 200 mg calcium (500 mg) chewable tablet Take 2 tablets by mouth nightly as needed for Heartburn.    dapsone 25 MG Tab      ergocalciferol (ERGOCALCIFEROL) 50,000 unit Cap Take 50,000 Units by mouth every 7 days.    ezetimibe (ZETIA) 10 mg tablet Take 1 tablet by mouth once daily.    fluticasone propionate (FLONASE) 50 mcg/actuation nasal spray 2 sprays (100 mcg total) by Each Nostril route once daily.    metoprolol succinate (TOPROL-XL) 25 MG 24 hr tablet Take 1 tablet (25 mg total) by mouth once daily.    mupirocin (BACTROBAN) 2 % ointment Apply to AA on  body BID PRN flares tapering with improvement    thyroid, pork, (ARMOUR THYROID) 15 mg Tab Take 15 mg by mouth.    thyroid, pork, (ARMOUR THYROID) 60 mg Tab Take 1 tablet (60 mg total) by mouth before breakfast.    triamcinolone acetonide 0.1% (KENALOG) 0.1 % cream Apply to AA on body BID PRN flares tapering with improvement      Family History         Problem Relation (Age of Onset)     Cancer Paternal Aunt     Heart disease Mother, Father     Kidney disease Mother                   Tobacco Use    Smoking status: Former       Current packs/day: 0.20       Average packs/day: 0.2 packs/day for 1 year (0.2 ttl pk-yrs)       Types: Cigarettes       Passive exposure: Current    Smokeless tobacco: Never   Substance and Sexual Activity    Alcohol use: Not Currently    Drug use: Never    Sexual activity: Not Currently      Review of Systems   Constitutional: Positive for decreased appetite. Negative for diaphoresis, malaise/fatigue, night sweats and weight gain.   HENT:  Positive for congestion. Negative for ear pain, hearing loss, nosebleeds and sore throat.    Eyes:  Negative for blurred vision, double vision, pain, photophobia and visual disturbance.   Cardiovascular:  Positive for chest pain. Negative for claudication, dyspnea on exertion, irregular heartbeat, leg swelling, near-syncope, orthopnea, palpitations and syncope.   Respiratory:  Negative for cough, shortness of breath, sleep disturbances due to breathing, snoring and wheezing.    Endocrine: Negative for cold intolerance, heat intolerance, polydipsia, polyphagia and polyuria.   Hematologic/Lymphatic: Negative for bleeding problem. Does not bruise/bleed easily.   Skin:  Negative for dry skin, flushing, itching, rash and skin cancer.   Musculoskeletal:  Positive for myalgias. Negative for arthritis, back pain, falls, joint pain, muscle cramps and muscle weakness.   Gastrointestinal:  Positive for abdominal pain, change in bowel habit and constipation. Negative  for diarrhea, dysphagia, heartburn, nausea and vomiting.   Genitourinary: Negative.  Negative for bladder incontinence, dysuria, flank pain, frequency and nocturia.   Neurological:  Positive for light-headedness, loss of balance and paresthesias. Negative for dizziness, focal weakness, headaches, numbness and seizures.   Psychiatric/Behavioral:  Negative for depression, memory loss and substance abuse. The patient is nervous/anxious.    Allergic/Immunologic: Negative.  Negative for environmental allergies.      Objective:      Vital Signs (Most Recent):  Temp: 98.9 °F (37.2 °C) (09/30/23 1600)  Pulse: (!) 41 (09/30/23 1600)  Resp: 14 (09/30/23 1600)  BP: 131/62 (09/30/23 1600)  SpO2: 98 % (09/30/23 1600) Vital Signs (24h Range):  Temp:  [97.6 °F (36.4 °C)-98.9 °F (37.2 °C)] 98.9 °F (37.2 °C)  Pulse:  [39-97] 41  Resp:  [10-19] 14  SpO2:  [91 %-100 %] 98 %  BP: ()/(44-90) 131/62      Weight: 86.2 kg (190 lb)  Body mass index is 32.61 kg/m².     SpO2: 98 %        No intake or output data in the 24 hours ending 09/30/23 1717     Lines/Drains/Airways         Peripheral Intravenous Line  Duration                     Peripheral IV - Single Lumen 09/29/23 2015 18 G Left Antecubital <1 day                          Physical Exam  Vitals and nursing note reviewed.   Constitutional:       Appearance: She is obese. She is ill-appearing.   HENT:      Head: Normocephalic and atraumatic.      Right Ear: External ear normal.      Left Ear: External ear normal.   Eyes:      General: No scleral icterus.        Right eye: No discharge.         Left eye: No discharge.      Extraocular Movements: Extraocular movements intact.      Conjunctiva/sclera: Conjunctivae normal.      Pupils: Pupils are equal, round, and reactive to light.   Cardiovascular:      Rate and Rhythm: Normal rate and regular rhythm.      Pulses: Normal pulses.      Heart sounds: Normal heart sounds. No murmur heard.     No friction rub. No gallop.   Pulmonary:  "     Effort: Pulmonary effort is normal.      Breath sounds: Normal breath sounds. No wheezing, rhonchi or rales.   Chest:      Chest wall: No tenderness.   Abdominal:      General: Abdomen is flat. Bowel sounds are normal. There is no distension.      Palpations: Abdomen is soft.      Tenderness: There is no abdominal tenderness. There is no guarding or rebound.   Musculoskeletal:         General: No swelling or tenderness. Normal range of motion.      Cervical back: Normal range of motion and neck supple.      Right lower leg: Edema present.      Left lower leg: Edema present.   Skin:     General: Skin is warm and dry.      Findings: No erythema or rash.   Neurological:      General: No focal deficit present.      Mental Status: She is alert and oriented to person, place, and time.      Cranial Nerves: No cranial nerve deficit.      Motor: No weakness.      Gait: Gait normal.   Psychiatric:         Mood and Affect: Mood normal.         Behavior: Behavior normal.         Thought Content: Thought content normal.         Judgment: Judgment normal.      Comments: anxious            Significant Labs: Troponin No results for input(s): "TROPONINI" in the last 48 hours.     Significant Imaging:      Assessment and Plan:      * Chest pain  1. Chest pain, atypical, has resolved, will order lexiscan cariolytes stress tests in AM, continue to  optimize medical treatment, , risk factor modificatons  Will obtain old records,    Troponin Elevation:  Troponin  Elevation consistent  With type 2 myocardial necrosis due to demand ischemia,Plan lexiscan cardiolyte Monday, will review echo when available.      Bradycardia  Has improved off toprol.      Hypothyroidism  On  Oral thyriod replacement               VTE Risk Mitigation (From admission, onward)           Ordered       enoxaparin injection 40 mg  Daily         09/30/23 0102       IP VTE HIGH RISK PATIENT  Once         09/30/23 0102       Place sequential compression device  " Until discontinued         09/30/23 0102                    Thank you for your consult. I will follow-up with patient. Please contact us if you have any additional questions.

## 2023-10-02 NOTE — PLAN OF CARE
Pt was gone for stress test when SW went to complete initial dcp. SW will follow up this afternoon. SW following.

## 2023-10-02 NOTE — SUBJECTIVE & OBJECTIVE
Interval History:    No significant events overnight, patient with no new complaints this morning. Abnormal NM stress test this AM with plans for [facility] tomorrow. Otherwise doing well, continue current management.    Review of Systems   Constitutional:  Negative for activity change, appetite change, chills and fever.   HENT:  Negative for hearing loss, tinnitus and trouble swallowing.    Eyes:  Negative for visual disturbance.   Respiratory:  Negative for cough and shortness of breath.    Cardiovascular:  Negative for chest pain and leg swelling.   Gastrointestinal:  Negative for abdominal pain, constipation, diarrhea, nausea and vomiting.   Genitourinary:  Negative for difficulty urinating and hematuria.   Musculoskeletal:  Positive for gait problem. Negative for arthralgias, back pain and myalgias.   Skin:  Positive for rash. Negative for color change and pallor.   Neurological:  Positive for headaches. Negative for light-headedness and numbness.   Psychiatric/Behavioral:  Negative for agitation, confusion and sleep disturbance.      Objective:     Vital Signs (Most Recent):  Temp: 97.2 °F (36.2 °C) (10/02/23 1100)  Pulse: 64 (10/02/23 1100)  Resp: 18 (10/02/23 1100)  BP: 121/79 (10/02/23 1100)  SpO2: 97 % (10/02/23 1100) Vital Signs (24h Range):  Temp:  [96.9 °F (36.1 °C)-97.7 °F (36.5 °C)] 97.2 °F (36.2 °C)  Pulse:  [51-64] 64  Resp:  [18-20] 18  SpO2:  [95 %-98 %] 97 %  BP: (121-155)/() 121/79     Weight: 86.2 kg (190 lb 0.6 oz)  Body mass index is 32.62 kg/m².     Physical Exam  Vitals and nursing note reviewed.   Constitutional:       General: She is not in acute distress.     Appearance: Normal appearance. She is obese. She is not toxic-appearing.   HENT:      Head: Normocephalic and atraumatic.      Right Ear: External ear normal.      Left Ear: External ear normal.      Nose: Nose normal.      Mouth/Throat:      Mouth: Mucous membranes are moist.   Eyes:      Extraocular Movements: Extraocular movements  intact.      Conjunctiva/sclera: Conjunctivae normal.   Cardiovascular:      Rate and Rhythm: Normal rate and regular rhythm.      Pulses: Normal pulses.      Heart sounds: Normal heart sounds. No murmur heard.  Pulmonary:      Effort: Pulmonary effort is normal. No respiratory distress.      Breath sounds: Normal breath sounds. No wheezing, rhonchi or rales.   Abdominal:      General: Bowel sounds are normal. There is no distension.      Palpations: Abdomen is soft.      Tenderness: There is no right CVA tenderness, left CVA tenderness, guarding or rebound.   Musculoskeletal:         General: Normal range of motion.      Cervical back: Normal range of motion and neck supple. No tenderness.      Right lower leg: No edema.      Left lower leg: No edema.   Skin:     General: Skin is warm and dry.      Capillary Refill: Capillary refill takes less than 2 seconds.      Findings: Rash present.      Comments: Healing rash on abdomen   Neurological:      General: No focal deficit present.      Mental Status: She is alert and oriented to person, place, and time.      Cranial Nerves: No cranial nerve deficit.      Sensory: No sensory deficit.      Motor: No weakness.   Psychiatric:         Mood and Affect: Mood normal.         Behavior: Behavior normal.         Thought Content: Thought content normal.                Significant Labs: All pertinent labs within the past 24 hours have been reviewed.    Significant Imaging: I have reviewed all pertinent imaging results/findings within the past 24 hours.

## 2023-10-03 LAB
ANION GAP SERPL CALCULATED.3IONS-SCNC: 9 MMOL/L (ref 7–16)
BASOPHILS # BLD AUTO: 0.06 K/UL (ref 0–0.2)
BASOPHILS NFR BLD AUTO: 0.9 % (ref 0–1)
BUN SERPL-MCNC: 14 MG/DL (ref 7–18)
BUN/CREAT SERPL: 17 (ref 6–20)
CALCIUM SERPL-MCNC: 8.8 MG/DL (ref 8.5–10.1)
CHLORIDE SERPL-SCNC: 104 MMOL/L (ref 98–107)
CO2 SERPL-SCNC: 28 MMOL/L (ref 21–32)
CREAT SERPL-MCNC: 0.83 MG/DL (ref 0.55–1.02)
CV STRESS BASE HR: 62 BPM
DIASTOLIC BLOOD PRESSURE: 87 MMHG
DIFFERENTIAL METHOD BLD: ABNORMAL
EGFR (NO RACE VARIABLE) (RUSH/TITUS): 78 ML/MIN/1.73M2
EOSINOPHIL # BLD AUTO: 0.28 K/UL (ref 0–0.5)
EOSINOPHIL NFR BLD AUTO: 4.4 % (ref 1–4)
ERYTHROCYTE [DISTWIDTH] IN BLOOD BY AUTOMATED COUNT: 12.2 % (ref 11.5–14.5)
GLUCOSE SERPL-MCNC: 113 MG/DL (ref 74–106)
HCT VFR BLD AUTO: 34.6 % (ref 38–47)
HGB BLD-MCNC: 11.7 G/DL (ref 12–16)
IMM GRANULOCYTES # BLD AUTO: 0.01 K/UL (ref 0–0.04)
IMM GRANULOCYTES NFR BLD: 0.2 % (ref 0–0.4)
LYMPHOCYTES # BLD AUTO: 1.27 K/UL (ref 1–4.8)
LYMPHOCYTES NFR BLD AUTO: 20.1 % (ref 27–41)
MCH RBC QN AUTO: 29.1 PG (ref 27–31)
MCHC RBC AUTO-ENTMCNC: 33.8 G/DL (ref 32–36)
MCV RBC AUTO: 86.1 FL (ref 80–96)
MONOCYTES # BLD AUTO: 0.55 K/UL (ref 0–0.8)
MONOCYTES NFR BLD AUTO: 8.7 % (ref 2–6)
MPC BLD CALC-MCNC: 11.7 FL (ref 9.4–12.4)
NEUTROPHILS # BLD AUTO: 4.15 K/UL (ref 1.8–7.7)
NEUTROPHILS NFR BLD AUTO: 65.7 % (ref 53–65)
NRBC # BLD AUTO: 0 X10E3/UL
NRBC, AUTO (.00): 0 %
OHS CV CPX 1 MINUTE RECOVERY HEART RATE: 89 BPM
OHS CV CPX 85 PERCENT MAX PREDICTED HEART RATE MALE: 126
OHS CV CPX MAX PREDICTED HEART RATE: 148
OHS CV CPX PATIENT IS FEMALE: 1
OHS CV CPX PATIENT IS MALE: 0
OHS CV CPX PEAK DIASTOLIC BLOOD PRESSURE: 97 MMHG
OHS CV CPX PEAK HEAR RATE: 98 BPM
OHS CV CPX PEAK RATE PRESSURE PRODUCT: NORMAL
OHS CV CPX PEAK SYSTOLIC BLOOD PRESSURE: 182 MMHG
OHS CV CPX PERCENT MAX PREDICTED HEART RATE ACHIEVED: 66
OHS CV CPX RATE PRESSURE PRODUCT PRESENTING: 9610
PLATELET # BLD AUTO: 234 K/UL (ref 150–400)
POTASSIUM SERPL-SCNC: 3.4 MMOL/L (ref 3.5–5.1)
RBC # BLD AUTO: 4.02 M/UL (ref 4.2–5.4)
SODIUM SERPL-SCNC: 138 MMOL/L (ref 136–145)
SYSTOLIC BLOOD PRESSURE: 155 MMHG
WBC # BLD AUTO: 6.32 K/UL (ref 4.5–11)

## 2023-10-03 PROCEDURE — 99232 PR SUBSEQUENT HOSPITAL CARE,LEVL II: ICD-10-PCS | Mod: ,,, | Performed by: FAMILY MEDICINE

## 2023-10-03 PROCEDURE — 99152 MOD SED SAME PHYS/QHP 5/>YRS: CPT | Performed by: INTERNAL MEDICINE

## 2023-10-03 PROCEDURE — C1760 CLOSURE DEV, VASC: HCPCS | Performed by: INTERNAL MEDICINE

## 2023-10-03 PROCEDURE — 99232 SBSQ HOSP IP/OBS MODERATE 35: CPT | Mod: ,,, | Performed by: FAMILY MEDICINE

## 2023-10-03 PROCEDURE — 99152 MOD SED SAME PHYS/QHP 5/>YRS: CPT | Mod: ,,, | Performed by: INTERNAL MEDICINE

## 2023-10-03 PROCEDURE — 25500020 PHARM REV CODE 255: Performed by: INTERNAL MEDICINE

## 2023-10-03 PROCEDURE — 63600175 PHARM REV CODE 636 W HCPCS: Performed by: GENERAL PRACTICE

## 2023-10-03 PROCEDURE — 94761 N-INVAS EAR/PLS OXIMETRY MLT: CPT | Mod: XB

## 2023-10-03 PROCEDURE — 99152 PR MOD CONSCIOUS SEDATION, SAME PHYS, 5+ YRS, FIRST 15 MIN: ICD-10-PCS | Mod: ,,, | Performed by: INTERNAL MEDICINE

## 2023-10-03 PROCEDURE — 63600175 PHARM REV CODE 636 W HCPCS: Performed by: INTERNAL MEDICINE

## 2023-10-03 PROCEDURE — 27201423 OPTIME MED/SURG SUP & DEVICES STERILE SUPPLY: Performed by: INTERNAL MEDICINE

## 2023-10-03 PROCEDURE — 96372 THER/PROPH/DIAG INJ SC/IM: CPT | Performed by: GENERAL PRACTICE

## 2023-10-03 PROCEDURE — 93458 L HRT ARTERY/VENTRICLE ANGIO: CPT | Performed by: INTERNAL MEDICINE

## 2023-10-03 PROCEDURE — 25000003 PHARM REV CODE 250: Performed by: GENERAL PRACTICE

## 2023-10-03 PROCEDURE — 93458 L HRT ARTERY/VENTRICLE ANGIO: CPT | Mod: 26,,, | Performed by: INTERNAL MEDICINE

## 2023-10-03 PROCEDURE — G0378 HOSPITAL OBSERVATION PER HR: HCPCS

## 2023-10-03 PROCEDURE — 25000003 PHARM REV CODE 250: Performed by: INTERNAL MEDICINE

## 2023-10-03 PROCEDURE — 96361 HYDRATE IV INFUSION ADD-ON: CPT | Mod: 59

## 2023-10-03 PROCEDURE — 80048 BASIC METABOLIC PNL TOTAL CA: CPT | Performed by: GENERAL PRACTICE

## 2023-10-03 PROCEDURE — 85025 COMPLETE CBC W/AUTO DIFF WBC: CPT | Performed by: GENERAL PRACTICE

## 2023-10-03 PROCEDURE — 25000003 PHARM REV CODE 250: Performed by: FAMILY MEDICINE

## 2023-10-03 PROCEDURE — 93458 PR CATH PLACE/CORON ANGIO, IMG SUPER/INTERP,W LEFT HEART VENTRICULOGRAPHY: ICD-10-PCS | Mod: 26,,, | Performed by: INTERNAL MEDICINE

## 2023-10-03 PROCEDURE — C1894 INTRO/SHEATH, NON-LASER: HCPCS | Performed by: INTERNAL MEDICINE

## 2023-10-03 DEVICE — ANGIO-SEAL VIP VASCULAR CLOSURE DEVICE
Type: IMPLANTABLE DEVICE | Site: GROIN | Status: FUNCTIONAL
Brand: ANGIO-SEAL

## 2023-10-03 RX ORDER — SODIUM CHLORIDE 450 MG/100ML
INJECTION, SOLUTION INTRAVENOUS CONTINUOUS
Status: DISCONTINUED | OUTPATIENT
Start: 2023-10-03 | End: 2023-10-04 | Stop reason: HOSPADM

## 2023-10-03 RX ORDER — FENTANYL CITRATE 50 UG/ML
INJECTION, SOLUTION INTRAMUSCULAR; INTRAVENOUS
Status: DISCONTINUED | OUTPATIENT
Start: 2023-10-03 | End: 2023-10-03 | Stop reason: HOSPADM

## 2023-10-03 RX ORDER — LIDOCAINE HYDROCHLORIDE 10 MG/ML
INJECTION INFILTRATION; PERINEURAL
Status: DISCONTINUED | OUTPATIENT
Start: 2023-10-03 | End: 2023-10-03 | Stop reason: HOSPADM

## 2023-10-03 RX ORDER — MIDAZOLAM HYDROCHLORIDE 1 MG/ML
INJECTION INTRAMUSCULAR; INTRAVENOUS
Status: DISCONTINUED | OUTPATIENT
Start: 2023-10-03 | End: 2023-10-03 | Stop reason: HOSPADM

## 2023-10-03 RX ORDER — SODIUM CHLORIDE 9 MG/ML
INJECTION, SOLUTION INTRAVENOUS
Status: DISCONTINUED | OUTPATIENT
Start: 2023-10-03 | End: 2023-10-04 | Stop reason: HOSPADM

## 2023-10-03 RX ADMIN — THYROID, PORCINE 60 MG: 30 TABLET ORAL at 06:10

## 2023-10-03 RX ADMIN — CLOPIDOGREL BISULFATE 75 MG: 75 TABLET ORAL at 09:10

## 2023-10-03 RX ADMIN — TRIAMCINOLONE ACETONIDE: 1 CREAM TOPICAL at 09:10

## 2023-10-03 RX ADMIN — MICONAZOLE NITRATE 2 % TOPICAL POWDER: at 09:10

## 2023-10-03 RX ADMIN — ENOXAPARIN SODIUM 40 MG: 100 INJECTION SUBCUTANEOUS at 05:10

## 2023-10-03 RX ADMIN — ATORVASTATIN CALCIUM 80 MG: 80 TABLET, FILM COATED ORAL at 09:10

## 2023-10-03 RX ADMIN — SODIUM CHLORIDE: 4.5 INJECTION, SOLUTION INTRAVENOUS at 02:10

## 2023-10-03 RX ADMIN — DOCUSATE SODIUM 100 MG: 100 CAPSULE, LIQUID FILLED ORAL at 09:10

## 2023-10-03 NOTE — SUBJECTIVE & OBJECTIVE
Interval History:    No significant events overnight, patient with no new complaints this morning. [facility] earlier today with non-obstructing CAD, recommendations for medical management. Anticipate discharge tomorrow.     Review of Systems   Constitutional:  Negative for activity change, appetite change, chills and fever.   HENT:  Negative for hearing loss, tinnitus and trouble swallowing.    Eyes:  Negative for visual disturbance.   Respiratory:  Negative for cough and shortness of breath.    Cardiovascular:  Negative for chest pain and leg swelling.   Gastrointestinal:  Negative for abdominal pain, constipation, diarrhea, nausea and vomiting.   Genitourinary:  Negative for difficulty urinating and hematuria.   Musculoskeletal:  Positive for gait problem. Negative for arthralgias, back pain and myalgias.   Skin:  Positive for rash. Negative for color change and pallor.   Neurological:  Positive for headaches. Negative for light-headedness and numbness.   Psychiatric/Behavioral:  Negative for agitation, confusion and sleep disturbance.      Objective:     Vital Signs (Most Recent):  Temp: 98.4 °F (36.9 °C) (10/03/23 1100)  Pulse: (!) 59 (10/03/23 1325)  Resp: 18 (10/03/23 1325)  BP: 137/74 (10/03/23 1325)  SpO2: 95 % (10/03/23 1325) Vital Signs (24h Range):  Temp:  [97.1 °F (36.2 °C)-98.4 °F (36.9 °C)] 98.4 °F (36.9 °C)  Pulse:  [47-95] 59  Resp:  [18] 18  SpO2:  [95 %-100 %] 95 %  BP: (122-163)/(66-89) 137/74     Weight: 86.2 kg (190 lb 0.6 oz)  Body mass index is 32.62 kg/m².     Physical Exam  Vitals and nursing note reviewed.   Constitutional:       General: She is not in acute distress.     Appearance: Normal appearance. She is obese. She is not toxic-appearing.   HENT:      Head: Normocephalic and atraumatic.      Right Ear: External ear normal.      Left Ear: External ear normal.      Nose: Nose normal.      Mouth/Throat:      Mouth: Mucous membranes are moist.   Eyes:      Extraocular Movements: Extraocular  movements intact.      Conjunctiva/sclera: Conjunctivae normal.   Cardiovascular:      Rate and Rhythm: Normal rate and regular rhythm.      Pulses: Normal pulses.      Heart sounds: Normal heart sounds. No murmur heard.  Pulmonary:      Effort: Pulmonary effort is normal. No respiratory distress.      Breath sounds: Normal breath sounds. No wheezing, rhonchi or rales.   Abdominal:      General: Bowel sounds are normal. There is no distension.      Palpations: Abdomen is soft.      Tenderness: There is no right CVA tenderness, left CVA tenderness, guarding or rebound.   Musculoskeletal:         General: Normal range of motion.      Cervical back: Normal range of motion and neck supple. No tenderness.      Right lower leg: No edema.      Left lower leg: No edema.   Skin:     General: Skin is warm and dry.      Capillary Refill: Capillary refill takes less than 2 seconds.      Findings: Rash present.      Comments: Healing rash on abdomen   Neurological:      General: No focal deficit present.      Mental Status: She is alert and oriented to person, place, and time.      Cranial Nerves: No cranial nerve deficit.      Sensory: No sensory deficit.      Motor: No weakness.   Psychiatric:         Mood and Affect: Mood normal.         Behavior: Behavior normal.         Thought Content: Thought content normal.                Significant Labs: All pertinent labs within the past 24 hours have been reviewed.    Significant Imaging: I have reviewed all pertinent imaging results/findings within the past 24 hours.

## 2023-10-03 NOTE — PROGRESS NOTES
[facility] - [address] [place] [address] Medicine  Progress Note    Patient Name: Beverly Rodríguez  MRN: [id number]  Patient Class: OP- Observation   Admission Date: 9/29/2023  Length of Stay: 2 days  Attending Physician: Rosemarie Mireles DO  Primary Care Provider: Prem Vincent MD        Subjective:     Principal Problem:Chest pain        HPI:  Patient is a 68 year old female, with past medical history of hypertension, hypothyroidism and celiac disease, who presents to [facility] via EMS for evaluation of chest and abdominal pain. Patient states she was experiencing nausea, vomiting, shortness of breath, chest pain, bilateral jaw pain that radiated down her arm along with abdominal pain lasting about 30 minutes. She started to have these symptoms on Thursday 9/28, but begin to worsen the next day. Patient also mentions she has been having constipation for the past week and has taken stool softeners. Her last bowel movement was yesterday morning. Patient has diabetes mellitus listed on her history but she is currently not on home medications and most recent glucose levels have been wnl.    Patient lives alone. She is able to perform daily activities, lately with the help of a walker or cane. She now has home health coming by for the past 2-3 weeks due to having issues with ringing in ears and loss of balance. Her last fall was several months ago. Patient also mentions she is on home oxygen 2 liters, only used at night.      In the ED, initial presenting vitals were blood pressure: 89/53, temperature: 97.6, pulse: 97, respirations: 16, O2 sat: 91%. EKG was obtained showing bradycardia with heart rate of 46 bpm and patient was treated with IV atropine. Right bundle branch block and possible idioventricular rhythm were also noted on EKG. Imaging completed included chest x-ray which showed possible minimal left basilar atelectasis or infectious/inflammatory infiltrate. CT of abdomen  and pelvis with contrast results pending. Labs demonstrated WBC: 5.36, hemoglobin: 12.8, hematocrit: 37.6, platelet: 284, sodium: 137, potassium: 3.0, chloride: 102, CO2: 28, BUN: 13, Cr: 1.02, glucose: 108, Protime:13.9, INR: 1.08, aPTT: 28.8, D-Dimer: <0.27, ProBNP: 82, Troponin: 15, 45 and 63, will continue to trend. The patient was given aztreonam in dextrose 5% in water 100 mL and ketorolac injection 15 mg in the ED.     The patient will be admitted for continued care and medical management.      Overview/Hospital Course:  No notes on file    Interval History:    No significant events overnight, patient with no new complaints this morning. [facility] earlier today with non-obstructing CAD, recommendations for medical management. Anticipate discharge tomorrow.     Review of Systems   Constitutional:  Negative for activity change, appetite change, chills and fever.   HENT:  Negative for hearing loss, tinnitus and trouble swallowing.    Eyes:  Negative for visual disturbance.   Respiratory:  Negative for cough and shortness of breath.    Cardiovascular:  Negative for chest pain and leg swelling.   Gastrointestinal:  Negative for abdominal pain, constipation, diarrhea, nausea and vomiting.   Genitourinary:  Negative for difficulty urinating and hematuria.   Musculoskeletal:  Positive for gait problem. Negative for arthralgias, back pain and myalgias.   Skin:  Positive for rash. Negative for color change and pallor.   Neurological:  Positive for headaches. Negative for light-headedness and numbness.   Psychiatric/Behavioral:  Negative for agitation, confusion and sleep disturbance.      Objective:     Vital Signs (Most Recent):  Temp: 98.4 °F (36.9 °C) (10/03/23 1100)  Pulse: (!) 59 (10/03/23 1325)  Resp: 18 (10/03/23 1325)  BP: 137/74 (10/03/23 1325)  SpO2: 95 % (10/03/23 1325) Vital Signs (24h Range):  Temp:  [97.1 °F (36.2 °C)-98.4 °F (36.9 °C)] 98.4 °F (36.9 °C)  Pulse:  [47-95] 59  Resp:  [18] 18  SpO2:  [95 %-100 %]  95 %  BP: (122-163)/(66-89) 137/74     Weight: 86.2 kg (190 lb 0.6 oz)  Body mass index is 32.62 kg/m².     Physical Exam  Vitals and nursing note reviewed.   Constitutional:       General: She is not in acute distress.     Appearance: Normal appearance. She is obese. She is not toxic-appearing.   HENT:      Head: Normocephalic and atraumatic.      Right Ear: External ear normal.      Left Ear: External ear normal.      Nose: Nose normal.      Mouth/Throat:      Mouth: Mucous membranes are moist.   Eyes:      Extraocular Movements: Extraocular movements intact.      Conjunctiva/sclera: Conjunctivae normal.   Cardiovascular:      Rate and Rhythm: Normal rate and regular rhythm.      Pulses: Normal pulses.      Heart sounds: Normal heart sounds. No murmur heard.  Pulmonary:      Effort: Pulmonary effort is normal. No respiratory distress.      Breath sounds: Normal breath sounds. No wheezing, rhonchi or rales.   Abdominal:      General: Bowel sounds are normal. There is no distension.      Palpations: Abdomen is soft.      Tenderness: There is no right CVA tenderness, left CVA tenderness, guarding or rebound.   Musculoskeletal:         General: Normal range of motion.      Cervical back: Normal range of motion and neck supple. No tenderness.      Right lower leg: No edema.      Left lower leg: No edema.   Skin:     General: Skin is warm and dry.      Capillary Refill: Capillary refill takes less than 2 seconds.      Findings: Rash present.      Comments: Healing rash on abdomen   Neurological:      General: No focal deficit present.      Mental Status: She is alert and oriented to person, place, and time.      Cranial Nerves: No cranial nerve deficit.      Sensory: No sensory deficit.      Motor: No weakness.   Psychiatric:         Mood and Affect: Mood normal.         Behavior: Behavior normal.         Thought Content: Thought content normal.                Significant Labs: All pertinent labs within the past 24  hours have been reviewed.    Significant Imaging: I have reviewed all pertinent imaging results/findings within the past 24 hours.      Assessment/Plan:      * Chest pain  Improved. Cardiology following with echocardiogram pending.  ---  Chest pain, bradycardia and hypotension on presentation. No history of coronary artery disease. Patient reports heart catheterization more than 10 years ago, no stents placed. Her cardiologist is Dr. Powell at [facility].  EKG sinus bradycardia, no specific ST changes  ALEJANDRO score 4, Sona ACS score 120, Heart score 6  Patient with allergy to aspirin will start Plavix and statin  Holding BB due to bradycardia  NPO  Obtain Echo  Cardiology consult, recommendations appreciated        Abnormal cardiovascular stress test  [facility] with non-obstructing CAD, recommendations for medical management.      Myocardial infarction  Abnormal NM scan today, plan for C tomorrow.       Hyperlipidemia  Patient reports not taking home statin. She reports statin makes her sleepy  Lipid panel 6 months ago showing total cholesterol 234, HDL 45, [id number]  Will start statin and monitor for possible side effects         Bradycardia  Stable, HR 50s this AM with no associated symptoms. Cardiology following.  ---  Bradycardia in the ED treated with atropine  Initial EKG showing HR 46, unspecific ST changes  After IV atropine HR 60-66  Will obtain and Echo and consult cardiology due to concomitant elevated troponin concerning for ACS      Celiac disease  Improved. CT abdomen-pelvis unremarkable. Continue supportive care and gluten free diet.   ---  History of celiac disease with occasional constipation but no GI bleed. Vague abdominal pain over the past week. Abdominal tenderness on exam.      Hypertension  Well controlled. Continue current management.    Hypothyroidism  Managed by endocrinology in [place].     TSH and T4 wnl  Patient takes home thyroid (pork). She reports the synthroid does not correct her  hypothyroidism  Will continue home medication.       VTE Risk Mitigation (From admission, onward)         Ordered     enoxaparin injection 40 mg  Daily         09/30/23 0102     IP VTE HIGH RISK PATIENT  Once         09/30/23 0102     Place sequential compression device  Until discontinued         09/30/23 0102                Discharge Planning   SANDY:      Code Status: Full Code   Is the patient medically ready for discharge?:     Reason for patient still in hospital (select all that apply): Treatment  Discharge Plan A: Home                  Rosemarie Mireles DO  Department of Hospital Medicine   [facility] - [address] [facility] Telemetry

## 2023-10-03 NOTE — PLAN OF CARE
Problem: Adult Inpatient Plan of Care  Goal: Patient-Specific Goal (Individualized)  Outcome: Ongoing, Progressing  Goal: Absence of Hospital-Acquired Illness or Injury  Outcome: Ongoing, Progressing     Problem: Fall Injury Risk  Goal: Absence of Fall and Fall-Related Injury  Outcome: Ongoing, Progressing     Problem: Impaired Wound Healing  Goal: Optimal Wound Healing  Outcome: Ongoing, Progressing

## 2023-10-04 VITALS
TEMPERATURE: 98 F | RESPIRATION RATE: 18 BRPM | HEIGHT: 64 IN | SYSTOLIC BLOOD PRESSURE: 139 MMHG | DIASTOLIC BLOOD PRESSURE: 68 MMHG | WEIGHT: 190.06 LBS | HEART RATE: 47 BPM | BODY MASS INDEX: 32.45 KG/M2 | OXYGEN SATURATION: 98 %

## 2023-10-04 PROBLEM — I95.9 HYPOTENSIVE EPISODE: Status: RESOLVED | Noted: 2023-09-30 | Resolved: 2023-10-04

## 2023-10-04 PROBLEM — R07.9 CHEST PAIN: Status: RESOLVED | Noted: 2023-09-30 | Resolved: 2023-10-04

## 2023-10-04 PROBLEM — I21.9 MYOCARDIAL INFARCTION: Status: RESOLVED | Noted: 2023-09-30 | Resolved: 2023-10-04

## 2023-10-04 LAB
ANION GAP SERPL CALCULATED.3IONS-SCNC: 11 MMOL/L (ref 7–16)
BASOPHILS # BLD AUTO: 0.05 K/UL (ref 0–0.2)
BASOPHILS NFR BLD AUTO: 0.7 % (ref 0–1)
BUN SERPL-MCNC: 10 MG/DL (ref 7–18)
BUN/CREAT SERPL: 12 (ref 6–20)
CALCIUM SERPL-MCNC: 9.1 MG/DL (ref 8.5–10.1)
CATH EF QUANTITATIVE: 55 %
CHLORIDE SERPL-SCNC: 105 MMOL/L (ref 98–107)
CO2 SERPL-SCNC: 26 MMOL/L (ref 21–32)
CREAT SERPL-MCNC: 0.84 MG/DL (ref 0.55–1.02)
DIFFERENTIAL METHOD BLD: ABNORMAL
EGFR (NO RACE VARIABLE) (RUSH/TITUS): 77 ML/MIN/1.73M2
EOSINOPHIL # BLD AUTO: 0.28 K/UL (ref 0–0.5)
EOSINOPHIL NFR BLD AUTO: 4.1 % (ref 1–4)
ERYTHROCYTE [DISTWIDTH] IN BLOOD BY AUTOMATED COUNT: 12.4 % (ref 11.5–14.5)
GLUCOSE SERPL-MCNC: 77 MG/DL (ref 74–106)
HCT VFR BLD AUTO: 35.6 % (ref 38–47)
HGB BLD-MCNC: 11.8 G/DL (ref 12–16)
IMM GRANULOCYTES # BLD AUTO: 0.02 K/UL (ref 0–0.04)
IMM GRANULOCYTES NFR BLD: 0.3 % (ref 0–0.4)
LYMPHOCYTES # BLD AUTO: 1.17 K/UL (ref 1–4.8)
LYMPHOCYTES NFR BLD AUTO: 17.2 % (ref 27–41)
MCH RBC QN AUTO: 28.9 PG (ref 27–31)
MCHC RBC AUTO-ENTMCNC: 33.1 G/DL (ref 32–36)
MCV RBC AUTO: 87 FL (ref 80–96)
MONOCYTES # BLD AUTO: 0.53 K/UL (ref 0–0.8)
MONOCYTES NFR BLD AUTO: 7.8 % (ref 2–6)
MPC BLD CALC-MCNC: 11.8 FL (ref 9.4–12.4)
NEUTROPHILS # BLD AUTO: 4.77 K/UL (ref 1.8–7.7)
NEUTROPHILS NFR BLD AUTO: 69.9 % (ref 53–65)
NRBC # BLD AUTO: 0 X10E3/UL
NRBC, AUTO (.00): 0 %
PLATELET # BLD AUTO: 250 K/UL (ref 150–400)
POTASSIUM SERPL-SCNC: 3.5 MMOL/L (ref 3.5–5.1)
RBC # BLD AUTO: 4.09 M/UL (ref 4.2–5.4)
SODIUM SERPL-SCNC: 138 MMOL/L (ref 136–145)
WBC # BLD AUTO: 6.82 K/UL (ref 4.5–11)

## 2023-10-04 PROCEDURE — 25000003 PHARM REV CODE 250: Performed by: GENERAL PRACTICE

## 2023-10-04 PROCEDURE — 99239 HOSP IP/OBS DSCHRG MGMT >30: CPT | Mod: ,,, | Performed by: FAMILY MEDICINE

## 2023-10-04 PROCEDURE — 99239 PR HOSPITAL DISCHARGE DAY,>30 MIN: ICD-10-PCS | Mod: ,,, | Performed by: FAMILY MEDICINE

## 2023-10-04 PROCEDURE — 25000003 PHARM REV CODE 250: Performed by: FAMILY MEDICINE

## 2023-10-04 PROCEDURE — G0378 HOSPITAL OBSERVATION PER HR: HCPCS

## 2023-10-04 PROCEDURE — 25000003 PHARM REV CODE 250: Performed by: INTERNAL MEDICINE

## 2023-10-04 PROCEDURE — 99214 OFFICE O/P EST MOD 30 MIN: CPT | Mod: ,,, | Performed by: NURSE PRACTITIONER

## 2023-10-04 PROCEDURE — 96361 HYDRATE IV INFUSION ADD-ON: CPT

## 2023-10-04 PROCEDURE — 80048 BASIC METABOLIC PNL TOTAL CA: CPT | Performed by: GENERAL PRACTICE

## 2023-10-04 PROCEDURE — 99214 PR OFFICE/OUTPT VISIT, EST, LEVL IV, 30-39 MIN: ICD-10-PCS | Mod: ,,, | Performed by: NURSE PRACTITIONER

## 2023-10-04 PROCEDURE — 85025 COMPLETE CBC W/AUTO DIFF WBC: CPT | Performed by: GENERAL PRACTICE

## 2023-10-04 RX ORDER — ATORVASTATIN CALCIUM 40 MG/1
40 TABLET, FILM COATED ORAL DAILY
Qty: 30 TABLET | Refills: 0 | Status: SHIPPED | OUTPATIENT
Start: 2023-10-04 | End: 2024-10-03

## 2023-10-04 RX ORDER — CLOPIDOGREL BISULFATE 75 MG/1
75 TABLET ORAL DAILY
Qty: 30 TABLET | Refills: 0 | Status: SHIPPED | OUTPATIENT
Start: 2023-10-04 | End: 2023-10-04 | Stop reason: HOSPADM

## 2023-10-04 RX ORDER — ISOSORBIDE MONONITRATE 30 MG/1
30 TABLET, EXTENDED RELEASE ORAL DAILY
Qty: 30 TABLET | Refills: 0 | Status: SHIPPED | OUTPATIENT
Start: 2023-10-04 | End: 2023-11-20 | Stop reason: SDUPTHER

## 2023-10-04 RX ADMIN — TRIAMCINOLONE ACETONIDE: 1 CREAM TOPICAL at 10:10

## 2023-10-04 RX ADMIN — MICONAZOLE NITRATE 2 % TOPICAL POWDER: at 10:10

## 2023-10-04 RX ADMIN — DOCUSATE SODIUM 100 MG: 100 CAPSULE, LIQUID FILLED ORAL at 10:10

## 2023-10-04 RX ADMIN — CLOPIDOGREL BISULFATE 75 MG: 75 TABLET ORAL at 10:10

## 2023-10-04 RX ADMIN — SODIUM CHLORIDE: 4.5 INJECTION, SOLUTION INTRAVENOUS at 03:10

## 2023-10-04 RX ADMIN — THYROID, PORCINE 60 MG: 30 TABLET ORAL at 05:10

## 2023-10-04 NOTE — NURSING
Discharge teaching completed with pt at bedside. Pt verbalized understanding. Pt has all personal belongings packed and is dressed to go home. Pt to be picked up by neighbor to transport back home. No acute distress noted at this time. Pt transported to [facility] via wheelchair by transporter.

## 2023-10-04 NOTE — SUBJECTIVE & OBJECTIVE
Interval History: Patient seen today, s/p C yesterday which revealed mild nonobstructive CAD. Right groin stable, no bleeding or hematoma. She denies chest pain or sob.     Review of Systems   Constitutional: Positive for decreased appetite. Negative for diaphoresis.   HENT:  Positive for congestion.    Eyes: Negative.    Cardiovascular:  Positive for chest pain. Negative for dyspnea on exertion, leg swelling, near-syncope, orthopnea, palpitations and syncope.   Respiratory:  Negative for cough and shortness of breath.    Hematologic/Lymphatic: Negative for bleeding problem.   Musculoskeletal:  Positive for myalgias.   Gastrointestinal:  Positive for abdominal pain, change in bowel habit and constipation. Negative for nausea and vomiting.   Genitourinary: Negative.    Neurological:  Positive for light-headedness. Negative for dizziness.   Psychiatric/Behavioral:  The patient is nervous/anxious.      Objective:     Vital Signs (Most Recent):  Temp: 98 °F (36.7 °C) (10/04/23 0620)  Pulse: (!) 47 (10/04/23 0620)  Resp: 18 (10/04/23 0620)  BP: 139/68 (10/04/23 0620)  SpO2: 98 % (10/04/23 0620) Vital Signs (24h Range):  Temp:  [97.2 °F (36.2 °C)-98.4 °F (36.9 °C)] 98 °F (36.7 °C)  Pulse:  [47-77] 47  Resp:  [18-20] 18  SpO2:  [95 %-99 %] 98 %  BP: (126-160)/(64-94) 139/68     Weight: 86.2 kg (190 lb 0.6 oz)  Body mass index is 32.62 kg/m².     SpO2: 98 %         Intake/Output Summary (Last 24 hours) at 10/4/2023 1120  Last data filed at 10/4/2023 0900  Gross per 24 hour   Intake 2789.5 ml   Output 1 ml   Net 2788.5 ml       Lines/Drains/Airways       None                      Physical Exam  Vitals reviewed.   Constitutional:       General: She is not in acute distress.  Eyes:      General: No scleral icterus.  Cardiovascular:      Rate and Rhythm: Normal rate and regular rhythm.      Heart sounds: Murmur heard.   Pulmonary:      Effort: Pulmonary effort is normal.      Breath sounds: Normal breath sounds. No wheezing,  "rhonchi or rales.   Abdominal:      General: Bowel sounds are normal.      Palpations: Abdomen is soft.   Musculoskeletal:      Right lower leg: No edema.      Left lower leg: No edema.   Skin:     General: Skin is warm and dry.   Neurological:      Mental Status: She is alert and oriented to person, place, and time.            Significant Labs: ABG: No results for input(s): "PH", "PCO2", "HCO3", "POCSATURATED", "BE" in the last 48 hours., Blood Culture: No results for input(s): "LABBLOO" in the last 48 hours., BMP:   Recent Labs   Lab 10/03/23  0322 10/04/23  0300   * 77    138   K 3.4* 3.5    105   CO2 28 26   BUN 14 10   CREATININE 0.83 0.84   CALCIUM 8.8 9.1   , CMP   Recent Labs   Lab 10/03/23  0322 10/04/23  0300    138   K 3.4* 3.5    105   CO2 28 26   * 77   BUN 14 10   CREATININE 0.83 0.84   CALCIUM 8.8 9.1   ANIONGAP 9 11   , CBC   Recent Labs   Lab 10/03/23  0322 10/04/23  0300   WBC 6.32 6.82   HGB 11.7* 11.8*   HCT 34.6* 35.6*    250   , INR No results for input(s): "INR", "PROTIME" in the last 48 hours., Lipid Panel No results for input(s): "CHOL", "HDL", "LDLCALC", "TRIG", "CHOLHDL" in the last 48 hours., and Troponin No results for input(s): "TROPONINI" in the last 48 hours.    Significant Imaging: Cardiac Cath: Results for orders placed during the hospital encounter of 09/29/23    Cardiac catheterization (Needs Review)  This result has not been signed. Information might be incomplete.    Conclusion    The ejection fraction was calculated to be 55%.    The left ventricular systolic function was normal.    The left ventricular end diastolic pressure was normal.    The pre-procedure left ventricular end diastolic pressure was 15.    The estimated blood loss was none.    There was non-obstructive coronary artery disease..    There was no mitral valve regurgitation.    The procedure log was documented by Documenter: Juanis Contreras RN and verified by " Sacha Landers MD.    Date: 10/3/2023  Time: 1:18 PM impression:    1. Mild, nonobstructive coronary artery disease.    2. Normal left ventricular size with inferolateral wall hypokinesis and overall normal left ventricular systolic function, ejection fraction 55%.    3. No significant mitral regurgitation.    Plan:    1. Medical management of coronary artery disease, nonobstructive.    2. Risk factor modification.    3. Long-acting nitrates, 81 mg aspirin daily. , Echocardiogram: Transthoracic echo (TTE) complete (Cupid Only):   Results for orders placed or performed during the hospital encounter of 09/29/23   Echo   Result Value Ref Range    LVOT stroke volume 76.51 cm3    LVIDd 4.58 3.5 - 6.0 cm    LV Systolic Volume 12.79 mL    LV Systolic Volume Index 6.7 mL/m2    LVIDs 2.00 (A) 2.1 - 4.0 cm    LV Diastolic Volume 96.54 mL    LV Diastolic Volume Index 50.54 mL/m2    IVS 1.18 (A) 0.6 - 1.1 cm    LVOT diameter 1.90 cm    LVOT area 2.8 cm2    FS 56 (A) 28 - 44 %    Left Ventricle Relative Wall Thickness 0.39 cm    Posterior Wall 0.90 0.6 - 1.1 cm    LV mass 166.45 g    LV Mass Index 87 g/m2    MV Peak E Howard 0.85 m/s    TDI LATERAL 0.09 m/s    TDI SEPTAL 0.06 m/s    E/E' ratio 11.33 m/s    MV Peak A Howard 0.66 m/s    TR Max Howard 2.03 m/s    E/A ratio 1.29     E wave deceleration time 225.06 msec    LV SEPTAL E/E' RATIO 14.17 m/s    LV LATERAL E/E' RATIO 9.44 m/s    LVOT peak howard 1.10 m/s    Left Ventricular Outflow Tract Mean Velocity 0.79 cm/s    Left Ventricular Outflow Tract Mean Gradient 2.76 mmHg    LA volume (mod) 61.22 cm3    LA Volume Index (Mod) 32.1 mL/m2    Right ventricular length in diastole (apical 4-chamber view) 5.63 cm    RV mid diameter 1.78 cm    RA area 11.0 cm2    Right Atrium Volume Systolic 25.15 mL    AR Max Howard 0.02 m/s    AV mean gradient 5 mmHg    AV peak gradient 2 mmHg    Ao peak howard 0.77 m/s    Ao VTI 32.20 cm    LVOT peak VTI 27.00 cm    AV valve area 2.38 cm²    AV Velocity Ratio  1.43     AV index (prosthetic) 0.84     PREETI by Velocity Ratio 4.05 cm²    Triscuspid Valve Regurgitation Peak Gradient 16 mmHg    PV PEAK VELOCITY 1.07 m/s    PV peak gradient 5 mmHg    Ao root annulus 2.16 cm    Mean e' 0.08 m/s    ZLVIDS -3.98     ZLVIDD -1.60     AORTIC VALVE CUSP SEPERATION 1.99 cm    BSA 1.97 m2    Fritz's Biplane MOD Ejection Fraction 53 %    LA size 2.8 cm    RVDD 3.00 cm    RV-patel mid d 1.8 cm    RV-patel length 5.6 cm    Narrative      Left Ventricle: The left ventricle is normal in size. Normal wall   thickness. Normal wall motion. There is low normal systolic function with   a visually estimated ejection fraction of 50 - 55%. There is normal   diastolic function.    Right Ventricle: Normal right ventricular cavity size. Systolic   function is normal.    Aortic Valve: The aortic valve is a trileaflet valve.     , EKG:   Results for orders placed or performed during the hospital encounter of 09/29/23   Repeat EKG 12-lead    Collection Time: 09/30/23  3:38 AM    Narrative    Test Reason : R53.1,    Vent. Rate : 050 BPM     Atrial Rate : 000 BPM     P-R Int : 000 ms          QRS Dur : 142 ms      QT Int : 466 ms       P-R-T Axes : 000 101 -04 degrees     QTc Int : 449 ms    Sinus bradycardia with 1st degree A-V block  Rightward axis  Right bundle branch block  Inferior T wave abnormality is nonspecific  Low QRS voltages in precordial leads  Abnormal ECG    Confirmed by Kaitlin RICHARDSON, Gael BROWN ([id number]) on 10/3/2023 5:13:04 AM    Referred By: [email or web address]   SELF           Confirmed By:Gael Madrigal MD    , and X-Ray: CXR: X-Ray Chest 1 View (CXR):   Results for orders placed or performed during the hospital encounter of 09/29/23   X-Ray Chest 1 View    Narrative    EXAMINATION:  XR CHEST 1 VIEW    CLINICAL HISTORY:  Shortness of breath    TECHNIQUE:  Chest 1 the    COMPARISON:  Prior radiograph 07/28/2022    FINDINGS:  The cardiomediastinal silhouette is within normal limits. Upper lungs are predominantly  clear.  Minimal patchy left basilar interstitial opacities are noted but are nonspecific.  There is no pneumothorax or pleural effusion.    There is no acute osseous or soft tissue abnormality.      Impression    Possible minimal left basilar atelectasis or infectious/inflammatory infiltrate.  Correlate with patient complaints.    Place of service: [facility]      Electronically signed by: Gustavo Brown  Date:    09/29/2023  Time:    20:54

## 2023-10-04 NOTE — PLAN OF CARE
Problem: Impaired Wound Healing  Goal: Optimal Wound Healing  Outcome: Ongoing, Progressing     Problem: Adult Inpatient Plan of Care  Goal: Plan of Care Review  Outcome: Met  Goal: Patient-Specific Goal (Individualized)  Outcome: Met  Goal: Absence of Hospital-Acquired Illness or Injury  Outcome: Met  Goal: Optimal Comfort and Wellbeing  Outcome: Met  Goal: Readiness for Transition of Care  Outcome: Met     Problem: Fall Injury Risk  Goal: Absence of Fall and Fall-Related Injury  Outcome: Met

## 2023-10-04 NOTE — ASSESSMENT & PLAN NOTE
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     10/2/2023:  - Records rec'd and reviewed from Dr. Powell  - Lexiscan today was abnormal, see full report above  - [facility] tomorrow    10/4/2023:  - C revealed mild, nonobstructive CAD; see full report above  - Medical management  - Follow up with Dr. Powell in 1-2 weeks; hospital discharge

## 2023-10-04 NOTE — PLAN OF CARE
[facility] [address] [place] [facility] Telemetry  Discharge Final Note    Primary Care Provider: Prem Vincent MD    Expected Discharge Date: 10/4/2023    Final Discharge Note (most recent)       Final Note - 10/04/23 1113          Final Note    Assessment Type Final Discharge Note     Anticipated Discharge Disposition Home or Self Care     What phone number can be called within the next 1-3 days to see how you are doing after discharge? [phone]        Post-Acute Status    Discharge Delays None known at this time                       Contact Info       Prem Vincent MD   Specialty: Family Medicine   Relationship: PCP - General    [address] [place] [address].  [facility] [place] [address]   Phone: [phone]       Next Steps: Schedule an appointment as soon as possible for a visit on 10/10/2023    Instructions: 1:20 pm with ALICIA Wright          Pt to dc home today. No needs.

## 2023-10-04 NOTE — PROGRESS NOTES
[facility] Tele[facility]  Cardiology  Progress Note    Patient Name: Beverly Rodríguez  MRN: [id number]  Admission Date: 9/29/2023  Hospital Length of Stay: 2 days  Code Status: Full Code   Attending Physician: Rosemarie Mireles DO   Primary Care Physician: Prem Vincent MD  Expected Discharge Date: 10/4/2023  Principal Problem:Chest pain    Subjective:     Hospital Course:   Pt reports back pain and abdominal pain have resolved.       Interval History: Patient seen today, s/p LHC yesterday which revealed mild nonobstructive CAD. Right groin stable, no bleeding or hematoma. She denies chest pain or sob.     Review of Systems   Constitutional: Positive for decreased appetite. Negative for diaphoresis.   HENT:  Positive for congestion.    Eyes: Negative.    Cardiovascular:  Positive for chest pain. Negative for dyspnea on exertion, leg swelling, near-syncope, orthopnea, palpitations and syncope.   Respiratory:  Negative for cough and shortness of breath.    Hematologic/Lymphatic: Negative for bleeding problem.   Musculoskeletal:  Positive for myalgias.   Gastrointestinal:  Positive for abdominal pain, change in bowel habit and constipation. Negative for nausea and vomiting.   Genitourinary: Negative.    Neurological:  Positive for light-headedness. Negative for dizziness.   Psychiatric/Behavioral:  The patient is nervous/anxious.      Objective:     Vital Signs (Most Recent):  Temp: 98 °F (36.7 °C) (10/04/23 0620)  Pulse: (!) 47 (10/04/23 0620)  Resp: 18 (10/04/23 0620)  BP: 139/68 (10/04/23 0620)  SpO2: 98 % (10/04/23 0620) Vital Signs (24h Range):  Temp:  [97.2 °F (36.2 °C)-98.4 °F (36.9 °C)] 98 °F (36.7 °C)  Pulse:  [47-77] 47  Resp:  [18-20] 18  SpO2:  [95 %-99 %] 98 %  BP: (126-160)/(64-94) 139/68     Weight: 86.2 kg (190 lb 0.6 oz)  Body mass index is 32.62 kg/m².     SpO2: 98 %         Intake/Output Summary (Last 24 hours) at 10/4/2023 1120  Last data filed at 10/4/2023 0900  Gross per  "24 hour   Intake 2789.5 ml   Output 1 ml   Net 2788.5 ml       Lines/Drains/Airways       None                      Physical Exam  Vitals reviewed.   Constitutional:       General: She is not in acute distress.  Eyes:      General: No scleral icterus.  Cardiovascular:      Rate and Rhythm: Normal rate and regular rhythm.      Heart sounds: Murmur heard.   Pulmonary:      Effort: Pulmonary effort is normal.      Breath sounds: Normal breath sounds. No wheezing, rhonchi or rales.   Abdominal:      General: Bowel sounds are normal.      Palpations: Abdomen is soft.   Musculoskeletal:      Right lower leg: No edema.      Left lower leg: No edema.   Skin:     General: Skin is warm and dry.   Neurological:      Mental Status: She is alert and oriented to person, place, and time.            Significant Labs: ABG: No results for input(s): "PH", "PCO2", "HCO3", "POCSATURATED", "BE" in the last 48 hours., Blood Culture: No results for input(s): "LABBLOO" in the last 48 hours., BMP:   Recent Labs   Lab 10/03/23  0322 10/04/23  0300   * 77    138   K 3.4* 3.5    105   CO2 28 26   BUN 14 10   CREATININE 0.83 0.84   CALCIUM 8.8 9.1   , CMP   Recent Labs   Lab 10/03/23  0322 10/04/23  0300    138   K 3.4* 3.5    105   CO2 28 26   * 77   BUN 14 10   CREATININE 0.83 0.84   CALCIUM 8.8 9.1   ANIONGAP 9 11   , CBC   Recent Labs   Lab 10/03/23  0322 10/04/23  0300   WBC 6.32 6.82   HGB 11.7* 11.8*   HCT 34.6* 35.6*    250   , INR No results for input(s): "INR", "PROTIME" in the last 48 hours., Lipid Panel No results for input(s): "CHOL", "HDL", "LDLCALC", "TRIG", "CHOLHDL" in the last 48 hours., and Troponin No results for input(s): "TROPONINI" in the last 48 hours.    Significant Imaging: Cardiac Cath: Results for orders placed during the hospital encounter of 09/29/23    Cardiac catheterization (Needs Review)  This result has not been signed. Information might be " incomplete.    Conclusion    The ejection fraction was calculated to be 55%.    The left ventricular systolic function was normal.    The left ventricular end diastolic pressure was normal.    The pre-procedure left ventricular end diastolic pressure was 15.    The estimated blood loss was none.    There was non-obstructive coronary artery disease..    There was no mitral valve regurgitation.    The procedure log was documented by Documenter: Juanis Contreras RN and verified by Sacha Landers MD.    Date: 10/3/2023  Time: 1:18 PM impression:    1. Mild, nonobstructive coronary artery disease.    2. Normal left ventricular size with inferolateral wall hypokinesis and overall normal left ventricular systolic function, ejection fraction 55%.    3. No significant mitral regurgitation.    Plan:    1. Medical management of coronary artery disease, nonobstructive.    2. Risk factor modification.    3. Long-acting nitrates, 81 mg aspirin daily. , Echocardiogram: Transthoracic echo (TTE) complete (Cupid Only):   Results for orders placed or performed during the hospital encounter of 09/29/23   Echo   Result Value Ref Range    LVOT stroke volume 76.51 cm3    LVIDd 4.58 3.5 - 6.0 cm    LV Systolic Volume 12.79 mL    LV Systolic Volume Index 6.7 mL/m2    LVIDs 2.00 (A) 2.1 - 4.0 cm    LV Diastolic Volume 96.54 mL    LV Diastolic Volume Index 50.54 mL/m2    IVS 1.18 (A) 0.6 - 1.1 cm    LVOT diameter 1.90 cm    LVOT area 2.8 cm2    FS 56 (A) 28 - 44 %    Left Ventricle Relative Wall Thickness 0.39 cm    Posterior Wall 0.90 0.6 - 1.1 cm    LV mass 166.45 g    LV Mass Index 87 g/m2    MV Peak E Marialuisa 0.85 m/s    TDI LATERAL 0.09 m/s    TDI SEPTAL 0.06 m/s    E/E' ratio 11.33 m/s    MV Peak A Marialuisa 0.66 m/s    TR Max Marialuisa 2.03 m/s    E/A ratio 1.29     E wave deceleration time 225.06 msec    LV SEPTAL E/E' RATIO 14.17 m/s    LV LATERAL E/E' RATIO 9.44 m/s    LVOT peak marialuisa 1.10 m/s    Left Ventricular Outflow Tract Mean  Velocity 0.79 cm/s    Left Ventricular Outflow Tract Mean Gradient 2.76 mmHg    LA volume (mod) 61.22 cm3    LA Volume Index (Mod) 32.1 mL/m2    Right ventricular length in diastole (apical 4-chamber view) 5.63 cm    RV mid diameter 1.78 cm    RA area 11.0 cm2    Right Atrium Volume Systolic 25.15 mL    AR Max Howard 0.02 m/s    AV mean gradient 5 mmHg    AV peak gradient 2 mmHg    Ao peak howard 0.77 m/s    Ao VTI 32.20 cm    LVOT peak VTI 27.00 cm    AV valve area 2.38 cm²    AV Velocity Ratio 1.43     AV index (prosthetic) 0.84     PREETI by Velocity Ratio 4.05 cm²    Triscuspid Valve Regurgitation Peak Gradient 16 mmHg    PV PEAK VELOCITY 1.07 m/s    PV peak gradient 5 mmHg    Ao root annulus 2.16 cm    Mean e' 0.08 m/s    ZLVIDS -3.98     ZLVIDD -1.60     AORTIC VALVE CUSP SEPERATION 1.99 cm    BSA 1.97 m2    Fritz's Biplane MOD Ejection Fraction 53 %    LA size 2.8 cm    RVDD 3.00 cm    RV-patel mid d 1.8 cm    RV-patel length 5.6 cm    Narrative      Left Ventricle: The left ventricle is normal in size. Normal wall   thickness. Normal wall motion. There is low normal systolic function with   a visually estimated ejection fraction of 50 - 55%. There is normal   diastolic function.    Right Ventricle: Normal right ventricular cavity size. Systolic   function is normal.    Aortic Valve: The aortic valve is a trileaflet valve.     , EKG:   Results for orders placed or performed during the hospital encounter of 09/29/23   Repeat EKG 12-lead    Collection Time: 09/30/23  3:38 AM    Narrative    Test Reason : R53.1,    Vent. Rate : 050 BPM     Atrial Rate : 000 BPM     P-R Int : 000 ms          QRS Dur : 142 ms      QT Int : 466 ms       P-R-T Axes : 000 101 -04 degrees     QTc Int : 449 ms    Sinus bradycardia with 1st degree A-V block  Rightward axis  Right bundle branch block  Inferior T wave abnormality is nonspecific  Low QRS voltages in precordial leads  Abnormal ECG    Confirmed by Kaitlin RICHARDSON, Min S. ([id number]) on  10/3/2023 5:13:04 AM    Referred By: FELIPE   SELF           Confirmed By:Gael Madrigal MD    , and X-Ray: CXR: X-Ray Chest 1 View (CXR):   Results for orders placed or performed during the hospital encounter of 09/29/23   X-Ray Chest 1 View    Narrative    EXAMINATION:  XR CHEST 1 VIEW    CLINICAL HISTORY:  Shortness of breath    TECHNIQUE:  Chest 1 the    COMPARISON:  Prior radiograph 07/28/2022    FINDINGS:  The cardiomediastinal silhouette is within normal limits. Upper lungs are predominantly clear.  Minimal patchy left basilar interstitial opacities are noted but are nonspecific.  There is no pneumothorax or pleural effusion.    There is no acute osseous or soft tissue abnormality.      Impression    Possible minimal left basilar atelectasis or infectious/inflammatory infiltrate.  Correlate with patient complaints.    Place of service: [facility]      Electronically signed by: Gustavo Brown  Date:    09/29/2023  Time:    20:54     Assessment and Plan:     Brief HPI:  Pt presented  to the ER with complaints of abdominal pain starting 9/28, waxing and waning, becoming more severe on 9/29.  She describes abdominal pain as epigastric burning sensation, associated with some cramping spasming which comes on spontaneously, lasts up to thirty minutes before calms down for an hour or two before pain reoccurs.  She otes is coming and going spontaneously.  She also complaints of mid sternal chest pain, 6/10, associated with nausea and shortness of breath which lasts five to ten minutes before resolving spontaneously..  She reports she has felt constipated for several weeks.  She is pain free at present.    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      10/2/2023:  - Records rec'd and reviewed from Dr. Powell  - Lexiscan today was abnormal, see full report above  - LHC tomorrow    10/4/2023:  - LHC revealed mild, nonobstructive CAD; see full report above  - Medical management  - Follow up with Dr. Powell in 1-2 weeks; hospital discharge    Hyperlipidemia  10/2/2023:  -  in 3/2023  - Repeat lipid pending  - Records rec'd from Dr. Powell, patient with hx of intolerance to Lipitor and Crestor  - Currently on lipitor 80mg    10/4/2023:  - LDL 49  - Okay to decrease statin dose      Bradycardia  Suspect due to Toprol XL, will continue to hold, monitor heart rate and bp response off beta blockaid    10/2/2023:  - HR 30s-40s initially, now 50s-60s  - Continue holding  BB    Hypothyroidism  On  Oral thyriod replacement        VTE Risk Mitigation (From admission, onward)         Ordered     enoxaparin injection 40 mg  Daily         09/30/23 0102     IP VTE HIGH RISK PATIENT  Once         09/30/23 0102     Place sequential compression device  Until discontinued         09/30/23 0102                Ebony Haynes, ALICIA  Cardiology  [facility]

## 2023-10-04 NOTE — ASSESSMENT & PLAN NOTE
10/2/2023:  -  in 3/2023  - Repeat lipid pending  - Records rec'd from Dr. Powell, patient with hx of intolerance to Lipitor and Crestor  - Currently on lipitor 80mg    10/4/2023:  - LDL 49  - Okay to decrease statin dose

## 2023-10-05 LAB
BACTERIA BLD CULT: NORMAL
BACTERIA BLD CULT: NORMAL

## 2023-10-10 ENCOUNTER — OFFICE VISIT (OUTPATIENT)
Dept: FAMILY MEDICINE | Facility: CLINIC | Age: 68
End: 2023-10-10
Payer: MEDICARE

## 2023-10-10 VITALS
DIASTOLIC BLOOD PRESSURE: 72 MMHG | SYSTOLIC BLOOD PRESSURE: 118 MMHG | WEIGHT: 186 LBS | HEART RATE: 86 BPM | RESPIRATION RATE: 19 BRPM | TEMPERATURE: 98 F | OXYGEN SATURATION: 95 % | HEIGHT: 64 IN | BODY MASS INDEX: 31.76 KG/M2

## 2023-10-10 DIAGNOSIS — Z09 HOSPITAL DISCHARGE FOLLOW-UP: Primary | ICD-10-CM

## 2023-10-10 DIAGNOSIS — K90.0 CELIAC DISEASE: ICD-10-CM

## 2023-10-10 DIAGNOSIS — R94.31 ABNORMAL EKG: ICD-10-CM

## 2023-10-10 DIAGNOSIS — Z98.890 HISTORY OF LEFT HEART CATHETERIZATION: ICD-10-CM

## 2023-10-10 PROCEDURE — 1159F MED LIST DOCD IN RCRD: CPT | Mod: ,,, | Performed by: NURSE PRACTITIONER

## 2023-10-10 PROCEDURE — 3008F BODY MASS INDEX DOCD: CPT | Mod: ,,, | Performed by: NURSE PRACTITIONER

## 2023-10-10 PROCEDURE — 1125F PR PAIN SEVERITY QUANTIFIED, PAIN PRESENT: ICD-10-PCS | Mod: ,,, | Performed by: NURSE PRACTITIONER

## 2023-10-10 PROCEDURE — 1101F PT FALLS ASSESS-DOCD LE1/YR: CPT | Mod: ,,, | Performed by: NURSE PRACTITIONER

## 2023-10-10 PROCEDURE — 3044F PR MOST RECENT HEMOGLOBIN A1C LEVEL <7.0%: ICD-10-PCS | Mod: ,,, | Performed by: NURSE PRACTITIONER

## 2023-10-10 PROCEDURE — 3078F PR MOST RECENT DIASTOLIC BLOOD PRESSURE < 80 MM HG: ICD-10-PCS | Mod: ,,, | Performed by: NURSE PRACTITIONER

## 2023-10-10 PROCEDURE — 3074F SYST BP LT 130 MM HG: CPT | Mod: ,,, | Performed by: NURSE PRACTITIONER

## 2023-10-10 PROCEDURE — 3078F DIAST BP <80 MM HG: CPT | Mod: ,,, | Performed by: NURSE PRACTITIONER

## 2023-10-10 PROCEDURE — 3044F HG A1C LEVEL LT 7.0%: CPT | Mod: ,,, | Performed by: NURSE PRACTITIONER

## 2023-10-10 PROCEDURE — 1160F PR REVIEW ALL MEDS BY PRESCRIBER/CLIN PHARMACIST DOCUMENTED: ICD-10-PCS | Mod: ,,, | Performed by: NURSE PRACTITIONER

## 2023-10-10 PROCEDURE — 3008F PR BODY MASS INDEX (BMI) DOCUMENTED: ICD-10-PCS | Mod: ,,, | Performed by: NURSE PRACTITIONER

## 2023-10-10 PROCEDURE — 3288F PR FALLS RISK ASSESSMENT DOCUMENTED: ICD-10-PCS | Mod: ,,, | Performed by: NURSE PRACTITIONER

## 2023-10-10 PROCEDURE — 3288F FALL RISK ASSESSMENT DOCD: CPT | Mod: ,,, | Performed by: NURSE PRACTITIONER

## 2023-10-10 PROCEDURE — 99214 PR OFFICE/OUTPT VISIT, EST, LEVL IV, 30-39 MIN: ICD-10-PCS | Mod: 25,,, | Performed by: NURSE PRACTITIONER

## 2023-10-10 PROCEDURE — 1111F DSCHRG MED/CURRENT MED MERGE: CPT | Mod: ,,, | Performed by: NURSE PRACTITIONER

## 2023-10-10 PROCEDURE — 96372 PR INJECTION,THERAP/PROPH/DIAG2ST, IM OR SUBCUT: ICD-10-PCS | Mod: ,,, | Performed by: NURSE PRACTITIONER

## 2023-10-10 PROCEDURE — 1125F AMNT PAIN NOTED PAIN PRSNT: CPT | Mod: ,,, | Performed by: NURSE PRACTITIONER

## 2023-10-10 PROCEDURE — 99214 OFFICE O/P EST MOD 30 MIN: CPT | Mod: 25,,, | Performed by: NURSE PRACTITIONER

## 2023-10-10 PROCEDURE — 1159F PR MEDICATION LIST DOCUMENTED IN MEDICAL RECORD: ICD-10-PCS | Mod: ,,, | Performed by: NURSE PRACTITIONER

## 2023-10-10 PROCEDURE — 1101F PR PT FALLS ASSESS DOC 0-1 FALLS W/OUT INJ PAST YR: ICD-10-PCS | Mod: ,,, | Performed by: NURSE PRACTITIONER

## 2023-10-10 PROCEDURE — 3074F PR MOST RECENT SYSTOLIC BLOOD PRESSURE < 130 MM HG: ICD-10-PCS | Mod: ,,, | Performed by: NURSE PRACTITIONER

## 2023-10-10 PROCEDURE — 1111F PR DISCHARGE MEDS RECONCILED W/ CURRENT OUTPATIENT MED LIST: ICD-10-PCS | Mod: ,,, | Performed by: NURSE PRACTITIONER

## 2023-10-10 PROCEDURE — 96372 THER/PROPH/DIAG INJ SC/IM: CPT | Mod: ,,, | Performed by: NURSE PRACTITIONER

## 2023-10-10 PROCEDURE — 1160F RVW MEDS BY RX/DR IN RCRD: CPT | Mod: ,,, | Performed by: NURSE PRACTITIONER

## 2023-10-10 RX ORDER — CYANOCOBALAMIN 1000 UG/ML
1000 INJECTION, SOLUTION INTRAMUSCULAR; SUBCUTANEOUS
Status: COMPLETED | OUTPATIENT
Start: 2023-10-10 | End: 2023-10-10

## 2023-10-10 RX ADMIN — CYANOCOBALAMIN 1000 MCG: 1000 INJECTION, SOLUTION INTRAMUSCULAR; SUBCUTANEOUS at 02:10

## 2023-10-10 NOTE — PROGRESS NOTES
[facility]    Chief Complaint      Chief Complaint   Patient presents with    Hospital Follow Up     Was held for observation at hospital 09/29-10/4/23. Pt had left heart cath placed while at hospital    Health Maintenance     Pt defers care gaps       History of Present Illness      Beverly Rodríguez is a 68 y.o. female. She  has a past medical history of Abnormal LFTs, Asthma, Diabetes mellitus, Hypertension, Hypothyroidism, and Nausea vomiting and diarrhea (2/7/2022)., who presents today for hospital f/u.  States she was taken to EMS from home to the ER- states she was able to call 911 before passing out. Patient was admitted from 09/29/23- 10/4/23.  She underwent L heart cath- she states they told her everything looked ok. I have reviewed her medications and they were reconciled.     Past Medical History:  Past Medical History:   Diagnosis Date    Abnormal LFTs     Asthma     ? 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    Nausea vomiting and diarrhea 2/7/2022       Past Surgical History:   has a past surgical history that includes Cholecystectomy; Tonsillectomy; Hysterectomy; Appendectomy; and Left heart catheterization (N/A, 10/3/2023).    Social History:  Social History     Tobacco Use    Smoking status: Former     Current packs/day: 0.20     Average packs/day: 0.2 packs/day for 1 year (0.2 ttl pk-yrs)     Types: Cigarettes     Passive exposure: Current    Smokeless tobacco: Never   Substance Use Topics    Alcohol use: Not Currently    Drug use: Never       I personally reviewed all past medical, surgical, and social.     Review of Systems   Constitutional:  Negative for fatigue.   HENT:  Negative for trouble swallowing.    Eyes:  Negative for visual disturbance.   Respiratory:  Negative for cough and shortness of breath.    Cardiovascular: Negative.    Gastrointestinal:  Negative for abdominal pain, constipation and diarrhea.   Musculoskeletal:  Positive for gait  problem.        Uses cane   Skin: Negative.    Neurological:  Negative for dizziness and light-headedness.        Medications:  Outpatient Encounter Medications as of 10/10/2023   Medication Sig Dispense Refill    albuterol (PROVENTIL) 2.5 mg /3 mL (0.083 %) nebulizer solution Take 3 mLs (2.5 mg total) by nebulization every 6 (six) hours as needed for Wheezing. Rescue 100 mL 3    ascorbic acid, vitamin C, (VITAMIN C) 1000 MG tablet Take 1,000 mg by mouth once daily.      atorvastatin (LIPITOR) 40 MG tablet Take 1 tablet (40 mg total) by mouth once daily. 30 tablet 0    calcium carbonate (TUMS) 200 mg calcium (500 mg) chewable tablet Take 2 tablets by mouth nightly as needed for Heartburn.      isosorbide mononitrate (IMDUR) 30 MG 24 hr tablet Take 1 tablet (30 mg total) by mouth once daily. 30 tablet 0    mupirocin (BACTROBAN) 2 % ointment Apply to AA on body BID PRN flares tapering with improvement 30 g 11    thyroid, pork, (ARMOUR THYROID) 15 mg Tab Take 15 mg by mouth.      thyroid, pork, (ARMOUR THYROID) 60 mg Tab Take 1 tablet (60 mg total) by mouth before breakfast. 90 tablet 1    triamcinolone acetonide 0.1% (KENALOG) 0.1 % cream Apply to AA on body BID PRN flares tapering with improvement 453.6 g 2    [DISCONTINUED] ergocalciferol (ERGOCALCIFEROL) 50,000 unit Cap Take 50,000 Units by mouth every 7 days.      [DISCONTINUED] fluticasone propionate (FLONASE) 50 mcg/actuation nasal spray 2 sprays (100 mcg total) by Each Nostril route once daily. (Patient not taking: Reported on 10/10/2023) 48 g 2     Facility-Administered Encounter Medications as of 10/10/2023   Medication Dose Route Frequency Provider Last Rate Last Admin    [COMPLETED] cyanocobalamin injection 1,000 mcg  1,000 mcg Intramuscular 1 time in Clinic/HOD Tatianna Singer FNP   1,000 mcg at 10/10/23 1420       Allergies:  Review of patient's allergies indicates:   Allergen Reactions    Aricept [donepezil]     Codeine     Crestor [rosuvastatin]       "Makes her sleepy    Dapsone      nausea    Flagyl [metronidazole]     Hydrocodone     Imitrex [sumatriptan]     Influenza virus vaccines     Opioids - morphine analogues     Penicillins     Tramadol     Aspirin Rash    Clindamycin Nausea Only    Latex, natural rubber Rash       Health Maintenance:    There is no immunization history on file for this patient.   Health Maintenance   Topic Date Due    Hepatitis C Screening  Never done    TETANUS VACCINE  Never done    DEXA Scan  Never done    Shingles Vaccine (1 of 2) Never done    Mammogram  10/27/2015    Lipid Panel  10/02/2028    Colorectal Cancer Screening  12/29/2028        Physical Exam      Vital Signs  Temp: 98.3 °F (36.8 °C)  Temp Source: Oral  Pulse: 86  Resp: 19  SpO2: 95 %  BP: 118/72  BP Location: Right arm  Patient Position: Sitting  Pain Score:   3  Height and Weight  Height: 5' 4" (162.6 cm)  Weight: 84.4 kg (186 lb)  BSA (Calculated - sq m): 1.95 sq meters  BMI (Calculated): 31.9  Weight in (lb) to have BMI = 25: 145.3]    Physical Exam  Vitals and nursing note reviewed.   Constitutional:       Appearance: Normal appearance. She is well-developed.   HENT:      Head: Normocephalic.      Right Ear: Hearing normal.      Left Ear: Hearing normal.      Nose: Nose normal.   Eyes:      General: Lids are normal.      Conjunctiva/sclera: Conjunctivae normal.   Cardiovascular:      Rate and Rhythm: Normal rate and regular rhythm.      Heart sounds: Normal heart sounds.   Pulmonary:      Effort: Pulmonary effort is normal.      Breath sounds: Normal breath sounds.   Musculoskeletal:         General: Normal range of motion.      Cervical back: Normal range of motion and neck supple.      Right lower leg: No edema.      Left lower leg: No edema.      Comments: Uses cane   Skin:     General: Skin is warm and dry.             Comments: Right groin without any hematoma   Neurological:      Mental Status: She is alert and oriented to person, place, and time.      Gait: " Gait is intact.   Psychiatric:         Behavior: Behavior is cooperative.          Laboratory:  CBC:  Recent Labs   Lab 10/03/23  0322 10/04/23  0300 10/16/23  1105   WBC 6.32 6.82 6.09   RBC 4.02 L 4.09 L 4.13 L   Hemoglobin 11.7 L 11.8 L 12.0   Hematocrit 34.6 L 35.6 L 35.6 L   Platelet Count 234 250 292   MCV 86.1 87.0 86.2   MCH 29.1 28.9 29.1   MCHC 33.8 33.1 33.7     CMP:  Recent Labs   Lab 03/09/23  1310 09/29/23  2015 09/30/23  0447 10/16/23  1105   Glucose 84 108 H   < > 94   Calcium 9.6 9.5   < > 9.7   Albumin 4.4 3.9  --  3.8   Total Protein 7.8 7.5  --  7.2   Sodium 136 137   < > 139   Potassium 4.5 3.0 L   < > 3.9   CO2 30 28   < > 27   Chloride 103 102   < > 107   BUN 22 H 13   < > 13   Alk Phos 90 72  --  75   ALT 26 33  --  34   AST 19 21  --  17   Bilirubin, Total 0.6 0.6  --  0.7    < > = values in this interval not displayed.     LIPIDS:  Recent Labs   Lab 07/22/21  1624 01/18/22  1121 03/09/23  1310 09/30/23  0026 10/02/23  0347 10/16/23  1105   TSH 6.130 H   < > 2.170 1.020  --  1.550   HDL Cholesterol 41  --  45  --  33 L  --    Cholesterol 247 H  --  234 H  --  102  --    Triglycerides 237 H  --  128  --  101  --    LDL Calculated 159  --  163  --  49  --    Cholesterol/HDL Ratio (Risk Factor) 6.0  --  5.2  --  3.1  --    Non-  --  189  --  69  --     < > = values in this interval not displayed.     TSH:  Recent Labs   Lab 03/09/23  1310 09/30/23  0026 10/16/23  1105   TSH 2.170 1.020 1.550     A1C:  Recent Labs   Lab 09/30/23 0447   Hemoglobin A1C 5.0       Assessment/Plan     Beverly Rodríguez is a 68 y.o.female with:     1. Hospital discharge follow-up    2. Celiac disease  -     cyanocobalamin injection 1,000 mcg    3. Abnormal EKG    4. History of left heart catheterization         Total time spent face-to-face and non-face-to-face coordinating care for this encounter was: 30 minutes     Chronic conditions status updated as per HPI.  Other than changes above, cont current medications  and maintain follow up with specialists.  Return to clinic in 1 month(s).    Tatianna Singer, DUDLEYP  [facility]

## 2023-10-16 ENCOUNTER — HOSPITAL ENCOUNTER (EMERGENCY)
Facility: HOSPITAL | Age: 68
Discharge: HOME | End: 2023-10-16
Attending: EMERGENCY MEDICINE
Payer: MEDICARE

## 2023-10-16 VITALS
RESPIRATION RATE: 15 BRPM | OXYGEN SATURATION: 96 % | HEIGHT: 63 IN | DIASTOLIC BLOOD PRESSURE: 70 MMHG | TEMPERATURE: 98 F | BODY MASS INDEX: 32.6 KG/M2 | WEIGHT: 184 LBS | SYSTOLIC BLOOD PRESSURE: 134 MMHG | HEART RATE: 61 BPM

## 2023-10-16 DIAGNOSIS — R07.89 CHEST WALL PAIN: Primary | ICD-10-CM

## 2023-10-16 DIAGNOSIS — R07.9 CHEST PAIN: ICD-10-CM

## 2023-10-16 PROBLEM — I21.A1 TYPE 2 MYOCARDIAL INFARCTION: Status: RESOLVED | Noted: 2023-09-30 | Resolved: 2023-10-04

## 2023-10-16 LAB
ALBUMIN SERPL BCP-MCNC: 3.8 G/DL (ref 3.5–5)
ALBUMIN/GLOB SERPL: 1.1 {RATIO}
ALP SERPL-CCNC: 75 U/L (ref 55–142)
ALT SERPL W P-5'-P-CCNC: 34 U/L (ref 13–56)
ANION GAP SERPL CALCULATED.3IONS-SCNC: 9 MMOL/L (ref 7–16)
AST SERPL W P-5'-P-CCNC: 17 U/L (ref 15–37)
BASOPHILS # BLD AUTO: 0.07 K/UL (ref 0–0.2)
BASOPHILS NFR BLD AUTO: 1.1 % (ref 0–1)
BILIRUB SERPL-MCNC: 0.7 MG/DL (ref ?–1.2)
BUN SERPL-MCNC: 13 MG/DL (ref 7–18)
BUN/CREAT SERPL: 14 (ref 6–20)
CALCIUM SERPL-MCNC: 9.7 MG/DL (ref 8.5–10.1)
CHLORIDE SERPL-SCNC: 107 MMOL/L (ref 98–107)
CO2 SERPL-SCNC: 27 MMOL/L (ref 21–32)
CREAT SERPL-MCNC: 0.92 MG/DL (ref 0.55–1.02)
D DIMER PPP FEU-MCNC: <0.27 ΜG/ML (ref 0–0.47)
DIFFERENTIAL METHOD BLD: ABNORMAL
EGFR (NO RACE VARIABLE) (RUSH/TITUS): 69 ML/MIN/1.73M2
EOSINOPHIL # BLD AUTO: 0.16 K/UL (ref 0–0.5)
EOSINOPHIL NFR BLD AUTO: 2.6 % (ref 1–4)
ERYTHROCYTE [DISTWIDTH] IN BLOOD BY AUTOMATED COUNT: 13.1 % (ref 11.5–14.5)
GLOBULIN SER-MCNC: 3.4 G/DL (ref 2–4)
GLUCOSE SERPL-MCNC: 94 MG/DL (ref 74–106)
HCT VFR BLD AUTO: 35.6 % (ref 38–47)
HGB BLD-MCNC: 12 G/DL (ref 12–16)
IMM GRANULOCYTES # BLD AUTO: 0.03 K/UL (ref 0–0.04)
IMM GRANULOCYTES NFR BLD: 0.5 % (ref 0–0.4)
LYMPHOCYTES # BLD AUTO: 0.7 K/UL (ref 1–4.8)
LYMPHOCYTES NFR BLD AUTO: 11.5 % (ref 27–41)
MCH RBC QN AUTO: 29.1 PG (ref 27–31)
MCHC RBC AUTO-ENTMCNC: 33.7 G/DL (ref 32–36)
MCV RBC AUTO: 86.2 FL (ref 80–96)
MONOCYTES # BLD AUTO: 0.39 K/UL (ref 0–0.8)
MONOCYTES NFR BLD AUTO: 6.4 % (ref 2–6)
MPC BLD CALC-MCNC: 10.9 FL (ref 9.4–12.4)
NEUTROPHILS # BLD AUTO: 4.74 K/UL (ref 1.8–7.7)
NEUTROPHILS NFR BLD AUTO: 77.9 % (ref 53–65)
NRBC # BLD AUTO: 0 X10E3/UL
NRBC, AUTO (.00): 0 %
NT-PROBNP SERPL-MCNC: 306 PG/ML (ref 1–125)
PLATELET # BLD AUTO: 292 K/UL (ref 150–400)
POTASSIUM SERPL-SCNC: 3.9 MMOL/L (ref 3.5–5.1)
PROT SERPL-MCNC: 7.2 G/DL (ref 6.4–8.2)
RBC # BLD AUTO: 4.13 M/UL (ref 4.2–5.4)
SODIUM SERPL-SCNC: 139 MMOL/L (ref 136–145)
TROPONIN I SERPL DL<=0.01 NG/ML-MCNC: 37 PG/ML
TROPONIN I SERPL DL<=0.01 NG/ML-MCNC: 37.5 PG/ML
TSH SERPL DL<=0.005 MIU/L-ACNC: 1.55 UIU/ML (ref 0.36–3.74)
WBC # BLD AUTO: 6.09 K/UL (ref 4.5–11)

## 2023-10-16 PROCEDURE — 99285 EMERGENCY DEPT VISIT HI MDM: CPT | Mod: 25

## 2023-10-16 PROCEDURE — 83880 ASSAY OF NATRIURETIC PEPTIDE: CPT | Performed by: EMERGENCY MEDICINE

## 2023-10-16 PROCEDURE — 80053 COMPREHEN METABOLIC PANEL: CPT | Performed by: EMERGENCY MEDICINE

## 2023-10-16 PROCEDURE — 84443 ASSAY THYROID STIM HORMONE: CPT | Performed by: EMERGENCY MEDICINE

## 2023-10-16 PROCEDURE — 99284 PR EMERGENCY DEPT VISIT,LEVEL IV: ICD-10-PCS | Mod: ,,, | Performed by: EMERGENCY MEDICINE

## 2023-10-16 PROCEDURE — 85025 COMPLETE CBC W/AUTO DIFF WBC: CPT | Performed by: EMERGENCY MEDICINE

## 2023-10-16 PROCEDURE — 25000003 PHARM REV CODE 250: Performed by: EMERGENCY MEDICINE

## 2023-10-16 PROCEDURE — 85379 FIBRIN DEGRADATION QUANT: CPT | Performed by: EMERGENCY MEDICINE

## 2023-10-16 PROCEDURE — 84484 ASSAY OF TROPONIN QUANT: CPT | Performed by: EMERGENCY MEDICINE

## 2023-10-16 PROCEDURE — 99284 EMERGENCY DEPT VISIT MOD MDM: CPT | Mod: ,,, | Performed by: EMERGENCY MEDICINE

## 2023-10-16 RX ORDER — ACETAMINOPHEN 325 MG/1
650 TABLET ORAL
Status: COMPLETED | OUTPATIENT
Start: 2023-10-16 | End: 2023-10-16

## 2023-10-16 RX ORDER — METHOCARBAMOL 500 MG/1
TABLET, FILM COATED ORAL 3 TIMES DAILY
Qty: 30 TABLET | Refills: 0 | Status: SHIPPED | OUTPATIENT
Start: 2023-10-16 | End: 2023-10-21

## 2023-10-16 RX ADMIN — ACETAMINOPHEN 650 MG: 325 TABLET ORAL at 11:10

## 2023-10-16 NOTE — DISCHARGE INSTRUCTIONS
Your thyroid level in your glucose or ideal.  So no changes need to be made with your thyroid medication and it does not appear to be diabetes

## 2023-10-16 NOTE — ASSESSMENT & PLAN NOTE
Has not tolerated statin therapy in the past, trial low-dose atorvastatin and increase as tolerated. Educated on importance of statin therapy considering hyperlipidemia and non-obstructive CAD.     Lipid panel 6 months ago showing total cholesterol 234, HDL 45, [id number]

## 2023-10-16 NOTE — ASSESSMENT & PLAN NOTE
Managed by endocrinology in [place]. TSH WNL this hospitalization. Continue home medications and follow up in clinic.

## 2023-10-16 NOTE — ASSESSMENT & PLAN NOTE
Abnormal NM scan, LHC with non-obstructive CAD. Recommend statin therapy and outpatient follow up.

## 2023-10-16 NOTE — ED TRIAGE NOTES
Presents to ED via EMS from home for c/o chest pain and left jaw pain. Patient reports she had episode last night and again this morning. EMS reports patient was tachycardic upon arrival. Patient admitted October 2023, had LHC.

## 2023-10-16 NOTE — ED PROVIDER NOTES
"Encounter Date: 10/16/2023    SCRIBE #1 NOTE: I, Jody Mitchell, am scribing for, and in the presence of,  Gustavo Bunn MD. I have scribed the entire note.       History     Chief Complaint   Patient presents with    Chest Pain    Jaw Pain     68 year old female presents to the ED via EMS complaining of chest pain and lower jaw pain. The patient reports that earlier this morning she started experiencing pain in her chest that she localizes to the left side. She states that her CP radiates to her arms bilaterally. She also reports pain in her lower jaw and says that she started experiencing a migraine this morning and sweating profusely. Patient reports that her headaches have been "on and off" for a while but she denies a headache while in the ED currently. She says that this morning she started feeling like she could pass out after having CP. She reports experiencing diarrhea last night. She is positive for cough but denies any fever or burning with urination. The patient was recently admitted into the hospital on 10/3 and had a left heart catheterization done. Patient has a PMHx of hypothyroidism and hypertension.     The history is provided by the patient. No  was used.     Review of patient's allergies indicates:   Allergen Reactions    Aricept [donepezil]     Codeine     Crestor [rosuvastatin]      Makes her sleepy    Dapsone      nausea    Flagyl [metronidazole]     Hydrocodone     Imitrex [sumatriptan]     Influenza virus vaccines     Opioids - morphine analogues     Penicillins     Tramadol     Aspirin Rash    Clindamycin Nausea Only    Latex, natural rubber Rash     Past Medical History:   Diagnosis Date    Abnormal LFTs     Asthma     ? 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    Nausea vomiting and diarrhea 2/7/2022     Past Surgical History:   Procedure Laterality Date    APPENDECTOMY      CHOLECYSTECTOMY      HYSTERECTOMY      LEFT " HEART CATHETERIZATION N/A 10/3/2023    Procedure: Left heart cath;  Surgeon: Sacha Landers MD;  Location: [facility] C[facility] LAB;  Service: Cardiology;  Laterality: N/A;    TONSILLECTOMY       Family History   Problem Relation Age of Onset    Heart disease Mother     Kidney disease Mother     Heart disease Father     Cancer Paternal Aunt      Social History     Tobacco Use    Smoking status: Former     Current packs/day: 0.20     Average packs/day: 0.2 packs/day for 1 year (0.2 ttl pk-yrs)     Types: Cigarettes     Passive exposure: Current    Smokeless tobacco: Never   Substance Use Topics    Alcohol use: Not Currently    Drug use: Never     Review of Systems   Constitutional:  Negative for fever.   Respiratory:  Positive for cough.    Cardiovascular:  Positive for chest pain.   Gastrointestinal:  Positive for diarrhea.   Musculoskeletal:  Positive for neck pain.   Neurological:  Positive for headaches.       Physical Exam     Initial Vitals [10/16/23 1019]   BP Pulse Resp Temp SpO2   131/85 71 19 98.4 °F (36.9 °C) (!) 92 %      MAP       --         Physical Exam    Nursing note and vitals reviewed.  Constitutional: She appears well-developed and well-nourished.   Eyes: Conjunctivae and EOM are normal. Pupils are equal, round, and reactive to light.   Neck:   Tenderness on left side of neck    Cardiovascular:  Normal rate, regular rhythm and normal heart sounds.           Pulmonary/Chest: Breath sounds normal. No respiratory distress. She exhibits tenderness.   Abdominal: Bowel sounds are normal.     Neurological: She is alert and oriented to person, place, and time.         ED Course   Procedures  Labs Reviewed   NT-PRO NATRIURETIC PEPTIDE - Abnormal; Notable for the following components:       Result Value    ProBNP 306 (*)     All other components within normal limits   CBC WITH DIFFERENTIAL - Abnormal; Notable for the following components:    RBC 4.13 (*)     Hematocrit 35.6 (*)     Neutrophils % 77.9 (*)      Lymphocytes % 11.5 (*)     Monocytes % 6.4 (*)     Basophils % 1.1 (*)     Immature Granulocytes % 0.5 (*)     Lymphocytes, Absolute 0.70 (*)     All other components within normal limits   TROPONIN I - Normal   D DIMER, QUANTITATIVE - Normal   TSH - Normal   TROPONIN I - Normal   CBC W/ AUTO DIFFERENTIAL    Narrative:     The following orders were created for panel order CBC auto differential.  Procedure                               Abnormality         Status                     ---------                               -----------         ------                     CBC with Differential[[id number]]       Abnormal            Final result                 Please view results for these tests on the individual orders.   COMPREHENSIVE METABOLIC PANEL          Imaging Results              CT Head Without Contrast (Final result)  Result time 10/16/23 12:01:57      Final result by Raffi Black MD (10/16/23 12:01:57)                   Impression:      No acute intracranial abnormality.      Electronically signed by: Raffi Black  Date:    10/16/2023  Time:    12:01               Narrative:    EXAMINATION:  CT HEAD WITHOUT CONTRAST    CLINICAL HISTORY:  Headache, new or worsening (Age >= 50y);    TECHNIQUE:  CT of the head performed without the use of intravenous contrast.  The CT examination was performed using one or more of the following dose reduction techniques: Automated exposure control, adjustment of the mA and kV according to patient's size, use of acute or iterative reconstruction techniques.    COMPARISON:  None.    FINDINGS:  No acute intracranial hemorrhage, mass, infarct, or fluid collection.  Ventricles, sulci, and cisterns are normal.  No mass effect or midline shift.  Calvarium intact.  Mild chronic microvascular ischemic changes are seen.                                       X-Ray Chest 1 View (Final result)  Result time 10/16/23 10:55:55      Final result by Yunior Shanks II, MD (10/16/23 10:55:55)                    Impression:      No evidence of cardiopulmonary disease.      Electronically signed by: Yunior Shanks  Date:    10/16/2023  Time:    10:55               Narrative:    EXAMINATION:  XR CHEST 1 VIEW    CLINICAL HISTORY:  Chest pain, unspecified    COMPARISON:  29 September 2023    TECHNIQUE:  XR CHEST 1 VIEW    FINDINGS:  The heart and mediastinum are normal in size and configuration.  The pulmonary vascularity is normal in caliber.  No lung infiltrates, effusions, pneumothorax or other abnormality is demonstrated.                                    X-Rays:   Independently Interpreted Readings:   Other Readings:  X-Ray Chest 1 View  No evidence of cardiopulmonary disease.    CT Head Without Contrast  No acute intracranial abnormality      Medications   acetaminophen tablet 650 mg (650 mg Oral Given 10/16/23 1121)     Medical Decision Making  [facility]    Patient presents for emergent evaluation of acute headache discomfort she describes as a Crick in her neck chest pain that poses a threat to life and/or bodily function.    In the ED patient found to have acute chest wall pain.    I ordered labs and personally reviewed them.  Labs significant for negative high sensitivity troponin x2 with no significant delta troponin unlikely to be myocardial infarction.  TSH normal unlikely to need a change in her Synthroid dose.  I ordered X-rays and personally reviewed them and reviewed the radiologist interpretation.  Xray significant for chest x-ray no acute abnormality.    I ordered EKG and personally reviewed it.  EKG significant for no ST elevation.      Discharge [facility]  Patient was managed in the ED with acetaminophen  The response to treatment was improved.    Patient was discharged in stable condition.  Detailed return precautions discussed.     Amount and/or Complexity of Data Reviewed  Labs: ordered.  Radiology: ordered.    Risk  OTC drugs.  Prescription drug management.              Attending Attestation:            Physician Attestation for Scribe:  Physician Attestation Statement for Scribe #1: I, Gustavo Bunn MD, reviewed documentation, as scribed by Jody Mitchell in my presence, and it is both accurate and complete.             ED Course as of 10/16/23 1610   Mon Oct 16, 2023   1036 EKG no significant change compared to September 2023 [PK]   1254 X-Ray Chest 1 View  No evidence of cardiopulmonary disease.    [AM]   1255 CT Head Without Contrast  No acute intracranial abnormality [AM]   1416 Patient has the history that she has had some crick in her neck from a pillow she is been using she says that may explain her symptoms with her neck pain and chest pain when she moved her neck and tested.  She is feeling better after treatment in the ER no complaints.  High sensitivity troponin is normal [PK]      ED Course User Index  [AM] Jody Mitchell  [PK] Gustavo Bunn MD                    Clinical Impression:   Final diagnoses:  [R07.9] Chest pain  [R07.89] Chest wall pain (Primary)        ED Disposition Condition    Discharge Stable          ED Prescriptions       Medication Sig Dispense Start Date End Date Auth. Provider    methocarbamoL (ROBAXIN) 500 MG Tab Take 2-3 tablets (1,000-1,500 mg total) by mouth 3 (three) times daily. for 5 days 30 tablet 10/16/2023 10/21/2023 Gustavo Bunn MD          Follow-up Information       Follow up With Specialties Details Why Contact Info    [facility] - Emergency Department Emergency Medicine Go to  As needed, If symptoms worsen [address] [place] [id number] [phone]             Gustavo Bunn MD  10/16/23 1610

## 2023-10-16 NOTE — ASSESSMENT & PLAN NOTE
[facility] with non-obstructing CAD, recommendations for medical management including statin. Follow up with cardiology.

## 2023-10-16 NOTE — DISCHARGE SUMMARY
[facility] - [address] [place] [address] Medicine  Discharge Summary      Patient Name: Beverly Rodríguez  MRN: [id number]  [facility]: [id number]  Patient Class: OP- Observation  Admission Date: 9/29/2023  Hospital Length of Stay: 2 days  Discharge Date and Time: 10/4/2023 11:56 AM  Attending Physician: Elina att. providers found   Discharging Provider: Rosemarie Mireles DO  Primary Care Provider: Prem Vincent MD    Primary Care Team: Networked reference to record PCT     HPI:   Patient is a 68 year old female, with past medical history of hypertension, hypothyroidism and celiac disease, who presents [facility] via EMS for evaluation of chest and abdominal pain. Patient states she was experiencing nausea, vomiting, shortness of breath, chest pain, bilateral jaw pain that radiated down her arm along with abdominal pain lasting about 30 minutes. She started to have these symptoms on Thursday 9/28, but begin to worsen the next day. Patient also mentions she has been having constipation for the past week and has taken stool softeners. Her last bowel movement was yesterday morning. Patient has diabetes mellitus listed on her history but she is currently not on home medications and most recent glucose levels have been wnl.    Patient lives alone. She is able to perform daily activities, lately with the help of a walker or cane. She now has home health coming by for the past 2-3 weeks due to having issues with ringing in ears and loss of balance. Her last fall was several months ago. Patient also mentions she is on home oxygen 2 liters, only used at night.      In the ED, initial presenting vitals were blood pressure: 89/53, temperature: 97.6, pulse: 97, respirations: 16, O2 sat: 91%. EKG was obtained showing bradycardia with heart rate of 46 bpm and patient was treated with IV atropine. Right bundle branch block and possible idioventricular rhythm were also noted on EKG. Imaging completed  included chest x-ray which showed possible minimal left basilar atelectasis or infectious/inflammatory infiltrate. CT of abdomen and pelvis with contrast results pending. Labs demonstrated WBC: 5.36, hemoglobin: 12.8, hematocrit: 37.6, platelet: 284, sodium: 137, potassium: 3.0, chloride: 102, CO2: 28, BUN: 13, Cr: 1.02, glucose: 108, Protime:13.9, INR: 1.08, aPTT: 28.8, D-Dimer: <0.27, ProBNP: 82, Troponin: 15, 45 and 63, will continue to trend. The patient was given aztreonam in dextrose 5% in water 100 mL and ketorolac injection 15 mg in the ED.     The patient will be admitted for continued care and medical management.      Procedure(s) (LRB):  Left heart cath (N/A)      Hospital Course:   No notes on file     Goals of Care Treatment Preferences:  Code Status: Full Code      Consults:   Consults (From admission, onward)        Status Ordering Provider     Inpatient consult to Cardiology  Once        Provider:  Roberto Kilgore DO Completed RUELA, ANDRE          Cardiac/Vascular  Abnormal cardiovascular stress test  [facility] with non-obstructing CAD, recommendations for medical management including statin. Follow up with cardiology.      Hyperlipidemia  Has not tolerated statin therapy in the past, trial low-dose atorvastatin and increase as tolerated. Educated on importance of statin therapy considering hyperlipidemia and non-obstructive CAD.     Lipid panel 6 months ago showing total cholesterol 234, HDL 45, [id number]           Bradycardia  Bradycardia on presentation, treated with atropine. Nonspecific EKG changes.     Inpatient cardiology evaluation unremarkable. Discontinued beta blocker, discharged on isosorbide.    Has close follow up with Dr. Powell ([facility]).    Hypertension  Well controlled. Continue isosorbide, follow up with PCP.    Type 2 myocardial infarction-resolved as of 10/4/2023  Abnormal NM scan, [facility] with non-obstructive CAD. Recommend statin therapy and outpatient follow  up.      Endocrine  Hypothyroidism  Managed by endocrinology in [place]. TSH WNL this hospitalization. Continue home medications and follow up in clinic.       GI  Celiac disease  Continue usual diet, follow up with PCP.        Final Active Diagnoses:    Diagnosis Date Noted POA    Abnormal cardiovascular stress test [R94.39] 10/02/2023 No    Bradycardia [R00.1] 09/30/2023 Yes    Hyperlipidemia [E78.5] 09/30/2023 Yes    Hypertension [I10] 02/07/2022 Yes    Hypothyroidism [E03.9] 02/07/2022 Yes    Celiac disease [K90.0] 01/17/2022 Yes    Morbid obesity [E66.01] 01/17/2022 Yes      Problems Resolved During this Admission:    Diagnosis Date Noted Date Resolved POA    PRINCIPAL PROBLEM:  Chest pain [R07.9] 09/30/2023 10/04/2023 Yes    Hypotensive episode [I95.9] 09/30/2023 10/04/2023 Yes    Type 2 myocardial infarction [I21.A1] 09/30/2023 10/04/2023 Yes       Discharged Condition: good    Disposition: Home or Self Care    Follow Up:   Follow-up Information     Prem Vincent MD. Schedule an appointment as soon as possible for a visit on 10/10/2023.    Specialty: Family Medicine  Why: 1:20 pm with ALICIA Wright  Contact information:  [address]  [facility] [id number]  [phone]                       Patient Instructions:      Diet Cardiac     Notify your health care provider if you experience any of the following:  temperature >100.4     Notify your health care provider if you experience any of the following:  persistent nausea and vomiting or diarrhea     Notify your health care provider if you experience any of the following:  severe uncontrolled pain     Notify your health care provider if you experience any of the following:  redness, tenderness, or signs of infection (pain, swelling, redness, odor or green/yellow discharge around incision site)     Notify your health care provider if you experience any of the following:  difficulty breathing  or increased cough     Notify your health care provider if you experience any of the following:  severe persistent headache     Notify your health care provider if you experience any of the following:  worsening rash     Notify your health care provider if you experience any of the following:  persistent dizziness, light-headedness, or visual disturbances     Notify your health care provider if you experience any of the following:  increased confusion or weakness     Activity as tolerated       Significant Diagnostic Studies: Labs: All labs within the past 24 hours have been reviewed    Pending Diagnostic Studies:     Procedure Component Value Units Date/Time    EXTRA TUBES [[id number]] Collected: 09/29/23 2015    Order Status: Sent Lab Status: In process Updated: 09/29/23 2032    Specimen: Blood, Venous     Narrative:      The following orders were created for panel order EXTRA TUBES.  Procedure                               Abnormality         Status                     ---------                               -----------         ------                     Light Blue Top Hold[[id number]]                                                        Gold Top Hold[[id number]]                                   In process                   Please view results for these tests on the individual orders.         Medications:  Reconciled Home Medications:      Medication List      START taking these medications    atorvastatin 40 MG tablet  Commonly known as: LIPITOR  Take 1 tablet (40 mg total) by mouth once daily.     isosorbide mononitrate 30 MG 24 hr tablet  Commonly known as: IMDUR  Take 1 tablet (30 mg total) by mouth once daily.        CONTINUE taking these medications    albuterol 2.5 mg /3 mL (0.083 %) nebulizer solution  Commonly known as: PROVENTIL  Take 3 mLs (2.5 mg total) by nebulization every 6 (six) hours as needed for Wheezing. Rescue     ascorbic acid (vitamin C) 1000 MG tablet  Commonly known as: VITAMIN C  Take  1,000 mg by mouth once daily.     calcium carbonate 200 mg calcium (500 mg) chewable tablet  Commonly known as: TUMS  Take 2 tablets by mouth nightly as needed for Heartburn.     mupirocin 2 % ointment  Commonly known as: BACTROBAN  Apply to AA on body BID PRN flares tapering with improvement     * thyroid (pork) 60 mg Tab  Commonly known as: ARMOUR THYROID  Take 1 tablet (60 mg total) by mouth before breakfast.     * thyroid (pork) 15 mg Tab  Commonly known as: ARMOUR THYROID  Take 15 mg by mouth.     triamcinolone acetonide 0.1% 0.1 % cream  Commonly known as: KENALOG  Apply to AA on body BID PRN flares tapering with improvement         * This list has 2 medication(s) that are the same as other medications prescribed for you. Read the directions carefully, and ask your doctor or other care provider to review them with you.            STOP taking these medications    dapsone 25 MG Tab     ezetimibe 10 mg tablet  Commonly known as: ZETIA     metoprolol succinate 25 MG 24 hr tablet  Commonly known as: TOPROL-XL            Indwelling Lines/Drains at time of discharge:   Lines/Drains/Airways     None                 Time spent on the discharge of patient: 40 minutes         Rosemarie Mireles DO  Department of Hospital Medicine  [facility] - [facility] Telemetry

## 2023-10-16 NOTE — ASSESSMENT & PLAN NOTE
Bradycardia on presentation, treated with atropine. Nonspecific EKG changes.     Inpatient cardiology evaluation unremarkable. Discontinued beta blocker, discharged on isosorbide.    Has close follow up with Dr. Powell ([facility]).

## 2023-10-17 ENCOUNTER — TELEPHONE (OUTPATIENT)
Dept: EMERGENCY MEDICINE | Facility: HOSPITAL | Age: 68
End: 2023-10-17
Payer: MEDICARE

## 2023-10-30 ENCOUNTER — HOSPITAL ENCOUNTER (OUTPATIENT)
Dept: RADIOLOGY | Facility: HOSPITAL | Age: 68
Discharge: HOME | End: 2023-10-30
Attending: FAMILY MEDICINE
Payer: MEDICARE

## 2023-10-30 VITALS — BODY MASS INDEX: 31.54 KG/M2 | WEIGHT: 178 LBS | HEIGHT: 63 IN

## 2023-10-30 DIAGNOSIS — Z12.31 ENCOUNTER FOR SCREENING MAMMOGRAM FOR MALIGNANT NEOPLASM OF BREAST: ICD-10-CM

## 2023-10-30 PROCEDURE — 77067 SCR MAMMO BI INCL CAD: CPT | Mod: TC

## 2023-10-31 ENCOUNTER — EXTERNAL CHRONIC CARE MANAGEMENT (OUTPATIENT)
Dept: FAMILY MEDICINE | Facility: CLINIC | Age: 68
End: 2023-10-31
Payer: MEDICARE

## 2023-10-31 PROCEDURE — G0511 CCM/BHI BY RHC/FQHC 20MIN MO: HCPCS | Mod: ,,, | Performed by: FAMILY MEDICINE

## 2023-10-31 PROCEDURE — G0511 PR CHRONIC CARE MGMT, RHC OR FQHC ONLY, 20 MINS OR MORE: ICD-10-PCS | Mod: ,,, | Performed by: FAMILY MEDICINE

## 2023-11-10 ENCOUNTER — TELEPHONE (OUTPATIENT)
Dept: FAMILY MEDICINE | Facility: CLINIC | Age: 68
End: 2023-11-10
Payer: MEDICARE

## 2023-11-10 NOTE — TELEPHONE ENCOUNTER
----- Message from Dolly Aleln LPN sent at 11/1/2023  3:19 PM CDT -----    ----- Message -----  From: Prem Vincent MD  Sent: 10/30/2023   4:34 PM CDT  To: [facility] Staff    Mammogram is benign,  repeat in 1 year

## 2023-11-20 ENCOUNTER — TELEPHONE (OUTPATIENT)
Dept: FAMILY MEDICINE | Facility: CLINIC | Age: 68
End: 2023-11-20
Payer: MEDICARE

## 2023-11-20 RX ORDER — ISOSORBIDE MONONITRATE 30 MG/1
30 TABLET, EXTENDED RELEASE ORAL DAILY
Qty: 30 TABLET | Refills: 0 | Status: SHIPPED | OUTPATIENT
Start: 2023-11-20 | End: 2023-12-28 | Stop reason: SDUPTHER

## 2023-11-20 NOTE — TELEPHONE ENCOUNTER
----- Message from Apurva Ch MA sent at 11/17/2023  3:47 PM CST -----  Please call in blood pressure meds to [facility] for pt.

## 2023-11-30 ENCOUNTER — EXTERNAL CHRONIC CARE MANAGEMENT (OUTPATIENT)
Dept: FAMILY MEDICINE | Facility: CLINIC | Age: 68
End: 2023-11-30
Payer: MEDICARE

## 2023-11-30 PROCEDURE — G0511 CCM/BHI BY RHC/FQHC 20MIN MO: HCPCS | Mod: ,,, | Performed by: FAMILY MEDICINE

## 2023-11-30 PROCEDURE — G0511 PR CHRONIC CARE MGMT, RHC OR FQHC ONLY, 20 MINS OR MORE: ICD-10-PCS | Mod: ,,, | Performed by: FAMILY MEDICINE

## 2023-12-01 ENCOUNTER — HOSPITAL ENCOUNTER (EMERGENCY)
Facility: HOSPITAL | Age: 68
Discharge: HOME | End: 2023-12-01
Attending: EMERGENCY MEDICINE
Payer: MEDICARE

## 2023-12-01 VITALS
BODY MASS INDEX: 31.36 KG/M2 | TEMPERATURE: 99 F | WEIGHT: 177 LBS | DIASTOLIC BLOOD PRESSURE: 73 MMHG | HEIGHT: 63 IN | RESPIRATION RATE: 14 BRPM | SYSTOLIC BLOOD PRESSURE: 148 MMHG | OXYGEN SATURATION: 98 % | HEART RATE: 48 BPM

## 2023-12-01 DIAGNOSIS — R06.02 SOB (SHORTNESS OF BREATH): ICD-10-CM

## 2023-12-01 DIAGNOSIS — R07.89 ATYPICAL CHEST PAIN: Primary | ICD-10-CM

## 2023-12-01 LAB
ALBUMIN SERPL BCP-MCNC: 4 G/DL (ref 3.5–5)
ALBUMIN/GLOB SERPL: 1.4 {RATIO}
ALP SERPL-CCNC: 77 U/L (ref 55–142)
ALT SERPL W P-5'-P-CCNC: 31 U/L (ref 13–56)
ANION GAP SERPL CALCULATED.3IONS-SCNC: 6 MMOL/L (ref 7–16)
AST SERPL W P-5'-P-CCNC: 12 U/L (ref 15–37)
BASOPHILS # BLD AUTO: 0.05 K/UL (ref 0–0.2)
BASOPHILS NFR BLD AUTO: 1 % (ref 0–1)
BILIRUB SERPL-MCNC: 0.6 MG/DL (ref ?–1.2)
BUN SERPL-MCNC: 12 MG/DL (ref 7–18)
BUN/CREAT SERPL: 15 (ref 6–20)
CALCIUM SERPL-MCNC: 9.8 MG/DL (ref 8.5–10.1)
CHLORIDE SERPL-SCNC: 107 MMOL/L (ref 98–107)
CO2 SERPL-SCNC: 32 MMOL/L (ref 21–32)
CREAT SERPL-MCNC: 0.82 MG/DL (ref 0.55–1.02)
DIFFERENTIAL METHOD BLD: ABNORMAL
EGFR (NO RACE VARIABLE) (RUSH/TITUS): 79 ML/MIN/1.73M2
EOSINOPHIL # BLD AUTO: 0.12 K/UL (ref 0–0.5)
EOSINOPHIL NFR BLD AUTO: 2.3 % (ref 1–4)
ERYTHROCYTE [DISTWIDTH] IN BLOOD BY AUTOMATED COUNT: 13.4 % (ref 11.5–14.5)
FLUAV AG UPPER RESP QL IA.RAPID: NEGATIVE
FLUBV AG UPPER RESP QL IA.RAPID: NEGATIVE
GLOBULIN SER-MCNC: 2.8 G/DL (ref 2–4)
GLUCOSE SERPL-MCNC: 109 MG/DL (ref 74–106)
HCT VFR BLD AUTO: 35 % (ref 38–47)
HGB BLD-MCNC: 11.6 G/DL (ref 12–16)
IMM GRANULOCYTES # BLD AUTO: 0.04 K/UL (ref 0–0.04)
IMM GRANULOCYTES NFR BLD: 0.8 % (ref 0–0.4)
INR BLD: 1.03
LYMPHOCYTES # BLD AUTO: 0.88 K/UL (ref 1–4.8)
LYMPHOCYTES NFR BLD AUTO: 17.2 % (ref 27–41)
MCH RBC QN AUTO: 29.3 PG (ref 27–31)
MCHC RBC AUTO-ENTMCNC: 33.1 G/DL (ref 32–36)
MCV RBC AUTO: 88.4 FL (ref 80–96)
MONOCYTES # BLD AUTO: 0.39 K/UL (ref 0–0.8)
MONOCYTES NFR BLD AUTO: 7.6 % (ref 2–6)
MPC BLD CALC-MCNC: 11 FL (ref 9.4–12.4)
NEUTROPHILS # BLD AUTO: 3.64 K/UL (ref 1.8–7.7)
NEUTROPHILS NFR BLD AUTO: 71.1 % (ref 53–65)
NRBC # BLD AUTO: 0 X10E3/UL
NRBC, AUTO (.00): 0 %
NT-PROBNP SERPL-MCNC: 144 PG/ML (ref 1–125)
PLATELET # BLD AUTO: 258 K/UL (ref 150–400)
POTASSIUM SERPL-SCNC: 3.4 MMOL/L (ref 3.5–5.1)
PROT SERPL-MCNC: 6.8 G/DL (ref 6.4–8.2)
PROTHROMBIN TIME: 13.4 SECONDS (ref 11.7–14.7)
RBC # BLD AUTO: 3.96 M/UL (ref 4.2–5.4)
SARS-COV-2 RDRP RESP QL NAA+PROBE: NEGATIVE
SODIUM SERPL-SCNC: 142 MMOL/L (ref 136–145)
TROPONIN I SERPL DL<=0.01 NG/ML-MCNC: 6.7 PG/ML
TROPONIN I SERPL DL<=0.01 NG/ML-MCNC: 7.2 PG/ML
WBC # BLD AUTO: 5.12 K/UL (ref 4.5–11)

## 2023-12-01 PROCEDURE — 93005 ELECTROCARDIOGRAM TRACING: CPT

## 2023-12-01 PROCEDURE — 87804 INFLUENZA ASSAY W/OPTIC: CPT | Performed by: EMERGENCY MEDICINE

## 2023-12-01 PROCEDURE — 93010 EKG 12-LEAD: ICD-10-PCS | Mod: ,,, | Performed by: STUDENT IN AN ORGANIZED HEALTH CARE EDUCATION/TRAINING PROGRAM

## 2023-12-01 PROCEDURE — 99284 EMERGENCY DEPT VISIT MOD MDM: CPT | Mod: ,,, | Performed by: EMERGENCY MEDICINE

## 2023-12-01 PROCEDURE — 83880 ASSAY OF NATRIURETIC PEPTIDE: CPT | Performed by: EMERGENCY MEDICINE

## 2023-12-01 PROCEDURE — 80053 COMPREHEN METABOLIC PANEL: CPT | Performed by: EMERGENCY MEDICINE

## 2023-12-01 PROCEDURE — 99284 EMERGENCY DEPT VISIT MOD MDM: CPT | Mod: 25

## 2023-12-01 PROCEDURE — 87635 SARS-COV-2 COVID-19 AMP PRB: CPT | Performed by: EMERGENCY MEDICINE

## 2023-12-01 PROCEDURE — 84484 ASSAY OF TROPONIN QUANT: CPT | Performed by: EMERGENCY MEDICINE

## 2023-12-01 PROCEDURE — 85025 COMPLETE CBC W/AUTO DIFF WBC: CPT | Performed by: EMERGENCY MEDICINE

## 2023-12-01 PROCEDURE — 85610 PROTHROMBIN TIME: CPT | Performed by: EMERGENCY MEDICINE

## 2023-12-01 PROCEDURE — 93010 ELECTROCARDIOGRAM REPORT: CPT | Mod: ,,, | Performed by: STUDENT IN AN ORGANIZED HEALTH CARE EDUCATION/TRAINING PROGRAM

## 2023-12-01 PROCEDURE — 99284 PR EMERGENCY DEPT VISIT,LEVEL IV: ICD-10-PCS | Mod: ,,, | Performed by: EMERGENCY MEDICINE

## 2023-12-01 NOTE — ED TRIAGE NOTES
Presents to ED via EMS from home for c/o chest pain/pressure, SOB, left sided jaw pain and left arm pain that began today. Patient had heart cath September 2023. Also reports episode of dizziness that occurred today, states she felt like the room was spinning and she was going to pass out. Reports EMS gave her Nitro which helped with the chest pain/pressure some.

## 2023-12-01 NOTE — ED PROVIDER NOTES
Encounter Date: 12/1/2023    SCRIBE #1 NOTE: I, Agnes Valdez, am scribing for, and in the presence of,  Armando Lopez MD. I have scribed the entire note.       History     Chief Complaint   Patient presents with    Chest Pain    Shortness of Breath    Arm Pain    Jaw Pain     68 y.o. female presents to the ED via EMS with c/o chest pain, SOB. Patient states the chest pain radiates through her left arm and jaw. Patient given nitro en route by EMS. No other symptoms were reported.     The history is provided by the EMS personnel. No  was used.     Review of patient's allergies indicates:   Allergen Reactions    Aricept [donepezil]     Codeine     Crestor [rosuvastatin]      Makes her sleepy    Dapsone      nausea    Flagyl [metronidazole]     Hydrocodone     Imitrex [sumatriptan]     Influenza virus vaccines     Opioids - morphine analogues     Penicillins     Tramadol     Aspirin Rash    Clindamycin Nausea Only    Latex, natural rubber Rash     Past Medical History:   Diagnosis Date    Abnormal LFTs     Asthma     ? 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    Nausea vomiting and diarrhea 2/7/2022     Past Surgical History:   Procedure Laterality Date    APPENDECTOMY      CHOLECYSTECTOMY      HYSTERECTOMY      LEFT HEART CATHETERIZATION N/A 10/3/2023    Procedure: Left heart cath;  Surgeon: Sacha Landers MD;  Location: [facility] CATH LAB;  Service: Cardiology;  Laterality: N/A;    TONSILLECTOMY       Family History   Problem Relation Age of Onset    Heart disease Mother     Kidney disease Mother     Heart disease Father     Cancer Paternal Aunt      Social History     Tobacco Use    Smoking status: Former     Current packs/day: 0.20     Average packs/day: 0.2 packs/day for 1 year (0.2 ttl pk-yrs)     Types: Cigarettes     Passive exposure: Current    Smokeless tobacco: Never   Substance Use Topics    Alcohol use: Not Currently    Drug use: Never      Review of Systems   Constitutional: Negative.    HENT: Negative.     Eyes: Negative.    Respiratory:  Positive for shortness of breath.    Cardiovascular:  Positive for chest pain.   Gastrointestinal: Negative.    Endocrine: Negative.    Genitourinary: Negative.    Musculoskeletal: Negative.    Skin: Negative.    Allergic/Immunologic: Negative.    Neurological: Negative.    Hematological: Negative.    Psychiatric/Behavioral: Negative.     All other systems reviewed and are negative.      Physical Exam     Initial Vitals [12/01/23 1341]   BP Pulse Resp Temp SpO2   (!) 141/75 60 12 98.7 °F (37.1 °C) (!) 92 %      MAP       --         Physical Exam    Vitals reviewed.  Constitutional: She appears well-developed and well-nourished. No distress.   HENT:   Head: Normocephalic.   Eyes: Conjunctivae are normal. Pupils are equal, round, and reactive to light.   Cardiovascular:  Normal rate, regular rhythm, normal heart sounds and intact distal pulses.           Pulmonary/Chest: Breath sounds normal.   Abdominal: Abdomen is soft. Bowel sounds are normal.   Musculoskeletal:      Right lower leg: No edema.      Left lower leg: No edema.     Neurological: She is alert and oriented to person, place, and time.   Skin: Skin is warm and dry.   Psychiatric: She has a normal mood and affect.         ED Course   Procedures  Labs Reviewed   COMPREHENSIVE METABOLIC PANEL - Abnormal; Notable for the following components:       Result Value    Potassium 3.4 (*)     Anion Gap 6 (*)     Glucose 109 (*)     AST 12 (*)     All other components within normal limits   CBC WITH DIFFERENTIAL - Abnormal; Notable for the following components:    RBC 3.96 (*)     Hemoglobin 11.6 (*)     Hematocrit 35.0 (*)     Neutrophils % 71.1 (*)     Lymphocytes % 17.2 (*)     Monocytes % 7.6 (*)     Immature Granulocytes % 0.8 (*)     Lymphocytes, Absolute 0.88 (*)     All other components within normal limits   NT-PRO NATRIURETIC PEPTIDE - Abnormal; Notable  for the following components:    ProBNP 144 (*)     All other components within normal limits   RAPID INFLUENZA A/B - Normal   TROPONIN I - Normal   PROTIME-INR - Normal   SARS-COV-2 RNA AMPLIFICATION, QUAL - Normal    Narrative:     Negative SARS-CoV results should not be used as the sole basis for treatment or patient management decisions; negative results should be considered in the context of a patient's recent exposures, history and the presene of clinical signs and symptoms consistent with COVID-19.  Negative results should be treated as presumptive and confirmed by molecular assay, if necessary for patient management.   TROPONIN I - Normal   CBC W/ AUTO DIFFERENTIAL    Narrative:     The following orders were created for panel order CBC auto differential.  Procedure                               Abnormality         Status                     ---------                               -----------         ------                     CBC with Differential[[id number]]       Abnormal            Final result                 Please view results for these tests on the individual orders.          Imaging Results              X-Ray Chest PA And Lateral (Final result)  Result time 12/01/23 14:27:17      Final result by Yunior Shanks II, MD (12/01/23 14:27:17)                   Impression:      Findings suggest mild cardiac decompensation and / or pneumonitis.      Electronically signed by: Yunior Shanks  Date:    12/01/2023  Time:    14:27               Narrative:    EXAMINATION:  XR CHEST PA AND LATERAL    CLINICAL HISTORY:  Chest Pain; SOB;    COMPARISON:  16 October 2023    TECHNIQUE:  XR CHEST PA AND LATERAL    FINDINGS:  The heart and mediastinum are stable in size and configuration.  The pulmonary vascularity is slightly increased with bilateral increased interstitial lung density more prominent on the left.  No other lung infiltrates, effusions, pneumothorax or other abnormality is demonstrated.                                        Medications - No data to display  Medical Decision Making  Amount and/or Complexity of Data Reviewed  Labs: ordered.  Radiology: ordered.              Attending Attestation:           Physician Attestation for Scribe:  Physician Attestation Statement for Scribe #1: I, Armando Lopez MD, reviewed documentation, as scribed by Agnes Valdez in my presence, and it is both accurate and complete.             ED Course as of 12/02/23 0415   Fri Dec 01, 2023   1842 Medical decision-making:  Differential diagnosis includes chest pain, shortness breath, STEMI, NSTEMI, pneumonia.  All labs and imaging ordered and interpreted by me.  Flu is negative.  COVID is negative.  Initial troponin is normal.  CMP is normal except mild hypokalemia.  CBC is normal except slight [BB]   1843 Chest x-ray by my interpretation shows possible mild CHF. [BB]   1936 Repeat troponin is normal with no significant change from initial troponin. [BB]   1941 Review of outside medical record shows patient had a heart catheterization in October of this year which showed some mild nonobstructive coronary disease.  Patient tells me that the chest pain she is having today is the same type chest pain she has been having on and off for a long time which is why she had the recent heart catheterization.  Workup today is negative.  Patient will be discharged home. [BB]   1941 Pro BNP is minimally elevated.  Only slightly above normal. [BB]      ED Course User Index  [BB] Tc Martinez MD                           Clinical Impression:  Final diagnoses:  [R06.02] SOB (shortness of breath)  [R07.89] Atypical chest pain (Primary)          ED Disposition Condition    Discharge Stable          ED Prescriptions    None       Follow-up Information    None          Tc Martinez MD  12/02/23 0416

## 2023-12-02 NOTE — DISCHARGE INSTRUCTIONS
Continue current medications.  Follow up in clinic with primary care provider in 2-3 days for recheck.  Return to emergency department for any worsening or further problems.

## 2023-12-08 ENCOUNTER — OFFICE VISIT (OUTPATIENT)
Dept: FAMILY MEDICINE | Facility: CLINIC | Age: 68
End: 2023-12-08
Payer: MEDICARE

## 2023-12-08 VITALS
HEART RATE: 61 BPM | BODY MASS INDEX: 31.36 KG/M2 | TEMPERATURE: 99 F | RESPIRATION RATE: 18 BRPM | DIASTOLIC BLOOD PRESSURE: 88 MMHG | OXYGEN SATURATION: 96 % | HEIGHT: 63 IN | WEIGHT: 177 LBS | SYSTOLIC BLOOD PRESSURE: 147 MMHG

## 2023-12-08 DIAGNOSIS — I10 HYPERTENSION, UNSPECIFIED TYPE: ICD-10-CM

## 2023-12-08 DIAGNOSIS — R94.39 ABNORMAL CARDIOVASCULAR STRESS TEST: Primary | ICD-10-CM

## 2023-12-08 DIAGNOSIS — E53.8 B12 DEFICIENCY: ICD-10-CM

## 2023-12-08 PROCEDURE — 3044F PR MOST RECENT HEMOGLOBIN A1C LEVEL <7.0%: ICD-10-PCS | Mod: ,,, | Performed by: FAMILY MEDICINE

## 2023-12-08 PROCEDURE — 1159F PR MEDICATION LIST DOCUMENTED IN MEDICAL RECORD: ICD-10-PCS | Mod: ,,, | Performed by: FAMILY MEDICINE

## 2023-12-08 PROCEDURE — 1101F PT FALLS ASSESS-DOCD LE1/YR: CPT | Mod: ,,, | Performed by: FAMILY MEDICINE

## 2023-12-08 PROCEDURE — 1160F PR REVIEW ALL MEDS BY PRESCRIBER/CLIN PHARMACIST DOCUMENTED: ICD-10-PCS | Mod: ,,, | Performed by: FAMILY MEDICINE

## 2023-12-08 PROCEDURE — 96372 PR INJECTION,THERAP/PROPH/DIAG2ST, IM OR SUBCUT: ICD-10-PCS | Mod: ,,, | Performed by: FAMILY MEDICINE

## 2023-12-08 PROCEDURE — 3044F HG A1C LEVEL LT 7.0%: CPT | Mod: ,,, | Performed by: FAMILY MEDICINE

## 2023-12-08 PROCEDURE — 1159F MED LIST DOCD IN RCRD: CPT | Mod: ,,, | Performed by: FAMILY MEDICINE

## 2023-12-08 PROCEDURE — 3079F PR MOST RECENT DIASTOLIC BLOOD PRESSURE 80-89 MM HG: ICD-10-PCS | Mod: ,,, | Performed by: FAMILY MEDICINE

## 2023-12-08 PROCEDURE — 96372 THER/PROPH/DIAG INJ SC/IM: CPT | Mod: ,,, | Performed by: FAMILY MEDICINE

## 2023-12-08 PROCEDURE — 99213 PR OFFICE/OUTPT VISIT, EST, LEVL III, 20-29 MIN: ICD-10-PCS | Mod: 25,,, | Performed by: FAMILY MEDICINE

## 2023-12-08 PROCEDURE — 3079F DIAST BP 80-89 MM HG: CPT | Mod: ,,, | Performed by: FAMILY MEDICINE

## 2023-12-08 PROCEDURE — 1160F RVW MEDS BY RX/DR IN RCRD: CPT | Mod: ,,, | Performed by: FAMILY MEDICINE

## 2023-12-08 PROCEDURE — 3008F PR BODY MASS INDEX (BMI) DOCUMENTED: ICD-10-PCS | Mod: ,,, | Performed by: FAMILY MEDICINE

## 2023-12-08 PROCEDURE — 99213 OFFICE O/P EST LOW 20 MIN: CPT | Mod: 25,,, | Performed by: FAMILY MEDICINE

## 2023-12-08 PROCEDURE — 3008F BODY MASS INDEX DOCD: CPT | Mod: ,,, | Performed by: FAMILY MEDICINE

## 2023-12-08 PROCEDURE — 3288F FALL RISK ASSESSMENT DOCD: CPT | Mod: ,,, | Performed by: FAMILY MEDICINE

## 2023-12-08 PROCEDURE — 1101F PR PT FALLS ASSESS DOC 0-1 FALLS W/OUT INJ PAST YR: ICD-10-PCS | Mod: ,,, | Performed by: FAMILY MEDICINE

## 2023-12-08 PROCEDURE — 3288F PR FALLS RISK ASSESSMENT DOCUMENTED: ICD-10-PCS | Mod: ,,, | Performed by: FAMILY MEDICINE

## 2023-12-08 PROCEDURE — 3077F PR MOST RECENT SYSTOLIC BLOOD PRESSURE >= 140 MM HG: ICD-10-PCS | Mod: ,,, | Performed by: FAMILY MEDICINE

## 2023-12-08 PROCEDURE — 3077F SYST BP >= 140 MM HG: CPT | Mod: ,,, | Performed by: FAMILY MEDICINE

## 2023-12-08 RX ORDER — CYANOCOBALAMIN 1000 UG/ML
1000 INJECTION, SOLUTION INTRAMUSCULAR; SUBCUTANEOUS ONCE
Status: COMPLETED | OUTPATIENT
Start: 2023-12-08 | End: 2023-12-08

## 2023-12-08 RX ADMIN — CYANOCOBALAMIN 1000 MCG: 1000 INJECTION, SOLUTION INTRAMUSCULAR; SUBCUTANEOUS at 10:12

## 2023-12-08 NOTE — PROGRESS NOTES
Beverly Rodríguez is a 68 y.o. female seen today for hospital follow-up.  Patient does have a history of coronary artery disease and usually sees a provider at [facility] but since her most recent catheterization was done while inpatient patient would like to see a cardiologist at [facility].  Patient does have a history of hypothyroidism and will be followed by Dr. Brown as her endocrinologist in the near future.  Patient's lipids are up-to-date and she is had no chest pain shortness for breath recently.  Patient is allergic to flu and COVID vaccines..      Past Medical History:   Diagnosis Date    Abnormal LFTs     Asthma     ? 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 Rescue 100 mL 3    ascorbic acid, vitamin C, (VITAMIN C) 1000 MG tablet Take 1,000 mg by mouth once daily.      atorvastatin (LIPITOR) 40 MG tablet Take 1 tablet (40 mg total) by mouth once daily. 30 tablet 0    calcium carbonate (TUMS) 200 mg calcium (500 mg) chewable tablet Take 2 tablets by mouth nightly as needed for Heartburn.      isosorbide mononitrate (IMDUR) 30 MG 24 hr tablet Take 1 tablet (30 mg total) by mouth once daily. 30 tablet 0    mupirocin (BACTROBAN) 2 % ointment Apply to AA on body BID PRN flares tapering with improvement 30 g 11    thyroid, pork, (ARMOUR THYROID) 15 mg Tab Take 15 mg by mouth.      thyroid, pork, (ARMOUR THYROID) 60 mg Tab Take 1 tablet (60 mg total) by mouth before breakfast. 90 tablet 1    triamcinolone acetonide 0.1% (KENALOG) 0.1 % cream Apply to AA on  body BID PRN flares tapering with improvement 453.6 g 2     No current facility-administered medications on file prior to visit.       There is no immunization history on file for this patient.    Review of Systems   Constitutional:  Negative for fever, malaise/fatigue and weight loss.   Respiratory:  Negative for shortness of breath.    Cardiovascular:  Negative for chest pain and palpitations.   Gastrointestinal:  Negative for nausea and vomiting.   Psychiatric/Behavioral:  Negative for depression.         Vitals:    12/08/23 1053   BP: (!) 147/88   Pulse: 61   Resp:    Temp: 99.1 °F (37.3 °C)       Physical Exam  Vitals reviewed.   Constitutional:       Appearance: Normal appearance.   HENT:      Head: Normocephalic.   Eyes:      Extraocular Movements: Extraocular movements intact.      Conjunctiva/sclera: Conjunctivae normal.      Pupils: Pupils are equal, round, and reactive to light.   Neck:      Thyroid: No thyroid mass or thyromegaly.   Cardiovascular:      Rate and Rhythm: Normal rate and regular rhythm.      Heart sounds: Normal heart sounds. No murmur heard.     No gallop.   Pulmonary:      Effort: Pulmonary effort is normal. No respiratory distress.      Breath sounds: Normal breath sounds. No wheezing or rales.   Skin:     General: Skin is warm and dry.      Coloration: Skin is not jaundiced or pale.   Neurological:      Mental Status: She is alert.   Psychiatric:         Mood and Affect: Mood normal.         Behavior: Behavior normal.         Thought Content: Thought content normal.         Judgment: Judgment normal.          Assessment and Plan  1. Abnormal cardiovascular stress test  -     Ambulatory referral/consult to Cardiology; Future; Expected date: 12/15/2023    2. Hypertension, unspecified type  -     Ambulatory referral/consult to Cardiology; Future; Expected date: 12/15/2023    3. B12 deficiency  -     cyanocobalamin injection 1,000 mcg             Return to clinic in months or as  needed.    Health Maintenance Topics with due status: Not Due       Topic Last Completion Date    Colorectal Cancer Screening 12/29/2021    Hemoglobin A1c (Diabetic Prevention Screening) 09/30/2023    Lipid Panel 10/02/2023    Mammogram 10/30/2023

## 2023-12-11 ENCOUNTER — OFFICE VISIT (OUTPATIENT)
Dept: GASTROENTEROLOGY | Facility: CLINIC | Age: 68
End: 2023-12-11
Payer: MEDICARE

## 2023-12-11 VITALS
WEIGHT: 174 LBS | BODY MASS INDEX: 30.83 KG/M2 | DIASTOLIC BLOOD PRESSURE: 79 MMHG | HEIGHT: 63 IN | HEART RATE: 63 BPM | OXYGEN SATURATION: 96 % | SYSTOLIC BLOOD PRESSURE: 151 MMHG

## 2023-12-11 DIAGNOSIS — K59.04 CHRONIC IDIOPATHIC CONSTIPATION: ICD-10-CM

## 2023-12-11 DIAGNOSIS — K90.0 CELIAC DISEASE: Primary | ICD-10-CM

## 2023-12-11 PROCEDURE — 3044F PR MOST RECENT HEMOGLOBIN A1C LEVEL <7.0%: ICD-10-PCS | Mod: CPTII,,,

## 2023-12-11 PROCEDURE — 3078F DIAST BP <80 MM HG: CPT | Mod: CPTII,,,

## 2023-12-11 PROCEDURE — 3077F SYST BP >= 140 MM HG: CPT | Mod: CPTII,,,

## 2023-12-11 PROCEDURE — 3288F FALL RISK ASSESSMENT DOCD: CPT | Mod: CPTII,,,

## 2023-12-11 PROCEDURE — 1126F AMNT PAIN NOTED NONE PRSNT: CPT | Mod: CPTII,,,

## 2023-12-11 PROCEDURE — 1160F PR REVIEW ALL MEDS BY PRESCRIBER/CLIN PHARMACIST DOCUMENTED: ICD-10-PCS | Mod: CPTII,,,

## 2023-12-11 PROCEDURE — 3288F PR FALLS RISK ASSESSMENT DOCUMENTED: ICD-10-PCS | Mod: CPTII,,,

## 2023-12-11 PROCEDURE — 1159F MED LIST DOCD IN RCRD: CPT | Mod: CPTII,,,

## 2023-12-11 PROCEDURE — 3077F PR MOST RECENT SYSTOLIC BLOOD PRESSURE >= 140 MM HG: ICD-10-PCS | Mod: CPTII,,,

## 2023-12-11 PROCEDURE — 1160F RVW MEDS BY RX/DR IN RCRD: CPT | Mod: CPTII,,,

## 2023-12-11 PROCEDURE — 99215 OFFICE O/P EST HI 40 MIN: CPT | Mod: PBBFAC

## 2023-12-11 PROCEDURE — 1101F PR PT FALLS ASSESS DOC 0-1 FALLS W/OUT INJ PAST YR: ICD-10-PCS | Mod: CPTII,,,

## 2023-12-11 PROCEDURE — 1159F PR MEDICATION LIST DOCUMENTED IN MEDICAL RECORD: ICD-10-PCS | Mod: CPTII,,,

## 2023-12-11 PROCEDURE — 3044F HG A1C LEVEL LT 7.0%: CPT | Mod: CPTII,,,

## 2023-12-11 PROCEDURE — 99214 OFFICE O/P EST MOD 30 MIN: CPT | Mod: S$PBB,,,

## 2023-12-11 PROCEDURE — 3008F PR BODY MASS INDEX (BMI) DOCUMENTED: ICD-10-PCS | Mod: CPTII,,,

## 2023-12-11 PROCEDURE — 99214 PR OFFICE/OUTPT VISIT, EST, LEVL IV, 30-39 MIN: ICD-10-PCS | Mod: S$PBB,,,

## 2023-12-11 PROCEDURE — 3008F BODY MASS INDEX DOCD: CPT | Mod: CPTII,,,

## 2023-12-11 PROCEDURE — 1101F PT FALLS ASSESS-DOCD LE1/YR: CPT | Mod: CPTII,,,

## 2023-12-11 PROCEDURE — 1126F PR PAIN SEVERITY QUANTIFIED, NO PAIN PRESENT: ICD-10-PCS | Mod: CPTII,,,

## 2023-12-11 PROCEDURE — 3078F PR MOST RECENT DIASTOLIC BLOOD PRESSURE < 80 MM HG: ICD-10-PCS | Mod: CPTII,,,

## 2023-12-11 NOTE — PROGRESS NOTES
Beverly Rodríguez is a 68 y.o. female here for Follow-up (6 month)        PCP: Prem Vincent  Referring Provider: No referring provider defined for this encounter.     HPI:  Ms. Rodríguez is a 66 yo female with PMH of celiac disease with Dermatitis Herpetiformis who presents to clinic today for follow-up. She reports she has been on a gluten free since 9038-7660. Gliadin >150 in 2021. At present, the rash on her abdomen has improved. She has increased fiber within her diet and her constipation is improved. She denies any abdominal pain, nausea, vomiting, hematochezia or melena. She had EGD w/ dilation 08/2023 with good response and improvement in dysphagia.     Previous colonoscopy and EGD 12/2021 Dr. Potter - 1 small colon polyp removed (TA polyp). Large external hemorrhoids.  Normal EGD.                ROS:  Review of Systems   Constitutional:  Negative for appetite change and unexpected weight change.   HENT:  Negative for trouble swallowing.    Gastrointestinal:  Positive for constipation. Negative for abdominal pain, diarrhea, nausea, vomiting and reflux.   Musculoskeletal:  Negative for back pain.   Integumentary:  Positive for rash. Negative for color change.          PMHX:  has a past medical history of Abnormal LFTs, Asthma, Diabetes mellitus, Hypertension, Hypothyroidism, and Nausea vomiting and diarrhea (2/7/2022).    PSHX:  has a past surgical history that includes Cholecystectomy; Tonsillectomy; Hysterectomy; Appendectomy; and Left heart catheterization (N/A, 10/3/2023).    PFHX: family history includes Cancer in her paternal aunt; Heart disease in her father and mother; Kidney disease in her mother.    PSlHX:  reports that she has quit smoking. Her smoking use included cigarettes. She has a 0.2 pack-year smoking history. She has been exposed to tobacco smoke. She has never used smokeless tobacco. She reports that she does not currently use alcohol. She reports that she does not use drugs.        Review  "of patient's allergies indicates:   Allergen Reactions    Aricept [donepezil]     Codeine     Crestor [rosuvastatin]      Makes her sleepy    Dapsone      nausea    Flagyl [metronidazole]     Hydrocodone     Imitrex [sumatriptan]     Influenza virus vaccines     Opioids - morphine analogues     Penicillins     Tramadol     Aspirin Rash    Clindamycin Nausea Only    Latex, natural rubber Rash       Medication List with Changes/Refills   Current Medications    ALBUTEROL (PROVENTIL) 2.5 MG /3 ML (0.083 %) NEBULIZER SOLUTION    Take 3 mLs (2.5 mg total) by nebulization every 6 (six) hours as needed for Wheezing. Rescue    ASCORBIC ACID, VITAMIN C, (VITAMIN C) 1000 MG TABLET    Take 1,000 mg by mouth once daily.    ATORVASTATIN (LIPITOR) 40 MG TABLET    Take 1 tablet (40 mg total) by mouth once daily.    CALCIUM CARBONATE (TUMS) 200 MG CALCIUM (500 MG) CHEWABLE TABLET    Take 2 tablets by mouth nightly as needed for Heartburn.    ISOSORBIDE MONONITRATE (IMDUR) 30 MG 24 HR TABLET    Take 1 tablet (30 mg total) by mouth once daily.    MUPIROCIN (BACTROBAN) 2 % OINTMENT    Apply to AA on body BID PRN flares tapering with improvement    THYROID, PORK, (ARMOUR THYROID) 15 MG TAB    Take 15 mg by mouth.    THYROID, PORK, (ARMOUR THYROID) 60 MG TAB    Take 1 tablet (60 mg total) by mouth before breakfast.    TRIAMCINOLONE ACETONIDE 0.1% (KENALOG) 0.1 % CREAM    Apply to AA on body BID PRN flares tapering with improvement        Objective Findings:  Vital Signs:  BP (!) 151/79   Pulse 63   Ht 5' 3" (1.6 m)   Wt 78.9 kg (174 lb)   SpO2 96%   BMI 30.82 kg/m²  Body mass index is 30.82 kg/m².    Physical Exam:  Physical Exam  Vitals reviewed.   Constitutional:       General: She is not in acute distress.     Appearance: Normal appearance.   HENT:      Mouth/Throat:      Mouth: Mucous membranes are moist.   Cardiovascular:      Rate and Rhythm: Normal rate.      Pulses: Normal pulses.   Pulmonary:      Effort: Pulmonary effort " is normal.   Abdominal:      General: There is no distension.      Tenderness: There is no guarding.   Skin:     General: Skin is warm and dry.   Neurological:      Mental Status: She is alert and oriented to person, place, and time.          Labs:  Lab Results   Component Value Date    WBC 5.12 12/01/2023    HGB 11.6 (L) 12/01/2023    HCT 35.0 (L) 12/01/2023    MCV 88.4 12/01/2023    RDW 13.4 12/01/2023     12/01/2023    LYMPH 17.2 (L) 12/01/2023    LYMPH 0.88 (L) 12/01/2023    MONO 7.6 (H) 12/01/2023    EOS 0.12 12/01/2023    BASO 0.05 12/01/2023     Lab Results   Component Value Date     12/01/2023    K 3.4 (L) 12/01/2023     12/01/2023    CO2 32 12/01/2023     (H) 12/01/2023    BUN 12 12/01/2023    CREATININE 0.82 12/01/2023    CALCIUM 9.8 12/01/2023    PROT 6.8 12/01/2023    ALBUMIN 4.0 12/01/2023    BILITOT 0.6 12/01/2023    ALKPHOS 77 12/01/2023    AST 12 (L) 12/01/2023    ALT 31 12/01/2023         Imaging: No results found.      Assessment:  Beverly Rodírguez is a 68 y.o. female here with:  1. Celiac disease    2. Chronic idiopathic constipation          Recommendations:  1. Continue gluten-free diet  2. DEXA scan along with Vit D, Vit b12 & folate   3. Miralax 17 gm daily to twice daily PRN for constipation  4. Screening colonoscopy due for repeat 12/29/2028     Follow up in about 6 months (around 6/11/2024).      Order summary:  Orders Placed This Encounter    DXA Bone Density with Vertebral Fracture Assesment    Vitamin D    Vitamin B12    Folate    Hepatitis C Antibody       Thank you for allowing me to participate in the care of Beverly Rodríguez.    35 minutes of total time spent on the encounter, which includes face to face time and non-face to face time preparing to see the patient (eg, review of tests), obtaining and/or reviewing separately obtained history, documenting clinical information in the electronic or other health record, Independently interpreting results (not separately  reported) and communicating results to the patient/family/caregiver, or care coordination (not separately reported).        Chantal Zarate, FNP-BC, AG-ACNP-BC

## 2023-12-20 ENCOUNTER — HOSPITAL ENCOUNTER (OUTPATIENT)
Dept: RADIOLOGY | Facility: HOSPITAL | Age: 68
Discharge: HOME | End: 2023-12-20
Payer: MEDICARE

## 2023-12-20 DIAGNOSIS — Z78.0 POST-MENOPAUSAL: ICD-10-CM

## 2023-12-20 DIAGNOSIS — K90.0 CELIAC DISEASE: ICD-10-CM

## 2023-12-20 PROCEDURE — 77080 DXA BONE DENSITY AXIAL SKELETON 1 OR MORE SITES: ICD-10-PCS | Mod: 26,,, | Performed by: RADIOLOGY

## 2023-12-20 PROCEDURE — 77080 DXA BONE DENSITY AXIAL: CPT | Mod: 26,,, | Performed by: RADIOLOGY

## 2023-12-20 PROCEDURE — 77080 DXA BONE DENSITY AXIAL: CPT | Mod: TC

## 2023-12-28 ENCOUNTER — OFFICE VISIT (OUTPATIENT)
Dept: CARDIOLOGY | Facility: CLINIC | Age: 68
End: 2023-12-28
Payer: MEDICARE

## 2023-12-28 VITALS
DIASTOLIC BLOOD PRESSURE: 58 MMHG | HEART RATE: 64 BPM | SYSTOLIC BLOOD PRESSURE: 128 MMHG | OXYGEN SATURATION: 97 % | WEIGHT: 171.81 LBS | BODY MASS INDEX: 30.44 KG/M2 | HEIGHT: 63 IN

## 2023-12-28 DIAGNOSIS — E78.5 HYPERLIPIDEMIA, UNSPECIFIED HYPERLIPIDEMIA TYPE: Chronic | ICD-10-CM

## 2023-12-28 DIAGNOSIS — I10 HYPERTENSION, UNSPECIFIED TYPE: Chronic | ICD-10-CM

## 2023-12-28 DIAGNOSIS — Z87.891 FORMER SMOKER: Chronic | ICD-10-CM

## 2023-12-28 DIAGNOSIS — I25.10 CORONARY ARTERY DISEASE INVOLVING NATIVE CORONARY ARTERY OF NATIVE HEART WITHOUT ANGINA PECTORIS: Primary | Chronic | ICD-10-CM

## 2023-12-28 DIAGNOSIS — E03.9 ACQUIRED HYPOTHYROIDISM: Chronic | ICD-10-CM

## 2023-12-28 DIAGNOSIS — E11.9 DIABETES MELLITUS WITHOUT COMPLICATION: Chronic | ICD-10-CM

## 2023-12-28 PROCEDURE — 99213 OFFICE O/P EST LOW 20 MIN: CPT | Mod: PBBFAC | Performed by: INTERNAL MEDICINE

## 2023-12-28 PROCEDURE — 3078F DIAST BP <80 MM HG: CPT | Mod: CPTII,,, | Performed by: INTERNAL MEDICINE

## 2023-12-28 PROCEDURE — 1101F PT FALLS ASSESS-DOCD LE1/YR: CPT | Mod: CPTII,,, | Performed by: INTERNAL MEDICINE

## 2023-12-28 PROCEDURE — 3008F BODY MASS INDEX DOCD: CPT | Mod: CPTII,,, | Performed by: INTERNAL MEDICINE

## 2023-12-28 PROCEDURE — 93005 ELECTROCARDIOGRAM TRACING: CPT | Mod: PBBFAC | Performed by: INTERNAL MEDICINE

## 2023-12-28 PROCEDURE — 99214 OFFICE O/P EST MOD 30 MIN: CPT | Mod: S$PBB,,, | Performed by: INTERNAL MEDICINE

## 2023-12-28 PROCEDURE — 3288F FALL RISK ASSESSMENT DOCD: CPT | Mod: CPTII,,, | Performed by: INTERNAL MEDICINE

## 2023-12-28 PROCEDURE — 1160F RVW MEDS BY RX/DR IN RCRD: CPT | Mod: CPTII,,, | Performed by: INTERNAL MEDICINE

## 2023-12-28 PROCEDURE — 93010 ELECTROCARDIOGRAM REPORT: CPT | Mod: S$PBB,,, | Performed by: INTERNAL MEDICINE

## 2023-12-28 PROCEDURE — 1159F MED LIST DOCD IN RCRD: CPT | Mod: CPTII,,, | Performed by: INTERNAL MEDICINE

## 2023-12-28 PROCEDURE — 3074F SYST BP LT 130 MM HG: CPT | Mod: CPTII,,, | Performed by: INTERNAL MEDICINE

## 2023-12-28 RX ORDER — ISOSORBIDE MONONITRATE 30 MG/1
30 TABLET, EXTENDED RELEASE ORAL DAILY
Qty: 90 TABLET | Refills: 3 | Status: SHIPPED | OUTPATIENT
Start: 2023-12-28 | End: 2024-12-27

## 2023-12-31 ENCOUNTER — EXTERNAL CHRONIC CARE MANAGEMENT (OUTPATIENT)
Dept: FAMILY MEDICINE | Facility: CLINIC | Age: 68
End: 2023-12-31
Payer: MEDICARE

## 2023-12-31 PROCEDURE — G0511 CCM/BHI BY RHC/FQHC 20MIN MO: HCPCS | Mod: ,,, | Performed by: FAMILY MEDICINE

## 2024-01-02 NOTE — PROGRESS NOTES
PCP: Prem Vincent MD    Referring Provider:     Subjective:   Beverly Rodríguez is a 68 y.o. female with hx of mild nonobstructive CAD, HTN, HLD, NIDDM, and hypothyroid who presents for cardiac follow up.       Fhx:  Family History   Problem Relation Age of Onset    Heart disease Mother     Kidney disease Mother     Heart disease Father     Cancer Paternal Aunt      Shx:   Social History     Socioeconomic History    Marital status:    Tobacco Use    Smoking status: Former     Current packs/day: 0.20     Average packs/day: 0.2 packs/day for 1 year (0.2 ttl pk-yrs)     Types: Cigarettes     Passive exposure: Current    Smokeless tobacco: Never   Substance and Sexual Activity    Alcohol use: Not Currently    Drug use: Never    Sexual activity: Not Currently     Social Determinants of Health     Financial Resource Strain: Low Risk  (10/2/2023)    Overall Financial Resource Strain (CARDIA)     Difficulty of Paying Living Expenses: Not hard at all   Food Insecurity: No Food Insecurity (10/2/2023)    Hunger Vital Sign     Worried About Running Out of Food in the Last Year: Never true     Ran Out of Food in the Last Year: Never true   Transportation Needs: No Transportation Needs (10/2/2023)    PRAPARE - Transportation     Lack of Transportation (Medical): No     Lack of Transportation (Non-Medical): No   Physical Activity: Inactive (10/2/2023)    Exercise Vital Sign     Days of Exercise per Week: 0 days     Minutes of Exercise per Session: 0 min   Stress: No Stress Concern Present (10/2/2023)    [employer] - Occupational Stress Questionnaire     Feeling of Stress : Not at all   Social Connections: Socially Isolated (10/2/2023)    Social Connection and Isolation Panel [NHANES]     Frequency of Communication with Friends and Family: More than three times a week     Frequency of Social Gatherings with Friends and Family: More than three times a week     Attends [employer] Services: Never      Active Member of Clubs or Organizations: No     Attends Club or Organization Meetings: Never     Marital Status:    Housing Stability: Unknown (10/2/2023)    Housing Stability Vital Sign     Unable to Pay for Housing in the Last Year: No     Unstable Housing in the Last Year: No       EKG   12/28/23--NSR, RBBB, left posterior fascicular block, T wave ang, 64 bpm      Echo    9/29/23--Interpretation Summary    Left Ventricle: The left ventricle is normal in size. Normal wall thickness. Normal wall motion. There is low normal systolic function with a visually estimated ejection fraction of 50 - 55%. There is normal diastolic function.    Right Ventricle: Normal right ventricular cavity size. Systolic function is normal.    Aortic Valve: The aortic valve is a trileaflet valve.      Cardiac catheterization    9/29/23--Conclusion    The ejection fraction was calculated to be 55%.    The left ventricular systolic function was normal.    The left ventricular end diastolic pressure was normal.    The pre-procedure left ventricular end diastolic pressure was 15.    The estimated blood loss was none.    There was non-obstructive coronary artery disease..    There was no mitral valve regurgitation.    The procedure log was documented by Documenter: Juanis Contreras RN and verified by Sacha Landers MD.    Date: 10/3/2023  Time: 1:18 PM impression:    1. Mild, nonobstructive coronary artery disease.    2. Normal left ventricular size with inferolateral wall hypokinesis and overall normal left ventricular systolic function, ejection fraction 55%.    3. No significant mitral regurgitation.    Plan:    1. Medical management of coronary artery disease, nonobstructive.    2. Risk factor modification.    3. Long-acting nitrates, 81 mg aspirin daily.        Lab Results   Component Value Date     12/01/2023    K 3.4 (L) 12/01/2023     12/01/2023    CO2 32 12/01/2023    BUN 12 12/01/2023    CREATININE 0.82  12/01/2023    CALCIUM 9.8 12/01/2023    ANIONGAP 6 (L) 12/01/2023    ESTGFRAFRICA 77 10/06/2020    EGFRNONAA 63 02/14/2022       Lab Results   Component Value Date    CHOL 102 10/02/2023    CHOL 234 (H) 03/09/2023    CHOL 247 (H) 07/22/2021     Lab Results   Component Value Date    HDL 33 (L) 10/02/2023    HDL 45 03/09/2023    HDL 41 07/22/2021     Lab Results   Component Value Date    LDLCALC 49 10/02/2023    LDLCALC 163 03/09/2023    LDLCALC 159 07/22/2021     Lab Results   Component Value Date    TRIG 101 10/02/2023    TRIG 128 03/09/2023    TRIG 237 (H) 07/22/2021     Lab Results   Component Value Date    CHOLHDL 3.1 10/02/2023    CHOLHDL 5.2 03/09/2023    CHOLHDL 6.0 07/22/2021       Lab Results   Component Value Date    WBC 5.12 12/01/2023    HGB 11.6 (L) 12/01/2023    HCT 35.0 (L) 12/01/2023    MCV 88.4 12/01/2023     12/01/2023           Current Outpatient Medications:     albuterol (PROVENTIL) 2.5 mg /3 mL (0.083 %) nebulizer solution, Take 3 mLs (2.5 mg total) by nebulization every 6 (six) hours as needed for Wheezing. Rescue, Disp: 100 mL, Rfl: 3    ascorbic acid, vitamin C, (VITAMIN C) 1000 MG tablet, Take 1,000 mg by mouth once daily., Disp: , Rfl:     atorvastatin (LIPITOR) 40 MG tablet, Take 1 tablet (40 mg total) by mouth once daily., Disp: 30 tablet, Rfl: 0    calcium carbonate (TUMS) 200 mg calcium (500 mg) chewable tablet, Take 2 tablets by mouth nightly as needed for Heartburn., Disp: , Rfl:     isosorbide mononitrate (IMDUR) 30 MG 24 hr tablet, Take 1 tablet (30 mg total) by mouth once daily., Disp: 90 tablet, Rfl: 3    mupirocin (BACTROBAN) 2 % ointment, Apply to AA on body BID PRN flares tapering with improvement, Disp: 30 g, Rfl: 11    thyroid, pork, (ARMOUR THYROID) 15 mg Tab, Take 15 mg by mouth., Disp: , Rfl:     thyroid, pork, (ARMOUR THYROID) 60 mg Tab, Take 1 tablet (60 mg total) by mouth before breakfast., Disp: 90 tablet, Rfl: 1    triamcinolone acetonide 0.1% (KENALOG) 0.1 %  "cream, Apply to AA on body BID PRN flares tapering with improvement, Disp: 453.6 g, Rfl: 2  Did not bring medications.     Review of Systems   Respiratory:  Positive for shortness of breath.         On O2   Cardiovascular:  Positive for palpitations. Negative for chest pain and leg swelling.   Neurological:  Negative for loss of consciousness.           Objective:   BP (!) 128/58 (BP Location: Left arm, Patient Position: Sitting)   Pulse 64   Ht 5' 3" (1.6 m)   Wt 77.9 kg (171 lb 12.8 oz)   SpO2 97%   BMI 30.43 kg/m²     Physical Exam  Vitals reviewed.   Constitutional:       General: She is not in acute distress.  HENT:      Head: Normocephalic and atraumatic.   Neck:      Vascular: No carotid bruit or JVD.   Cardiovascular:      Rate and Rhythm: Normal rate and regular rhythm.      Pulses: Normal pulses.      Heart sounds: Normal heart sounds. No murmur heard.  Pulmonary:      Effort: Pulmonary effort is normal.      Breath sounds: Normal breath sounds.   Musculoskeletal:      Right lower leg: No edema.      Left lower leg: No edema.   Skin:     General: Skin is warm and dry.   Neurological:      General: No focal deficit present.      Mental Status: She is alert.           Assessment:     1. Coronary artery disease involving native coronary artery of native heart without angina pectoris      mild, nonobstructive      2. Hypertension, unspecified type  EKG 12-lead    Ambulatory referral/consult to Cardiology    EKG 12-lead      3. Hyperlipidemia, unspecified hyperlipidemia type        4. Diabetes mellitus without complication        5. Acquired hypothyroidism        6. Former smoker              Plan:   Continue current medications  F/u in 6 months.         "

## 2024-01-04 ENCOUNTER — PATIENT MESSAGE (OUTPATIENT)
Dept: GASTROENTEROLOGY | Facility: CLINIC | Age: 69
End: 2024-01-04
Payer: MEDICARE

## 2024-01-04 PROBLEM — Z87.891 FORMER SMOKER: Chronic | Status: ACTIVE | Noted: 2024-01-04

## 2024-01-04 PROBLEM — E11.9 DIABETES MELLITUS WITHOUT COMPLICATION: Chronic | Status: ACTIVE | Noted: 2024-01-04

## 2024-01-04 PROBLEM — I25.10 CORONARY ARTERY DISEASE INVOLVING NATIVE CORONARY ARTERY OF NATIVE HEART WITHOUT ANGINA PECTORIS: Chronic | Status: ACTIVE | Noted: 2024-01-04

## 2024-01-17 ENCOUNTER — OFFICE VISIT (OUTPATIENT)
Dept: DERMATOLOGY | Facility: CLINIC | Age: 69
End: 2024-01-17
Payer: MEDICARE

## 2024-01-17 VITALS — RESPIRATION RATE: 18 BRPM | WEIGHT: 171 LBS | HEIGHT: 63 IN | BODY MASS INDEX: 30.3 KG/M2

## 2024-01-17 DIAGNOSIS — L23.5 ALLERGIC DERMATITIS DUE TO OTHER CHEMICAL PRODUCT: ICD-10-CM

## 2024-01-17 DIAGNOSIS — L13.0 DERMATITIS HERPETIFORMIS: Primary | ICD-10-CM

## 2024-01-17 DIAGNOSIS — L08.9 SKIN INFECTION: ICD-10-CM

## 2024-01-17 PROCEDURE — 87070 CULTURE OTHR SPECIMN AEROBIC: CPT | Mod: ,,, | Performed by: CLINICAL MEDICAL LABORATORY

## 2024-01-17 PROCEDURE — 87077 CULTURE AEROBIC IDENTIFY: CPT | Mod: ,,, | Performed by: CLINICAL MEDICAL LABORATORY

## 2024-01-17 PROCEDURE — 1159F MED LIST DOCD IN RCRD: CPT | Mod: CPTII,,, | Performed by: DERMATOLOGY

## 2024-01-17 PROCEDURE — 3008F BODY MASS INDEX DOCD: CPT | Mod: CPTII,,, | Performed by: DERMATOLOGY

## 2024-01-17 PROCEDURE — 87186 SC STD MICRODIL/AGAR DIL: CPT | Mod: ,,, | Performed by: CLINICAL MEDICAL LABORATORY

## 2024-01-17 PROCEDURE — 1101F PT FALLS ASSESS-DOCD LE1/YR: CPT | Mod: CPTII,,, | Performed by: DERMATOLOGY

## 2024-01-17 PROCEDURE — 1160F RVW MEDS BY RX/DR IN RCRD: CPT | Mod: CPTII,,, | Performed by: DERMATOLOGY

## 2024-01-17 PROCEDURE — 3288F FALL RISK ASSESSMENT DOCD: CPT | Mod: CPTII,,, | Performed by: DERMATOLOGY

## 2024-01-17 PROCEDURE — 99214 OFFICE O/P EST MOD 30 MIN: CPT | Mod: ,,, | Performed by: DERMATOLOGY

## 2024-01-17 RX ORDER — CLINDAMYCIN PHOSPHATE 11.9 MG/ML
SOLUTION TOPICAL
Qty: 60 ML | Refills: 3 | Status: SHIPPED | OUTPATIENT
Start: 2024-01-17

## 2024-01-17 NOTE — PROGRESS NOTES
[facility]   Shamika Valencia MD    Patient Name: Beverly Rodríguez  Patient YOB: 1955   Date of Service: 1/17/24    CC: Follow-up Dermatitis Herpetiformis     HPI: Beverly Rodríguez is a 68 y.o. female here today for follow-up of dermatitis herpetiforms , last seen 7/13/2023.  Previous treatments include TAC and gluten avoidance.  Overall, the dermatitis herpetiforms is stable.  Treatment plan was followed as directed.    Past Medical History:   Diagnosis Date    Abnormal LFTs     Asthma     ? 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    Nausea vomiting and diarrhea 2/7/2022     Past Surgical History:   Procedure Laterality Date    APPENDECTOMY      CHOLECYSTECTOMY      HYSTERECTOMY      LEFT HEART CATHETERIZATION N/A 10/3/2023    Procedure: Left heart cath;  Surgeon: Sacha Landers MD;  Location: [facility] CATH LAB;  Service: Cardiology;  Laterality: N/A;    TONSILLECTOMY       Review of patient's allergies indicates:   Allergen Reactions    Aricept [donepezil]     Codeine     Crestor [rosuvastatin]      Makes her sleepy    Dapsone      nausea    Flagyl [metronidazole]     Hydrocodone     Imitrex [sumatriptan]     Influenza virus vaccines     Opioids - morphine analogues     Penicillins     Tramadol     Aspirin Rash    Clindamycin Nausea Only    Latex, natural rubber Rash       Current Outpatient Medications:     albuterol (PROVENTIL) 2.5 mg /3 mL (0.083 %) nebulizer solution, Take 3 mLs (2.5 mg total) by nebulization every 6 (six) hours as needed for Wheezing. Rescue, Disp: 100 mL, Rfl: 3    ascorbic acid, vitamin C, (VITAMIN C) 1000 MG tablet, Take 1,000 mg by mouth once daily., Disp: , Rfl:     atorvastatin (LIPITOR) 40 MG tablet, Take 1 tablet (40 mg total) by mouth once daily., Disp: 30 tablet, Rfl: 0    calcium carbonate (TUMS) 200 mg calcium (500 mg) chewable tablet, Take 2 tablets by mouth nightly as needed for Heartburn., Disp: , Rfl:     clindamycin  (CLEOCIN T) 1 % external solution, Apply to AA on scalp daily, Disp: 60 mL, Rfl: 3    isosorbide mononitrate (IMDUR) 30 MG 24 hr tablet, Take 1 tablet (30 mg total) by mouth once daily., Disp: 90 tablet, Rfl: 3    mupirocin (BACTROBAN) 2 % ointment, Apply to AA on body BID PRN flares tapering with improvement, Disp: 30 g, Rfl: 11    thyroid, pork, (ARMOUR THYROID) 15 mg Tab, Take 15 mg by mouth., Disp: , Rfl:     thyroid, pork, (ARMOUR THYROID) 60 mg Tab, Take 1 tablet (60 mg total) by mouth before breakfast., Disp: 90 tablet, Rfl: 1    triamcinolone acetonide 0.1% (KENALOG) 0.1 % cream, Apply to AA on body BID PRN flares tapering with improvement, Disp: 453.6 g, Rfl: 2    ROS: A focused review of systems was obtained and negative.     Exam: A focused skin exam was performed. All areas examined were normal except as mentioned in the assessment and plan below.  General Appearance of the patient is well developed and well nourished.  Orientation: alert and oriented x 3.  Mood and affect: pleasant.    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 Diagnoses of Allergic dermatitis due to other chemical product and Skin infection were also pertinent to this visit.    Plan:   Medications Ordered This Encounter   Medications    clindamycin (CLEOCIN T) 1 % external solution     Sig: Apply to AA on scalp daily     Dispense:  60 mL     Refill:  3     Dermatitis Herpetiformis  - clear  Status: stable    Plan: Counseling.  I counseled the patient regarding the following:  Contact office if: Rash fails to resolve despite treatment.     - Follow up with GI as scheduled to continue to monitor  - continue to avoid gluten     Allergic Contact Dermatitis (L23.89)  - Well demarcated, geometric eczematous patches  Status: Inadequately controlled     Plan: Counseling.  I counseled the patient regarding the following:  Skin care: Patient should use hypoallergenic products such as unscented soaps. Eliminate  exposure to all new  cosmetics, fragrances, hair products, nail products shampoos, scented soaps, plants, metals and sunscreens.  Expectations: Allergic contact dermatitis can persist for several weeks before it fully resolves. Sometimes, patch  testing is necessary if reactions persist or if the patient is in contact with several potential allergens.  Contact office if: Allergic contact dermatitis worsens or fails to improve despite several weeks of treatment.    - slight flare on hands  - reviewed allergens  - continue triamcinolone PRN flares    Skin Infection, NOS   - crusted papules on the scalp    Plan: Counseling  I counseled the patient regarding the following:  Skin care: Patients with purulence or fluid collections should have their wounds re-opened, drained, cultured and irrigated. All wound infections should be treated with antibiotics.  Expectations: Wound Infections usually occur 4-7 days postoperatively. Patients exhibit, pain, rednes, swelling, cellulitic changes and fever.  Contact Office if: Wound Infection fails to respond to treatment or worsens, patient develops a fever, or if redness spreads despite antibiotics.    A bacterial culture was obtained from the scalp    - will start clindamycin while awaiting culture results     Follow up if symptoms worsen or fail to improve.    Shamika Valencia MD

## 2024-01-19 LAB — MICROORGANISM SPEC CULT: ABNORMAL

## 2024-01-22 DIAGNOSIS — B95.8 STAPH SKIN INFECTION: Primary | ICD-10-CM

## 2024-01-22 DIAGNOSIS — L08.9 STAPH SKIN INFECTION: Primary | ICD-10-CM

## 2024-01-22 RX ORDER — SULFAMETHOXAZOLE AND TRIMETHOPRIM 400; 80 MG/1; MG/1
1 TABLET ORAL 2 TIMES DAILY
Qty: 28 TABLET | Refills: 0 | Status: SHIPPED | OUTPATIENT
Start: 2024-01-22 | End: 2024-02-05

## 2024-01-22 RX ORDER — CEPHALEXIN 500 MG/1
500 CAPSULE ORAL 4 TIMES DAILY
Status: CANCELLED | OUTPATIENT
Start: 2024-01-22

## 2024-01-31 ENCOUNTER — EXTERNAL CHRONIC CARE MANAGEMENT (OUTPATIENT)
Dept: FAMILY MEDICINE | Facility: CLINIC | Age: 69
End: 2024-01-31
Payer: MEDICARE

## 2024-01-31 PROCEDURE — G0511 CCM/BHI BY RHC/FQHC 20MIN MO: HCPCS | Mod: ,,, | Performed by: FAMILY MEDICINE

## 2024-02-29 ENCOUNTER — EXTERNAL CHRONIC CARE MANAGEMENT (OUTPATIENT)
Dept: FAMILY MEDICINE | Facility: CLINIC | Age: 69
End: 2024-02-29
Payer: MEDICARE

## 2024-02-29 PROCEDURE — G0511 CCM/BHI BY RHC/FQHC 20MIN MO: HCPCS | Mod: ,,, | Performed by: FAMILY MEDICINE

## 2024-03-31 ENCOUNTER — EXTERNAL CHRONIC CARE MANAGEMENT (OUTPATIENT)
Dept: FAMILY MEDICINE | Facility: CLINIC | Age: 69
End: 2024-03-31
Payer: MEDICARE

## 2024-03-31 PROCEDURE — G0511 CCM/BHI BY RHC/FQHC 20MIN MO: HCPCS | Mod: ,,, | Performed by: FAMILY MEDICINE

## 2024-04-22 RX ORDER — ALBUTEROL SULFATE 0.83 MG/ML
2.5 SOLUTION RESPIRATORY (INHALATION) EVERY 6 HOURS PRN
Qty: 100 ML | Refills: 3 | Status: SHIPPED | OUTPATIENT
Start: 2024-04-22

## 2024-04-22 NOTE — TELEPHONE ENCOUNTER
----- Message from Glenys Perez sent at 4/22/2024  1:47 PM CDT -----  Regarding: Refill  Patient needs:     albuterol (PROVENTIL) 2.5 mg /3 mL (0.083 %) nebulizer solution    Pharmacy:    [facility] [address]    Phone #:    [phone]

## 2024-04-30 ENCOUNTER — EXTERNAL CHRONIC CARE MANAGEMENT (OUTPATIENT)
Dept: FAMILY MEDICINE | Facility: CLINIC | Age: 69
End: 2024-04-30
Payer: MEDICARE

## 2024-04-30 PROCEDURE — G0511 CCM/BHI BY RHC/FQHC 20MIN MO: HCPCS | Mod: ,,, | Performed by: FAMILY MEDICINE

## 2024-05-31 ENCOUNTER — EXTERNAL CHRONIC CARE MANAGEMENT (OUTPATIENT)
Dept: FAMILY MEDICINE | Facility: CLINIC | Age: 69
End: 2024-05-31
Payer: MEDICARE

## 2024-05-31 PROCEDURE — G0511 CCM/BHI BY RHC/FQHC 20MIN MO: HCPCS | Mod: ,,, | Performed by: FAMILY MEDICINE

## 2024-06-11 ENCOUNTER — TELEPHONE (OUTPATIENT)
Dept: GASTROENTEROLOGY | Facility: CLINIC | Age: 69
End: 2024-06-11
Payer: MEDICARE

## 2024-06-11 ENCOUNTER — OFFICE VISIT (OUTPATIENT)
Dept: GASTROENTEROLOGY | Facility: CLINIC | Age: 69
End: 2024-06-11
Payer: MEDICARE

## 2024-06-11 VITALS
WEIGHT: 199 LBS | BODY MASS INDEX: 35.26 KG/M2 | HEIGHT: 63 IN | SYSTOLIC BLOOD PRESSURE: 152 MMHG | HEART RATE: 54 BPM | DIASTOLIC BLOOD PRESSURE: 77 MMHG

## 2024-06-11 DIAGNOSIS — K90.0 CELIAC DISEASE: Primary | ICD-10-CM

## 2024-06-11 PROCEDURE — 3078F DIAST BP <80 MM HG: CPT | Mod: CPTII,,,

## 2024-06-11 PROCEDURE — 3077F SYST BP >= 140 MM HG: CPT | Mod: CPTII,,,

## 2024-06-11 PROCEDURE — 1101F PT FALLS ASSESS-DOCD LE1/YR: CPT | Mod: CPTII,,,

## 2024-06-11 PROCEDURE — 99999 PR PBB SHADOW E&M-EST. PATIENT-LVL IV: CPT | Mod: PBBFAC,,,

## 2024-06-11 PROCEDURE — 3008F BODY MASS INDEX DOCD: CPT | Mod: CPTII,,,

## 2024-06-11 PROCEDURE — 3288F FALL RISK ASSESSMENT DOCD: CPT | Mod: CPTII,,,

## 2024-06-11 PROCEDURE — 99214 OFFICE O/P EST MOD 30 MIN: CPT | Mod: S$PBB,,,

## 2024-06-11 PROCEDURE — 1160F RVW MEDS BY RX/DR IN RCRD: CPT | Mod: CPTII,,,

## 2024-06-11 PROCEDURE — 99214 OFFICE O/P EST MOD 30 MIN: CPT | Mod: PBBFAC

## 2024-06-11 PROCEDURE — 1159F MED LIST DOCD IN RCRD: CPT | Mod: CPTII,,,

## 2024-06-11 NOTE — PROGRESS NOTES
Gastroenterology Clinic Note    Patient ID: [id number]   Referring MD: No ref. provider found   Chief Complaint:   Chief Complaint   Patient presents with    Follow-up     PCP told her she needed test to follow up on Celiac disease     Constipation    Rectal Bleeding     When she was straining to go due to severe constipation        History of Present Illness   Beverly Rodríguez is an 68 y.o. WF who is referred for follow-up. She reports she has been on a gluten free since 3813-2783. Gliadin >150 in 2021. At present, the rash on her abdomen has improved. She has increased fiber within her diet and her constipation is improved. She denies any abdominal pain, nausea, vomiting, hematochezia or melena. She had EGD w/ dilation 08/2023 with good response and improvement in dysphagia.     Previous workup:  EGD    Last colonoscopy was 12/29/2021 with 7 year recall for screening purposes.    Interval  - doing well at follow-up; she continues to have occasional constipation  - she remains on gluten free diet    Review of Systems   Constitutional:  Negative for weight loss.   Gastrointestinal:  Positive for constipation. Negative for abdominal pain, blood in stool, diarrhea, heartburn, melena, nausea and vomiting.       Past Medical History      Past Medical History:   Diagnosis Date    Abnormal LFTs     Asthma     ? 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    Nausea vomiting and diarrhea 2/7/2022       Past Surgical History     Past Surgical History:   Procedure Laterality Date    APPENDECTOMY      CHOLECYSTECTOMY      HYSTERECTOMY      LEFT HEART CATHETERIZATION N/A 10/3/2023    Procedure: Left heart cath;  Surgeon: Sacha Landers MD;  Location: [facility] CATH LAB;  Service: Cardiology;  Laterality: N/A;    TONSILLECTOMY         Allergies     Review of patient's allergies indicates:   Allergen Reactions    Aricept [donepezil]     Codeine     Crestor [rosuvastatin]      Makes her sleepy     Dapsone      nausea    Flagyl [metronidazole]     Hydrocodone     Imitrex [sumatriptan]     Influenza virus vaccines     Opioids - morphine analogues     Penicillins     Tramadol     Aspirin Rash    Clindamycin Nausea Only    Latex, natural rubber Rash       Immunization History     There is no immunization history on file for this patient.    Past Family History      Family History   Problem Relation Name Age of Onset    Heart disease Mother      Kidney disease Mother      Heart disease Father      Cancer Paternal Aunt         Past Social History      Social History     Socioeconomic History    Marital status:    Tobacco Use    Smoking status: Former     Current packs/day: 0.20     Average packs/day: 0.2 packs/day for 1 year (0.2 ttl pk-yrs)     Types: Cigarettes     Passive exposure: Current    Smokeless tobacco: Never   Substance and Sexual Activity    Alcohol use: Not Currently    Drug use: Never    Sexual activity: Not Currently     Social Determinants of Health     Financial Resource Strain: Low Risk  (10/2/2023)    Overall Financial Resource Strain (CARDIA)     Difficulty of Paying Living Expenses: Not hard at all   Food Insecurity: No Food Insecurity (10/2/2023)    Hunger Vital Sign     Worried About Running Out of Food in the Last Year: Never true     Ran Out of Food in the Last Year: Never true   Transportation Needs: No Transportation Needs (10/2/2023)    PRAPARE - Transportation     Lack of Transportation (Medical): No     Lack of Transportation (Non-Medical): No   Physical Activity: Inactive (10/2/2023)    Exercise Vital Sign     Days of Exercise per Week: 0 days     Minutes of Exercise per Session: 0 min   Stress: No Stress Concern Present (10/2/2023)    [employer] [place] [employer] - Occupational Stress Questionnaire     Feeling of Stress : Not at all   Housing Stability: Unknown (10/2/2023)    Housing Stability Vital Sign     Unable to Pay for Housing in the Last Year: No      "Unstable Housing in the Last Year: No       Current Medications     Outpatient Medications Marked as Taking for the 6/11/24 encounter (Office Visit) with Chantal Zarate FNP   Medication Sig Dispense Refill    albuterol (PROVENTIL) 2.5 mg /3 mL (0.083 %) nebulizer solution Take 3 mLs (2.5 mg total) by nebulization every 6 (six) hours as needed for Wheezing. Rescue 100 mL 3    ascorbic acid, vitamin C, (VITAMIN C) 1000 MG tablet Take 1,000 mg by mouth once daily.      atorvastatin (LIPITOR) 40 MG tablet Take 1 tablet (40 mg total) by mouth once daily. 30 tablet 0    calcium carbonate (TUMS) 200 mg calcium (500 mg) chewable tablet Take 2 tablets by mouth nightly as needed for Heartburn.      clindamycin (CLEOCIN T) 1 % external solution Apply to AA on scalp daily 60 mL 3    isosorbide mononitrate (IMDUR) 30 MG 24 hr tablet Take 1 tablet (30 mg total) by mouth once daily. 90 tablet 3    mupirocin (BACTROBAN) 2 % ointment Apply to AA on body BID PRN flares tapering with improvement 30 g 11    thyroid, pork, (ARMOUR THYROID) 60 mg Tab Take 1 tablet (60 mg total) by mouth before breakfast. 90 tablet 1    triamcinolone acetonide 0.1% (KENALOG) 0.1 % cream Apply to AA on body BID PRN flares tapering with improvement 453.6 g 2        I have reviewed the current medications, allergies, vital signs, past medical and surgical history, family medical history, and social history for this encounter and agree with all findings.    OBJECTIVE    Physical Exam    BP (!) 152/77   Pulse (!) 54   Ht 5' 3" (1.6 m)   Wt 90.3 kg (199 lb)   BMI 35.25 kg/m²   GEN: Well appearing, cooperative, NAD  NECK: Supple, no LAD  CV: Normal rate  RESP: Unlabored  ABD: ND, NT, soft, no guarding  EXT: No clubbing, cyanosis, or edema  SKIN: Warm and dry  NEURO: AAO x4.     LABS    CBC (with or without Differential):   Lab Results   Component Value Date    WBC 5.12 12/01/2023    HGB 11.6 (L) 12/01/2023    HCT 35.0 (L) 12/01/2023    HCT 40 09/29/2023    " MCV 88.4 12/01/2023    MCH 29.3 12/01/2023    MCHC 33.1 12/01/2023    RDW 13.4 12/01/2023     12/01/2023    MPV 11.0 12/01/2023    NEUTOPHILPCT 71.1 (H) 12/01/2023    DIFFTYPE Auto 12/01/2023     BMP/CMP:   Lab Results   Component Value Date     12/01/2023    K 3.4 (L) 12/01/2023     12/01/2023    CO2 32 12/01/2023    BUN 12 12/01/2023    CREATININE 0.82 12/01/2023     (H) 12/01/2023    CALCIUM 9.8 12/01/2023    ALBUMIN 4.0 12/01/2023    AST 12 (L) 12/01/2023    ALT 31 12/01/2023    ALKPHOS 77 12/01/2023    MG 2.2 09/29/2023        IMAGING  CT abdomen pelvis with contrast 09/2023  - no acute findings within the abdomen or pelvis    ASSESSMENT  Beverly Rodríguez is a 68 y.o. WF with history of hypertension, diabetes, and celiac disease who is referred for follow-up.    1. Celiac disease           PLAN    - continue gluten free diet  - return to clinic for follow-up in 1 year, sooner as needed  Patient Instructions   - I recommend starting a daily fiber supplement with Citrucel or Fibercon (can purchase at your local pharmacy)  - I recommend that you also use Miralax 17 grams daily-to-twice daily as needed to have a regular, soft BM without straining you can adjust how often you need miralax, but start with daily dosing and go from there  - I recommend drinking at least 60-80 ounces of water daily unless you have a medical condition that requires fluid restriction        Orders Placed This Encounter   Procedures    CBC Auto Differential     Standing Status:   Future     Standing Expiration Date:   8/10/2025    Comprehensive Metabolic Panel     Standing Status:   Future     Standing Expiration Date:   8/10/2025         The risks and benefits of my recommendations, as well as other treatment options were discussed with the patient today. All questions were answered.    35 minutes of total time spent on the encounter, which includes face to face time and non-face to face time preparing to see the  patient (eg, review of tests), obtaining and/or reviewing separately obtained history, documenting clinical information in the electronic or other health record, Independently interpreting results (not separately reported) and communicating results to the patient/family/caregiver, or care coordination (not separately reported).        Chantal Zarate, FNP/ACNP  [facility]

## 2024-06-11 NOTE — PATIENT INSTRUCTIONS
- I recommend starting a daily fiber supplement with Citrucel or Fibercon (can purchase at your local pharmacy)  - I recommend that you also use Miralax 17 grams daily-to-twice daily as needed to have a regular, soft BM without straining you can adjust how often you need miralax, but start with daily dosing and go from there  - I recommend drinking at least 60-80 ounces of water daily unless you have a medical condition that requires fluid restriction

## 2024-06-30 ENCOUNTER — EXTERNAL CHRONIC CARE MANAGEMENT (OUTPATIENT)
Dept: FAMILY MEDICINE | Facility: CLINIC | Age: 69
End: 2024-06-30
Payer: MEDICARE

## 2024-06-30 PROCEDURE — G0511 CCM/BHI BY RHC/FQHC 20MIN MO: HCPCS | Mod: ,,, | Performed by: FAMILY MEDICINE

## 2024-07-09 ENCOUNTER — OFFICE VISIT (OUTPATIENT)
Dept: CARDIOLOGY | Facility: CLINIC | Age: 69
End: 2024-07-09
Payer: MEDICARE

## 2024-07-09 VITALS
WEIGHT: 192.38 LBS | HEART RATE: 59 BPM | OXYGEN SATURATION: 97 % | DIASTOLIC BLOOD PRESSURE: 64 MMHG | BODY MASS INDEX: 34.09 KG/M2 | HEIGHT: 63 IN | SYSTOLIC BLOOD PRESSURE: 132 MMHG

## 2024-07-09 DIAGNOSIS — R07.89 NON-CARDIAC CHEST PAIN: Chronic | ICD-10-CM

## 2024-07-09 DIAGNOSIS — I10 HYPERTENSION, UNSPECIFIED TYPE: Chronic | ICD-10-CM

## 2024-07-09 DIAGNOSIS — E11.9 DIABETES MELLITUS WITHOUT COMPLICATION: Chronic | ICD-10-CM

## 2024-07-09 DIAGNOSIS — E78.5 HYPERLIPIDEMIA, UNSPECIFIED HYPERLIPIDEMIA TYPE: Chronic | ICD-10-CM

## 2024-07-09 DIAGNOSIS — K21.9 GASTROESOPHAGEAL REFLUX DISEASE, UNSPECIFIED WHETHER ESOPHAGITIS PRESENT: Chronic | ICD-10-CM

## 2024-07-09 DIAGNOSIS — I25.10 CORONARY ARTERY DISEASE INVOLVING NATIVE CORONARY ARTERY OF NATIVE HEART WITHOUT ANGINA PECTORIS: Primary | Chronic | ICD-10-CM

## 2024-07-09 DIAGNOSIS — E03.9 ACQUIRED HYPOTHYROIDISM: Chronic | ICD-10-CM

## 2024-07-09 PROCEDURE — 93005 ELECTROCARDIOGRAM TRACING: CPT | Mod: PBBFAC | Performed by: INTERNAL MEDICINE

## 2024-07-09 PROCEDURE — 99214 OFFICE O/P EST MOD 30 MIN: CPT | Mod: S$PBB,,, | Performed by: INTERNAL MEDICINE

## 2024-07-09 PROCEDURE — 3075F SYST BP GE 130 - 139MM HG: CPT | Mod: CPTII,,, | Performed by: INTERNAL MEDICINE

## 2024-07-09 PROCEDURE — 1159F MED LIST DOCD IN RCRD: CPT | Mod: CPTII,,, | Performed by: INTERNAL MEDICINE

## 2024-07-09 PROCEDURE — 3008F BODY MASS INDEX DOCD: CPT | Mod: CPTII,,, | Performed by: INTERNAL MEDICINE

## 2024-07-09 PROCEDURE — 99999 PR PBB SHADOW E&M-EST. PATIENT-LVL III: CPT | Mod: PBBFAC,,, | Performed by: INTERNAL MEDICINE

## 2024-07-09 PROCEDURE — 3078F DIAST BP <80 MM HG: CPT | Mod: CPTII,,, | Performed by: INTERNAL MEDICINE

## 2024-07-09 PROCEDURE — 3288F FALL RISK ASSESSMENT DOCD: CPT | Mod: CPTII,,, | Performed by: INTERNAL MEDICINE

## 2024-07-09 PROCEDURE — 1101F PT FALLS ASSESS-DOCD LE1/YR: CPT | Mod: CPTII,,, | Performed by: INTERNAL MEDICINE

## 2024-07-09 PROCEDURE — 99213 OFFICE O/P EST LOW 20 MIN: CPT | Mod: PBBFAC,25 | Performed by: INTERNAL MEDICINE

## 2024-07-09 PROCEDURE — 93010 ELECTROCARDIOGRAM REPORT: CPT | Mod: S$PBB,,, | Performed by: INTERNAL MEDICINE

## 2024-07-10 LAB
OHS QRS DURATION: 134 MS
OHS QTC CALCULATION: 391 MS

## 2024-07-12 NOTE — PROGRESS NOTES
PCP: Prem Vincent MD    Referring Provider:     Subjective:   Beverly Rodríguez is a 68 y.o. female with hx of mild nonobstructive CAD, HTN, HLD, NIDDM, and hypothyroid who presents for 6 month follow up.     12/28/23--Beverly Rodríguez is a 68 y.o. female with hx of mild nonobstructive CAD, HTN, HLD, NIDDM, and hypothyroid who presents for cardiac follow up.       Fhx:  Family History   Problem Relation Name Age of Onset    Heart disease Mother      Kidney disease Mother      Heart disease Father      Cancer Paternal Aunt       Shx:   Social History     Socioeconomic History    Marital status:    Tobacco Use    Smoking status: Former     Current packs/day: 0.20     Average packs/day: 0.2 packs/day for 1 year (0.2 ttl pk-yrs)     Types: Cigarettes     Passive exposure: Current    Smokeless tobacco: Never   Substance and Sexual Activity    Alcohol use: Not Currently    Drug use: Never    Sexual activity: Not Currently     Social Determinants of Health     Financial Resource Strain: Low Risk  (10/2/2023)    Overall Financial Resource Strain (CARDIA)     Difficulty of Paying Living Expenses: Not hard at all   Food Insecurity: No Food Insecurity (10/2/2023)    Hunger Vital Sign     Worried About Running Out of Food in the Last Year: Never true     Ran Out of Food in the Last Year: Never true   Transportation Needs: No Transportation Needs (10/2/2023)    PRAPARE - Transportation     Lack of Transportation (Medical): No     Lack of Transportation (Non-Medical): No   Physical Activity: Inactive (10/2/2023)    Exercise Vital Sign     Days of Exercise per Week: 0 days     Minutes of Exercise per Session: 0 min   Stress: No Stress Concern Present (10/2/2023)    [employer] [place] [employer] - Occupational Stress Questionnaire     Feeling of Stress : Not at all   Housing Stability: Unknown (10/2/2023)    Housing Stability Vital Sign     Unable to Pay for Housing in the Last Year: No     Unstable Housing in the Last  Year: No       EKG   7/9/24--sinus bradycardia with sinus arrhythmia, RBBB, septal infarct- age undetermined, 48 bpm 12/28/23--NSR, RBBB, left posterior fascicular block, T wave ang, 64 bpm      Echo    9/29/23--Interpretation Summary    Left Ventricle: The left ventricle is normal in size. Normal wall thickness. Normal wall motion. There is low normal systolic function with a visually estimated ejection fraction of 50 - 55%. There is normal diastolic function.    Right Ventricle: Normal right ventricular cavity size. Systolic function is normal.    Aortic Valve: The aortic valve is a trileaflet valve.      Cardiac catheterization    9/29/23--Conclusion    The ejection fraction was calculated to be 55%.    The left ventricular systolic function was normal.    The left ventricular end diastolic pressure was normal.    The pre-procedure left ventricular end diastolic pressure was 15.    The estimated blood loss was none.    There was non-obstructive coronary artery disease..    There was no mitral valve regurgitation.    The procedure log was documented by Documenter: Juanis Contreras RN and verified by Sacha Landers MD.    Date: 10/3/2023  Time: 1:18 PM impression:    1. Mild, nonobstructive coronary artery disease.    2. Normal left ventricular size with inferolateral wall hypokinesis and overall normal left ventricular systolic function, ejection fraction 55%.    3. No significant mitral regurgitation.    Plan:    1. Medical management of coronary artery disease, nonobstructive.    2. Risk factor modification.    3. Long-acting nitrates, 81 mg aspirin daily.        Lab Results   Component Value Date     06/11/2024    K 4.4 06/11/2024     06/11/2024    CO2 31 06/11/2024    BUN 20 (H) 06/11/2024    CREATININE 0.90 06/11/2024    CALCIUM 10.0 06/11/2024    ANIONGAP 5 (L) 06/11/2024    ESTGFRAFRICA 77 10/06/2020    EGFRNONAA 63 02/14/2022       Lab Results   Component Value Date    CHOL 102  10/02/2023    CHOL 234 (H) 03/09/2023    CHOL 247 (H) 07/22/2021     Lab Results   Component Value Date    HDL 33 (L) 10/02/2023    HDL 45 03/09/2023    HDL 41 07/22/2021     Lab Results   Component Value Date    LDLCALC 49 10/02/2023    LDLCALC 163 03/09/2023    LDLCALC 159 07/22/2021     Lab Results   Component Value Date    TRIG 101 10/02/2023    TRIG 128 03/09/2023    TRIG 237 (H) 07/22/2021     Lab Results   Component Value Date    CHOLHDL 3.1 10/02/2023    CHOLHDL 5.2 03/09/2023    CHOLHDL 6.0 07/22/2021       Lab Results   Component Value Date    WBC 6.97 06/11/2024    HGB 12.5 06/11/2024    HCT 38.9 06/11/2024    MCV 87.6 06/11/2024     06/11/2024           Current Outpatient Medications:     albuterol (PROVENTIL) 2.5 mg /3 mL (0.083 %) nebulizer solution, Take 3 mLs (2.5 mg total) by nebulization every 6 (six) hours as needed for Wheezing. Rescue, Disp: 100 mL, Rfl: 3    calcium carbonate (TUMS) 200 mg calcium (500 mg) chewable tablet, Take 2 tablets by mouth nightly as needed for Heartburn., Disp: , Rfl:     isosorbide mononitrate (IMDUR) 30 MG 24 hr tablet, Take 1 tablet (30 mg total) by mouth once daily., Disp: 90 tablet, Rfl: 3    thyroid, pork, (ARMOUR THYROID) 60 mg Tab, Take 1 tablet (60 mg total) by mouth before breakfast., Disp: 90 tablet, Rfl: 1    ascorbic acid, vitamin C, (VITAMIN C) 1000 MG tablet, Take 1,000 mg by mouth once daily. (Patient not taking: Reported on 7/9/2024), Disp: , Rfl:     atorvastatin (LIPITOR) 40 MG tablet, Take 1 tablet (40 mg total) by mouth once daily. (Patient not taking: Reported on 7/9/2024), Disp: 30 tablet, Rfl: 0    clindamycin (CLEOCIN T) 1 % external solution, Apply to AA on scalp daily (Patient not taking: Reported on 7/9/2024), Disp: 60 mL, Rfl: 3    mupirocin (BACTROBAN) 2 % ointment, Apply to AA on body BID PRN flares tapering with improvement (Patient not taking: Reported on 7/9/2024), Disp: 30 g, Rfl: 11    triamcinolone acetonide 0.1% (KENALOG) 0.1  "% cream, Apply to AA on body BID PRN flares tapering with improvement (Patient not taking: Reported on 7/9/2024), Disp: 453.6 g, Rfl: 2  Did not bring medications.     Review of Systems   Respiratory:  Positive for shortness of breath.         On O2   Cardiovascular:  Positive for chest pain, palpitations and leg swelling.   Neurological:  Negative for loss of consciousness.           Objective:   /64 (BP Location: Left arm, Patient Position: Sitting)   Pulse (!) 59   Ht 5' 3" (1.6 m)   Wt 87.3 kg (192 lb 6.4 oz)   SpO2 97%   BMI 34.08 kg/m²     Physical Exam  Vitals reviewed.   Constitutional:       General: She is not in acute distress.  HENT:      Head: Normocephalic and atraumatic.   Neck:      Vascular: No carotid bruit or JVD.   Cardiovascular:      Rate and Rhythm: Normal rate and regular rhythm.      Pulses: Normal pulses.           Radial pulses are 2+ on the right side and 2+ on the left side.        Dorsalis pedis pulses are 2+ on the right side and 2+ on the left side.      Heart sounds: Murmur heard.   Pulmonary:      Effort: Pulmonary effort is normal.      Breath sounds: Normal breath sounds.   Musculoskeletal:      Right lower leg: No edema.      Left lower leg: No edema.   Skin:     General: Skin is warm and dry.   Neurological:      General: No focal deficit present.      Mental Status: She is alert.           Assessment:     1. Coronary artery disease involving native coronary artery of native heart without angina pectoris  EKG 12-lead    EKG 12-lead    mild, nonobstructive      2. Non-cardiac chest pain      probably GERD      3. Hypertension, unspecified type        4. Hyperlipidemia, unspecified hyperlipidemia type        5. Diabetes mellitus without complication        6. Acquired hypothyroidism        7. Gastroesophageal reflux disease, unspecified whether esophagitis present              Plan:   Continue current medications  F/u in 6 months.           "

## 2024-07-15 PROBLEM — K21.9 GASTROESOPHAGEAL REFLUX DISEASE: Chronic | Status: ACTIVE | Noted: 2024-07-15

## 2024-07-15 PROBLEM — R07.89 NON-CARDIAC CHEST PAIN: Status: ACTIVE | Noted: 2023-09-30

## 2024-07-31 ENCOUNTER — EXTERNAL CHRONIC CARE MANAGEMENT (OUTPATIENT)
Dept: FAMILY MEDICINE | Facility: CLINIC | Age: 69
End: 2024-07-31
Payer: MEDICARE

## 2024-07-31 PROCEDURE — G0511 CCM/BHI BY RHC/FQHC 20MIN MO: HCPCS | Mod: ,,, | Performed by: FAMILY MEDICINE

## 2024-08-31 ENCOUNTER — EXTERNAL CHRONIC CARE MANAGEMENT (OUTPATIENT)
Dept: FAMILY MEDICINE | Facility: CLINIC | Age: 69
End: 2024-08-31
Payer: MEDICARE

## 2024-08-31 PROCEDURE — G0511 CCM/BHI BY RHC/FQHC 20MIN MO: HCPCS | Mod: ,,, | Performed by: FAMILY MEDICINE

## 2024-09-30 ENCOUNTER — EXTERNAL CHRONIC CARE MANAGEMENT (OUTPATIENT)
Dept: FAMILY MEDICINE | Facility: CLINIC | Age: 69
End: 2024-09-30
Payer: MEDICARE

## 2024-09-30 PROCEDURE — G0511 CCM/BHI BY RHC/FQHC 20MIN MO: HCPCS | Mod: ,,, | Performed by: FAMILY MEDICINE

## 2024-10-16 ENCOUNTER — OFFICE VISIT (OUTPATIENT)
Dept: FAMILY MEDICINE | Facility: CLINIC | Age: 69
End: 2024-10-16
Payer: MEDICARE

## 2024-10-16 VITALS
BODY MASS INDEX: 33.95 KG/M2 | HEART RATE: 63 BPM | RESPIRATION RATE: 18 BRPM | TEMPERATURE: 98 F | WEIGHT: 191.63 LBS | DIASTOLIC BLOOD PRESSURE: 78 MMHG | OXYGEN SATURATION: 98 % | SYSTOLIC BLOOD PRESSURE: 138 MMHG | HEIGHT: 63 IN

## 2024-10-16 DIAGNOSIS — E03.9 HYPOTHYROIDISM, UNSPECIFIED TYPE: ICD-10-CM

## 2024-10-16 DIAGNOSIS — E11.9 TYPE 2 DIABETES MELLITUS WITHOUT COMPLICATION, WITHOUT LONG-TERM CURRENT USE OF INSULIN: ICD-10-CM

## 2024-10-16 DIAGNOSIS — I10 HYPERTENSION, UNSPECIFIED TYPE: Primary | ICD-10-CM

## 2024-10-16 DIAGNOSIS — R20.2 PARESTHESIA: ICD-10-CM

## 2024-10-16 LAB
ALBUMIN SERPL BCP-MCNC: 4.4 G/DL (ref 3.5–5)
ALBUMIN/GLOB SERPL: 1.3 {RATIO}
ALP SERPL-CCNC: 126 U/L (ref 55–142)
ALT SERPL W P-5'-P-CCNC: 60 U/L (ref 13–56)
ANION GAP SERPL CALCULATED.3IONS-SCNC: 10 MMOL/L (ref 7–16)
AST SERPL W P-5'-P-CCNC: 25 U/L (ref 15–37)
BASOPHILS # BLD AUTO: 0.08 K/UL (ref 0–0.2)
BASOPHILS NFR BLD AUTO: 1.2 % (ref 0–1)
BILIRUB SERPL-MCNC: 0.5 MG/DL (ref ?–1.2)
BILIRUB SERPL-MCNC: NEGATIVE MG/DL
BLOOD URINE, POC: NEGATIVE
BUN SERPL-MCNC: 19 MG/DL (ref 7–18)
BUN/CREAT SERPL: 21 (ref 6–20)
CALCIUM SERPL-MCNC: 9.7 MG/DL (ref 8.5–10.1)
CHLORIDE SERPL-SCNC: 104 MMOL/L (ref 98–107)
CHOLEST SERPL-MCNC: 235 MG/DL (ref 0–200)
CHOLEST/HDLC SERPL: 4.3 {RATIO}
CLARITY, UA: CLEAR
CO2 SERPL-SCNC: 28 MMOL/L (ref 21–32)
COLOR, UA: YELLOW
CREAT SERPL-MCNC: 0.92 MG/DL (ref 0.55–1.02)
DIFFERENTIAL METHOD BLD: ABNORMAL
EGFR (NO RACE VARIABLE) (RUSH/TITUS): 68 ML/MIN/1.73M2
EOSINOPHIL # BLD AUTO: 0.22 K/UL (ref 0–0.5)
EOSINOPHIL NFR BLD AUTO: 3.2 % (ref 1–4)
ERYTHROCYTE [DISTWIDTH] IN BLOOD BY AUTOMATED COUNT: 12.8 % (ref 11.5–14.5)
EST. AVERAGE GLUCOSE BLD GHB EST-MCNC: 105 MG/DL
FOLATE SERPL-MCNC: 2.5 NG/ML (ref 3.1–17.5)
GLOBULIN SER-MCNC: 3.5 G/DL (ref 2–4)
GLUCOSE SERPL-MCNC: 87 MG/DL (ref 74–106)
GLUCOSE UR QL STRIP: NEGATIVE
HBA1C MFR BLD HPLC: 5.3 % (ref 4.5–6.6)
HCT VFR BLD AUTO: 40.7 % (ref 38–47)
HDLC SERPL-MCNC: 55 MG/DL (ref 40–60)
HGB BLD-MCNC: 12.6 G/DL (ref 12–16)
IMM GRANULOCYTES # BLD AUTO: 0.02 K/UL (ref 0–0.04)
IMM GRANULOCYTES NFR BLD: 0.3 % (ref 0–0.4)
KETONES UR QL STRIP: NEGATIVE
LDLC SERPL CALC-MCNC: 146 MG/DL
LDLC/HDLC SERPL: 2.7 {RATIO}
LEUKOCYTE ESTERASE URINE, POC: NEGATIVE
LYMPHOCYTES # BLD AUTO: 1.29 K/UL (ref 1–4.8)
LYMPHOCYTES NFR BLD AUTO: 19.1 % (ref 27–41)
MCH RBC QN AUTO: 27.6 PG (ref 27–31)
MCHC RBC AUTO-ENTMCNC: 31 G/DL (ref 32–36)
MCV RBC AUTO: 89.1 FL (ref 80–96)
MONOCYTES # BLD AUTO: 0.39 K/UL (ref 0–0.8)
MONOCYTES NFR BLD AUTO: 5.8 % (ref 2–6)
MPC BLD CALC-MCNC: 10.3 FL (ref 9.4–12.4)
NEUTROPHILS # BLD AUTO: 4.77 K/UL (ref 1.8–7.7)
NEUTROPHILS NFR BLD AUTO: 70.4 % (ref 53–65)
NITRITE, POC UA: NEGATIVE
NONHDLC SERPL-MCNC: 180 MG/DL
NRBC # BLD AUTO: 0 X10E3/UL
NRBC, AUTO (.00): 0 %
PH, POC UA: 5
PLATELET # BLD AUTO: 350 K/UL (ref 150–400)
POTASSIUM SERPL-SCNC: 4.3 MMOL/L (ref 3.5–5.1)
PROT SERPL-MCNC: 7.9 G/DL (ref 6.4–8.2)
PROTEIN, POC: NEGATIVE
RBC # BLD AUTO: 4.57 M/UL (ref 4.2–5.4)
SODIUM SERPL-SCNC: 138 MMOL/L (ref 136–145)
SPECIFIC GRAVITY, POC UA: >=1.03
T3 SERPL-MCNC: 168 NG/DL (ref 60–181)
T4 FREE SERPL-MCNC: 0.73 NG/DL (ref 0.76–1.46)
TRIGL SERPL-MCNC: 169 MG/DL (ref 35–150)
TSH SERPL DL<=0.005 MIU/L-ACNC: 0.31 UIU/ML (ref 0.36–3.74)
UROBILINOGEN, POC UA: 0.2
VIT B12 SERPL-MCNC: 2858 PG/ML (ref 193–986)
VLDLC SERPL-MCNC: 34 MG/DL
WBC # BLD AUTO: 6.77 K/UL (ref 4.5–11)

## 2024-10-16 PROCEDURE — 84480 ASSAY TRIIODOTHYRONINE (T3): CPT | Mod: ,,, | Performed by: CLINICAL MEDICAL LABORATORY

## 2024-10-16 PROCEDURE — 84443 ASSAY THYROID STIM HORMONE: CPT | Mod: ,,, | Performed by: CLINICAL MEDICAL LABORATORY

## 2024-10-16 PROCEDURE — 82746 ASSAY OF FOLIC ACID SERUM: CPT | Mod: ,,, | Performed by: CLINICAL MEDICAL LABORATORY

## 2024-10-16 PROCEDURE — 84439 ASSAY OF FREE THYROXINE: CPT | Mod: ,,, | Performed by: CLINICAL MEDICAL LABORATORY

## 2024-10-16 PROCEDURE — 82607 VITAMIN B-12: CPT | Mod: ,,, | Performed by: CLINICAL MEDICAL LABORATORY

## 2024-10-16 PROCEDURE — 80053 COMPREHEN METABOLIC PANEL: CPT | Mod: ,,, | Performed by: CLINICAL MEDICAL LABORATORY

## 2024-10-16 PROCEDURE — 80061 LIPID PANEL: CPT | Mod: ,,, | Performed by: CLINICAL MEDICAL LABORATORY

## 2024-10-16 PROCEDURE — 83036 HEMOGLOBIN GLYCOSYLATED A1C: CPT | Mod: ,,, | Performed by: CLINICAL MEDICAL LABORATORY

## 2024-10-16 PROCEDURE — 85025 COMPLETE CBC W/AUTO DIFF WBC: CPT | Mod: ,,, | Performed by: CLINICAL MEDICAL LABORATORY

## 2024-10-18 PROBLEM — E11.9 TYPE 2 DIABETES MELLITUS WITHOUT COMPLICATION, WITHOUT LONG-TERM CURRENT USE OF INSULIN: Status: ACTIVE | Noted: 2024-10-18

## 2024-10-18 NOTE — ASSESSMENT & PLAN NOTE
Monitor - Pt is being monitored due to PMH of HTN. BP elevated today at 138/78.    Evaluate - CBC, CMP, HbA1c, Lipid panel, POCT Urinalysis w/o scope    Assessment - Elevated BP in the setting of Hypertension    Treat - Strict compliance with Isosorbide nitrate 30 mg once daily.

## 2024-10-18 NOTE — ASSESSMENT & PLAN NOTE
Monitor - Pt is also being monitored for DM2. HbA1c 1 year ago was 5%.    Evaluate - CBC, CMP, HbA1c, Lipid panel, POCT Urinalysis w/o scope    Assessment - Screening for DM2    Treat - Continue monitoring at least every 3 months.

## 2024-10-18 NOTE — ASSESSMENT & PLAN NOTE
Monitor - Pt is  being monitored due to PMH of hypothyroidism.    Evaluate - TSH, T3, Free T4    Assessment - Long standing hypothyroidism     Treat - Continue taking [place] thyroid 90 mg once daily

## 2024-10-18 NOTE — ASSESSMENT & PLAN NOTE
Monitor - Pt is being monitored due to PMH of paresthesia.    Evaluate - B12 and Folate    Assessment - Long standing paresthesia    Treat - Continue taking Vitamin B12 5000 mcg

## 2024-10-18 NOTE — PROGRESS NOTES
Philippe Freeman Jr., MD        PATIENT NAME: Beverly Rodríguez  : 1955  DATE: 10/16/24  MRN: [id number]      Billing Provider: Philippe Freeman Jr., MD  Level of Service:   Patient PCP Information       Provider PCP Type    Philippe Freeman Jr., MD General            Reason for Visit / Chief Complaint: Establish Care (Patient is establishing care. Patient stated she gordon wear oxygen at night)       Update PCP  Update Chief Complaint         History of Present Illness / Problem Focused Workflow      HPI  Pt is a 68 yo female with PMH of HTN, HLD, DM2, Hypothyroidism, Vit. D deficiency, Paresthesia, who presents to the clinic to establish care. She says her PCP Prem Vincent MD moved to [facility] and is too far away from her. Pt had abnormal nuclear stress test on 10/01/23; and LHC on 10/03/23 showing non-obstructive coronary disease of LAD (30%) and RCA (20%). Recommendations  by Cardiology included 81 mg Aspirin, and Long-acting nitrates. Pt is currently not taking aspirin citing allergy. She is however on Isosorbide nitrate 30 mg once daily, and [place] thyroid 90 mg once daily. Pt also takes Vit B12 supplements 5000 mcg, Milk extract 1000 mg, and Gingko Biloba extract.  Pt denies any fever, chills, nausea, vomiting, chest pains, palpitations, or SOB.       Review of Systems     Review of Systems   Constitutional:  Negative for chills, diaphoresis and fever.   HENT:  Negative for sinus pain and sore throat.    Respiratory:  Negative for cough, chest tightness, shortness of breath and wheezing.    Cardiovascular:  Negative for chest pain and palpitations.   Gastrointestinal:  Negative for abdominal pain, diarrhea and nausea.   Genitourinary:  Negative for flank pain and urgency.   Musculoskeletal:  Negative for back pain.   Neurological:  Negative for dizziness and headaches.   Psychiatric/Behavioral:  Negative for behavioral problems and confusion. The patient is not nervous/anxious.         Medical / Social  / Family History     Past Medical History:   Diagnosis Date    Abnormal LFTs     Asthma     ? 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    Nausea vomiting and diarrhea 2/7/2022       Past Surgical History:   Procedure Laterality Date    APPENDECTOMY      CHOLECYSTECTOMY      HYSTERECTOMY      LEFT HEART CATHETERIZATION N/A 10/3/2023    Procedure: Left heart cath;  Surgeon: Sacha Landers MD;  Location: [facility] CATH LAB;  Service: Cardiology;  Laterality: N/A;    TONSILLECTOMY         Social History  Ms.  reports that she has quit smoking. Her smoking use included cigarettes. She has a 0.2 pack-year smoking history. She has been exposed to tobacco smoke. She has never used smokeless tobacco. She reports that she does not currently use alcohol. She reports that she does not use drugs.    Family History  Ms.'s family history includes Cancer in her paternal aunt; Heart disease in her father and mother; Kidney disease in her mother.    Medications and Allergies     Medications  Outpatient Medications Marked as Taking for the 10/16/24 encounter (Office Visit) with Jr. Philippe Freeman MD   Medication Sig Dispense Refill    albuterol (PROVENTIL) 2.5 mg /3 mL (0.083 %) nebulizer solution Take 3 mLs (2.5 mg total) by nebulization every 6 (six) hours as needed for Wheezing. Rescue 100 mL 3    calcium carbonate (TUMS) 200 mg calcium (500 mg) chewable tablet Take 2 tablets by mouth nightly as needed for Heartburn.      isosorbide mononitrate (IMDUR) 30 MG 24 hr tablet Take 1 tablet (30 mg total) by mouth once daily. 90 tablet 3    thyroid, pork, (ARMOUR THYROID) 60 mg Tab Take 1 tablet (60 mg total) by mouth before breakfast. 90 tablet 1       Allergies  Review of patient's allergies indicates:   Allergen Reactions    Aricept [donepezil]     Codeine     Crestor [rosuvastatin]      Makes her sleepy    Dapsone      nausea    Flagyl [metronidazole]     Hydrocodone     Imitrex  [sumatriptan]     Influenza virus vaccines     Opioids - morphine analogues     Penicillins     Statins-hmg-coa reductase inhibitors Other (See Comments)    Tramadol     Aspirin Rash    Clindamycin Nausea Only    Latex, natural rubber Rash       Physical Examination     Vitals:    10/16/24 1027   BP: 138/78   Pulse: 63   Resp: 18   Temp: 98 °F (36.7 °C)     Physical Exam  Constitutional:       Appearance: She is obese.   HENT:      Head: Normocephalic and atraumatic.      Right Ear: External ear normal.      Left Ear: External ear normal.   Cardiovascular:      Rate and Rhythm: Normal rate and regular rhythm.      Pulses: Normal pulses.      Heart sounds: Normal heart sounds.   Pulmonary:      Breath sounds: No wheezing.   Chest:      Chest wall: No tenderness.   Neurological:      Mental Status: She is alert.   Psychiatric:         Behavior: Behavior normal.         Thought Content: Thought content normal.          Assessment and Plan (including Health Maintenance)      Problem List  Smart Sets  Document Outside HM   :  1. Hypertension, unspecified type  Assessment & Plan:  Monitor - Pt is being monitored due to PMH of HTN. BP elevated today at 138/78.    Evaluate - CBC, CMP, HbA1c, Lipid panel, POCT Urinalysis w/o scope    Assessment - Elevated BP in the setting of Hypertension    Treat - Strict compliance with Isosorbide nitrate 30 mg once daily.      2. Type 2 diabetes mellitus without complication, without long-term current use of insulin  Assessment & Plan:  Monitor - Pt is also being monitored for DM2. HbA1c 1 year ago was 5%.    Evaluate - CBC, CMP, HbA1c, Lipid panel, POCT Urinalysis w/o scope    Assessment - Screening for DM2    Treat - Continue monitoring at least every 3 months.      3. Hypothyroidism, unspecified type  Assessment & Plan:  Monitor - Pt is  being monitored due to PMH of hypothyroidism.    Evaluate - TSH, T3, Free T4    Assessment - Long standing hypothyroidism     Treat - Continue taking  [place] thyroid 90 mg once daily      4. Paresthesia  Assessment & Plan:  Monitor - Pt is being monitored due to PMH of paresthesia.    Evaluate - B12 and Folate    Assessment - Long standing paresthesia    Treat - Continue taking Vitamin B12 5000 mcg        Health Maintenance Due   Topic Date Due    Diabetes Urine Screening  Never done    Foot Exam  Never done    Eye Exam  Never done    Mammogram  10/30/2024            Health Maintenance Topics with due status: Not Due       Topic Last Completion Date    Colorectal Cancer Screening 12/29/2021    DEXA Scan 12/20/2023    Lipid Panel 10/16/2024    Hemoglobin A1c 10/16/2024       Future Appointments   Date Time Provider Department Center   11/4/2024 12:40 PM [facility]   11/13/2024  2:00 PM Jr. Philippe Freeman MD [facility]   6/11/2025  8:00 AM Chantal Zarate FNP [facility]      Pt advised to F/UP in 1 month or sooner.    Signature:  Philippe Freeman Jr., MD      Date of encounter: 10/16/24

## 2024-10-24 ENCOUNTER — OFFICE VISIT (OUTPATIENT)
Dept: DERMATOLOGY | Facility: CLINIC | Age: 69
End: 2024-10-24
Payer: MEDICARE

## 2024-10-24 VITALS — WEIGHT: 191.56 LBS | BODY MASS INDEX: 33.94 KG/M2 | RESPIRATION RATE: 18 BRPM | HEIGHT: 63 IN

## 2024-10-24 DIAGNOSIS — D18.01 CHERRY ANGIOMA: ICD-10-CM

## 2024-10-24 DIAGNOSIS — L13.0 DERMATITIS HERPETIFORMIS: ICD-10-CM

## 2024-10-24 DIAGNOSIS — D48.5 NEOPLASM OF UNCERTAIN BEHAVIOR OF SKIN: ICD-10-CM

## 2024-10-24 DIAGNOSIS — L30.4 INTERTRIGO: Primary | ICD-10-CM

## 2024-10-24 PROCEDURE — 88305 TISSUE EXAM BY PATHOLOGIST: CPT | Mod: 26,,, | Performed by: PATHOLOGY

## 2024-10-24 PROCEDURE — 88305 TISSUE EXAM BY PATHOLOGIST: CPT | Mod: TC,SUR | Performed by: DERMATOLOGY

## 2024-10-24 RX ORDER — TRIAMCINOLONE ACETONIDE 0.25 MG/G
CREAM TOPICAL
Qty: 80 G | Refills: 3 | Status: SHIPPED | OUTPATIENT
Start: 2024-10-24

## 2024-10-24 NOTE — PROGRESS NOTES
[facility]   Shamika Valencia MD    Patient Name: Beverly Rodríguez  Patient YOB: 1955   Date of Service: 10/24/24    CC: Lesion    HPI: Beverly Rodríguez is a 69 y.o. female here today for lesion, located on the submental chin.  Lesion has been present for 2 years.  Previous treatments include no treatment.  Patient is also concerned today about lesion located on the chest.    Past Medical History:   Diagnosis Date    Abnormal LFTs     Asthma     ? 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    Nausea vomiting and diarrhea 2/7/2022     Past Surgical History:   Procedure Laterality Date    APPENDECTOMY      CHOLECYSTECTOMY      HYSTERECTOMY      LEFT HEART CATHETERIZATION N/A 10/3/2023    Procedure: Left heart cath;  Surgeon: Sacha Landers MD;  Location: [facility] CATH LAB;  Service: Cardiology;  Laterality: N/A;    TONSILLECTOMY       Review of patient's allergies indicates:   Allergen Reactions    Aricept [donepezil]     Codeine     Crestor [rosuvastatin]      Makes her sleepy    Dapsone      nausea    Flagyl [metronidazole]     Hydrocodone     Imitrex [sumatriptan]     Influenza virus vaccines     Opioids - morphine analogues     Penicillins     Statins-hmg-coa reductase inhibitors Other (See Comments)    Tramadol     Aspirin Rash    Clindamycin Nausea Only    Latex, natural rubber Rash       Current Outpatient Medications:     albuterol (PROVENTIL) 2.5 mg /3 mL (0.083 %) nebulizer solution, Take 3 mLs (2.5 mg total) by nebulization every 6 (six) hours as needed for Wheezing. Rescue, Disp: 100 mL, Rfl: 3    ascorbic acid, vitamin C, (VITAMIN C) 1000 MG tablet, Take 1,000 mg by mouth once daily. (Patient not taking: Reported on 10/16/2024), Disp: , Rfl:     calcium carbonate (TUMS) 200 mg calcium (500 mg) chewable tablet, Take 2 tablets by mouth nightly as needed for Heartburn., Disp: , Rfl:     clindamycin (CLEOCIN T) 1 % external solution, Apply to AA on  scalp daily (Patient not taking: Reported on 10/16/2024), Disp: 60 mL, Rfl: 3    isosorbide mononitrate (IMDUR) 30 MG 24 hr tablet, Take 1 tablet (30 mg total) by mouth once daily., Disp: 90 tablet, Rfl: 3    mupirocin (BACTROBAN) 2 % ointment, Apply to AA on body BID PRN flares tapering with improvement (Patient not taking: Reported on 10/16/2024), Disp: 30 g, Rfl: 11    thyroid, pork, (ARMOUR THYROID) 60 mg Tab, Take 1 tablet (60 mg total) by mouth before breakfast., Disp: 90 tablet, Rfl: 1    triamcinolone acetonide 0.1% (KENALOG) 0.1 % cream, Apply to AA on body BID PRN flares tapering with improvement (Patient not taking: Reported on 10/16/2024), Disp: 453.6 g, Rfl: 2    ROS: A focused review of systems was obtained and negative.     Exam: A focused skin exam was performed. All areas examined were normal except as mentioned in the assessment and plan below.  General Appearance of the patient is well developed and well nourished.  Orientation: alert and oriented x 3.  Mood and affect: pleasant.    Assessment:   The primary encounter diagnosis was Intertrigo. Diagnoses of Cherry angioma, Neoplasm of uncertain behavior of skin, and Dermatitis herpetiformis were also pertinent to this visit.    Plan:      Cherry Angiomas  - Scattered bright pink papules    Plan: Counseling  I counseled the patient regarding the diagnosis including that cherry angiomas are benign skin growths requiring no treatment. Patients get more as they age.    Neoplasm of Uncertain Behavior (D48.5)  - eroded papule located on the left submental chin  Ddx includes: PN vs BCC    Plan: Counseling.  I counseled the patient regarding the following:  Instructions: Neoplasms of Uncertain Behavior can be observed, biopsied or surgically removed depending on the  level of clinical suspicion.  Instructions: Neoplasms of Uncertain Behavior can be observed, biopsied or surgically removed depending on the  level of clinical suspicion.  Contact Office if:  patient develops any new lesions that fail to heal, ulcerate or bleed.    Plan: Biopsy by Shave Method.  Location (1): left submental chin  Written consent was obtained and risks were reviewed including but not  limited to scarring, infection, bleeding, scabbing, incomplete removal, nerve damage and allergy to anesthesia.  The area was prepped with Chloraprep. Local anesthesia was obtained with approximately 0.5cc of 1% lidocaine  with epinephrine. A biopsy by shave method to the level of the dermis (sent for H and E) was performed using  a Dermablade on the above location. Aluminum chloride was used for hemostasis. Following the biopsy  Petrolatum and a bandage were applied. Patient will be notified of biopsy results. However, patient instructed to  call the office if not contacted within 2 weeks.    Dermatitis Herpetiformis  - clear  Status: stable    Plan: Counseling.  I counseled the patient regarding the following:  Contact office if: Rash fails to resolve despite treatment.    - continue gluten free diet    Intertrigo (L30.4)  - Weepy macerated erythematous patches  Status: Inadequately controlled     Plan: Counseling.  I counseled the patient regarding the following:  Skin care: Intertrigo responds to absorbent or topical antifungal powders to reduce friction and moisture, and low  potency topical steroids to reduce inflammation.  Expectations: Intertrigo is a rash that occurs with frictional rubbing of skin in warm and moist environments. Common intertriginous locations  include: inframammary skin, inguinal folds, abdominal folds.  Contact office if: Rash continues to spread despite treatment.    - will start triamcinolone     Follow up if symptoms worsen or fail to improve.    Shamika Valencia MD

## 2024-10-31 ENCOUNTER — EXTERNAL CHRONIC CARE MANAGEMENT (OUTPATIENT)
Dept: FAMILY MEDICINE | Facility: CLINIC | Age: 69
End: 2024-10-31
Payer: MEDICARE

## 2024-10-31 PROCEDURE — G0511 CCM/BHI BY RHC/FQHC 20MIN MO: HCPCS | Mod: ,,, | Performed by: FAMILY MEDICINE

## 2024-11-04 ENCOUNTER — HOSPITAL ENCOUNTER (OUTPATIENT)
Dept: RADIOLOGY | Facility: HOSPITAL | Age: 69
Discharge: HOME | End: 2024-11-04
Attending: FAMILY MEDICINE
Payer: MEDICARE

## 2024-11-04 VITALS — WEIGHT: 191 LBS | BODY MASS INDEX: 33.84 KG/M2 | HEIGHT: 63 IN

## 2024-11-04 DIAGNOSIS — Z12.31 OTHER SCREENING MAMMOGRAM: ICD-10-CM

## 2024-11-04 PROCEDURE — 77067 SCR MAMMO BI INCL CAD: CPT | Mod: 26,,, | Performed by: RADIOLOGY

## 2024-11-04 PROCEDURE — 77063 BREAST TOMOSYNTHESIS BI: CPT | Mod: 26,,, | Performed by: RADIOLOGY

## 2024-11-04 PROCEDURE — 77063 BREAST TOMOSYNTHESIS BI: CPT | Mod: TC

## 2024-11-20 ENCOUNTER — OFFICE VISIT (OUTPATIENT)
Dept: FAMILY MEDICINE | Facility: CLINIC | Age: 69
End: 2024-11-20
Payer: MEDICARE

## 2024-11-20 VITALS
WEIGHT: 195.63 LBS | DIASTOLIC BLOOD PRESSURE: 89 MMHG | TEMPERATURE: 98 F | HEART RATE: 63 BPM | BODY MASS INDEX: 34.66 KG/M2 | RESPIRATION RATE: 18 BRPM | HEIGHT: 63 IN | SYSTOLIC BLOOD PRESSURE: 138 MMHG | OXYGEN SATURATION: 96 %

## 2024-11-20 DIAGNOSIS — R20.2 PARESTHESIA: ICD-10-CM

## 2024-11-20 DIAGNOSIS — I10 HYPERTENSION, UNSPECIFIED TYPE: Primary | ICD-10-CM

## 2024-11-20 DIAGNOSIS — E03.8 OTHER SPECIFIED HYPOTHYROIDISM: ICD-10-CM

## 2024-11-20 DIAGNOSIS — E78.5 HYPERLIPIDEMIA, UNSPECIFIED HYPERLIPIDEMIA TYPE: ICD-10-CM

## 2024-11-20 PROCEDURE — 1159F MED LIST DOCD IN RCRD: CPT | Mod: ,,, | Performed by: FAMILY MEDICINE

## 2024-11-20 PROCEDURE — 3075F SYST BP GE 130 - 139MM HG: CPT | Mod: ,,, | Performed by: FAMILY MEDICINE

## 2024-11-20 PROCEDURE — 99212 OFFICE O/P EST SF 10 MIN: CPT | Mod: GC,,, | Performed by: FAMILY MEDICINE

## 2024-11-20 PROCEDURE — 3288F FALL RISK ASSESSMENT DOCD: CPT | Mod: ,,, | Performed by: FAMILY MEDICINE

## 2024-11-20 PROCEDURE — 3079F DIAST BP 80-89 MM HG: CPT | Mod: ,,, | Performed by: FAMILY MEDICINE

## 2024-11-20 PROCEDURE — 3008F BODY MASS INDEX DOCD: CPT | Mod: ,,, | Performed by: FAMILY MEDICINE

## 2024-11-20 PROCEDURE — 1126F AMNT PAIN NOTED NONE PRSNT: CPT | Mod: ,,, | Performed by: FAMILY MEDICINE

## 2024-11-20 PROCEDURE — 3044F HG A1C LEVEL LT 7.0%: CPT | Mod: ,,, | Performed by: FAMILY MEDICINE

## 2024-11-20 PROCEDURE — 1101F PT FALLS ASSESS-DOCD LE1/YR: CPT | Mod: ,,, | Performed by: FAMILY MEDICINE

## 2024-11-23 NOTE — ASSESSMENT & PLAN NOTE
Monitor - Pt is being monitored due to PMH of HTN. BP elevated today at 138/89 vs 138/78 last visit.     Evaluate - BP measurement in clinic today: 138/89     Assessment - Elevated BP in the setting of Hypertension     Treat - Strict compliance with Isosorbide nitrate 30 mg once daily. Might consider increasing dose if BP still elevated at next visit.

## 2024-11-23 NOTE — ASSESSMENT & PLAN NOTE
Monitor - Pt is being monitored due to PMH of paresthesia.     Evaluate - B12 level on 10/16/2024 2,858 and Folate 2.5     Assessment - Long standing paresthesia     Treat - Pt advised to stop any further B12 shots or supplements as the level is high. We will re-evaluate in 3 months.

## 2024-11-23 NOTE — PROGRESS NOTES
Philippe Freeman Jr., MD        PATIENT NAME: Beverly Rodríguez  : 1955  DATE: 24  MRN: [id number]      Billing Provider: Philippe Freeman Jr., MD  Level of Service:   Patient PCP Information       Provider PCP Type    Philippe Freeman Jr., MD General            Reason for Visit / Chief Complaint: Hypertension (Follow up on hypertension) and Follow-up       Update PCP  Update Chief Complaint         History of Present Illness / Problem Focused Workflow     HPI  Pt is a 68 yo female with PMH of HTN, HLD, Hypothyroidism, Vit. D deficiency, Paresthesia, who presents for her 4 wk F/UP. BP today is 138/89.  Pt denies any fever, chills, nausea, vomiting, chest pains, palpitations, or SOB.       Review of Systems     Review of Systems   Constitutional:  Negative for chills and fever.   Respiratory:  Negative for cough, chest tightness, shortness of breath and wheezing.    Cardiovascular:  Negative for chest pain, palpitations and leg swelling.   Gastrointestinal:  Negative for abdominal pain, diarrhea, nausea and vomiting.   Psychiatric/Behavioral:  Negative for agitation and confusion. The patient is not nervous/anxious.         Medical / Social / Family History     Past Medical History:   Diagnosis Date    Abnormal LFTs     Asthma     ? diagnosis    Hypertension     Hypothyroidism     from birth    Nausea vomiting and diarrhea 2022       Past Surgical History:   Procedure Laterality Date    APPENDECTOMY      CHOLECYSTECTOMY      HYSTERECTOMY      LEFT HEART CATHETERIZATION N/A 10/3/2023    Procedure: Left heart cath;  Surgeon: Sacha Landers MD;  Location: [facility] CATH LAB;  Service: Cardiology;  Laterality: N/A;    TONSILLECTOMY         Social History  Ms.  reports that she has quit smoking. Her smoking use included cigarettes. She has a 0.2 pack-year smoking history. She has been exposed to tobacco smoke. She has never used smokeless tobacco. She reports that she does not currently use alcohol. She  reports that she does not use drugs.    Family History  Ms.'s family history includes Cancer in her paternal aunt; Heart disease in her father and mother; Kidney disease in her mother.    Medications and Allergies     Medications  Outpatient Medications Marked as Taking for the 11/20/24 encounter (Office Visit) with Jr. Philippe Freeman MD   Medication Sig Dispense Refill    albuterol (PROVENTIL) 2.5 mg /3 mL (0.083 %) nebulizer solution Take 3 mLs (2.5 mg total) by nebulization every 6 (six) hours as needed for Wheezing. Rescue 100 mL 3    calcium carbonate (TUMS) 200 mg calcium (500 mg) chewable tablet Take 2 tablets by mouth nightly as needed for Heartburn.      isosorbide mononitrate (IMDUR) 30 MG 24 hr tablet Take 1 tablet (30 mg total) by mouth once daily. 90 tablet 3    thyroid, pork, (ARMOUR THYROID) 60 mg Tab Take 1 tablet (60 mg total) by mouth before breakfast. 90 tablet 1       Allergies  Review of patient's allergies indicates:   Allergen Reactions    Aricept [donepezil]     Codeine     Crestor [rosuvastatin]      Makes her sleepy    Dapsone      nausea    Flagyl [metronidazole]     Hydrocodone     Imitrex [sumatriptan]     Influenza virus vaccines     Opioids - morphine analogues     Penicillins     Statins-hmg-coa reductase inhibitors Other (See Comments)    Tramadol     Aspirin Rash    Clindamycin Nausea Only    Latex, natural rubber Rash       Physical Examination     Vitals:    11/20/24 1329   BP: 138/89   Pulse: 63   Resp: 18   Temp: 98 °F (36.7 °C)     Physical Exam  HENT:      Right Ear: External ear normal.      Left Ear: External ear normal.   Cardiovascular:      Rate and Rhythm: Normal rate and regular rhythm.   Pulmonary:      Effort: No respiratory distress.      Breath sounds: No wheezing.   Chest:      Chest wall: No tenderness.   Psychiatric:         Mood and Affect: Mood normal.         Behavior: Behavior normal.         Thought Content: Thought content normal.         Judgment:  Judgment normal.          Assessment and Plan (including Health Maintenance)      Problem List  Smart Sets  Document Outside HM   :  1. Hypertension, unspecified type  Assessment & Plan:  Monitor - Pt is being monitored due to PMH of HTN. BP elevated today at 138/89 vs 138/78 last visit.     Evaluate - BP measurement in clinic today: 138/89     Assessment - Elevated BP in the setting of Hypertension     Treat - Strict compliance with Isosorbide nitrate 30 mg once daily. Might consider increasing dose if BP still elevated at next visit.      2. Hyperlipidemia, unspecified hyperlipidemia type  Assessment & Plan:  Monitor - Pt is being monitored due to hyperlipidemia.    Evaluate - Lipid panel 10/16/2024 showed: Total cholesterol 235 (vs 102 1 year ago);  (vs 49 1 year ago); and Triglycerides 169 (101 1 year ago)    Assessment - Hyperlipidemia in the setting of non-obstructive CAD    Treat - Pt reports statin intolerance in the past and is hesitant to get on it again. Pt educated on the importance of statin therapy considering her cardiac history.      3. Paresthesia  Assessment & Plan:  Monitor - Pt is being monitored due to PMH of paresthesia.     Evaluate - B12 level on 10/16/2024 2,858 and Folate 2.5     Assessment - Long standing paresthesia     Treat - Pt advised to stop any further B12 shots or supplements as the level is high. We will re-evaluate in 3 months.      4. Other specified hypothyroidism  Assessment & Plan:  Monitor - Pt is  being monitored due to PMH of hypothyroidism.     Evaluate - 10/16/2024 : TSH 0.312;  Free T4 0.73     Assessment - Long standing hypothyroidism      Treat - Continue taking [place] thyroid 90 mg once daily; F/UP with Endocrinologist in January 2025 appointment.          Health Maintenance Due   Topic Date Due    Foot Exam  Never done    Eye Exam  Never done    RSV Vaccine (Age 60+ and Pregnant patients) (1 - Risk 60-74 years 1-dose series) Never done            Health  Maintenance Topics with due status: Not Due       Topic Last Completion Date    Colorectal Cancer Screening 12/29/2021    DEXA Scan 12/20/2023    Lipid Panel 10/16/2024    Hemoglobin A1c 10/16/2024    Mammogram 11/04/2024       Future Appointments   Date Time Provider Department Center   2/19/2025  1:00 PM Jr. Philippe Freeman MD [facility]   6/11/2025  8:00 AM Chantal Zarate FNP [facility]   11/10/2025 11:20 AM [facility]      Pt advised to F/UP in 3 months or visit ER if severe symptoms.    Signature:  Philippe Freeman Jr., MD      Date of encounter: 11/20/24

## 2024-11-23 NOTE — ASSESSMENT & PLAN NOTE
Monitor - Pt is being monitored due to hyperlipidemia.    Evaluate - Lipid panel 10/16/2024 showed: Total cholesterol 235 (vs 102 1 year ago);  (vs 49 1 year ago); and Triglycerides 169 (101 1 year ago)    Assessment - Hyperlipidemia in the setting of non-obstructive CAD    Treat - Pt reports statin intolerance in the past and is hesitant to get on it again. Pt educated on the importance of statin therapy considering her cardiac history.

## 2024-11-23 NOTE — ASSESSMENT & PLAN NOTE
Monitor - Pt is  being monitored due to PMH of hypothyroidism.     Evaluate - 10/16/2024 : TSH 0.312;  Free T4 0.73     Assessment - Long standing hypothyroidism      Treat - Continue taking [place] thyroid 90 mg once daily; F/UP with Endocrinologist in January 2025 appointment.

## 2024-11-30 ENCOUNTER — EXTERNAL CHRONIC CARE MANAGEMENT (OUTPATIENT)
Dept: FAMILY MEDICINE | Facility: CLINIC | Age: 69
End: 2024-11-30
Payer: MEDICARE

## 2024-11-30 PROCEDURE — G0511 CCM/BHI BY RHC/FQHC 20MIN MO: HCPCS | Mod: ,,, | Performed by: FAMILY MEDICINE

## 2024-12-09 ENCOUNTER — PATIENT MESSAGE (OUTPATIENT)
Dept: CARDIOLOGY | Facility: CLINIC | Age: 69
End: 2024-12-09
Payer: MEDICARE

## 2024-12-31 ENCOUNTER — EXTERNAL CHRONIC CARE MANAGEMENT (OUTPATIENT)
Dept: FAMILY MEDICINE | Facility: CLINIC | Age: 69
End: 2024-12-31
Payer: MEDICARE

## 2024-12-31 PROCEDURE — G0511 CCM/BHI BY RHC/FQHC 20MIN MO: HCPCS | Mod: ,,, | Performed by: FAMILY MEDICINE

## 2025-01-22 NOTE — ASSESSMENT & PLAN NOTE
TSH and T4 wnl  Patient takes home thyroid (pork). She reports the synthroid does not correct her hypothyroidism  Will continue home thyroid (pork)   N/A

## 2025-01-31 ENCOUNTER — EXTERNAL CHRONIC CARE MANAGEMENT (OUTPATIENT)
Dept: FAMILY MEDICINE | Facility: CLINIC | Age: 70
End: 2025-01-31
Payer: MEDICARE

## 2025-01-31 PROCEDURE — G0511 CCM/BHI BY RHC/FQHC 20MIN MO: HCPCS | Mod: ,,, | Performed by: FAMILY MEDICINE

## 2025-02-25 RX ORDER — ISOSORBIDE MONONITRATE 30 MG/1
30 TABLET, EXTENDED RELEASE ORAL DAILY
Qty: 90 TABLET | Refills: 1 | Status: SHIPPED | OUTPATIENT
Start: 2025-02-25 | End: 2026-02-25

## 2025-02-25 NOTE — TELEPHONE ENCOUNTER
----- Message from Ana sent at 2/24/2025  1:42 PM CST -----  Regarding: refill (no refills left)  Who Called: Beverly RodríguezRefill or New Rx: RxRX Name and Strength:isosorbide mononitrate (IMDUR) 30 MG 24 hr tablet 90 tablet Sig: Take 1 tablet (30 mg total) by mouth once daily.Route: OralList of preferred pharmacies on file (remove unneeded): [unfilled][facility], [place] - [address] [place] [id number]: [phone] Fax: [phone] Method of Contact: Phone CallPatient's Preferred Phone Number on File: [phone]

## 2025-02-27 ENCOUNTER — OFFICE VISIT (OUTPATIENT)
Dept: DERMATOLOGY | Facility: CLINIC | Age: 70
End: 2025-02-27
Payer: MEDICARE

## 2025-02-27 DIAGNOSIS — L08.9 SKIN INFECTION: ICD-10-CM

## 2025-02-27 DIAGNOSIS — L23.5 ALLERGIC DERMATITIS DUE TO OTHER CHEMICAL PRODUCT: ICD-10-CM

## 2025-02-27 DIAGNOSIS — D18.01 CHERRY ANGIOMA: ICD-10-CM

## 2025-02-27 DIAGNOSIS — L13.0 DERMATITIS HERPETIFORMIS: Primary | ICD-10-CM

## 2025-02-27 PROCEDURE — 87077 CULTURE AEROBIC IDENTIFY: CPT | Mod: ,,, | Performed by: CLINICAL MEDICAL LABORATORY

## 2025-02-27 PROCEDURE — 87186 SC STD MICRODIL/AGAR DIL: CPT | Mod: ,,, | Performed by: CLINICAL MEDICAL LABORATORY

## 2025-02-27 PROCEDURE — 87070 CULTURE OTHR SPECIMN AEROBIC: CPT | Mod: ,,, | Performed by: CLINICAL MEDICAL LABORATORY

## 2025-02-27 RX ORDER — TRIAMCINOLONE ACETONIDE 1 MG/G
CREAM TOPICAL
Qty: 453.6 G | Refills: 2 | Status: SHIPPED | OUTPATIENT
Start: 2025-02-27

## 2025-02-27 RX ORDER — SULFAMETHOXAZOLE AND TRIMETHOPRIM 400; 80 MG/1; MG/1
1 TABLET ORAL 2 TIMES DAILY
Qty: 14 TABLET | Refills: 0 | Status: SHIPPED | OUTPATIENT
Start: 2025-02-27 | End: 2025-03-06

## 2025-02-27 NOTE — Clinical Note
Will you look and see if there is a dietician we can refer to to help with gluten avoidance?  I am also looking in to see if we can refer her to social work

## 2025-02-27 NOTE — PROGRESS NOTES
[facility]   Shamika Valencia MD    Patient Name: Beverly Rodríguez  Patient YOB: 1955   Date of Service: 2/27/25    CC: Rash    HPI: Beverly Rodríguez is a 69 y.o. female here today for rash, located on the all over body.  Rash has been present for 1 months.  Previous treatments include OTC creams.      Past Medical History:   Diagnosis Date    Abnormal LFTs     Asthma     ? diagnosis    Hypertension     Hypothyroidism     from birth    Nausea vomiting and diarrhea 02/07/2022     Past Surgical History:   Procedure Laterality Date    APPENDECTOMY      CHOLECYSTECTOMY      HYSTERECTOMY      LEFT HEART CATHETERIZATION N/A 10/3/2023    Procedure: Left heart cath;  Surgeon: Sacha Landers MD;  Location: [facility] CATH LAB;  Service: Cardiology;  Laterality: N/A;    TONSILLECTOMY       Review of patient's allergies indicates:   Allergen Reactions    Aricept [donepezil]     Codeine     Crestor [rosuvastatin]      Makes her sleepy    Dapsone      nausea    Flagyl [metronidazole]     Hydrocodone     Imitrex [sumatriptan]     Influenza virus vaccines     Opioids - morphine analogues     Penicillins     Statins-hmg-coa reductase inhibitors Other (See Comments)    Tramadol     Aspirin Rash    Clindamycin Nausea Only    Latex, natural rubber Rash     Current Medications[1]    ROS: A focused review of systems was obtained and negative.     Exam: A focused skin exam was performed. All areas examined were normal except as mentioned in the assessment and plan below.  General Appearance of the patient is well developed and well nourished.  Orientation: alert and oriented x 3.  Mood and affect: pleasant.    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 Diagnoses of Skin infection, Cherry angioma, and Allergic dermatitis due to other chemical product were also pertinent to this visit.    Plan:   Medications Ordered This Encounter   Medications    sulfamethoxazole-trimethoprim 400-80mg  (BACTRIM,SEPTRA) 400-80 mg per tablet     Sig: Take 1 tablet by mouth 2 (two) times daily. for 7 days     Dispense:  14 tablet     Refill:  0    triamcinolone acetonide 0.1% (KENALOG) 0.1 % cream     Sig: Apply to AA on body BID PRN flares tapering with improvement     Dispense:  453.6 g     Refill:  2       Cherry Angiomas  - Scattered bright pink papules    Plan: Counseling  I counseled the patient regarding the diagnosis including that cherry angiomas are benign skin growths requiring no treatment. Patients get more as they age.    Dermatitis Herpetiformis  - Symmetrical erythematous papules   Status: Inadequately controlled      Plan: Counseling.  I counseled the patient regarding the following:  Contact office if: Rash fails to resolve despite treatment.     - allergic to dapsone  - pt struggling with gluten free diet despite multiple visits counseling pt on dx  - will arrange social work to help with understanding of dx  -will refer to dietitian      Skin Infection, NOS   - crusted papules    Plan: Counseling  I counseled the patient regarding the following:  Skin care: Patients with purulence or fluid collections should have their wounds re-opened, drained, cultured and irrigated. All wound infections should be treated with antibiotics.  Expectations: Wound Infections usually occur 4-7 days postoperatively. Patients exhibit, pain, rednes, swelling, cellulitic changes and fever.  Contact Office if: Wound Infection fails to respond to treatment or worsens, patient develops a fever, or if redness spreads despite antibiotics.    A bacterial culture was obtained from the bilateral legs    -begin Bactrim     Allergic Contact Dermatitis (L23.89)  - Well demarcated, geometric eczematous patches  Status: Inadequately controlled      Plan: Counseling.  I counseled the patient regarding the following:  Skin care: Patient should use hypoallergenic products such as unscented soaps. Eliminate exposure to all  new  cosmetics, fragrances, hair products, nail products shampoos, scented soaps, plants, metals and sunscreens.  Expectations: Allergic contact dermatitis can persist for several weeks before it fully resolves. Sometimes, patch  testing is necessary if reactions persist or if the patient is in contact with several potential allergens.  Contact office if: Allergic contact dermatitis worsens or fails to improve despite several weeks of treatment.    - reviewed allergens  - also struggling to understand how to avoid allergens, will refer to social work  -continue TAC     Follow up if symptoms worsen or fail to improve.    Shamika Valencia MD           [1]   Current Outpatient Medications:     albuterol (PROVENTIL) 2.5 mg /3 mL (0.083 %) nebulizer solution, Take 3 mLs (2.5 mg total) by nebulization every 6 (six) hours as needed for Wheezing. Rescue, Disp: 100 mL, Rfl: 3    ascorbic acid, vitamin C, (VITAMIN C) 1000 MG tablet, Take 1,000 mg by mouth once daily. (Patient not taking: Reported on 7/9/2024), Disp: , Rfl:     calcium carbonate (TUMS) 200 mg calcium (500 mg) chewable tablet, Take 2 tablets by mouth nightly as needed for Heartburn., Disp: , Rfl:     clindamycin (CLEOCIN T) 1 % external solution, Apply to AA on scalp daily (Patient not taking: Reported on 7/9/2024), Disp: 60 mL, Rfl: 3    isosorbide mononitrate (IMDUR) 30 MG 24 hr tablet, Take 1 tablet (30 mg total) by mouth once daily., Disp: 90 tablet, Rfl: 1    mupirocin (BACTROBAN) 2 % ointment, Apply to AA on body BID PRN flares tapering with improvement (Patient not taking: Reported on 7/9/2024), Disp: 30 g, Rfl: 11    sulfamethoxazole-trimethoprim 400-80mg (BACTRIM,SEPTRA) 400-80 mg per tablet, Take 1 tablet by mouth 2 (two) times daily. for 7 days, Disp: 14 tablet, Rfl: 0    thyroid, pork, (ARMOUR THYROID) 60 mg Tab, Take 1 tablet (60 mg total) by mouth before breakfast., Disp: 90 tablet, Rfl: 1    triamcinolone acetonide 0.025% (KENALOG) 0.025 % cream, Apply  to AA under breasts BID PRN flares tapering with improvement (Patient not taking: Reported on 11/20/2024), Disp: 80 g, Rfl: 3    triamcinolone acetonide 0.1% (KENALOG) 0.1 % cream, Apply to AA on body BID PRN flares tapering with improvement, Disp: 453.6 g, Rfl: 2

## 2025-03-01 LAB — MICROORGANISM SPEC CULT: ABNORMAL

## 2025-03-03 ENCOUNTER — RESULTS FOLLOW-UP (OUTPATIENT)
Dept: DERMATOLOGY | Facility: CLINIC | Age: 70
End: 2025-03-03

## 2025-03-20 ENCOUNTER — TELEPHONE (OUTPATIENT)
Dept: DERMATOLOGY | Facility: CLINIC | Age: 70
End: 2025-03-20
Payer: MEDICARE

## 2025-03-20 NOTE — TELEPHONE ENCOUNTER
Returned call to pt,  recommends pt to come in for a re-culture and will determine if antibiotic are needed. Appt for re culture made on 03/24/2025 @14:30 for nurse visit.     ----- Message from Joyce sent at 3/20/2025 12:59 PM CDT -----  Regarding: call back  Who Called: Beverly Fraga is requesting assistance/information from provider's office.Patient would like a call back from the nurse. Preferred Method of Contact: Phone CallPatient's Preferred Phone Number on File: [phone] Best Call Back Number, if different:Additional Information:

## 2025-03-20 NOTE — TELEPHONE ENCOUNTER
----- Message from Rosemarie sent at 3/19/2025  2:48 PM CDT -----  Regarding: Refill on the Bactrim-pt states she needs the full strength antibotic because the generic doesn't work well  Who Called: Beverly Fraga is requesting assistance/information from provider's office.Preferred Method of Contact: Phone CallPatient's Preferred Phone Number on File: [phone] Best Call Back Number, if different:Additional Information: Refill on the Bactrim-pt states she needs the full strength antibotic because the generic doesn't work well and sent to:[facility], [place] - [address] [facility] [id number] [place] [address] [place] [id number]: [phone] Fax: [phone]

## 2025-03-24 ENCOUNTER — CLINICAL SUPPORT (OUTPATIENT)
Dept: DERMATOLOGY | Facility: CLINIC | Age: 70
End: 2025-03-24
Payer: MEDICARE

## 2025-03-24 DIAGNOSIS — L08.9 SKIN INFECTION: Primary | ICD-10-CM

## 2025-03-24 PROCEDURE — 87070 CULTURE OTHR SPECIMN AEROBIC: CPT | Mod: ,,, | Performed by: CLINICAL MEDICAL LABORATORY

## 2025-03-24 PROCEDURE — 87186 SC STD MICRODIL/AGAR DIL: CPT | Mod: ,,, | Performed by: CLINICAL MEDICAL LABORATORY

## 2025-03-24 PROCEDURE — 87077 CULTURE AEROBIC IDENTIFY: CPT | Mod: ,,, | Performed by: CLINICAL MEDICAL LABORATORY

## 2025-03-24 NOTE — PROGRESS NOTES
[facility]   Shamika Valencia MD    Patient Name: Beveryl Rodríguez  Patient YOB: 1955   Date of Service: 3/24/25    CC: Follow-up Skin Infection    HPI: Beverly Rodríguez is a 69 y.o. female here today for follow-up of skin infection, last seen 2/27/25.  Previous treatments include bactrim.  Overall, the skin infection is improved.  Treatment plan was followed as directed.    Past Medical History:   Diagnosis Date    Abnormal LFTs     Asthma     ? diagnosis    Hypertension     Hypothyroidism     from birth    Nausea vomiting and diarrhea 02/07/2022     Past Surgical History:   Procedure Laterality Date    APPENDECTOMY      CHOLECYSTECTOMY      HYSTERECTOMY      LEFT HEART CATHETERIZATION N/A 10/3/2023    Procedure: Left heart cath;  Surgeon: Sacha Landers MD;  Location: [facility] CATH LAB;  Service: Cardiology;  Laterality: N/A;    TONSILLECTOMY       Review of patient's allergies indicates:   Allergen Reactions    Aricept [donepezil]     Codeine     Crestor [rosuvastatin]      Makes her sleepy    Dapsone      nausea    Flagyl [metronidazole]     Hydrocodone     Imitrex [sumatriptan]     Influenza virus vaccines     Opioids - morphine analogues     Penicillins     Statins-hmg-coa reductase inhibitors Other (See Comments)    Tramadol     Aspirin Rash    Clindamycin Nausea Only    Latex, natural rubber Rash     Current Medications[1]    ROS: A focused review of systems was obtained and negative.     Exam: A focused skin exam was performed. All areas examined were normal except as mentioned in the assessment and plan below.  General Appearance of the patient is well developed and well nourished.  Orientation: alert and oriented x 3.  Mood and affect: pleasant.    Assessment:   The encounter diagnosis was Skin infection.    Plan:        Skin Infection, NOS     Plan: Counseling  I counseled the patient regarding the following:  Skin care: Patients with purulence or fluid collections should have their  wounds re-opened, drained, cultured and irrigated. All wound infections should be treated with antibiotics.  Expectations: Wound Infections usually occur 4-7 days postoperatively. Patients exhibit, pain, rednes, swelling, cellulitic changes and fever.  Contact Office if: Wound Infection fails to respond to treatment or worsens, patient develops a fever, or if redness spreads despite antibiotics.    A bacterial culture was obtained from the bilateral legs    Follow up if symptoms worsen or fail to improve.    Rodrick Pond RN           [1]   Current Outpatient Medications:     albuterol (PROVENTIL) 2.5 mg /3 mL (0.083 %) nebulizer solution, Take 3 mLs (2.5 mg total) by nebulization every 6 (six) hours as needed for Wheezing. Rescue, Disp: 100 mL, Rfl: 3    ascorbic acid, vitamin C, (VITAMIN C) 1000 MG tablet, Take 1,000 mg by mouth once daily. (Patient not taking: Reported on 7/9/2024), Disp: , Rfl:     calcium carbonate (TUMS) 200 mg calcium (500 mg) chewable tablet, Take 2 tablets by mouth nightly as needed for Heartburn., Disp: , Rfl:     clindamycin (CLEOCIN T) 1 % external solution, Apply to AA on scalp daily (Patient not taking: Reported on 7/9/2024), Disp: 60 mL, Rfl: 3    isosorbide mononitrate (IMDUR) 30 MG 24 hr tablet, Take 1 tablet (30 mg total) by mouth once daily., Disp: 90 tablet, Rfl: 1    mupirocin (BACTROBAN) 2 % ointment, Apply to AA on body BID PRN flares tapering with improvement (Patient not taking: Reported on 7/9/2024), Disp: 30 g, Rfl: 11    thyroid, pork, (ARMOUR THYROID) 60 mg Tab, Take 1 tablet (60 mg total) by mouth before breakfast., Disp: 90 tablet, Rfl: 1    triamcinolone acetonide 0.025% (KENALOG) 0.025 % cream, Apply to AA under breasts BID PRN flares tapering with improvement (Patient not taking: Reported on 11/20/2024), Disp: 80 g, Rfl: 3    triamcinolone acetonide 0.1% (KENALOG) 0.1 % cream, Apply to AA on body BID PRN flares tapering with improvement, Disp: 453.6 g, Rfl:  2

## 2025-03-27 ENCOUNTER — RESULTS FOLLOW-UP (OUTPATIENT)
Dept: DERMATOLOGY | Facility: CLINIC | Age: 70
End: 2025-03-27
Payer: MEDICARE

## 2025-03-27 DIAGNOSIS — L08.9 SKIN INFECTION: ICD-10-CM

## 2025-03-27 LAB — MICROORGANISM SPEC CULT: ABNORMAL

## 2025-03-28 RX ORDER — SULFAMETHOXAZOLE AND TRIMETHOPRIM 400; 80 MG/1; MG/1
1 TABLET ORAL 2 TIMES DAILY
Qty: 14 TABLET | Refills: 0 | Status: SHIPPED | OUTPATIENT
Start: 2025-03-28 | End: 2025-04-04

## 2025-03-31 ENCOUNTER — EXTERNAL CHRONIC CARE MANAGEMENT (OUTPATIENT)
Dept: FAMILY MEDICINE | Facility: CLINIC | Age: 70
End: 2025-03-31
Payer: MEDICARE

## 2025-03-31 PROCEDURE — G0511 CCM/BHI BY RHC/FQHC 20MIN MO: HCPCS | Mod: ,,, | Performed by: FAMILY MEDICINE

## 2025-05-02 ENCOUNTER — HOSPITAL ENCOUNTER (EMERGENCY)
Facility: HOSPITAL | Age: 70
Discharge: HOME | End: 2025-05-02
Attending: EMERGENCY MEDICINE
Payer: MEDICARE

## 2025-05-02 VITALS
WEIGHT: 190 LBS | TEMPERATURE: 99 F | OXYGEN SATURATION: 95 % | DIASTOLIC BLOOD PRESSURE: 68 MMHG | HEART RATE: 48 BPM | RESPIRATION RATE: 16 BRPM | BODY MASS INDEX: 33.66 KG/M2 | SYSTOLIC BLOOD PRESSURE: 141 MMHG | HEIGHT: 63 IN

## 2025-05-02 DIAGNOSIS — R07.9 CHEST PAIN: ICD-10-CM

## 2025-05-02 LAB
ALBUMIN SERPL BCP-MCNC: 4.3 G/DL (ref 3.4–4.8)
ALBUMIN/GLOB SERPL: 1.3 {RATIO}
ALP SERPL-CCNC: 95 U/L (ref 40–150)
ALT SERPL W P-5'-P-CCNC: 17 U/L
ANION GAP SERPL CALCULATED.3IONS-SCNC: 14 MMOL/L (ref 7–16)
AST SERPL W P-5'-P-CCNC: 21 U/L (ref 11–45)
BASOPHILS # BLD AUTO: 0.05 K/UL (ref 0–0.2)
BASOPHILS NFR BLD AUTO: 0.9 % (ref 0–1)
BILIRUB SERPL-MCNC: 0.4 MG/DL
BUN SERPL-MCNC: 10 MG/DL (ref 10–20)
BUN/CREAT SERPL: 10 (ref 6–20)
CALCIUM SERPL-MCNC: 9 MG/DL (ref 8.4–10.2)
CHLORIDE SERPL-SCNC: 106 MMOL/L (ref 98–107)
CO2 SERPL-SCNC: 24 MMOL/L (ref 23–31)
CREAT SERPL-MCNC: 1.02 MG/DL (ref 0.55–1.02)
DIFFERENTIAL METHOD BLD: ABNORMAL
EGFR (NO RACE VARIABLE) (RUSH/TITUS): 60 ML/MIN/1.73M2
EOSINOPHIL # BLD AUTO: 0.15 K/UL (ref 0–0.5)
EOSINOPHIL NFR BLD AUTO: 2.7 % (ref 1–4)
ERYTHROCYTE [DISTWIDTH] IN BLOOD BY AUTOMATED COUNT: 13.1 % (ref 11.5–14.5)
GLOBULIN SER-MCNC: 3.2 G/DL (ref 2–4)
GLUCOSE SERPL-MCNC: 85 MG/DL (ref 82–115)
HCT VFR BLD AUTO: 38.9 % (ref 38–47)
HGB BLD-MCNC: 12.6 G/DL (ref 12–16)
IMM GRANULOCYTES # BLD AUTO: 0 K/UL (ref 0–0.04)
IMM GRANULOCYTES NFR BLD: 0 % (ref 0–0.4)
LYMPHOCYTES # BLD AUTO: 1.5 K/UL (ref 1–4.8)
LYMPHOCYTES NFR BLD AUTO: 27.5 % (ref 27–41)
MCH RBC QN AUTO: 28.4 PG (ref 27–31)
MCHC RBC AUTO-ENTMCNC: 32.4 G/DL (ref 32–36)
MCV RBC AUTO: 87.8 FL (ref 80–96)
MONOCYTES # BLD AUTO: 0.38 K/UL (ref 0–0.8)
MONOCYTES NFR BLD AUTO: 7 % (ref 2–6)
MPC BLD CALC-MCNC: 10.5 FL (ref 9.4–12.4)
NEUTROPHILS # BLD AUTO: 3.38 K/UL (ref 1.8–7.7)
NEUTROPHILS NFR BLD AUTO: 61.9 % (ref 53–65)
NRBC # BLD AUTO: 0 X10E3/UL
NRBC, AUTO (.00): 0 %
NT-PROBNP SERPL-MCNC: 16 PG/ML (ref 1–125)
PLATELET # BLD AUTO: 269 K/UL (ref 150–400)
POTASSIUM SERPL-SCNC: 3.6 MMOL/L (ref 3.5–5.1)
PROT SERPL-MCNC: 7.5 G/DL (ref 5.8–7.6)
RBC # BLD AUTO: 4.43 M/UL (ref 4.2–5.4)
SODIUM SERPL-SCNC: 140 MMOL/L (ref 136–145)
TROPONIN I SERPL HS-MCNC: <2.7 NG/L
TROPONIN I SERPL HS-MCNC: <2.7 NG/L
WBC # BLD AUTO: 5.46 K/UL (ref 4.5–11)

## 2025-05-02 PROCEDURE — 93005 ELECTROCARDIOGRAM TRACING: CPT

## 2025-05-02 PROCEDURE — 36415 COLL VENOUS BLD VENIPUNCTURE: CPT | Performed by: EMERGENCY MEDICINE

## 2025-05-02 PROCEDURE — 99285 EMERGENCY DEPT VISIT HI MDM: CPT | Mod: 25

## 2025-05-02 PROCEDURE — 80053 COMPREHEN METABOLIC PANEL: CPT | Performed by: EMERGENCY MEDICINE

## 2025-05-02 PROCEDURE — 93010 ELECTROCARDIOGRAM REPORT: CPT | Mod: ,,, | Performed by: INTERNAL MEDICINE

## 2025-05-02 PROCEDURE — 85025 COMPLETE CBC W/AUTO DIFF WBC: CPT | Performed by: EMERGENCY MEDICINE

## 2025-05-02 PROCEDURE — 84484 ASSAY OF TROPONIN QUANT: CPT | Performed by: EMERGENCY MEDICINE

## 2025-05-02 PROCEDURE — 83880 ASSAY OF NATRIURETIC PEPTIDE: CPT | Performed by: EMERGENCY MEDICINE

## 2025-05-02 PROCEDURE — 25000003 PHARM REV CODE 250: Performed by: EMERGENCY MEDICINE

## 2025-05-02 RX ORDER — PANTOPRAZOLE SODIUM 40 MG/1
40 TABLET, DELAYED RELEASE ORAL DAILY
Qty: 30 TABLET | Refills: 0 | Status: SHIPPED | OUTPATIENT
Start: 2025-05-02 | End: 2026-05-02

## 2025-05-02 RX ORDER — ALUMINUM HYDROXIDE, MAGNESIUM HYDROXIDE, AND SIMETHICONE 1200; 120; 1200 MG/30ML; MG/30ML; MG/30ML
30 SUSPENSION ORAL ONCE
Status: COMPLETED | OUTPATIENT
Start: 2025-05-02 | End: 2025-05-02

## 2025-05-02 RX ORDER — LIDOCAINE HYDROCHLORIDE 20 MG/ML
10 SOLUTION OROPHARYNGEAL ONCE
Status: COMPLETED | OUTPATIENT
Start: 2025-05-02 | End: 2025-05-02

## 2025-05-02 RX ADMIN — NITROGLYCERIN 1 INCH: 20 OINTMENT TOPICAL at 01:05

## 2025-05-02 RX ADMIN — ALUMINUM HYDROXIDE, MAGNESIUM HYDROXIDE, AND SIMETHICONE 30 ML: 200; 200; 20 SUSPENSION ORAL at 01:05

## 2025-05-02 RX ADMIN — LIDOCAINE HYDROCHLORIDE 10 ML: 20 SOLUTION ORAL at 01:05

## 2025-05-02 NOTE — ED PROVIDER NOTES
Encounter Date: 5/2/2025       History     Chief Complaint   Patient presents with    Chest Pain    Arm Pain     Patient presents to the ED via [facility] with c/o left-sided chest tightness that radiates into her arm.     Patient is a 69-year-old female who presents to the emergency department with 3-4 day history of intermittent episodes of substernal chest pain that radiates into left arm with some associated nausea and vomiting, occasional diaphoresis.  Patient reports some dyspnea on exertion but has not noticed this is necessarily in association with her chest pain.  Patient has a mild chronic cough.  No fever, no leg pain or swelling, no other acute problems or complaints at this time.        Review of patient's allergies indicates:   Allergen Reactions    Aricept [donepezil]     Codeine     Crestor [rosuvastatin]      Makes her sleepy    Dapsone      nausea    Flagyl [metronidazole]     Hydrocodone     Imitrex [sumatriptan]     Influenza virus vaccines     Opioids - morphine analogues     Penicillins     Statins-hmg-coa reductase inhibitors Other (See Comments)    Tramadol     Aspirin Rash    Clindamycin Nausea Only    Latex, natural rubber Rash     Past Medical History:   Diagnosis Date    Abnormal LFTs     Asthma     ? diagnosis    Hypertension     Hypothyroidism     from birth    Nausea vomiting and diarrhea 02/07/2022     Past Surgical History:   Procedure Laterality Date    APPENDECTOMY      CHOLECYSTECTOMY      HYSTERECTOMY      LEFT HEART CATHETERIZATION N/A 10/3/2023    Procedure: Left heart cath;  Surgeon: Sacha Landers MD;  Location: [facility] CATH LAB;  Service: Cardiology;  Laterality: N/A;    TONSILLECTOMY       Family History   Problem Relation Name Age of Onset    Heart disease Mother      Kidney disease Mother      Heart disease Father      Cancer Paternal Aunt       Social History[1]  Review of Systems   Respiratory:  Positive for cough and shortness of breath.    Cardiovascular:   Positive for chest pain.   All other systems reviewed and are negative.      Physical Exam     Initial Vitals [05/02/25 1206]   BP Pulse Resp Temp SpO2   (!) 178/86 (!) 57 12 98.6 °F (37 °C) 95 %      MAP       --         Physical Exam    Nursing note and vitals reviewed.  Constitutional: She appears well-developed and well-nourished.   HENT:   Head: Normocephalic.   Eyes: Pupils are equal, round, and reactive to light.   Cardiovascular:  Normal rate.           Pulmonary/Chest: Breath sounds normal.   Abdominal: Abdomen is soft.   Musculoskeletal:         General: Normal range of motion.     Neurological: She is alert.   Skin: Skin is warm. Capillary refill takes less than 2 seconds.   Psychiatric: She has a normal mood and affect.         Medical Screening Exam   See Full Note    ED Course   Procedures  Labs Reviewed   CBC WITH DIFFERENTIAL - Abnormal       Result Value    WBC 5.46      RBC 4.43      Hemoglobin 12.6      Hematocrit 38.9      MCV 87.8      MCH 28.4      MCHC 32.4      RDW 13.1      Platelet Count 269      MPV 10.5      Neutrophils % 61.9      Lymphocytes % 27.5      Monocytes % 7.0 (*)     Eosinophils % 2.7      Basophils % 0.9      Immature Granulocytes % 0.0      nRBC, Auto 0.0      Neutrophils, Abs 3.38      Lymphocytes, Absolute 1.50      Monocytes, Absolute 0.38      Eosinophils, Absolute 0.15      Basophils, Absolute 0.05      Immature Granulocytes, Absolute 0.00      nRBC, Absolute 0.00      Diff Type Auto     TROPONIN I - Normal    Troponin I High Sensitivity <2.7     TROPONIN I - Normal    Troponin I High Sensitivity <2.7     NT-PRO NATRIURETIC PEPTIDE - Normal    ProBNP 16     CBC W/ AUTO DIFFERENTIAL    Narrative:     The following orders were created for panel order CBC auto differential.  Procedure                               Abnormality         Status                     ---------                               -----------         ------                     CBC with  Differential[[id number]]       Abnormal            Final result                 Please view results for these tests on the individual orders.   COMPREHENSIVE METABOLIC PANEL    Sodium 140      Potassium 3.6      Chloride 106      CO2 24      Anion Gap 14      Glucose 85      BUN 10      Creatinine 1.02      BUN/Creatinine Ratio 10      Calcium 9.0      Total Protein 7.5      Albumin 4.3      Globulin 3.2      A/G Ratio 1.3      Bilirubin, Total 0.4      Alk Phos 95      ALT 17      AST 21      eGFR 60            Imaging Results              X-Ray Chest AP Portable (Final result)  Result time 05/02/25 13:25:03      Final result by Irwin Rondon MD (05/02/25 13:25:03)                   Impression:      No convincing evidence of acute cardiopulmonary disease.      Electronically signed by: Irwin Rondon  Date:    05/02/2025  Time:    13:25               Narrative:    EXAMINATION:  XR CHEST AP PORTABLE    CLINICAL HISTORY:  Chest Pain;    TECHNIQUE:  Single frontal view of the chest was performed.    COMPARISON:  Chest radiograph 12/01/2023, 14:16 hours.    FINDINGS:  Monitoring leads over the chest.  Cardiac contours appear grossly within normal limits.    Lungs essentially clear.  No definite pneumothorax or large volume pleural effusion.    No acute findings in the visualized abdomen.  Osseous and soft tissue structures appear without definite acute change.                                       Medications   nitroGLYCERIN 2% TD oint ointment 1 inch (1 inch Topical (Top) Given 5/2/25 1310)   aluminum-magnesium hydroxide-simethicone 200-200-20 mg/5 mL suspension 30 mL (30 mLs Oral Given 5/2/25 1310)     And   LIDOcaine viscous HCl 2% oral solution 10 mL (10 mLs Oral Given 5/2/25 1310)     Medical Decision Making  Amount and/or Complexity of Data Reviewed  Labs: ordered.  Radiology: ordered.    Risk  OTC drugs.  Prescription drug management.               ED Course as of 05/02/25 1546   Fri May 02, 2025   [id number]  Medical decision-making:  Differential diagnosis includes chest pain, STEMI, NSTEMI, ACS, pneumonia, GERD.  All testing ordered and interpreted by me. [BB]   1451 CMP is normal.  Pro BNP is normal.  Initial troponin is normal. [BB]   1451 Awaiting repeat troponin. [BB]   1532 Chest x-ray by my interpretation shows no acute disease.  EKG by my interpretation shows sinus rhythm, rate of 56, nonspecific ST-T changes, right bundle-branch block. [BB]   1542 Initial and repeat troponin are both normal with no change. [BB]   1544 On repeat exam patient is feeling better and ready for discharge home. [BB]      ED Course User Index  [BB] Tc Martinez MD                           Clinical Impression:   Final diagnoses:  [R07.9] Chest pain        ED Disposition Condition    Discharge Stable          ED Prescriptions       Medication Sig Dispense Start Date End Date Auth. Provider    pantoprazole (PROTONIX) 40 MG tablet Take 1 tablet (40 mg total) by mouth once daily. 30 tablet 5/2/2025 5/2/2026 Tc Martinez MD          Follow-up Information    None            [1]   Social History  Tobacco Use    Smoking status: Former     Current packs/day: 0.20     Average packs/day: 0.2 packs/day for 1 year (0.2 ttl pk-yrs)     Types: Cigarettes     Passive exposure: Current    Smokeless tobacco: Never   Substance Use Topics    Alcohol use: Not Currently    Drug use: Never        Tc Martinez MD  05/02/25 [id number]

## 2025-05-02 NOTE — DISCHARGE INSTRUCTIONS
Continue current medications as prescribed.  Return to emergency department for any worsening or further problems.  Follow up in clinic with primary care provider or cardiologist in 2-3 days if symptoms persist.

## 2025-05-04 LAB
OHS QRS DURATION: 146 MS
OHS QTC CALCULATION: 455 MS

## 2025-05-05 ENCOUNTER — HOSPITAL ENCOUNTER (EMERGENCY)
Facility: HOSPITAL | Age: 70
Discharge: HOME | End: 2025-05-05
Attending: EMERGENCY MEDICINE
Payer: MEDICARE

## 2025-05-05 VITALS
TEMPERATURE: 99 F | HEART RATE: 65 BPM | OXYGEN SATURATION: 93 % | HEIGHT: 63 IN | WEIGHT: 190 LBS | BODY MASS INDEX: 33.66 KG/M2 | SYSTOLIC BLOOD PRESSURE: 123 MMHG | RESPIRATION RATE: 16 BRPM | DIASTOLIC BLOOD PRESSURE: 72 MMHG

## 2025-05-05 DIAGNOSIS — R07.9 CHEST PAIN: ICD-10-CM

## 2025-05-05 DIAGNOSIS — A08.4 VIRAL GASTROENTERITIS: Primary | ICD-10-CM

## 2025-05-05 LAB
ALBUMIN SERPL BCP-MCNC: 4.7 G/DL (ref 3.4–4.8)
ALBUMIN/GLOB SERPL: 1.2 {RATIO}
ALP SERPL-CCNC: 100 U/L (ref 40–150)
ALT SERPL W P-5'-P-CCNC: 18 U/L
ANION GAP SERPL CALCULATED.3IONS-SCNC: 18 MMOL/L (ref 7–16)
AST SERPL W P-5'-P-CCNC: 22 U/L (ref 11–45)
BASOPHILS # BLD AUTO: 0.06 K/UL (ref 0–0.2)
BASOPHILS NFR BLD AUTO: 0.5 % (ref 0–1)
BILIRUB SERPL-MCNC: 0.4 MG/DL
BUN SERPL-MCNC: 10 MG/DL (ref 10–20)
BUN/CREAT SERPL: 9 (ref 6–20)
CALCIUM SERPL-MCNC: 10 MG/DL (ref 8.4–10.2)
CHLORIDE SERPL-SCNC: 103 MMOL/L (ref 98–107)
CO2 SERPL-SCNC: 24 MMOL/L (ref 23–31)
CREAT SERPL-MCNC: 1.12 MG/DL (ref 0.55–1.02)
DIFFERENTIAL METHOD BLD: ABNORMAL
EGFR (NO RACE VARIABLE) (RUSH/TITUS): 54 ML/MIN/1.73M2
EOSINOPHIL # BLD AUTO: 0.08 K/UL (ref 0–0.5)
EOSINOPHIL NFR BLD AUTO: 0.6 % (ref 1–4)
ERYTHROCYTE [DISTWIDTH] IN BLOOD BY AUTOMATED COUNT: 13.2 % (ref 11.5–14.5)
GLOBULIN SER-MCNC: 3.9 G/DL (ref 2–4)
GLUCOSE SERPL-MCNC: 137 MG/DL (ref 82–115)
HCT VFR BLD AUTO: 46 % (ref 38–47)
HGB BLD-MCNC: 14.7 G/DL (ref 12–16)
IMM GRANULOCYTES # BLD AUTO: 0.08 K/UL (ref 0–0.04)
IMM GRANULOCYTES NFR BLD: 0.6 % (ref 0–0.4)
LACTATE SERPL-SCNC: 1.9 MMOL/L (ref 0.5–2.2)
LACTATE SERPL-SCNC: 2.1 MMOL/L (ref 0.5–2.2)
LACTATE SERPL-SCNC: 2.3 MMOL/L (ref 0.5–2.2)
LYMPHOCYTES # BLD AUTO: 0.36 K/UL (ref 1–4.8)
LYMPHOCYTES NFR BLD AUTO: 2.8 % (ref 27–41)
MAGNESIUM SERPL-MCNC: 2.3 MG/DL (ref 1.6–2.6)
MCH RBC QN AUTO: 28.2 PG (ref 27–31)
MCHC RBC AUTO-ENTMCNC: 32 G/DL (ref 32–36)
MCV RBC AUTO: 88.3 FL (ref 80–96)
MONOCYTES # BLD AUTO: 0.62 K/UL (ref 0–0.8)
MONOCYTES NFR BLD AUTO: 4.8 % (ref 2–6)
MPC BLD CALC-MCNC: 10.2 FL (ref 9.4–12.4)
NEUTROPHILS # BLD AUTO: 11.64 K/UL (ref 1.8–7.7)
NEUTROPHILS NFR BLD AUTO: 90.7 % (ref 53–65)
NRBC # BLD AUTO: 0 X10E3/UL
NRBC, AUTO (.00): 0 %
OHS QRS DURATION: 146 MS
OHS QTC CALCULATION: 421 MS
PLATELET # BLD AUTO: 280 K/UL (ref 150–400)
POTASSIUM SERPL-SCNC: 4 MMOL/L (ref 3.5–5.1)
PROT SERPL-MCNC: 8.6 G/DL (ref 5.8–7.6)
RBC # BLD AUTO: 5.21 M/UL (ref 4.2–5.4)
SODIUM SERPL-SCNC: 141 MMOL/L (ref 136–145)
TROPONIN I SERPL HS-MCNC: 3.8 NG/L
WBC # BLD AUTO: 12.84 K/UL (ref 4.5–11)

## 2025-05-05 PROCEDURE — 63600175 PHARM REV CODE 636 W HCPCS: Performed by: EMERGENCY MEDICINE

## 2025-05-05 PROCEDURE — 83735 ASSAY OF MAGNESIUM: CPT | Performed by: EMERGENCY MEDICINE

## 2025-05-05 PROCEDURE — 80053 COMPREHEN METABOLIC PANEL: CPT | Performed by: EMERGENCY MEDICINE

## 2025-05-05 PROCEDURE — 99284 EMERGENCY DEPT VISIT MOD MDM: CPT | Mod: 25

## 2025-05-05 PROCEDURE — 84484 ASSAY OF TROPONIN QUANT: CPT | Performed by: EMERGENCY MEDICINE

## 2025-05-05 PROCEDURE — 25000003 PHARM REV CODE 250: Performed by: EMERGENCY MEDICINE

## 2025-05-05 PROCEDURE — 93010 ELECTROCARDIOGRAM REPORT: CPT | Mod: ,,, | Performed by: INTERNAL MEDICINE

## 2025-05-05 PROCEDURE — 83605 ASSAY OF LACTIC ACID: CPT | Performed by: EMERGENCY MEDICINE

## 2025-05-05 PROCEDURE — 93005 ELECTROCARDIOGRAM TRACING: CPT

## 2025-05-05 PROCEDURE — 96374 THER/PROPH/DIAG INJ IV PUSH: CPT

## 2025-05-05 PROCEDURE — 96361 HYDRATE IV INFUSION ADD-ON: CPT

## 2025-05-05 PROCEDURE — 85025 COMPLETE CBC W/AUTO DIFF WBC: CPT | Performed by: EMERGENCY MEDICINE

## 2025-05-05 RX ORDER — ONDANSETRON 4 MG/1
4 TABLET, FILM COATED ORAL EVERY 6 HOURS
Qty: 12 TABLET | Refills: 0 | Status: SHIPPED | OUTPATIENT
Start: 2025-05-05

## 2025-05-05 RX ORDER — ONDANSETRON HYDROCHLORIDE 2 MG/ML
4 INJECTION, SOLUTION INTRAVENOUS
Status: COMPLETED | OUTPATIENT
Start: 2025-05-05 | End: 2025-05-05

## 2025-05-05 RX ORDER — ONDANSETRON 4 MG/1
4 TABLET, FILM COATED ORAL EVERY 8 HOURS PRN
Qty: 12 TABLET | Refills: 0 | Status: SHIPPED | OUTPATIENT
Start: 2025-05-05

## 2025-05-05 RX ADMIN — SODIUM CHLORIDE 1000 ML: 9 INJECTION, SOLUTION INTRAVENOUS at 01:05

## 2025-05-05 RX ADMIN — ONDANSETRON 4 MG: 2 INJECTION INTRAMUSCULAR; INTRAVENOUS at 08:05

## 2025-05-05 RX ADMIN — SODIUM CHLORIDE 1000 ML: 9 INJECTION, SOLUTION INTRAVENOUS at 08:05

## 2025-05-05 NOTE — ED PROVIDER NOTES
Encounter Date: 5/5/2025       History     Chief Complaint   Patient presents with    Nausea    Vomiting    Chest Pain    Shortness of Breath    Diarrhea     Pt presents to the ED with c/o chest pain, shortness of breath, nausea, vomiting, and diarrhea that started last night.      68 y/o female with nausea, vomiting, and diarrhea that started last night.  She also has had some chest pain.  She notes no particular remitting or exacerbating factors.      The history is provided by the patient. No  was used.     Review of patient's allergies indicates:   Allergen Reactions    Aricept [donepezil]     Codeine     Crestor [rosuvastatin]      Makes her sleepy    Dapsone      nausea    Flagyl [metronidazole]     Hydrocodone     Imitrex [sumatriptan]     Influenza virus vaccines     Opioids - morphine analogues     Penicillins     Statins-hmg-coa reductase inhibitors Other (See Comments)    Tramadol     Aspirin Rash    Clindamycin Nausea Only    Latex, natural rubber Rash     Past Medical History:   Diagnosis Date    Abnormal LFTs     Asthma     ? diagnosis    Hypertension     Hypothyroidism     from birth    Nausea vomiting and diarrhea 02/07/2022     Past Surgical History:   Procedure Laterality Date    APPENDECTOMY      CHOLECYSTECTOMY      HYSTERECTOMY      LEFT HEART CATHETERIZATION N/A 10/3/2023    Procedure: Left heart cath;  Surgeon: Sacha Landers MD;  Location: [facility] CATH LAB;  Service: Cardiology;  Laterality: N/A;    TONSILLECTOMY       Family History   Problem Relation Name Age of Onset    Heart disease Mother      Kidney disease Mother      Heart disease Father      Cancer Paternal Aunt       Social History[1]  Review of Systems   All other systems reviewed and are negative.      Physical Exam     Initial Vitals [05/05/25 0736]   BP Pulse Resp Temp SpO2   (!) 151/73 66 19 98.5 °F (36.9 °C) (!) 94 %      MAP       --         Physical Exam    Nursing note and vitals  reviewed.  Constitutional: She appears well-developed and well-nourished.   HENT:   Head: Normocephalic and atraumatic.   Nose: Nose normal. Mouth/Throat: Oropharynx is clear and moist.   Eyes: Conjunctivae and EOM are normal. Pupils are equal, round, and reactive to light.   Neck: Neck supple.   Normal range of motion.  Cardiovascular:  Normal rate, regular rhythm and normal heart sounds.           Pulmonary/Chest: Breath sounds normal.   Abdominal: Abdomen is soft. Bowel sounds are normal.   Musculoskeletal:         General: Normal range of motion.      Cervical back: Normal range of motion and neck supple.     Neurological: She is alert and oriented to person, place, and time. She has normal strength. GCS score is 15. GCS eye subscore is 4. GCS verbal subscore is 5. GCS motor subscore is 6.   Skin: Skin is warm and dry.         Medical Screening Exam   See Full Note    ED Course   Procedures  Labs Reviewed   COMPREHENSIVE METABOLIC PANEL - Abnormal       Result Value    Sodium 141      Potassium 4.0      Chloride 103      CO2 24      Anion Gap 18 (*)     Glucose 137 (*)     BUN 10      Creatinine 1.12 (*)     BUN/Creatinine Ratio 9      Calcium 10.0      Total Protein 8.6 (*)     Albumin 4.7      Globulin 3.9      A/G Ratio 1.2      Bilirubin, Total 0.4      Alk Phos 100      ALT 18      AST 22      eGFR 54 (*)    CBC WITH DIFFERENTIAL - Abnormal    WBC 12.84 (*)     RBC 5.21      Hemoglobin 14.7      Hematocrit 46.0      MCV 88.3      MCH 28.2      MCHC 32.0      RDW 13.2      Platelet Count 280      MPV 10.2      Neutrophils % 90.7 (*)     Lymphocytes % 2.8 (*)     Monocytes % 4.8      Eosinophils % 0.6 (*)     Basophils % 0.5      Immature Granulocytes % 0.6 (*)     nRBC, Auto 0.0      Neutrophils, Abs 11.64 (*)     Lymphocytes, Absolute 0.36 (*)     Monocytes, Absolute 0.62      Eosinophils, Absolute 0.08      Basophils, Absolute 0.06      Immature Granulocytes, Absolute 0.08 (*)     nRBC, Absolute 0.00       Diff Type Auto     LACTIC ACID, PLASMA - Normal    Lactic Acid 2.1     MAGNESIUM - Normal    Magnesium 2.3     TROPONIN I - Normal    Troponin I High Sensitivity 3.8     CBC W/ AUTO DIFFERENTIAL    Narrative:     The following orders were created for panel order CBC auto differential.  Procedure                               Abnormality         Status                     ---------                               -----------         ------                     CBC with Differential[[id number]]       Abnormal            Final result                 Please view results for these tests on the individual orders.   LACTIC ACID, PLASMA          Imaging Results    None          Medications   sodium chloride 0.9% bolus 1,000 mL 1,000 mL (0 mLs Intravenous Stopped 5/5/25 1023)   ondansetron injection 4 mg (4 mg Intravenous Given 5/5/25 2511)     Medical Decision Making  70 y/o female with nausea, vomiting, and diarrhea that started last night.  She also has had some chest pain.  She notes no particular remitting or exacerbating factors.    Ddx:  gastroenteritis vs gerd vs other  Outpatient follow up.      Amount and/or Complexity of Data Reviewed  Labs: ordered. Decision-making details documented in ED Course.    Risk  Prescription drug management.                                      Clinical Impression:   Final diagnoses:  [R07.9] Chest pain  [A08.4] Viral gastroenteritis (Primary)        ED Disposition Condition    Discharge Stable          ED Prescriptions       Medication Sig Dispense Start Date End Date Auth. Provider    ondansetron (ZOFRAN) 4 MG tablet Take 1 tablet (4 mg total) by mouth every 6 (six) hours. 12 tablet 5/5/2025 -- Armando Lopez MD          Follow-up Information       Follow up With Specialties Details Why Contact Info    Jr. Philippe Freeman MD Family Medicine  As needed [address] [facility] [address]  [phone]                   [1]   Social History  Tobacco Use    Smoking status: Former      Current packs/day: 0.20     Average packs/day: 0.2 packs/day for 1 year (0.2 ttl pk-yrs)     Types: Cigarettes     Passive exposure: Current    Smokeless tobacco: Never   Substance Use Topics    Alcohol use: Not Currently    Drug use: Never        Armando Lopez MD  05/05/25 1134

## 2025-05-15 ENCOUNTER — OFFICE VISIT (OUTPATIENT)
Dept: FAMILY MEDICINE | Facility: CLINIC | Age: 70
End: 2025-05-15
Payer: MEDICARE

## 2025-05-15 VITALS
RESPIRATION RATE: 18 BRPM | OXYGEN SATURATION: 94 % | WEIGHT: 203 LBS | DIASTOLIC BLOOD PRESSURE: 90 MMHG | TEMPERATURE: 99 F | BODY MASS INDEX: 35.97 KG/M2 | HEART RATE: 52 BPM | HEIGHT: 63 IN | SYSTOLIC BLOOD PRESSURE: 142 MMHG

## 2025-05-15 DIAGNOSIS — E11.9 TYPE 2 DIABETES MELLITUS WITHOUT COMPLICATION, WITHOUT LONG-TERM CURRENT USE OF INSULIN: ICD-10-CM

## 2025-05-15 DIAGNOSIS — E53.8 B12 DEFICIENCY: ICD-10-CM

## 2025-05-15 DIAGNOSIS — J45.909 ASTHMA, UNSPECIFIED ASTHMA SEVERITY, UNSPECIFIED WHETHER COMPLICATED, UNSPECIFIED WHETHER PERSISTENT: Primary | ICD-10-CM

## 2025-05-15 DIAGNOSIS — E78.5 HYPERLIPIDEMIA, UNSPECIFIED HYPERLIPIDEMIA TYPE: ICD-10-CM

## 2025-05-15 DIAGNOSIS — E55.9 VITAMIN D DEFICIENCY: ICD-10-CM

## 2025-05-15 LAB
25(OH)D3 SERPL-MCNC: 19.4 NG/ML (ref 30–80)
CHOLEST SERPL-MCNC: 223 MG/DL
CHOLEST/HDLC SERPL: 4.6 {RATIO}
EST. AVERAGE GLUCOSE BLD GHB EST-MCNC: 105 MG/DL
FOLATE SERPL-MCNC: 3.1 NG/ML (ref 7–31.4)
HBA1C MFR BLD HPLC: 5.3 %
HDLC SERPL-MCNC: 48 MG/DL (ref 35–60)
LDLC SERPL CALC-MCNC: 153 MG/DL
LDLC/HDLC SERPL: 3.2 {RATIO}
NONHDLC SERPL-MCNC: 175 MG/DL
TRIGL SERPL-MCNC: 108 MG/DL (ref 37–140)
VIT B12 SERPL-MCNC: 635 PG/ML (ref 213–816)
VLDLC SERPL-MCNC: 22 MG/DL

## 2025-05-15 PROCEDURE — 3288F FALL RISK ASSESSMENT DOCD: CPT | Mod: ,,, | Performed by: FAMILY MEDICINE

## 2025-05-15 PROCEDURE — 1101F PT FALLS ASSESS-DOCD LE1/YR: CPT | Mod: ,,, | Performed by: FAMILY MEDICINE

## 2025-05-15 PROCEDURE — 80061 LIPID PANEL: CPT | Mod: ,,, | Performed by: CLINICAL MEDICAL LABORATORY

## 2025-05-15 PROCEDURE — 82607 VITAMIN B-12: CPT | Mod: ,,, | Performed by: CLINICAL MEDICAL LABORATORY

## 2025-05-15 PROCEDURE — 83090 ASSAY OF HOMOCYSTEINE: CPT | Mod: ,,, | Performed by: CLINICAL MEDICAL LABORATORY

## 2025-05-15 PROCEDURE — 1160F RVW MEDS BY RX/DR IN RCRD: CPT | Mod: ,,, | Performed by: FAMILY MEDICINE

## 2025-05-15 PROCEDURE — 83036 HEMOGLOBIN GLYCOSYLATED A1C: CPT | Mod: ,,, | Performed by: CLINICAL MEDICAL LABORATORY

## 2025-05-15 PROCEDURE — 1159F MED LIST DOCD IN RCRD: CPT | Mod: ,,, | Performed by: FAMILY MEDICINE

## 2025-05-15 PROCEDURE — 82746 ASSAY OF FOLIC ACID SERUM: CPT | Mod: ,,, | Performed by: CLINICAL MEDICAL LABORATORY

## 2025-05-15 PROCEDURE — 3080F DIAST BP >= 90 MM HG: CPT | Mod: ,,, | Performed by: FAMILY MEDICINE

## 2025-05-15 PROCEDURE — 82306 VITAMIN D 25 HYDROXY: CPT | Mod: ,,, | Performed by: CLINICAL MEDICAL LABORATORY

## 2025-05-15 PROCEDURE — 3077F SYST BP >= 140 MM HG: CPT | Mod: ,,, | Performed by: FAMILY MEDICINE

## 2025-05-15 PROCEDURE — 3008F BODY MASS INDEX DOCD: CPT | Mod: ,,, | Performed by: FAMILY MEDICINE

## 2025-05-15 PROCEDURE — 99213 OFFICE O/P EST LOW 20 MIN: CPT | Mod: ,,, | Performed by: FAMILY MEDICINE

## 2025-05-15 NOTE — PROGRESS NOTES
Beverly Rodríguez is a 69 y.o. female seen today for follow-up on her history of B12 and vitamin-D deficiency as well as a past medical history of asthma.  She requires a new nebulizer machine as well as follow-up blood work.  Patient also has a past medical history of type 2 diabetes and we discussed an A1c today.    Past Medical History:   Diagnosis Date    Abnormal LFTs     Asthma     ? diagnosis    Hypertension     Hypothyroidism     from birth    Nausea vomiting and diarrhea 02/07/2022     Family History   Problem Relation Name Age of Onset    Heart disease Mother      Kidney disease Mother      Heart disease Father      Cancer Paternal Aunt       Medications Ordered Prior to Encounter[1]    There is no immunization history on file for this patient.    Review of Systems   Constitutional:  Positive for malaise/fatigue. Negative for fever and weight loss.   Respiratory:  Negative for shortness of breath.    Cardiovascular:  Negative for chest pain and palpitations.   Gastrointestinal:  Negative for nausea and vomiting.   Musculoskeletal:  Positive for myalgias.   Psychiatric/Behavioral:  Negative for depression.         Vitals:    05/15/25 1334   BP: (!) 142/90   Pulse: (!) 52   Resp: 18   Temp: 98.5 °F (36.9 °C)       Physical Exam     Assessment and Plan  1. Asthma, unspecified asthma severity, unspecified whether complicated, unspecified whether persistent  -     NEBULIZER FOR HOME USE    2. Hyperlipidemia, unspecified hyperlipidemia type  -     Lipid Panel; Future; Expected date: 05/15/2025    3. B12 deficiency  -     Vitamin B12 & Folate; Future; Expected date: 05/15/2025  -     Homocysteine, Serum; Future; Expected date: 05/15/2025    4. Vitamin D deficiency  -     Vitamin D; Future; Expected date: 05/15/2025    5. Type 2 diabetes mellitus without complication, without long-term current use of insulin  -     Hemoglobin A1C; Future; Expected date: 05/15/2025             Return to clinic in 1 month for follow-up  on her lab work or as needed.  We will follow up on her blood pressure at that visit.    Health Maintenance Topics with due status: Not Due       Topic Last Completion Date    Colorectal Cancer Screening 12/29/2021    Influenza Vaccine 11/04/2022    DEXA Scan 12/20/2023    Lipid Panel 10/16/2024    Mammogram 11/04/2024              [1]   Current Outpatient Medications on File Prior to Visit   Medication Sig Dispense Refill    albuterol (PROVENTIL) 2.5 mg /3 mL (0.083 %) nebulizer solution Take 3 mLs (2.5 mg total) by nebulization every 6 (six) hours as needed for Wheezing. Rescue 100 mL 3    calcium carbonate (TUMS) 200 mg calcium (500 mg) chewable tablet Take 2 tablets by mouth nightly as needed for Heartburn.      isosorbide mononitrate (IMDUR) 30 MG 24 hr tablet Take 1 tablet (30 mg total) by mouth once daily. 90 tablet 1    thyroid, pork, (ARMOUR THYROID) 60 mg Tab Take 1 tablet (60 mg total) by mouth before breakfast. 90 tablet 1    ascorbic acid, vitamin C, (VITAMIN C) 1000 MG tablet Take 1,000 mg by mouth once daily. (Patient not taking: Reported on 5/15/2025)      clindamycin (CLEOCIN T) 1 % external solution Apply to AA on scalp daily (Patient not taking: Reported on 5/15/2025) 60 mL 3    mupirocin (BACTROBAN) 2 % ointment Apply to AA on body BID PRN flares tapering with improvement (Patient not taking: Reported on 5/15/2025) 30 g 11    ondansetron (ZOFRAN) 4 MG tablet Take 1 tablet (4 mg total) by mouth every 6 (six) hours. (Patient not taking: Reported on 5/15/2025) 12 tablet 0    ondansetron (ZOFRAN) 4 MG tablet Take 1 tablet (4 mg total) by mouth every 8 (eight) hours as needed for Nausea. (Patient not taking: Reported on 5/15/2025) 12 tablet 0    ondansetron (ZOFRAN) 4 MG tablet Take 1 tablet (4 mg total) by mouth every 6 (six) hours. (Patient not taking: Reported on 5/15/2025) 12 tablet 0    pantoprazole (PROTONIX) 40 MG tablet Take 1 tablet (40 mg total) by mouth once daily. (Patient not taking:  Reported on 5/15/2025) 30 tablet 0    triamcinolone acetonide 0.025% (KENALOG) 0.025 % cream Apply to AA under breasts BID PRN flares tapering with improvement (Patient not taking: Reported on 5/15/2025) 80 g 3    triamcinolone acetonide 0.1% (KENALOG) 0.1 % cream Apply to AA on body BID PRN flares tapering with improvement (Patient not taking: Reported on 5/15/2025) 453.6 g 2     No current facility-administered medications on file prior to visit.

## 2025-05-16 LAB — HCYS SERPL-SCNC: 18.5 ΜMOL/L (ref 5.1–15.4)

## 2025-05-19 ENCOUNTER — RESULTS FOLLOW-UP (OUTPATIENT)
Dept: FAMILY MEDICINE | Facility: CLINIC | Age: 70
End: 2025-05-19

## 2025-05-23 ENCOUNTER — OFFICE VISIT (OUTPATIENT)
Dept: CARDIOLOGY | Facility: CLINIC | Age: 70
End: 2025-05-23
Payer: MEDICARE

## 2025-05-23 VITALS
SYSTOLIC BLOOD PRESSURE: 136 MMHG | HEART RATE: 57 BPM | WEIGHT: 200 LBS | BODY MASS INDEX: 35.44 KG/M2 | OXYGEN SATURATION: 96 % | DIASTOLIC BLOOD PRESSURE: 80 MMHG | HEIGHT: 63 IN

## 2025-05-23 DIAGNOSIS — I25.10 CORONARY ARTERY DISEASE INVOLVING NATIVE CORONARY ARTERY OF NATIVE HEART WITHOUT ANGINA PECTORIS: Chronic | ICD-10-CM

## 2025-05-23 DIAGNOSIS — I10 HYPERTENSION, UNSPECIFIED TYPE: Chronic | ICD-10-CM

## 2025-05-23 DIAGNOSIS — R42 DIZZINESS: ICD-10-CM

## 2025-05-23 DIAGNOSIS — R07.89 NON-CARDIAC CHEST PAIN: Primary | ICD-10-CM

## 2025-05-23 DIAGNOSIS — E11.9 DIABETES MELLITUS WITHOUT COMPLICATION: Chronic | ICD-10-CM

## 2025-05-23 DIAGNOSIS — E78.5 HYPERLIPIDEMIA, UNSPECIFIED HYPERLIPIDEMIA TYPE: Chronic | ICD-10-CM

## 2025-05-23 PROCEDURE — 99999 PR PBB SHADOW E&M-EST. PATIENT-LVL III: CPT | Mod: PBBFAC,,, | Performed by: INTERNAL MEDICINE

## 2025-05-23 PROCEDURE — 3008F BODY MASS INDEX DOCD: CPT | Mod: CPTII,,, | Performed by: INTERNAL MEDICINE

## 2025-05-23 PROCEDURE — 99213 OFFICE O/P EST LOW 20 MIN: CPT | Mod: PBBFAC | Performed by: INTERNAL MEDICINE

## 2025-05-23 PROCEDURE — 3044F HG A1C LEVEL LT 7.0%: CPT | Mod: CPTII,,, | Performed by: INTERNAL MEDICINE

## 2025-05-23 PROCEDURE — 1101F PT FALLS ASSESS-DOCD LE1/YR: CPT | Mod: CPTII,,, | Performed by: INTERNAL MEDICINE

## 2025-05-23 PROCEDURE — 1160F RVW MEDS BY RX/DR IN RCRD: CPT | Mod: CPTII,,, | Performed by: INTERNAL MEDICINE

## 2025-05-23 PROCEDURE — 3288F FALL RISK ASSESSMENT DOCD: CPT | Mod: CPTII,,, | Performed by: INTERNAL MEDICINE

## 2025-05-23 PROCEDURE — 1159F MED LIST DOCD IN RCRD: CPT | Mod: CPTII,,, | Performed by: INTERNAL MEDICINE

## 2025-05-23 PROCEDURE — 99214 OFFICE O/P EST MOD 30 MIN: CPT | Mod: S$PBB,,, | Performed by: INTERNAL MEDICINE

## 2025-05-23 PROCEDURE — 3079F DIAST BP 80-89 MM HG: CPT | Mod: CPTII,,, | Performed by: INTERNAL MEDICINE

## 2025-05-23 PROCEDURE — 3075F SYST BP GE 130 - 139MM HG: CPT | Mod: CPTII,,, | Performed by: INTERNAL MEDICINE

## 2025-05-29 ENCOUNTER — TELEPHONE (OUTPATIENT)
Dept: FAMILY MEDICINE | Facility: CLINIC | Age: 70
End: 2025-05-29
Payer: MEDICARE

## 2025-05-29 DIAGNOSIS — E55.9 VITAMIN D DEFICIENCY: Primary | ICD-10-CM

## 2025-05-29 RX ORDER — ERGOCALCIFEROL 1.25 MG/1
50000 CAPSULE ORAL
COMMUNITY
End: 2025-05-29 | Stop reason: SDUPTHER

## 2025-05-29 RX ORDER — ERGOCALCIFEROL 1.25 MG/1
50000 CAPSULE ORAL
Qty: 4 CAPSULE | Refills: 2 | Status: SHIPPED | OUTPATIENT
Start: 2025-05-29

## 2025-05-29 NOTE — TELEPHONE ENCOUNTER
----- Message from Prem Vincent MD sent at 5/19/2025  8:00 AM CDT -----  Vitamin B12 is functionally low as well as her folate so I recommend a B complex vitamin over-the-counter daily as well as B12 injections monthly for 3 months.  Vitamin-D is low and I recommend 35121   units weekly x x3 months as well and then recheck, office visit for  ----- Message -----  From: Lab, Background User  Sent: 5/15/2025   9:47 PM CDT  To: Prem Vincent MD

## 2025-05-29 NOTE — TELEPHONE ENCOUNTER
Pt returned call to clinic, notified of lab results. She agreed to get  B complex vitamin OTC and to start B12 injections monthly for 3 months then have labs repeated. Also notified of low vit d level and that Dr Vincent advised for her to take vit d 50.000 units weekly for 3 months and then repeat fasting vitamin d level, she voiced understanding. Notified her that Dr Vincent would review her cholesterol with her at her up coming appointment on 6/23/25, she voiced understanding. Will send vit d to pharm.

## 2025-05-30 NOTE — PROGRESS NOTES
PCP: Jr. Philippe Freeman MD    Referring Provider:     Subjective:   Beverly Rodríguez is a 69 y.o. female with hx of mild nonobstructive CAD, HTN, HLD, NIDDM, and hypothyroid  who presents for 6 month follow up.     Fhx:  Family History   Problem Relation Name Age of Onset    Heart disease Mother      Kidney disease Mother      Heart disease Father      Cancer Paternal Aunt       Shx: Social History[1]    EKG   5/5/25--sinus rhythm, rightward axis, RBBB, inferior/ lateral ST-T wave abn, 65 bpm  7/9/24--sinus bradycardia with sinus arrhythmia, RBBB, septal infarct- age undetermined, 48 bpm 12/28/23--NSR, RBBB, left posterior fascicular block, T wave ang, 64 bpm       ECHO Results for orders placed during the hospital encounter of 09/29/23    Echo    Interpretation Summary    Left Ventricle: The left ventricle is normal in size. Normal wall thickness. Normal wall motion. There is low normal systolic function with a visually estimated ejection fraction of 50 - 55%. There is normal diastolic function.    Right Ventricle: Normal right ventricular cavity size. Systolic function is normal.    Aortic Valve: The aortic valve is a trileaflet valve.    [facility] Results for orders placed during the hospital encounter of 09/29/23    Cardiac catheterization    Conclusion    The ejection fraction was calculated to be 55%.    The left ventricular systolic function was normal.    The left ventricular end diastolic pressure was normal.    The pre-procedure left ventricular end diastolic pressure was 15.    The estimated blood loss was none.    There was non-obstructive coronary artery disease..    There was no mitral valve regurgitation.    The procedure log was documented by Documenter: Juanis Contreras RN and verified by Sacha Landers MD.    Date: 10/3/2023  Time: 1:18 PM impression:    1. Mild, nonobstructive coronary artery disease.    2. Normal left ventricular size with inferolateral wall hypokinesis and overall normal left  "ventricular systolic function, ejection fraction 55%.    3. No significant mitral regurgitation.    Plan:    1. Medical management of coronary artery disease, nonobstructive.    2. Risk factor modification.    3. Long-acting nitrates, 81 mg aspirin daily.        Lab Results   Component Value Date     05/05/2025    K 4.0 05/05/2025     05/05/2025    CO2 24 05/05/2025    BUN 10 05/05/2025    CREATININE 1.12 (H) 05/05/2025    CALCIUM 10.0 05/05/2025    ANIONGAP 18 (H) 05/05/2025    ESTGFRAFRICA 77 10/06/2020    EGFRNONAA 63 02/14/2022       Lab Results   Component Value Date    CHOL 223 (H) 05/15/2025    CHOL 235 (H) 10/16/2024    CHOL 102 10/02/2023     Lab Results   Component Value Date    HDL 48 05/15/2025    HDL 55 10/16/2024    HDL 33 (L) 10/02/2023     Lab Results   Component Value Date    LDLCALC 153 05/15/2025    LDLCALC 146 10/16/2024    LDLCALC 49 10/02/2023     Lab Results   Component Value Date    TRIG 108 05/15/2025    TRIG 169 (H) 10/16/2024    TRIG 101 10/02/2023     Lab Results   Component Value Date    CHOLHDL 4.6 05/15/2025    CHOLHDL 4.3 10/16/2024    CHOLHDL 3.1 10/02/2023       Lab Results   Component Value Date    WBC 12.84 (H) 05/05/2025    HGB 14.7 05/05/2025    HCT 46.0 05/05/2025    MCV 88.3 05/05/2025     05/05/2025         Current Medications[2]  Reviewed and reconciled.     Review of Systems   Respiratory:  Positive for shortness of breath.    Cardiovascular:  Positive for chest pain and leg swelling. Negative for palpitations.   Neurological:  Positive for dizziness. Negative for loss of consciousness.       History of Present Illness    CHIEF COMPLAINT:  Patient presents today for follow up of cardiac concerns.    CARDIAC SYMPTOMS:  She reports experiencing "spells" similar to previous episodes, including chest pain and discomfort. She has been monitoring BP at home with elevated readings, one instance reaching 177. She denies anxiety or nervousness related to symptoms, " "attributing them to stress. She also reports balance issues without falls. Her cardiac history includes treadmill stress test and nuclear stress test with pharmacologic stress agent administered intravenously.    MEDICATION SENSITIVITIES:  She reports intolerance to generic medications. She experienced severe headache after taking Barm 75mg and flaxseed supplement.    DIETARY RESTRICTIONS:  She has gluten intolerance confirmed by testing with Dr. Mireles in 2021. She notes possible dairy sensitivity with symptom improvement when using alternatives such as coconut milk and dairy-free cheese.    Objective:   /80 (BP Location: Left arm, Patient Position: Sitting)   Pulse (!) 57   Ht 5' 3" (1.6 m)   Wt 90.7 kg (200 lb)   SpO2 96%   BMI 35.43 kg/m²     Physical Exam  Vitals reviewed.   Constitutional:       General: She is not in acute distress.     Appearance: Normal appearance.   HENT:      Head: Normocephalic and atraumatic.   Neck:      Vascular: No carotid bruit or JVD.   Cardiovascular:      Rate and Rhythm: Normal rate and regular rhythm.      Pulses: Normal pulses.           Radial pulses are 2+ on the right side and 2+ on the left side.      Heart sounds: Murmur heard.      Systolic murmur is present with a grade of 2/6.      Comments: II/ VI CHAU RUSB  Pulmonary:      Effort: Pulmonary effort is normal.      Breath sounds: Normal breath sounds.   Chest:      Comments: +tenderness, reproducible left chest wall  Musculoskeletal:      Right lower leg: No edema.      Left lower leg: No edema.   Skin:     General: Skin is warm and dry.   Neurological:      Mental Status: She is alert.             Assessment:     1. Non-cardiac chest pain        2. Coronary artery disease involving native coronary artery of native heart without angina pectoris      mild, non obstructive      3. Hypertension, unspecified type  EKG 12-lead      4. Hyperlipidemia, unspecified hyperlipidemia type        5. Diabetes mellitus " without complication        6. Dizziness            Assessment & Plan    I10 Hypertension, unspecified type    IMPRESSION:  - Blood pressure has improved with current Lasix regimen.  - Nuclear stress test history acknowledged to evaluate EF.  - Will investigate the blood pressure monitoring device issue and contact primary care to resolve the compatibility problem and inform patient about the outcome.         Plan:   Continue current medications  F/u in 6 months.     This note was generated with the assistance of ambient listening technology. Verbal consent was obtained by the patient and accompanying visitor(s) for the recording of patient appointment to facilitate this note. I attest to having reviewed and edited the generated note for accuracy, though some syntax or spelling errors may persist. Please contact the author of this note for any clarification.             [1]   Social History  Socioeconomic History    Marital status:    Tobacco Use    Smoking status: Former     Current packs/day: 0.00     Average packs/day: 0.2 packs/day for 1 year (0.2 ttl pk-yrs)     Types: Cigarettes     Start date:      Quit date:      Years since quittin.4     Passive exposure: Past    Smokeless tobacco: Never   Substance and Sexual Activity    Alcohol use: Not Currently    Drug use: Never    Sexual activity: Not Currently     Social Drivers of Health     Financial Resource Strain: Low Risk  (2025)    Overall Financial Resource Strain (CARDIA)     Difficulty of Paying Living Expenses: Not very hard   Food Insecurity: No Food Insecurity (2025)    Hunger Vital Sign     Worried About Running Out of Food in the Last Year: Never true     Ran Out of Food in the Last Year: Never true   Transportation Needs: Unmet Transportation Needs (2025)    PRAPARE - Transportation     Lack of Transportation (Medical): Yes     Lack of Transportation (Non-Medical): No   Physical Activity: Inactive (2025)    Exercise  Vital Sign     Days of Exercise per Week: 0 days     Minutes of Exercise per Session: 0 min   Stress: Patient Declined (5/8/2025)    [employer] - Occupational Stress Questionnaire     Feeling of Stress : Patient declined   Housing Stability: Low Risk  (5/8/2025)    Housing Stability Vital Sign     Unable to Pay for Housing in the Last Year: No     Number of Times Moved in the Last Year: 1     Homeless in the Last Year: No   [2]   Current Outpatient Medications:     albuterol (PROVENTIL) 2.5 mg /3 mL (0.083 %) nebulizer solution, Take 3 mLs (2.5 mg total) by nebulization every 6 (six) hours as needed for Wheezing. Rescue, Disp: 100 mL, Rfl: 3    calcium carbonate (TUMS) 200 mg calcium (500 mg) chewable tablet, Take 2 tablets by mouth nightly as needed for Heartburn., Disp: , Rfl:     isosorbide mononitrate (IMDUR) 30 MG 24 hr tablet, Take 1 tablet (30 mg total) by mouth once daily., Disp: 90 tablet, Rfl: 1    thyroid, pork, (ARMOUR THYROID) 60 mg Tab, Take 1 tablet (60 mg total) by mouth before breakfast., Disp: 90 tablet, Rfl: 1    ergocalciferol (ERGOCALCIFEROL) 50,000 unit Cap, Take 1 capsule (50,000 Units total) by mouth every 7 days., Disp: 4 capsule, Rfl: 2

## 2025-06-11 ENCOUNTER — OFFICE VISIT (OUTPATIENT)
Dept: GASTROENTEROLOGY | Facility: CLINIC | Age: 70
End: 2025-06-11
Payer: MEDICARE

## 2025-06-11 VITALS
SYSTOLIC BLOOD PRESSURE: 150 MMHG | DIASTOLIC BLOOD PRESSURE: 87 MMHG | HEART RATE: 63 BPM | WEIGHT: 204.81 LBS | OXYGEN SATURATION: 95 % | BODY MASS INDEX: 36.28 KG/M2

## 2025-06-11 DIAGNOSIS — R19.4 CHANGE IN BOWEL HABITS: ICD-10-CM

## 2025-06-11 DIAGNOSIS — K90.0 CELIAC DISEASE: Primary | ICD-10-CM

## 2025-06-11 DIAGNOSIS — K62.5 RECTAL BLEEDING: ICD-10-CM

## 2025-06-11 PROCEDURE — 99999 PR PBB SHADOW E&M-EST. PATIENT-LVL III: CPT | Mod: PBBFAC,,,

## 2025-06-11 PROCEDURE — 3044F HG A1C LEVEL LT 7.0%: CPT | Mod: CPTII,,,

## 2025-06-11 PROCEDURE — 3077F SYST BP >= 140 MM HG: CPT | Mod: CPTII,,,

## 2025-06-11 PROCEDURE — 99213 OFFICE O/P EST LOW 20 MIN: CPT | Mod: PBBFAC

## 2025-06-11 PROCEDURE — 1159F MED LIST DOCD IN RCRD: CPT | Mod: CPTII,,,

## 2025-06-11 PROCEDURE — 3008F BODY MASS INDEX DOCD: CPT | Mod: CPTII,,,

## 2025-06-11 PROCEDURE — 3079F DIAST BP 80-89 MM HG: CPT | Mod: CPTII,,,

## 2025-06-11 PROCEDURE — 99214 OFFICE O/P EST MOD 30 MIN: CPT | Mod: S$PBB,,,

## 2025-06-11 PROCEDURE — 1160F RVW MEDS BY RX/DR IN RCRD: CPT | Mod: CPTII,,,

## 2025-06-11 RX ORDER — POLYETHYLENE GLYCOL 3350, SODIUM SULFATE ANHYDROUS, SODIUM BICARBONATE, SODIUM CHLORIDE, POTASSIUM CHLORIDE 236; 22.74; 6.74; 5.86; 2.97 G/4L; G/4L; G/4L; G/4L; G/4L
4 POWDER, FOR SOLUTION ORAL ONCE
Qty: 4000 ML | Refills: 0 | Status: SHIPPED | OUTPATIENT
Start: 2025-06-11 | End: 2025-06-11 | Stop reason: CLARIF

## 2025-06-11 NOTE — PROGRESS NOTES
Gastroenterology Clinic Note    Patient ID: [id number]   Referring MD: No ref. provider found   Chief Complaint:   Chief Complaint   Patient presents with    Follow-up       History of Present Illness   Beverly Rodríguez is an 69 y.o. WF who is referred for follow-up. She reports she has been on a gluten free since 0612-9559. Gliadin >150 in 2021. At present, the rash on her abdomen has improved. She has increased fiber within her diet and her constipation is improved. She denies any abdominal pain, nausea, vomiting, hematochezia or melena. She had EGD w/ dilation 08/2023 with good response and improvement in dysphagia.     Previous workup:  EGD    Last colonoscopy was 12/29/2021 with 7 year recall for screening purposes.    Interval  - presents today for follow-up; she continues to have occasional constipation, now with rectal bleeding and lower abdominal pain  - she remains on gluten free diet  - recent labs with PCP reviewed; she has been treated for vitamin-D deficiency along with vitamin B12    Review of Systems   Constitutional:  Negative for weight loss.   Gastrointestinal:  Positive for abdominal pain, blood in stool and constipation. Negative for diarrhea, heartburn, melena, nausea and vomiting.       Past Medical History      Past Medical History:   Diagnosis Date    Abnormal LFTs     Asthma     ? diagnosis    Hypertension     Hypothyroidism     from birth    Nausea vomiting and diarrhea 02/07/2022       Past Surgical History     Past Surgical History:   Procedure Laterality Date    APPENDECTOMY      CHOLECYSTECTOMY      HYSTERECTOMY      LEFT HEART CATHETERIZATION N/A 10/3/2023    Procedure: Left heart cath;  Surgeon: Sacha Landers MD;  Location: [facility] CATH LAB;  Service: Cardiology;  Laterality: N/A;    TONSILLECTOMY         Allergies     Review of patient's allergies indicates:   Allergen Reactions    Aricept [donepezil]     Codeine     Crestor [rosuvastatin]      Makes her sleepy    Dapsone       nausea    Flagyl [metronidazole]     Hydrocodone     Imitrex [sumatriptan]     Influenza virus vaccines     Opioids - morphine analogues     Penicillins     Statins-hmg-coa reductase inhibitors Other (See Comments)    Tramadol     Aspirin Rash    Clindamycin Nausea Only    Latex, natural rubber Rash       Immunization History     There is no immunization history on file for this patient.    Past Family History      Family History   Problem Relation Name Age of Onset    Heart disease Mother      Kidney disease Mother      Heart disease Father      Cancer Paternal Aunt         Past Social History      Social History     Socioeconomic History    Marital status:    Tobacco Use    Smoking status: Former     Current packs/day: 0.00     Average packs/day: 0.2 packs/day for 1 year (0.2 ttl pk-yrs)     Types: Cigarettes     Start date:      Quit date:      Years since quittin.4     Passive exposure: Past    Smokeless tobacco: Never   Substance and Sexual Activity    Alcohol use: Not Currently    Drug use: Never    Sexual activity: Not Currently     Social Drivers of Health     Financial Resource Strain: Low Risk  (2025)    Overall Financial Resource Strain (CARDIA)     Difficulty of Paying Living Expenses: Not very hard   Food Insecurity: No Food Insecurity (2025)    Hunger Vital Sign     Worried About Running Out of Food in the Last Year: Never true     Ran Out of Food in the Last Year: Never true   Transportation Needs: Unmet Transportation Needs (2025)    PRAPARE - Transportation     Lack of Transportation (Medical): Yes     Lack of Transportation (Non-Medical): No   Physical Activity: Inactive (2025)    Exercise Vital Sign     Days of Exercise per Week: 0 days     Minutes of Exercise per Session: 0 min   Stress: Patient Declined (2025)    [employer] - Occupational Stress Questionnaire     Feeling of Stress : Patient declined   Housing Stability: Low  Risk  (5/8/2025)    Housing Stability Vital Sign     Unable to Pay for Housing in the Last Year: No     Number of Times Moved in the Last Year: 1     Homeless in the Last Year: No       Current Medications     Outpatient Medications Marked as Taking for the 6/11/25 encounter (Office Visit) with Chantal Zarate FNP   Medication Sig Dispense Refill    albuterol (PROVENTIL) 2.5 mg /3 mL (0.083 %) nebulizer solution Take 3 mLs (2.5 mg total) by nebulization every 6 (six) hours as needed for Wheezing. Rescue 100 mL 3    calcium carbonate (TUMS) 200 mg calcium (500 mg) chewable tablet Take 2 tablets by mouth nightly as needed for Heartburn.      ergocalciferol (ERGOCALCIFEROL) 50,000 unit Cap Take 1 capsule (50,000 Units total) by mouth every 7 days. 4 capsule 2    isosorbide mononitrate (IMDUR) 30 MG 24 hr tablet Take 1 tablet (30 mg total) by mouth once daily. 90 tablet 1    thyroid, pork, (ARMOUR THYROID) 60 mg Tab Take 1 tablet (60 mg total) by mouth before breakfast. 90 tablet 1        I have reviewed the current medications, allergies, vital signs, past medical and surgical history, family medical history, and social history for this encounter and agree with all findings.    OBJECTIVE    Physical Exam    BP (!) 150/87   Pulse 63   Wt 92.9 kg (204 lb 12.8 oz)   SpO2 95%   BMI 36.28 kg/m²   GEN: Well appearing, cooperative, NAD  NECK: Supple, no LAD  CV: Normal rate  RESP: Unlabored  ABD: ND, NT, soft, no guarding  EXT: No clubbing, cyanosis, or edema  SKIN: Warm and dry  NEURO: AAO x4.     LABS    CBC (with or without Differential):   Lab Results   Component Value Date    WBC 12.84 (H) 05/05/2025    HGB 14.7 05/05/2025    HCT 46.0 05/05/2025    HCT 40 09/29/2023    MCV 88.3 05/05/2025    MCH 28.2 05/05/2025    MCHC 32.0 05/05/2025    RDW 13.2 05/05/2025     05/05/2025    MPV 10.2 05/05/2025    NEUTOPHILPCT 90.7 (H) 05/05/2025    DIFFTYPE Auto 05/05/2025     BMP/CMP:   Lab Results   Component Value Date      05/05/2025    K 4.0 05/05/2025     05/05/2025    CO2 24 05/05/2025    BUN 10 05/05/2025    CREATININE 1.12 (H) 05/05/2025     (H) 05/05/2025    CALCIUM 10.0 05/05/2025    ALBUMIN 4.7 05/05/2025    AST 22 05/05/2025    ALT 18 05/05/2025    ALKPHOS 100 05/05/2025    MG 2.3 05/05/2025        IMAGING  CT abdomen pelvis with contrast 09/2023  - no acute findings within the abdomen or pelvis    ASSESSMENT  Beverly Rodríguez is a 69 y.o. WF with history of hypertension, diabetes, and celiac disease who is referred for follow-up.    1. Celiac disease    2. Rectal bleeding    3. Change in bowel habits           PLAN    - continue gluten free diet  - recommended colonoscopy today given her change in bowel habits, rectal bleeding, and abdominal pain; however, the patient declined and wishes to try conservative measures to regulate her bowel movements at home prior to pursuing endoscopy  - additional labs today as detailed below  Patient Instructions   - I recommend starting a daily fiber supplement with Citrucel or Fibercon (can purchase at your local pharmacy)  - I recommend that you also use Miralax 17 grams daily-to-twice daily as needed to have a regular, soft BM without straining you can adjust how often you need miralax, but start with daily dosing and go from there  - I recommend drinking at least 60-80 ounces of water daily unless you have a medical condition that requires fluid restriction        Orders Placed This Encounter   Procedures    Iron and TIBC     Standing Status:   Future     Expected Date:   6/11/2025     Expiration Date:   9/11/2026    Ferritin     Standing Status:   Future     Expected Date:   6/11/2025     Expiration Date:   8/11/2026    IgA     Standing Status:   Future     Expected Date:   6/11/2025     Expiration Date:   9/11/2026         The risks and benefits of my recommendations, as well as other treatment options were discussed with the patient today. All questions were  answered.    35 minutes of total time spent on the encounter, which includes face to face time and non-face to face time preparing to see the patient (eg, review of tests), obtaining and/or reviewing separately obtained history, documenting clinical information in the electronic or other health record, Independently interpreting results (not separately reported) and communicating results to the patient/family/caregiver, or care coordination (not separately reported).        Chantal Zarate, DUDLEYP/ACNP  [facility]

## 2025-06-20 ENCOUNTER — PATIENT OUTREACH (OUTPATIENT)
Facility: HOSPITAL | Age: 70
End: 2025-06-20
Payer: MEDICARE

## 2025-06-20 NOTE — PROGRESS NOTES
Population Health Chart Review & Patient Outreach Details    Updates Requested / Reviewed:  [x]  Care Everywhere Updated & Reviewed    [facility] [employer] Gap Report    Health Maintenance Topics Addressed and Outreach Outcomes / Actions Taken:  A1C  eGFR  DM Urine Screen  DM Eye Exam  Statin Therapy [x] Last A1C was on 5/15/25 and last eGFR was on 5/5/25. Upload to compass.     [x] Comment placed in the chart and upcoming appt note on 6/23/25 that pt needs an eye exam and DM Urine Screen.     [x] Chart documentation shows statin intolerance, comment placed in upcoming appt note to drop a code for the kind of statin intolerance the patient has.

## 2025-06-23 ENCOUNTER — OFFICE VISIT (OUTPATIENT)
Dept: FAMILY MEDICINE | Facility: CLINIC | Age: 70
End: 2025-06-23
Payer: MEDICARE

## 2025-06-23 VITALS
OXYGEN SATURATION: 95 % | HEIGHT: 63 IN | TEMPERATURE: 98 F | HEART RATE: 58 BPM | SYSTOLIC BLOOD PRESSURE: 138 MMHG | WEIGHT: 204 LBS | DIASTOLIC BLOOD PRESSURE: 86 MMHG | RESPIRATION RATE: 19 BRPM | BODY MASS INDEX: 36.14 KG/M2

## 2025-06-23 DIAGNOSIS — K90.0 CELIAC DISEASE: ICD-10-CM

## 2025-06-23 DIAGNOSIS — E55.9 VITAMIN D DEFICIENCY: Primary | ICD-10-CM

## 2025-06-23 LAB
FERRITIN SERPL-MCNC: 145 NG/ML (ref 5–204)
IGA SERPL-MCNC: 212 MG/DL (ref 69–517)
IRON SATN MFR SERPL: 35 % (ref 20–50)
IRON SERPL-MCNC: 101 UG/DL (ref 50–170)
TIBC SERPL-MCNC: 189 UG/DL (ref 70–310)
TIBC SERPL-MCNC: 290 UG/DL (ref 250–450)
TRANSFERRIN SERPL-MCNC: 264 MG/DL (ref 173–360)

## 2025-06-23 PROCEDURE — 1160F RVW MEDS BY RX/DR IN RCRD: CPT | Mod: ,,, | Performed by: FAMILY MEDICINE

## 2025-06-23 PROCEDURE — 83540 ASSAY OF IRON: CPT | Mod: ,,, | Performed by: CLINICAL MEDICAL LABORATORY

## 2025-06-23 PROCEDURE — 82728 ASSAY OF FERRITIN: CPT | Mod: ,,, | Performed by: CLINICAL MEDICAL LABORATORY

## 2025-06-23 PROCEDURE — 82784 ASSAY IGA/IGD/IGG/IGM EACH: CPT | Mod: ,,, | Performed by: CLINICAL MEDICAL LABORATORY

## 2025-06-23 PROCEDURE — 3288F FALL RISK ASSESSMENT DOCD: CPT | Mod: ,,, | Performed by: FAMILY MEDICINE

## 2025-06-23 PROCEDURE — 3079F DIAST BP 80-89 MM HG: CPT | Mod: ,,, | Performed by: FAMILY MEDICINE

## 2025-06-23 PROCEDURE — 1159F MED LIST DOCD IN RCRD: CPT | Mod: ,,, | Performed by: FAMILY MEDICINE

## 2025-06-23 PROCEDURE — 3075F SYST BP GE 130 - 139MM HG: CPT | Mod: ,,, | Performed by: FAMILY MEDICINE

## 2025-06-23 PROCEDURE — 1101F PT FALLS ASSESS-DOCD LE1/YR: CPT | Mod: ,,, | Performed by: FAMILY MEDICINE

## 2025-06-23 PROCEDURE — 1126F AMNT PAIN NOTED NONE PRSNT: CPT | Mod: ,,, | Performed by: FAMILY MEDICINE

## 2025-06-23 PROCEDURE — 99213 OFFICE O/P EST LOW 20 MIN: CPT | Mod: ,,, | Performed by: FAMILY MEDICINE

## 2025-06-23 PROCEDURE — 3044F HG A1C LEVEL LT 7.0%: CPT | Mod: ,,, | Performed by: FAMILY MEDICINE

## 2025-06-23 PROCEDURE — 3008F BODY MASS INDEX DOCD: CPT | Mod: ,,, | Performed by: FAMILY MEDICINE

## 2025-06-23 PROCEDURE — 83550 IRON BINDING TEST: CPT | Mod: ,,, | Performed by: CLINICAL MEDICAL LABORATORY

## 2025-06-23 RX ORDER — ERGOCALCIFEROL 1.25 MG/1
50000 CAPSULE ORAL
Qty: 4 CAPSULE | Refills: 1 | Status: SHIPPED | OUTPATIENT
Start: 2025-06-23

## 2025-06-23 RX ORDER — THYROID, PORCINE 15 MG/1
15 TABLET ORAL EVERY MORNING
COMMUNITY
Start: 2025-05-30

## 2025-06-23 NOTE — PROGRESS NOTES
Beverly Rodríguez is a 69 y.o. female seen today for follow-up on her history of type 2 diabetes.  Currently her A1c is normal and I do not see any evidence of prediabetes.  Patient also I started her vitamin-D replacement as well as B12 replacement in his tolerating this well.  Her blood pressures are to goal she has had no chest pain or shortness for breath.  She reports her sense of unsteadiness has improved with a B12 and vitamin-D replacement.  Unfortunately the patient threw away her bottle with her refills and I have reordered her vitamin-D today.    Past Medical History:   Diagnosis Date    Abnormal LFTs     Asthma     ? diagnosis    Hypertension     Hypothyroidism     from birth    Nausea vomiting and diarrhea 02/07/2022     Family History   Problem Relation Name Age of Onset    Heart disease Mother      Kidney disease Mother      Heart disease Father      Cancer Paternal Aunt       Medications Ordered Prior to Encounter[1]    There is no immunization history on file for this patient.    Review of Systems   Constitutional:  Positive for malaise/fatigue. Negative for fever and weight loss.   Respiratory:  Negative for shortness of breath.    Cardiovascular:  Negative for chest pain and palpitations.   Gastrointestinal:  Negative for nausea and vomiting.   Psychiatric/Behavioral:  Negative for depression.         Vitals:    06/23/25 0951   BP: 138/86   Pulse: (!) 58   Resp: 19   Temp: 97.7 °F (36.5 °C)       Physical Exam  Eyes:      Conjunctiva/sclera: Conjunctivae normal.   Pulmonary:      Effort: Pulmonary effort is normal.   Neurological:      Mental Status: She is alert.   Psychiatric:         Mood and Affect: Mood normal.         Behavior: Behavior normal.         Thought Content: Thought content normal.         Judgment: Judgment normal.          Assessment and Plan  1. Vitamin D deficiency  -     Cancel: Vitamin D; Future; Expected date: 06/23/2025  -     ergocalciferol (ERGOCALCIFEROL) 50,000 unit Cap;  Take 1 capsule (50,000 Units total) by mouth every 7 days.  Dispense: 4 capsule; Refill: 1    2. Celiac disease  -     Iron and TIBC  -     Ferritin  -     IgA             Return to clinic in 3 months fasting for lab work to include a vitamin-D B12 folate homocystine and lipids.    Health Maintenance Topics with due status: Not Due       Topic Last Completion Date    Colorectal Cancer Screening 12/29/2021    Influenza Vaccine 11/04/2022    DEXA Scan 12/20/2023    Mammogram 11/04/2024    Lipid Panel 05/15/2025    Hemoglobin A1c 05/15/2025              [1]   Current Outpatient Medications on File Prior to Visit   Medication Sig Dispense Refill    albuterol (PROVENTIL) 2.5 mg /3 mL (0.083 %) nebulizer solution Take 3 mLs (2.5 mg total) by nebulization every 6 (six) hours as needed for Wheezing. Rescue 100 mL 3    ARMOUR THYROID 15 mg Tab Take 15 mg by mouth every morning.      calcium carbonate (TUMS) 200 mg calcium (500 mg) chewable tablet Take 2 tablets by mouth nightly as needed for Heartburn.      isosorbide mononitrate (IMDUR) 30 MG 24 hr tablet Take 1 tablet (30 mg total) by mouth once daily. 90 tablet 1    thyroid, pork, (ARMOUR THYROID) 60 mg Tab Take 1 tablet (60 mg total) by mouth before breakfast. 90 tablet 1    [DISCONTINUED] ergocalciferol (ERGOCALCIFEROL) 50,000 unit Cap Take 1 capsule (50,000 Units total) by mouth every 7 days. (Patient not taking: Reported on 6/23/2025) 4 capsule 2     No current facility-administered medications on file prior to visit.

## 2025-06-24 ENCOUNTER — TELEPHONE (OUTPATIENT)
Dept: GASTROENTEROLOGY | Facility: CLINIC | Age: 70
End: 2025-06-24
Payer: MEDICARE

## 2025-06-24 ENCOUNTER — RESULTS FOLLOW-UP (OUTPATIENT)
Dept: GASTROENTEROLOGY | Facility: CLINIC | Age: 70
End: 2025-06-24

## 2025-06-24 NOTE — TELEPHONE ENCOUNTER
----- Message from ALICIA Lee sent at 6/24/2025 10:04 AM CDT -----  Labs are ok  ----- Message -----  From: Lab, Background User  Sent: 6/23/2025   7:16 PM CDT  To: ALICIA Lee

## 2025-06-24 NOTE — TELEPHONE ENCOUNTER
----- Message from ALICIA Lee sent at 6/24/2025  3:34 PM CDT -----  Adding gluten back could potentially cause side affects and would not be recommended.  ----- Message -----  From: Deb Gibson LPN  Sent: 6/24/2025  11:26 AM CDT  To: ALICIA Lee    Patient stated that she has limited gluten and not had a reaction. She was wanting to know if it was ok to have gluten again since the IgA was ok?

## 2025-06-30 ENCOUNTER — EXTERNAL CHRONIC CARE MANAGEMENT (OUTPATIENT)
Dept: FAMILY MEDICINE | Facility: CLINIC | Age: 70
End: 2025-06-30
Payer: MEDICARE

## 2025-06-30 PROCEDURE — 99490 CHRNC CARE MGMT STAFF 1ST 20: CPT | Mod: ,,, | Performed by: FAMILY MEDICINE

## 2025-08-25 ENCOUNTER — PATIENT MESSAGE (OUTPATIENT)
Facility: HOSPITAL | Age: 70
End: 2025-08-25
Payer: MEDICARE

## (undated) DEVICE — SET IV PRIMARY

## (undated) DEVICE — CHLORAPREP 10.5 ML APPLICATOR

## (undated) DEVICE — OXISENSOR ADULT DIGIT N/S

## (undated) DEVICE — NDL PERCUTANEOUS ENTRYBSDN 18

## (undated) DEVICE — DRESSING TRANS 4X4 TEGADERM

## (undated) DEVICE — INTRODUCER CATH 6F 11CM

## (undated) DEVICE — CONTRAST ISOVUE 370 100ML

## (undated) DEVICE — ETCO2 NC MICROSTR FEM ST ADLT

## (undated) DEVICE — SET EXTENSION CLEARLINK 2INJ

## (undated) DEVICE — CATH DIAG IMPULSE 6FR FR4

## (undated) DEVICE — Device

## (undated) DEVICE — CATH IMPULSE PIGTAIL 6F 110CM

## (undated) DEVICE — CATH DIAG IMPULSE 6FR FL4

## (undated) DEVICE — CLIPPER BLADE MOD 4406 (CAREF)

## (undated) DEVICE — SYR MED RAD 150ML

## (undated) DEVICE — PROTECTOR ULNAR NERVE FOAM

## (undated) DEVICE — DECANTER FLUID TRNSF WHITE 9IN

## (undated) DEVICE — GLOVE BIOGEL SKINSENSE PI 7.0

## (undated) DEVICE — SOL IRR SOD CHL .9% POUR

## (undated) DEVICE — GLOVE SURG BIOGEL LATEX SZ 7.5

## (undated) DEVICE — CLOTH READYPREP 2%CHG 9X10.5IN